# Patient Record
Sex: FEMALE | Race: WHITE | NOT HISPANIC OR LATINO | URBAN - METROPOLITAN AREA
[De-identification: names, ages, dates, MRNs, and addresses within clinical notes are randomized per-mention and may not be internally consistent; named-entity substitution may affect disease eponyms.]

---

## 2017-01-20 ENCOUNTER — EMERGENCY (EMERGENCY)
Facility: HOSPITAL | Age: 57
LOS: 0 days | Discharge: HOME | End: 2017-01-21
Admitting: EMERGENCY MEDICINE
Payer: COMMERCIAL

## 2017-01-20 DIAGNOSIS — R50.9 FEVER, UNSPECIFIED: ICD-10-CM

## 2017-01-20 DIAGNOSIS — E86.0 DEHYDRATION: ICD-10-CM

## 2017-01-20 DIAGNOSIS — N39.0 URINARY TRACT INFECTION, SITE NOT SPECIFIED: ICD-10-CM

## 2017-01-20 DIAGNOSIS — J02.9 ACUTE PHARYNGITIS, UNSPECIFIED: ICD-10-CM

## 2017-01-20 DIAGNOSIS — J02.8 ACUTE PHARYNGITIS DUE TO OTHER SPECIFIED ORGANISMS: ICD-10-CM

## 2017-01-20 PROCEDURE — 99284 EMERGENCY DEPT VISIT MOD MDM: CPT | Mod: 25

## 2017-01-20 PROCEDURE — 71020: CPT | Mod: 26

## 2017-04-06 ENCOUNTER — APPOINTMENT (OUTPATIENT)
Dept: GASTROENTEROLOGY | Facility: CLINIC | Age: 57
End: 2017-04-06

## 2017-04-06 VITALS
SYSTOLIC BLOOD PRESSURE: 135 MMHG | WEIGHT: 184 LBS | OXYGEN SATURATION: 97 % | BODY MASS INDEX: 30.66 KG/M2 | HEIGHT: 65 IN | DIASTOLIC BLOOD PRESSURE: 85 MMHG | HEART RATE: 81 BPM

## 2017-04-06 DIAGNOSIS — R93.2 ABNORMAL FINDINGS ON DIAGNOSTIC IMAGING OF LIVER AND BILIARY TRACT: ICD-10-CM

## 2017-04-06 DIAGNOSIS — R94.8 ABNORMAL RESULTS OF FUNCTION STUDIES OF OTHER ORGANS AND SYSTEMS: ICD-10-CM

## 2017-04-12 ENCOUNTER — APPOINTMENT (OUTPATIENT)
Dept: UROLOGY | Facility: CLINIC | Age: 57
End: 2017-04-12

## 2017-05-05 ENCOUNTER — INPATIENT (INPATIENT)
Facility: HOSPITAL | Age: 57
LOS: 24 days | Discharge: HOME | End: 2017-05-30
Attending: PSYCHIATRY & NEUROLOGY | Admitting: PSYCHIATRY & NEUROLOGY
Payer: COMMERCIAL

## 2017-05-05 VITALS
TEMPERATURE: 98 F | RESPIRATION RATE: 15 BRPM | HEIGHT: 64 IN | SYSTOLIC BLOOD PRESSURE: 157 MMHG | OXYGEN SATURATION: 100 % | WEIGHT: 145.06 LBS | DIASTOLIC BLOOD PRESSURE: 86 MMHG | HEART RATE: 78 BPM

## 2017-05-05 DIAGNOSIS — F30.2 MANIC EPISODE, SEVERE WITH PSYCHOTIC SYMPTOMS: ICD-10-CM

## 2017-05-05 DIAGNOSIS — R69 ILLNESS, UNSPECIFIED: ICD-10-CM

## 2017-05-05 LAB
ALBUMIN SERPL ELPH-MCNC: 3.5 G/DL — SIGNIFICANT CHANGE UP (ref 3.3–5)
ALP SERPL-CCNC: 166 U/L — HIGH (ref 40–120)
ALT FLD-CCNC: 86 U/L — HIGH (ref 12–78)
AMPHET UR-MCNC: NEGATIVE — SIGNIFICANT CHANGE UP
ANION GAP SERPL CALC-SCNC: 13 MMOL/L — SIGNIFICANT CHANGE UP (ref 5–17)
APAP SERPL-MCNC: <2 UG/ML — LOW (ref 10–30)
APPEARANCE UR: (no result)
AST SERPL-CCNC: 49 U/L — HIGH (ref 15–37)
BACTERIA # UR AUTO: (no result)
BARBITURATES UR SCN-MCNC: NEGATIVE — SIGNIFICANT CHANGE UP
BASOPHILS # BLD AUTO: 0.1 K/UL — SIGNIFICANT CHANGE UP (ref 0–0.2)
BASOPHILS NFR BLD AUTO: 1.4 % — SIGNIFICANT CHANGE UP (ref 0–2)
BENZODIAZ UR-MCNC: NEGATIVE — SIGNIFICANT CHANGE UP
BILIRUB SERPL-MCNC: 0.4 MG/DL — SIGNIFICANT CHANGE UP (ref 0.2–1.2)
BILIRUB UR-MCNC: NEGATIVE — SIGNIFICANT CHANGE UP
BUN SERPL-MCNC: 32 MG/DL — HIGH (ref 7–23)
CALCIUM SERPL-MCNC: 9.4 MG/DL — SIGNIFICANT CHANGE UP (ref 8.5–10.1)
CHLORIDE SERPL-SCNC: 109 MMOL/L — HIGH (ref 96–108)
CO2 SERPL-SCNC: 20 MMOL/L — LOW (ref 22–31)
COCAINE METAB.OTHER UR-MCNC: NEGATIVE — SIGNIFICANT CHANGE UP
COLOR SPEC: YELLOW — SIGNIFICANT CHANGE UP
CREAT SERPL-MCNC: 1.7 MG/DL — HIGH (ref 0.5–1.3)
DIFF PNL FLD: (no result)
EOSINOPHIL # BLD AUTO: 0.1 K/UL — SIGNIFICANT CHANGE UP (ref 0–0.5)
EOSINOPHIL NFR BLD AUTO: 1.7 % — SIGNIFICANT CHANGE UP (ref 0–6)
EPI CELLS # UR: (no result)
ETHANOL SERPL-MCNC: <10 MG/DL — SIGNIFICANT CHANGE UP (ref 0–10)
GLUCOSE SERPL-MCNC: 103 MG/DL — HIGH (ref 70–99)
GLUCOSE UR QL: NEGATIVE MG/DL — SIGNIFICANT CHANGE UP
HCG SERPL-ACNC: 4 MIU/ML — SIGNIFICANT CHANGE UP
HCT VFR BLD CALC: 36.4 % — SIGNIFICANT CHANGE UP (ref 34.5–45)
HGB BLD-MCNC: 11.9 G/DL — SIGNIFICANT CHANGE UP (ref 11.5–15.5)
KETONES UR-MCNC: NEGATIVE — SIGNIFICANT CHANGE UP
LEUKOCYTE ESTERASE UR-ACNC: (no result)
LITHIUM SERPL-MCNC: <0.2 MMOL/L — LOW (ref 0.6–1.2)
LYMPHOCYTES # BLD AUTO: 1.1 K/UL — SIGNIFICANT CHANGE UP (ref 1–3.3)
LYMPHOCYTES # BLD AUTO: 12.9 % — LOW (ref 13–44)
MCHC RBC-ENTMCNC: 28.3 PG — SIGNIFICANT CHANGE UP (ref 27–34)
MCHC RBC-ENTMCNC: 32.6 GM/DL — SIGNIFICANT CHANGE UP (ref 32–36)
MCV RBC AUTO: 86.8 FL — SIGNIFICANT CHANGE UP (ref 80–100)
METHADONE UR-MCNC: NEGATIVE — SIGNIFICANT CHANGE UP
MONOCYTES # BLD AUTO: 0.5 K/UL — SIGNIFICANT CHANGE UP (ref 0–0.9)
MONOCYTES NFR BLD AUTO: 5.8 % — SIGNIFICANT CHANGE UP (ref 2–14)
NEUTROPHILS # BLD AUTO: 6.4 K/UL — SIGNIFICANT CHANGE UP (ref 1.8–7.4)
NEUTROPHILS NFR BLD AUTO: 78.1 % — HIGH (ref 43–77)
NITRITE UR-MCNC: NEGATIVE — SIGNIFICANT CHANGE UP
OPIATES UR-MCNC: NEGATIVE — SIGNIFICANT CHANGE UP
PCP SPEC-MCNC: SIGNIFICANT CHANGE UP
PCP UR-MCNC: NEGATIVE — SIGNIFICANT CHANGE UP
PH UR: 6 — SIGNIFICANT CHANGE UP (ref 5–8)
PLATELET # BLD AUTO: 212 K/UL — SIGNIFICANT CHANGE UP (ref 150–400)
POTASSIUM SERPL-MCNC: 3.9 MMOL/L — SIGNIFICANT CHANGE UP (ref 3.5–5.3)
POTASSIUM SERPL-SCNC: 3.9 MMOL/L — SIGNIFICANT CHANGE UP (ref 3.5–5.3)
PROT SERPL-MCNC: 7.5 GM/DL — SIGNIFICANT CHANGE UP (ref 6–8.3)
PROT UR-MCNC: 100 MG/DL
RBC # BLD: 4.19 M/UL — SIGNIFICANT CHANGE UP (ref 3.8–5.2)
RBC # FLD: 12.2 % — SIGNIFICANT CHANGE UP (ref 10.3–14.5)
RBC CASTS # UR COMP ASSIST: SIGNIFICANT CHANGE UP /HPF (ref 0–4)
SALICYLATES SERPL-MCNC: <1.7 MG/DL — LOW (ref 2.8–20)
SODIUM SERPL-SCNC: 142 MMOL/L — SIGNIFICANT CHANGE UP (ref 135–145)
SP GR SPEC: 1.01 — SIGNIFICANT CHANGE UP (ref 1.01–1.02)
THC UR QL: NEGATIVE — SIGNIFICANT CHANGE UP
UROBILINOGEN FLD QL: NEGATIVE MG/DL — SIGNIFICANT CHANGE UP
WBC # BLD: 8.2 K/UL — SIGNIFICANT CHANGE UP (ref 3.8–10.5)
WBC # FLD AUTO: 8.2 K/UL — SIGNIFICANT CHANGE UP (ref 3.8–10.5)
WBC UR QL: SIGNIFICANT CHANGE UP

## 2017-05-05 PROCEDURE — 99285 EMERGENCY DEPT VISIT HI MDM: CPT

## 2017-05-05 PROCEDURE — 93010 ELECTROCARDIOGRAM REPORT: CPT

## 2017-05-05 RX ORDER — HALOPERIDOL DECANOATE 100 MG/ML
5 INJECTION INTRAMUSCULAR ONCE
Qty: 0 | Refills: 0 | Status: COMPLETED | OUTPATIENT
Start: 2017-05-05 | End: 2017-05-05

## 2017-05-05 RX ORDER — DIPHENHYDRAMINE HCL 50 MG
50 CAPSULE ORAL ONCE
Qty: 0 | Refills: 0 | Status: DISCONTINUED | OUTPATIENT
Start: 2017-05-05 | End: 2017-05-30

## 2017-05-05 RX ORDER — DOCUSATE SODIUM 100 MG
100 CAPSULE ORAL DAILY
Qty: 0 | Refills: 0 | Status: DISCONTINUED | OUTPATIENT
Start: 2017-05-05 | End: 2017-05-19

## 2017-05-05 RX ORDER — MAGNESIUM HYDROXIDE 400 MG/1
30 TABLET, CHEWABLE ORAL DAILY
Qty: 0 | Refills: 0 | Status: DISCONTINUED | OUTPATIENT
Start: 2017-05-05 | End: 2017-05-30

## 2017-05-05 RX ORDER — HALOPERIDOL DECANOATE 100 MG/ML
5 INJECTION INTRAMUSCULAR ONCE
Qty: 0 | Refills: 0 | Status: COMPLETED | OUTPATIENT
Start: 2017-05-05 | End: 2017-05-06

## 2017-05-05 RX ORDER — ACETAMINOPHEN 500 MG
650 TABLET ORAL EVERY 6 HOURS
Qty: 0 | Refills: 0 | Status: DISCONTINUED | OUTPATIENT
Start: 2017-05-05 | End: 2017-05-18

## 2017-05-05 RX ORDER — QUETIAPINE FUMARATE 200 MG/1
50 TABLET, FILM COATED ORAL AT BEDTIME
Qty: 0 | Refills: 0 | Status: DISCONTINUED | OUTPATIENT
Start: 2017-05-05 | End: 2017-05-06

## 2017-05-05 RX ADMIN — Medication 2 MILLIGRAM(S): at 12:00

## 2017-05-05 RX ADMIN — HALOPERIDOL DECANOATE 5 MILLIGRAM(S): 100 INJECTION INTRAMUSCULAR at 12:00

## 2017-05-05 RX ADMIN — QUETIAPINE FUMARATE 50 MILLIGRAM(S): 200 TABLET, FILM COATED ORAL at 20:20

## 2017-05-05 NOTE — ED BEHAVIORAL HEALTH ASSESSMENT NOTE - DESCRIPTION
Agitated,  acutely manic, uncooperative with interview due to paranoia, rambling pressures speech, illogical, delusional, thoughts feels  is stealing from her and neighbors are involved,  appears internally preoccupied, high energy.  Per police report Pt made suicidal statement about  ending it all". "stage 3 renal disease, elevated liver enzymes, MGUS,, breast Ca DSIS left breast, Cardiac Murmur, DJD back, GERD, Lymes Disoease, Uterine Leiomyoma, tonsilectomy lives with , no children, graduated [employer] and has some college.  Pt has own insurance co but can not function since stopping meds

## 2017-05-05 NOTE — ED PROVIDER NOTE - PMH
Bipolar disorder    Breast CA  DCIS, left breast, s/p RT  Cardiac murmur    Degenerative disc disease, lumbar    Depression    GERD (gastroesophageal reflux disease)    Kidney disease  "stage 3" as per patient secondary to lithium treatment many years ago  Lyme disease    Uterine leiomyoma

## 2017-05-05 NOTE — ED ADULT NURSE REASSESSMENT NOTE - NS ED NURSE REASSESS COMMENT FT1
Pt had to be chemically restrained for agitation and aggression towards staff. Pt spoken to by several staff members prior to incident without change. Pt in room at this time, blood work drawn and sent, pending results. Will continue to monitor for safety and comfort.

## 2017-05-05 NOTE — ED BEHAVIORAL HEALTH ASSESSMENT NOTE - OTHER PAST PSYCHIATRIC HISTORY (INCLUDE DETAILS REGARDING ONSET, COURSE OF ILLNESS, INPATIENT/OUTPATIENT TREATMENT)
multiple psych admission last at  March of 2016 and in 2013 in [facility].  Last psych provider is Dr Darryl Gant in [place] multiple psych admission last at  March of 2016 and in 2015 in [facility] to [place].  Last psych provider is Dr Darryl Gant in [place]

## 2017-05-05 NOTE — ED PROVIDER NOTE - PSH
S/P coronary angiogram  10 yrs ago, no intervention  S/P dilation and curettage  uterine polyps  S/P lumpectomy of breast  2011, left breast, no lymph nodes removed  S/P tonsillectomy

## 2017-05-05 NOTE — ED PROVIDER NOTE - PROGRESS NOTE DETAILS
Pt appears acutely psychotic. Unable to redirect verbally. Will use haldol and ativan to treat for psychosis and aggression. Pt evaluated by psych NPTommy. Pt will likely need involuntary admission. Awaiting labs for medical clearance. Pt is now sleeping. Haldol and ativan has worked for pt's sharlene. Physical exam is nonfocal.

## 2017-05-05 NOTE — ED PROVIDER NOTE - MEDICAL DECISION MAKING DETAILS
57 yo pt with hx bipolar.  pt presents with possible manic episode.  pt will in psych admission for evaluation and work up.

## 2017-05-05 NOTE — ED PROVIDER NOTE - NS ED MD SCRIBE ATTENDING SCRIBE SECTIONS
PHYSICAL EXAM/RESULTS/REVIEW OF SYSTEMS/DISPOSITION/PROGRESS NOTE/PAST MEDICAL/SURGICAL/SOCIAL HISTORY/HISTORY OF PRESENT ILLNESS

## 2017-05-05 NOTE — ED BEHAVIORAL HEALTH ASSESSMENT NOTE - SUMMARY
Pt is a 89 yowmf with PPH Bipolar and is noncompliant with meds due to medical comorbidities.  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  Pt refuses psych treatment and her meds and has tried alternativem but unsuccessful results.  Pt is agitated and psychotic and will require inpt psych admission for mood instability and psychosis and is a danger to self and others.  Admission will be  a [phone] to Dr. Pappas.  Case reviewed with Dr White.
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
## 2017-05-05 NOTE — PATIENT PROFILE BEHAVIORAL HEALTH - NS TRANSFER PATIENT BELONGINGS
pt has yellow rings on her person/silver bracl. in [employer] [address] [employer]/Clothing/Jewelry/Money (specify) pt has 2 yellow rings and 2 grey rings on her person/silver bracl. in [address] [employer]/Jewelry/Money (specify)/Clothing

## 2017-05-05 NOTE — ED PROVIDER NOTE - PSYCHIATRIC, MLM
Agitated. Unable to get close to pt. Pressured speech. Paranoid. Flight of ideas. Tangential when speaking.

## 2017-05-05 NOTE — ED PROVIDER NOTE - OBJECTIVE STATEMENT
55 y/o female with a h/o bipolar disorder, presenting to ED for agitation, paranoia and delusions. Pt denies being depressed. Pt says her  told her to "check into the hospital". She is stating that her psych medications will not help her and she will not take them. Pt states she has been "trying to get out of the bad environment" she was in due to her  and parents. She reports "stress at work", and states that she has no friends. Pt claims she has been staying in hotels.

## 2017-05-06 DIAGNOSIS — F31.2 BIPOLAR DISORDER, CURRENT EPISODE MANIC SEVERE WITH PSYCHOTIC FEATURES: ICD-10-CM

## 2017-05-06 DIAGNOSIS — Z63.9 PROBLEM RELATED TO PRIMARY SUPPORT GROUP, UNSPECIFIED: ICD-10-CM

## 2017-05-06 DIAGNOSIS — F31.5 BIPOLAR DISORDER, CURRENT EPISODE DEPRESSED, SEVERE, WITH PSYCHOTIC FEATURES: ICD-10-CM

## 2017-05-06 DIAGNOSIS — N28.9 DISORDER OF KIDNEY AND URETER, UNSPECIFIED: ICD-10-CM

## 2017-05-06 DIAGNOSIS — Z91.19 PATIENT'S NONCOMPLIANCE WITH OTHER MEDICAL TREATMENT AND REGIMEN: ICD-10-CM

## 2017-05-06 LAB
ALBUMIN SERPL ELPH-MCNC: 2.7 G/DL — LOW (ref 3.3–5)
ALP SERPL-CCNC: 130 U/L — HIGH (ref 40–120)
ALT FLD-CCNC: 64 U/L — SIGNIFICANT CHANGE UP (ref 12–78)
AST SERPL-CCNC: 34 U/L — SIGNIFICANT CHANGE UP (ref 15–37)
BILIRUB DIRECT SERPL-MCNC: <0.1 MG/DL — SIGNIFICANT CHANGE UP (ref 0–0.2)
BILIRUB INDIRECT FLD-MCNC: >0.2 MG/DL — SIGNIFICANT CHANGE UP (ref 0.2–1)
BILIRUB SERPL-MCNC: 0.3 MG/DL — SIGNIFICANT CHANGE UP (ref 0.2–1.2)
CHOLEST SERPL-MCNC: 197 MG/DL — SIGNIFICANT CHANGE UP (ref 10–199)
HCG SERPL-ACNC: 4 MIU/ML — SIGNIFICANT CHANGE UP
HDLC SERPL-MCNC: 67 MG/DL — SIGNIFICANT CHANGE UP (ref 40–125)
LIPID PNL WITH DIRECT LDL SERPL: 113 MG/DL — SIGNIFICANT CHANGE UP
PROT SERPL-MCNC: 6.3 GM/DL — SIGNIFICANT CHANGE UP (ref 6–8.3)
TOTAL CHOLESTEROL/HDL RATIO MEASUREMENT: 2.9 RATIO — LOW (ref 3.3–7.1)
TRIGL SERPL-MCNC: 87 MG/DL — SIGNIFICANT CHANGE UP (ref 10–149)
TSH SERPL-MCNC: 1.86 UU/ML — SIGNIFICANT CHANGE UP (ref 0.36–3.74)

## 2017-05-06 RX ORDER — HALOPERIDOL DECANOATE 100 MG/ML
5 INJECTION INTRAMUSCULAR EVERY 6 HOURS
Qty: 0 | Refills: 0 | Status: DISCONTINUED | OUTPATIENT
Start: 2017-05-06 | End: 2017-05-30

## 2017-05-06 RX ORDER — QUETIAPINE FUMARATE 200 MG/1
100 TABLET, FILM COATED ORAL AT BEDTIME
Qty: 0 | Refills: 0 | Status: DISCONTINUED | OUTPATIENT
Start: 2017-05-06 | End: 2017-05-07

## 2017-05-06 RX ADMIN — HALOPERIDOL DECANOATE 5 MILLIGRAM(S): 100 INJECTION INTRAMUSCULAR at 10:24

## 2017-05-06 RX ADMIN — QUETIAPINE FUMARATE 100 MILLIGRAM(S): 200 TABLET, FILM COATED ORAL at 21:54

## 2017-05-06 RX ADMIN — Medication 2 MILLIGRAM(S): at 10:25

## 2017-05-06 SDOH — SOCIAL STABILITY - SOCIAL INSECURITY: PROBLEM RELATED TO PRIMARY SUPPORT GROUP, UNSPECIFIED: Z63.9

## 2017-05-06 NOTE — BEHAVIORAL HEALTH ASSESSMENT NOTE - NSBHMEDSOTHERFT_PSY_A_CORE
as above  Lithium and Depakote both DC'd 2-3 months ago due to pt adverse effects  Seroquel 600 mg po qhs ( also effective but also apparently DC'd as above)

## 2017-05-06 NOTE — BEHAVIORAL HEALTH ASSESSMENT NOTE - NSBHCHARTREVIEWVS_PSY_A_CORE FT
Vital Signs Last 24 Hrs  T(C): 36.6, Max: 36.6 (05-06 @ 08:21)  T(F): 97.9, Max: 97.9 (05-06 @ 08:21)  HR: 82 (82 - 82)  BP: 121/82 (121/82 - 121/82)  BP(mean): --  RR: 16 (16 - 16)  SpO2: 100% (100% - 100%)

## 2017-05-06 NOTE — BEHAVIORAL HEALTH ASSESSMENT NOTE - NSBHADMBHPROVCNTCTNOFT_PSY_A_CORE
pt has not seen any outpatient provider in months. Plan to contact last provider when pt is well enough to give verbal consent
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
## 2017-05-06 NOTE — BEHAVIORAL HEALTH ASSESSMENT NOTE - NSBHCHARTREVIEWLAB_PSY_A_CORE FT
CBC Full  -  ( 05 May 2017 12:26 )  WBC Count : 8.2 K/uL  Hemoglobin : 11.9 g/dL  Hematocrit : 36.4 %  Platelet Count - Automated : 212 K/uL  Mean Cell Volume : 86.8 fl  Mean Cell Hemoglobin : 28.3 pg  Mean Cell Hemoglobin Concentration : 32.6 gm/dL  Auto Neutrophil # : 6.4 K/uL  Auto Lymphocyte # : 1.1 K/uL  Auto Monocyte # : 0.5 K/uL  Auto Eosinophil # : 0.1 K/uL  Auto Basophil # : 0.1 K/uL  Auto Neutrophil % : 78.1 %  Auto Lymphocyte % : 12.9 %  Auto Monocyte % : 5.8 %  Auto Eosinophil % : 1.7 %  Auto Basophil % : 1.4 %        142  |  109<H>  |  32<H>  ----------------------------<  103<H>  3.9   |  20<L>  |  1.70<H>    Ca    9.4      05 May 2017 12:26    TPro  6.3  /  Alb  2.7<L>  /  TBili  0.3  /  DBili  <0.1  /  AST  34  /  ALT  64  /  AlkPhos  130<H>        Urinalysis Basic - ( 05 May 2017 12:22 )    Color: Yellow / Appearance: Slightly Turbid / S.010 / pH: x  Gluc: x / Ketone: Negative  / Bili: Negative / Urobili: Negative mg/dL   Blood: x / Protein: 100 mg/dL / Nitrite: Negative   Leuk Esterase: Moderate / RBC: 0-2 /HPF / WBC 3-5   Sq Epi: x / Non Sq Epi: Moderate / Bacteria: Few

## 2017-05-06 NOTE — BEHAVIORAL HEALTH ASSESSMENT NOTE - OTHER PAST PSYCHIATRIC HISTORY (INCLUDE DETAILS REGARDING ONSET, COURSE OF ILLNESS, INPATIENT/OUTPATIENT TREATMENT)
see ED HPI  Pt with a h/o prior inpatient admissions due to manic psychosis in the context of clinical relapse  Pt admitted to [facility] in 3/2016   Admission to [facility] in 2015  Pt h/o outpatient treatment with Dr Darryl Gant in [place]

## 2017-05-06 NOTE — BEHAVIORAL HEALTH ASSESSMENT NOTE - NSBHADMITIPDSM4_PSY_A_CORE FT
ongoing psychosocial stressors largely in the context of pt's h/o treatment noncompliance with subsequent clinical relapse

## 2017-05-06 NOTE — BEHAVIORAL HEALTH ASSESSMENT NOTE - PROBLEM SELECTOR PLAN 2
1. Ongoing pt staff support and pt psychoeducation related to treatment of bipolar d/o including various treatment options.

## 2017-05-06 NOTE — BEHAVIORAL HEALTH ASSESSMENT NOTE - PROBLEM SELECTOR PLAN 1
1. Restart Seroquel 50 mg po qhs with slow titration and monitoring of CMP and LFTs   2. Unable to resume Lithium due to renal disease  3.Depakote also stopped due to prior elevated LFTs  4.To gather more clinical information from collaterals to aid in treatment and DC planning  5.Pt to attend therapy groups when clinicall more stable and able to meaningfully participate

## 2017-05-06 NOTE — BEHAVIORAL HEALTH ASSESSMENT NOTE - PROBLEM SELECTOR PLAN 3
1. Hospitalist H and P pending  2.To gather further pt PMH to aid in diagnosis and treatment of the pt's severe bipolar d/o

## 2017-05-06 NOTE — BEHAVIORAL HEALTH ASSESSMENT NOTE - NSBHSUICRISKFACTOR_PSY_A_CORE
Unable to engage in safety planning/Impulsivity/Agitation/severe anxiety/Mood episode/Chronic pain or acute medical issue

## 2017-05-06 NOTE — BEHAVIORAL HEALTH ASSESSMENT NOTE - OTHER
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

## 2017-05-06 NOTE — BEHAVIORAL HEALTH ASSESSMENT NOTE - NSBHSUICPROTECTFACT_PSY_A_CORE
Future oriented/Other/Responsibility to family and others/Identifies reasons for living/Supportive social network or family

## 2017-05-06 NOTE — BEHAVIORAL HEALTH ASSESSMENT NOTE - NSBHADMITIPSTRENGTH_PSY_A_CORE
Able to set and pursue goals/Has access to housing/residential stability/Able to exercise self-direction/other/Creative/Intelligent/Has supportive interpersonal relationships with family, friends or peers/Attempting to realize his/her potential/Has vocational interests or hobbies

## 2017-05-06 NOTE — BEHAVIORAL HEALTH ASSESSMENT NOTE - PRIMARY DX
Manic episode, severe with psychotic symptoms Bipolar affective disorder, manic, severe, with psychotic behavior

## 2017-05-06 NOTE — BEHAVIORAL HEALTH ASSESSMENT NOTE - AXIS III
Stage 3 renal disease, elevated liver enzymes, GERD, MGUS Stage 3 renal disease, elevated liver enzymes, GERD, MGUS  Left breast cancer s/p DSIS  DJD  back, h/o uterine leiomyoma, h/o Lyme disease

## 2017-05-06 NOTE — BEHAVIORAL HEALTH ASSESSMENT NOTE - DESCRIPTION
"stage 3 renal disease, elevated liver enzymes, MGUS,, breast Ca DSIS left breast, Cardiac Murmur, DJD back, GERD, Lymes Disoease, Uterine Leiomyoma, tonsilectomy

## 2017-05-06 NOTE — BEHAVIORAL HEALTH ASSESSMENT NOTE - PROBLEM SELECTOR PLAN 4
1.When pt is again more clinically stable, to address with pt and her  the tension surrounding the pt's h/o treatment noncompliance and the resultant strain on both their lives  2.JEF discussion and group resources etc for both pt and her

## 2017-05-06 NOTE — BEHAVIORAL HEALTH ASSESSMENT NOTE - NSBHVIOLRISKFACTORFT_PSY_A_CORE
when bipolar manic and psychotic in the context of recent need to DC previously effective medications due to adverse effects ( Stage 3 renal disease and recent h/o elevated LFTs .) LFTs now largely normalized

## 2017-05-06 NOTE — BEHAVIORAL HEALTH ASSESSMENT NOTE - NSBHCHARTREVIEWINVESTIGATE_PSY_A_CORE FT
MEDICATIONS  (STANDING):  docusate sodium 100milliGRAM(s) Oral daily  QUEtiapine 50milliGRAM(s) Oral at bedtime    MEDICATIONS  (PRN):  diphenhydrAMINE   Injectable 50milliGRAM(s) IntraMuscular once PRN Agitation  LORazepam     Tablet 2milliGRAM(s) Oral every 6 hours PRN Agitation  acetaminophen   Tablet 650milliGRAM(s) Oral every 6 hours PRN For Temp greater than 38 C (100.4 F)  aluminum hydroxide/magnesium hydroxide/simethicone Suspension 30milliLiter(s) Oral every 6 hours PRN Dyspepsia  magnesium hydroxide Suspension 30milliLiter(s) Oral daily PRN Constipation  haloperidol    Injectable 5milliGRAM(s) IntraMuscular every 6 hours PRN Agitation due to bipolar manic psychosis  LORazepam   Injectable 2milliGRAM(s) IntraMuscular every 6 hours PRN Agitation

## 2017-05-06 NOTE — BEHAVIORAL HEALTH ASSESSMENT NOTE - NSBHADMITTHERAPIESTARGET_PSY_A_CORE FT
to stabilze mood and to decrease thought disorganiztion to stabilze mood and to decrease thought disorganization

## 2017-05-07 RX ORDER — QUETIAPINE FUMARATE 200 MG/1
150 TABLET, FILM COATED ORAL AT BEDTIME
Qty: 0 | Refills: 0 | Status: DISCONTINUED | OUTPATIENT
Start: 2017-05-07 | End: 2017-05-09

## 2017-05-07 RX ADMIN — Medication 2 MILLIGRAM(S): at 20:15

## 2017-05-07 RX ADMIN — Medication 2 MILLIGRAM(S): at 10:14

## 2017-05-07 NOTE — PROGRESS NOTE BEHAVIORAL HEALTH - AXIS III
Stage 3 renal disease, elevated liver enzymes, GERD, MGUS  Left breast cancer s/p DSIS  DJD  back, h/o uterine leiomyoma, h/o Lyme disease
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
## 2017-05-07 NOTE — PROGRESS NOTE BEHAVIORAL HEALTH - OTHER
unable to assess fully, appears internally preoccupied verbally abusive, threatening nearly impossible to interrupt frightened despite pt insistence that " I'm fine!"

## 2017-05-07 NOTE — PROGRESS NOTE BEHAVIORAL HEALTH - NSBHFUPINTERVALHXFT_PSY_A_CORE
Pt tolerating newly slowly restarted Seroquel as above. Pt remains severely ill with manic psychotic dangerously impulsive behaviors.

## 2017-05-07 NOTE — PROGRESS NOTE BEHAVIORAL HEALTH - PROBLEM SELECTOR PLAN 1
1. Continue slow titration of Seroquel  now to 150 mg po qhs   2. Unable to resume Lithium due to renal disease  3.Depakote also stopped due to prior elevated LFTs  4.To gather more clinical information from collaterals to aid in treatment and DC planning  5.Pt to attend therapy groups when clinically more stable and able to meaningfully participate  7. Close ongoing lab monitoring

## 2017-05-08 LAB
ALBUMIN SERPL ELPH-MCNC: 3.6 G/DL — SIGNIFICANT CHANGE UP (ref 3.3–5)
ALP SERPL-CCNC: 160 U/L — HIGH (ref 40–120)
ALT FLD-CCNC: 72 U/L — SIGNIFICANT CHANGE UP (ref 12–78)
ANION GAP SERPL CALC-SCNC: 10 MMOL/L — SIGNIFICANT CHANGE UP (ref 5–17)
AST SERPL-CCNC: 45 U/L — HIGH (ref 15–37)
BILIRUB SERPL-MCNC: 0.3 MG/DL — SIGNIFICANT CHANGE UP (ref 0.2–1.2)
BUN SERPL-MCNC: 34 MG/DL — HIGH (ref 7–23)
CALCIUM SERPL-MCNC: 9.4 MG/DL — SIGNIFICANT CHANGE UP (ref 8.5–10.1)
CHLORIDE SERPL-SCNC: 108 MMOL/L — SIGNIFICANT CHANGE UP (ref 96–108)
CO2 SERPL-SCNC: 25 MMOL/L — SIGNIFICANT CHANGE UP (ref 22–31)
CREAT SERPL-MCNC: 1.78 MG/DL — HIGH (ref 0.5–1.3)
GLUCOSE SERPL-MCNC: 92 MG/DL — SIGNIFICANT CHANGE UP (ref 70–99)
POTASSIUM SERPL-MCNC: 4.3 MMOL/L — SIGNIFICANT CHANGE UP (ref 3.5–5.3)
POTASSIUM SERPL-SCNC: 4.3 MMOL/L — SIGNIFICANT CHANGE UP (ref 3.5–5.3)
PROT SERPL-MCNC: 7.9 GM/DL — SIGNIFICANT CHANGE UP (ref 6–8.3)
SODIUM SERPL-SCNC: 143 MMOL/L — SIGNIFICANT CHANGE UP (ref 135–145)

## 2017-05-08 PROCEDURE — 76770 US EXAM ABDO BACK WALL COMP: CPT | Mod: 26

## 2017-05-08 RX ADMIN — Medication 2 MILLIGRAM(S): at 18:04

## 2017-05-08 NOTE — PROGRESS NOTE BEHAVIORAL HEALTH - NSBHCHARTREVIEWLAB_PSY_A_CORE FT
05-08    143  |  108  |  34<H>  ----------------------------<  92  4.3   |  25  |  1.78<H>    Ca    9.4      08 May 2017 07:07    TPro  7.9  /  Alb  3.6  /  TBili  0.3  /  DBili  x   /  AST  45<H>  /  ALT  72  /  AlkPhos  160<H>  05-08

## 2017-05-08 NOTE — PROGRESS NOTE BEHAVIORAL HEALTH - SUMMARY
Pt is a 89 yo wmf with PPH Bipolar and is noncompliant with meds due to medical comorbidities.  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  Pt refuses psych treatment and her meds and has tried alternate treatment options  but with unsuccessful results.  Pt admitted to N and remains on a CO 1:1 for her safety and that of others in light of pt's severe manic psychotic clinical presentation.

## 2017-05-08 NOTE — CONSULT NOTE ADULT - SUBJECTIVE AND OBJECTIVE BOX
Chief complaints.: Brought to ER by Police due to [place] and suicidal ideation.    HPI:  Hx per pt and obtained per Chart.  55 yo woman with Bipolar disorder since age 16.  Had been on Lithium therapy for twenty yrs--Pt believes due abnormal renal fx.   Apparently has had renal evaluation with several different nephrologists --Nilesh Gross, Dr Grigsby and Dr Rosado.  States she did not want to go back for follow up since nothing was done to improve her kidney fx.  Pt is aware of CKD due to Lithium therapy.  Believes if she is just treated with holistic medicine, her condition should improve.  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  Continued to make her bed despite several requests to stop and talk to me.  No significant change in urinary habits.  However does complain of chronic urinary incontinence since Hysterectomy due to large fibroid.   Reports "hydronephrosis" post -op but pt is unclear on intervention for this.      PMHX and PSHX.  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  4.Hx of Hysterectomy with Fibroid removal  5.Hx of hydronephrosis.      FAMILY HISTORY: Non-contributory      SOCIAL HISTORY :  No smoking or ETOH hx  Allergies    No Known Allergies    Intolerances    Review of systems.  Denies HA  Denies SOB  Denies Chest discomfort  Complains of urinary incontinence.  Denies dysuria  Denies abdominal pain.    MEDICATIONS  (STANDING):  docusate sodium 100milliGRAM(s) Oral daily  QUEtiapine 150milliGRAM(s) Oral at bedtime    MEDICATIONS  (PRN):  diphenhydrAMINE   Injectable 50milliGRAM(s) IntraMuscular once PRN Agitation  LORazepam     Tablet 2milliGRAM(s) Oral every 6 hours PRN Agitation  acetaminophen   Tablet 650milliGRAM(s) Oral every 6 hours PRN For Temp greater than 38 C (100.4 F)  aluminum hydroxide/magnesium hydroxide/simethicone Suspension 30milliLiter(s) Oral every 6 hours PRN Dyspepsia  magnesium hydroxide Suspension 30milliLiter(s) Oral daily PRN Constipation  haloperidol    Injectable 5milliGRAM(s) IntraMuscular every 6 hours PRN Agitation due to bipolar manic psychosis  LORazepam   Injectable 2milliGRAM(s) IntraMuscular every 6 hours PRN Agitation         Vital Signs Last 24 Hrs  T(C): 36.8, Max: 36.8 (05-08 @ 08:16)  T(F): 98.3, Max: 98.3 (05-08 @ 08:16)  HR: 71 (71 - 71)  BP: 108/90 (108/90 - 108/90)  BP(mean): --  RR: 16 (16 - 16)  SpO2: 99% (99% - 99%)  Daily     Daily   I&O's Summary      PHYSICAL EXAM: Awake, Hyperactive with flight of ideas  GEN: No resp distress  HEENT: WNL  NECK : supple  CV: S1S2 RRR  LUNGS: clear to aus  ABD: soft  EXT: no edema    LABS:    05-08    143  |  108  |  34<H>  ----------------------------<  92  4.3   |  25  |  1.78<H>    Ca    9.4      08 May 2017 07:07    TPro  7.9  /  Alb  3.6  /  TBili  0.3  /  DBili  x   /  AST  45<H>  /  ALT  72  /  AlkPhos  160<H>  05-08

## 2017-05-08 NOTE — PROGRESS NOTE BEHAVIORAL HEALTH - OTHER
verbally abusive, threatening nearly impossible to interrupt frightened despite pt insistence that " I'm fine!" unable to assess fully, appears internally preoccupied though pt denies AH /VH

## 2017-05-08 NOTE — PROGRESS NOTE BEHAVIORAL HEALTH - PROBLEM SELECTOR PLAN 3
1. Hospitalist H and P appreciated  2.To gather further pt PMH to aid in diagnosis and treatment of the pt's severe bipolar d/o  3.Nephrology consult requested 5/8/17.

## 2017-05-08 NOTE — PROGRESS NOTE BEHAVIORAL HEALTH - NSBHFUPINTERVALCCFT_PSY_A_CORE
" There is nothing wrong with me! . My  and my parents are stealing from me . I don't need to be here! It was the stress . They put me here!  I want to try herbal remedies. 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 Pt remains  with loud pressured speech, illogical thinking with easy volatility and angry outbursts. 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         Pt with poor sleep and remains paranoid, guarded and impaired judgment and limited insight into the nature and severity of her illness and the need for consistent, likely long term medication treatment.  Pt h/o renal diease and recent elevated LFTs are limiting pt treatment options. This was discussed at length with the pt who continued to insist she " needs to go home and get back to work".   Nephrology consult requested 5/8/17 for further assessment of pt reported Stage 3 renal disease. Labs rechecked 5/8/17 . LFTs now trending slightly upward.   Pt tolerating low dose Seroquel with efficacy too early to assess as above.  Pt remains manic, psychotic,  with marked paranoia and unpredictable bursts of angry paranoid fueled tirades .'Pt was awake most of the  night  despite restart of low dose slowly titrated Seroquel due to pt h/o renal and hepatic disorders which had most recently curtailed pt's ability to continue on previously effective Lithium and Depakote .              Pt remains on CONSTANT OBSERVATION 1;1 STAFF DUE TO PT MANIC PSYCHOTIC UNPREDICTABLE AGGRESSION  Plan   1.   Rechecked  CMP 5/8/17, and to monitor renal and hepatic indices  2.To continue to titrate Seroquel possibly to prior effective dose of 600 mg po qhs ( bipolar d/o0  3.Nephrology consult requested 5/8/17 as above  4.CO 1:1 as above for pt and others safety

## 2017-05-08 NOTE — PROVIDER CONTACT NOTE (OTHER) - REASON
h/o stage 3 renal disease, h/o bipolar disorder and lithium, Requesting assessment for worsening renal function.

## 2017-05-09 RX ORDER — QUETIAPINE FUMARATE 200 MG/1
200 TABLET, FILM COATED ORAL AT BEDTIME
Qty: 0 | Refills: 0 | Status: DISCONTINUED | OUTPATIENT
Start: 2017-05-09 | End: 2017-05-12

## 2017-05-09 RX ADMIN — Medication 650 MILLIGRAM(S): at 17:55

## 2017-05-09 RX ADMIN — QUETIAPINE FUMARATE 200 MILLIGRAM(S): 200 TABLET, FILM COATED ORAL at 21:00

## 2017-05-09 RX ADMIN — Medication 30 MILLILITER(S): at 17:55

## 2017-05-09 NOTE — PROGRESS NOTE BEHAVIORAL HEALTH - NSBHFUPINTERVALCCFT_PSY_A_CORE
" So what is wrong with me? I need to see a urologist. How are my kidneys? But what about my urine? Seroquel? What's that for? 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 I don't know why my  and my parents wanted to put me here. I don't know why they are all against me? "    Pt seen in the day room and later in her room  with pt's CO 1;1 staff nearby due to ongoing pt safety concerns related to pt's ongoing bipolar manic psychotic volatility.  Pt remains  with loud pressured speech, illogical thinking with easy volatility and angry outbursts. 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        The pt remains with frequent bursts of frenetic manic psychotic agitation.  Pt remains restless and easily agitated and frantic with little to no provocation.  Pt seen by Nephrologist on 5/8/17 for evaluation of pt's chronic kidney disease . Renal ultrasound revealed mild right hydronephrosis and bilateral small cysts.   Impression that pt's renal disease is currently largely stable  but of long duration and of slow progression.  Pt with poor sleep and remains paranoid, guarded and impaired judgment and limited insight into the nature and severity of her illness and the need for consistent, likely long term medication treatment.  Pt h/o renal diease and recent elevated LFTs are limiting pt treatment options. This was discussed at length with the pt who continued to insist she " needs to go home and get back to work".   Nephrology consult requested 5/8/17 for further assessment of pt reported Stage 3 renal disease. Labs rechecked 5/8/17 . LFTs now trending slightly upward.   Pt tolerating low dose Seroquel with efficacy too early to assess as above. Pt continues with markedly impaired judgment and with very limited  insight into the nature and severity of her bipolar d/o and the need for consistent treatment in an effort to decrease  the risk of clinical relapse.  Pt remains manic, psychotic,  with marked paranoia and unpredictable bursts of angry paranoid fueled tirades .'Pt  reportedly did not take her Seroquel last evening though she had been med compliant the night before. was awake most of the  night  despite restart of low dose slowly titrated Seroquel due to pt h/o renal and hepatic disorders which had most recently curtailed pt's ability to continue on previously effective Lithium and Depakote .              Pt remains on CONSTANT OBSERVATION 1;1 STAFF DUE TO PT manic psychotic impulsive  aggression / agitation  Plan   1.   Rechecked  CMP 5/8/17, and to monitor renal and hepatic indices  2.To continue to titrate Seroquel possibly to prior effective dose of 600 mg po qhs ( bipolar d/o0  3.Nephrology consult of 5/8/17  appreciated as above  4.CO 1:1 as above for pt and others safety

## 2017-05-09 NOTE — PROGRESS NOTE BEHAVIORAL HEALTH - PROBLEM SELECTOR PLAN 3
1. Hospitalist H and P appreciated  2.To gather further pt PMH to aid in diagnosis and treatment of the pt's severe bipolar d/o  3.Nephrology consult  5/8/17. appreciated  4.Renal Ultrasound , other testing ongoing.

## 2017-05-09 NOTE — PROGRESS NOTE BEHAVIORAL HEALTH - OTHER
frightened, argumentative at times but less verbally abusive, threatening in nature difficult  to interrupt frightened with pt somatic concerns and worries " What is wrong with me? Will I be able to live my life like before?" unable to assess fully, appears internally preoccupied though pt denies AH /VH

## 2017-05-09 NOTE — PROGRESS NOTE BEHAVIORAL HEALTH - PROBLEM SELECTOR PLAN 1
1. Continue slow titration of Seroquel  now to 200  mg po qhs   2. Unable to resume Lithium due to renal disease  3.Depakote also stopped due to prior elevated LFTs  4.To gather more clinical information from collaterals to aid in treatment and DC planning  5.Pt to attend therapy groups when clinically more stable and able to meaningfully participate  7. Close ongoing lab monitoring  8.Continue Constant Observation 1:1 staff for pt/others safety

## 2017-05-10 LAB
ALBUMIN SERPL ELPH-MCNC: 3.3 G/DL — SIGNIFICANT CHANGE UP (ref 3.3–5)
ANION GAP SERPL CALC-SCNC: 6 MMOL/L — SIGNIFICANT CHANGE UP (ref 5–17)
BUN SERPL-MCNC: 38 MG/DL — HIGH (ref 7–23)
CALCIUM SERPL-MCNC: 9.1 MG/DL — SIGNIFICANT CHANGE UP (ref 8.5–10.1)
CALCIUM SERPL-MCNC: 9.6 MG/DL — SIGNIFICANT CHANGE UP (ref 8.4–10.5)
CHLORIDE SERPL-SCNC: 104 MMOL/L — SIGNIFICANT CHANGE UP (ref 96–108)
CO2 SERPL-SCNC: 32 MMOL/L — HIGH (ref 22–31)
CREAT SERPL-MCNC: 1.76 MG/DL — HIGH (ref 0.5–1.3)
GLUCOSE SERPL-MCNC: 133 MG/DL — HIGH (ref 70–99)
PHOSPHATE SERPL-MCNC: 3.4 MG/DL — SIGNIFICANT CHANGE UP (ref 2.5–4.5)
POTASSIUM SERPL-MCNC: 3.9 MMOL/L — SIGNIFICANT CHANGE UP (ref 3.5–5.3)
POTASSIUM SERPL-SCNC: 3.9 MMOL/L — SIGNIFICANT CHANGE UP (ref 3.5–5.3)
PTH-INTACT FLD-MCNC: 77 PG/ML — HIGH (ref 15–65)
SODIUM SERPL-SCNC: 142 MMOL/L — SIGNIFICANT CHANGE UP (ref 135–145)

## 2017-05-10 RX ADMIN — Medication 2 MILLIGRAM(S): at 08:43

## 2017-05-10 RX ADMIN — QUETIAPINE FUMARATE 200 MILLIGRAM(S): 200 TABLET, FILM COATED ORAL at 21:48

## 2017-05-10 NOTE — PROGRESS NOTE BEHAVIORAL HEALTH - NSBHFUPINTERVALHXFT_PSY_A_CORE
Pt tolerating newly restarted Seroquel with plan for ongoing slow titration to alleviate pt symptoms of bipolar d/o with manic psychosis.

## 2017-05-10 NOTE — PROGRESS NOTE BEHAVIORAL HEALTH - NSBHFUPINTERVALCCFT_PSY_A_CORE
" How will I know when I'm feeling better?"  Pt seen  with her CO 1: staff in her room at various times throughout the day. Pt remains manic and thought disordered but with slightly less overall intensity. Pt able to remain in one location now for awhile and is beginning to be able to have a conversation with others in a more usual back and forth manner. Pt appears more aware of her surroundings   and is better able to be redirected when her behavior begins to escalte in a potentially unsafe manner.         Pt seen by Nephrologist on 5/8/17 for evaluation of pt's chronic kidney disease . Renal ultrasound revealed mild right hydronephrosis and bilateral small cysts.   Impression that pt's renal disease is currently largely stable  but of long duration and of slow progression.  Pt with poor sleep and remains paranoid, guarded and impaired judgment and limited insight into the nature and severity of her illness and the need for consistent, likely long term medication treatment.  Pt h/o renal diease and recent elevated LFTs are limiting pt treatment options. This was discussed at length with the pt who continued to insis   Nephrology consult requested 5/8/17 for further assessment of pt reported Stage 3 renal disease. Labs recheck judgment and with very limited  insight into the nature and severity of her bipolar d/o and the need for consistent treatment in an effort to decrease  the risk of clinical relapse.  Pt remains manic, psychotic,  with marked paranoia and unpredictable bursts of angry paranoid fueled tirades .'Pt  reportedly did not take her Seroquel last evening though she had been med compliant the night before. was awake most of the  night  despite restart of low dose slowly titrated Seroquel due to pt h/o renal and hepatic disorders which had most recently curtailed pt's ability to continue on previously effective Lithium and Depakote .            Pt had initially refused lab work per Nephrology consult but RN staff were able to persuade pt to have renal profile and PTH  5/10/17.  Discussed pt clinical progress with staff. Plan to DC CO 1:1  and to begin Management alert observation status as immediate pt safety concerns have significantly decreased.

## 2017-05-10 NOTE — PROGRESS NOTE BEHAVIORAL HEALTH - NSBHCHARTREVIEWLAB_PSY_A_CORE FT
05-10    142  |  104  |  38<H>  ----------------------------<  133<H>  3.9   |  32<H>  |  1.76<H>    Ca    9.1      10 May 2017 13:30  Phos  3.4     05-10    TPro  x   /  Alb  3.3  /  TBili  x   /  DBili  x   /  AST  x   /  ALT  x   /  AlkPhos  x   05-10

## 2017-05-11 RX ADMIN — Medication 2 MILLIGRAM(S): at 21:20

## 2017-05-11 RX ADMIN — Medication 2 MILLIGRAM(S): at 01:26

## 2017-05-11 NOTE — PROGRESS NOTE BEHAVIORAL HEALTH - NSBHFUPINTERVALCCFT_PSY_A_CORE
" I don't want to take Seroquel anymore because of the way it makes me feel. I feel fuzzy in the head. I want to try holistic treatments. "   Pt seen with RN as pt spoke with rapid somewhat digressive, overinclusive and non goal directed statements which were difficult to follow and more difficult to interrupt.   Pt continues with manic psychosis and reportedly slept a little better with Seroquel but not through t the night. Pt noted by staff to be somewhat more clinically stable with newly begun still quite low dose of Seroquel which the pt now states she does not wish to continue.         Pt seen by Nephrologist for follow up on 5/11/17 . Consult notes appreciated.  Initial consult  on 5/8/17 for evaluation of pt's chronic kidney disease . Renal ultrasound revealed mild right hydronephrosis and bilateral small cysts.   Impression that pt's renal disease is currently largely stable  but of long duration and of slow progression.  Pt with poor sleep and remains paranoid, guarded and impaired judgment and limited insight into the nature and severity of her illness and the need for consistent, likely long term medication treatment.  Pt h/o renal diease and recent elevated LFTs are limiting pt treatment options. This was discussed at length with the pt who continued to insis   Nephrology consult requested 5/8/17 for further assessment of pt reported Stage 3 renal disease. Labs recheck judgment and with very limited  insight into the nature and severity of her bipolar d/o and the need for consistent treatment in an effort to decrease  the risk of clinical relapse.  Pt remains manic, psychotic,  with marked paranoia and unpredictable bursts of angry paranoid fueled tirades .'Pt  reportedly did not take her l and hepatic disorders which had most recently curtailed pt's ability to continue on previously effective Lithium and Depakote .            PTH  elevated = 77 .  Ca +2 = 9.6 ( WNL)

## 2017-05-11 NOTE — PROGRESS NOTE BEHAVIORAL HEALTH - PROBLEM SELECTOR PLAN 3
1. Hospitalist H and P appreciated  2.To gather further pt PMH to aid in diagnosis and treatment of the pt's severe bipolar d/o  3.Nephrology consult  5/8/17. appreciated  4.Renal Ultrasound , other testing complteted.

## 2017-05-11 NOTE — PROGRESS NOTE BEHAVIORAL HEALTH - SUMMARY
Pt is a 54 yo wmf with PPH Bipolar with a h/o treatment noncompliance  with meds due to medical comorbidities and due to pt's false belief that she has no illness and thus needs no treatment..  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  Pt had  refused further  psychotropic medication  treatment and her meds and had tried alternate treatment options  but without success.  Pt was admitted to   on  5/5/17 on a 9.39  emergency legal status now converted to a 2PC in the context of the pt's severe and ongoing bipolar manic psychosis . Nephrology consultation on 5/8/17 and 5/11/17 appreciated. Pt's CKD remains largely stable.    Pt continuing with low dose newly restarted Seroquel which the pt is now thinking about stopping once again in favor of prior and unsuccessful trials of 'holistic' treatments.

## 2017-05-11 NOTE — PROGRESS NOTE BEHAVIORAL HEALTH - NSBHFUPINTERVALHXFT_PSY_A_CORE
Pt had been tolerating newly and slowly restarted Seroquel now increased to 200 mg po qhs ( pt h/o Seroquel 600 mg po qhs with good clinical effect)   Pt remains with manic psychotic clinical presentation with ongoing impaired judgment and  virtually no insight into the nature and severity of her  bipolar d/o and the likely need for long term treatment.  Pt now on management alert observation status due to some mild clinical improvement since restarting Seroquel.  Pt still not able to meaningfully attend or to participate in therapy groups though hospital staff continue to encourage the pt to work a bit individually as tolerated.

## 2017-05-11 NOTE — PROGRESS NOTE BEHAVIORAL HEALTH - OTHER
frightened, argumentative at times but less verbally abusive, threatening in nature difficult  to interrupt frightened with pt somatic concerns and worries " What is wrong with me? Will I be able to live my life like before?" unable to assess fully, appears internally preoccupied though pt denies AH /VH frenetic, apprehensive and panicky at times near pressured and circumstantial , repetitive and non goal-directed

## 2017-05-12 RX ORDER — HALOPERIDOL DECANOATE 100 MG/ML
1 INJECTION INTRAMUSCULAR EVERY 12 HOURS
Qty: 0 | Refills: 0 | Status: DISCONTINUED | OUTPATIENT
Start: 2017-05-12 | End: 2017-05-15

## 2017-05-12 RX ORDER — BENZTROPINE MESYLATE 1 MG
0.5 TABLET ORAL
Qty: 0 | Refills: 0 | Status: DISCONTINUED | OUTPATIENT
Start: 2017-05-12 | End: 2017-05-17

## 2017-05-12 RX ADMIN — Medication 2 MILLIGRAM(S): at 16:11

## 2017-05-12 NOTE — PROGRESS NOTE BEHAVIORAL HEALTH - SUMMARY
Pt is a 56 yo wmf with PPH Bipolar with a h/o treatment noncompliance  with meds due to medical comorbidities and due to pt's false belief that she has no illness and thus needs no treatment..  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  Pt had  refused further  psychotropic medication  treatment and her meds and had tried alternate treatment options  but without success.  Pt was admitted to   on  5/5/17 on a 9.39  emergency legal status now converted to a 2PC in the context of the pt's severe and ongoing bipolar manic psychosis . Nephrology consultation on 5/8/17 and 5/11/17 appreciated. Pt's CKD remains largely stable.    Pt continuing with low dose newly restarted Seroquel which the pt is now thinking about stopping once again in favor of prior and unsuccessful trials of 'holistic' treatments.

## 2017-05-12 NOTE — PROGRESS NOTE BEHAVIORAL HEALTH - NSBHLOCFT_PSY_A_CORE
pt quickly manic and agitated and requiring sedation for her safety due to dangerous behaviors

## 2017-05-12 NOTE — PROGRESS NOTE BEHAVIORAL HEALTH - NSBHCHARTREVIEWINVESTIGATE_PSY_A_CORE FT
MEDICATIONS  (STANDING):  docusate sodium 100milliGRAM(s) Oral daily  QUEtiapine 150milliGRAM(s) Oral at bedtime    MEDICATIONS  (PRN):  diphenhydrAMINE   Injectable 50milliGRAM(s) IntraMuscular once PRN Agitation  LORazepam     Tablet 2milliGRAM(s) Oral every 6 hours PRN Agitation  acetaminophen   Tablet 650milliGRAM(s) Oral every 6 hours PRN For Temp greater than 38 C (100.4 F)  aluminum hydroxide/magnesium hydroxide/simethicone Suspension 30milliLiter(s) Oral every 6 hours PRN Dyspepsia  magnesium hydroxide Suspension 30milliLiter(s) Oral daily PRN Constipation  haloperidol    Injectable 5milliGRAM(s) IntraMuscular every 6 hours PRN Agitation due to bipolar manic psychosis  LORazepam   Injectable 2milliGRAM(s) IntraMuscular every 6 hours PRN Agitation
MEDICATIONS  (STANDING):  docusate sodium 100milliGRAM(s) Oral daily  QUEtiapine 200milliGRAM(s) Oral at bedtime    MEDICATIONS  (PRN):  diphenhydrAMINE   Injectable 50milliGRAM(s) IntraMuscular once PRN Agitation  LORazepam     Tablet 2milliGRAM(s) Oral every 6 hours PRN Agitation  acetaminophen   Tablet 650milliGRAM(s) Oral every 6 hours PRN For Temp greater than 38 C (100.4 F)  aluminum hydroxide/magnesium hydroxide/simethicone Suspension 30milliLiter(s) Oral every 6 hours PRN Dyspepsia  magnesium hydroxide Suspension 30milliLiter(s) Oral daily PRN Constipation  haloperidol    Injectable 5milliGRAM(s) IntraMuscular every 6 hours PRN Agitation due to bipolar manic psychosis  LORazepam   Injectable 2milliGRAM(s) IntraMuscular every 6 hours PRN Agitation

## 2017-05-12 NOTE — PROGRESS NOTE BEHAVIORAL HEALTH - OTHER
frenetic, apprehensive and panicky at times frightened, argumentative at times but less verbally abusive, threatening in nature near pressured and circumstantial , repetitive and non goal-directed difficult  to interrupt frightened with pt somatic concerns and worries " What is wrong with me? Will I be able to live my life like before?" unable to assess fully, appears internally preoccupied though pt denies AH /VH

## 2017-05-12 NOTE — PROGRESS NOTE BEHAVIORAL HEALTH - NSBHFUPINTERVALCCFT_PSY_A_CORE
" So how am I doing? I need to see a urologist. About my urine. How is my liver? Everything used to be perfect."   Pt seen in the hallway. Pt with a barrage of  questions repeated in a near pressured fashion before responses could be made.  Pt has now refused Seroquel x the past 3 nights despite ongoing staff psychoeducation and staff support. Writer reviewed with the pt the fact that pt had been seen twice by Nephrology related to pt's h/o chronic kidney disease which appears currently stable but likely slowly progressive. Pt was told by Nephology that pt could follow up with Urology as an outpatient for pt c/o urinary incontinence.   Pt noted by writer and other hospital staff to be resuming her  early admission more manic psychotic clinical presentation in light of the pt's now refusal of Seroquel  to treat her severe bipolar d/o. Writer discussed with the pt the recommendation for pt trial of well studied evidence based Haldol PO , then Haldol decanoate to treat the pt's severe bipolar d/o given pt's inability to take Lithium due to renal disease and pt h/o more elevated LFTs believed to be in the context of Depakote treatment.    Pt continues to insist she would like to 'try a holistic approach" and to work with a  holistic NP pt had heard about  at  [facility]..  Pt trying to attend therapy groups but unable to meaningfully participate in any discussion group. Pt able to remain for a time in art therapy group which pt appeared to enjoy.        Pt seen by Nephrologist for follow up on 5/11/17 . Consult notes appreciated.  Initial consult  on 5/8/17 for evaluation of pt's chronic kidney disease . Renal ultrasound revealed mild right hydronephrosis and bilateral small cysts.   Impression that pt's renal disease is currently largely stable  but of long duration and of slow progression.  Pt with poor sleep and remains paranoid, guarded and impaired judgment and limited insight into the nature and severity of her illness and the need for consistent, likely long term medication treatment.  Pt h/o renal diease and recent elevated LFTs are limiting pt treatment options. This was discussed at length with the pt who continued to insis   Nephrology consult requested 5/8/17 for further assessment of pt reported Stage 3 renal disease. Labs recheck judgment and with very limited  insight into the nature and severity of her bipolar d/o and the need for consistent treatment in an effort to decrease  the risk of clinical relapse.            PTH  elevated = 77 .  Ca +2 = 9.6 ( WNL)

## 2017-05-12 NOTE — PROGRESS NOTE BEHAVIORAL HEALTH - NSBHFUPINTERVALHXFT_PSY_A_CORE
Pt had been tolerating newly and slowly titrated Seroquel  but pt has now declined this medication x the past 3 nights due to pt's ongoing paranoia and manic agitation .  Pt remains with manic psychotic clinical presentation with ongoing impaired judgment and  virtually no insight into the nature and severity of her  bipolar d/o and the likely need for long term treatment.  Pt now on management alert observation status due to some mild clinical improvement since restarting Seroquel.  Pt still not able to meaningfully attend or to participate in therapy groups though hospital staff continue to encourage the pt to work a bit individually as tolerated.

## 2017-05-12 NOTE — PROGRESS NOTE BEHAVIORAL HEALTH - PROBLEM SELECTOR PLAN 1
1. Pt now declining Seroquel  due to manic psychotic paranoia .   2.Plan to continue to encourage the pt to resume previously effective and well tolerated Seroquel or to consider a trial of Haldol PO with recommendation for Haldol decanoate if pt tolerates PO Haldol and this medication is seen to be clinically effective for this pt.    3. Unable to resume Lithium due to renal disease  4.Depakote also stopped due to prior elevated LFTs  5.To gather more clinical information from collaterals to aid in treatment and DC planning  6.Pt to attend therapy groups when clinically more stable and able to meaningfully participate  7. Continue close ongoing lab monitoring  8.Management alert observation status  9. CO 1:1 observation may once again become necessary if pt continues to decline medication and her manic psychotic aggression and agitation return.

## 2017-05-12 NOTE — PROGRESS NOTE BEHAVIORAL HEALTH - PRN MEDS
prn Ativan with good effect for bipolar manic anxiety/agitation
prn IM and PO Haldol  and Ativan  due to pt's dangerous, aggressive and assaultive behaviors as above
prn Ativan with good effect for bipolar manic anxiety/agitation
prn IM and PO Haldol  and Ativan  due to pt's dangerous, aggressive and assaultive behaviors as above

## 2017-05-13 NOTE — PROGRESS NOTE BEHAVIORAL HEALTH - NSBHFUPINTERVALCCFT_PSY_A_CORE
Follow up for sharlene    Patient with multiple complaints- reluctant to take medication recommended  Also complaining about mistreatment by

## 2017-05-13 NOTE — PROGRESS NOTE BEHAVIORAL HEALTH - OTHER
frenetic, apprehensive and panicky at times frightened, argumentative at times but less verbally abusive, threatening in nature near pressured and circumstantial , repetitive and non goal-directed difficult  to interrupt unable to assess fully, appears internally preoccupied though pt denies AH /VH

## 2017-05-13 NOTE — PROGRESS NOTE BEHAVIORAL HEALTH - NSBHCHARTREVIEWVS_PSY_A_CORE FT
Vital Signs Last 24 Hrs  T(C): 36.6, Max: 36.6 (05-13 @ 08:41)  T(F): 97.9, Max: 97.9 (05-13 @ 08:41)  HR: 79 (79 - 79)  BP: 142/87 (142/87 - 142/87)  BP(mean): --  RR: 14 (14 - 14)  SpO2: 100% (100% - 100%)

## 2017-05-14 RX ORDER — LANOLIN ALCOHOL/MO/W.PET/CERES
3 CREAM (GRAM) TOPICAL AT BEDTIME
Qty: 0 | Refills: 0 | Status: DISCONTINUED | OUTPATIENT
Start: 2017-05-14 | End: 2017-05-30

## 2017-05-14 RX ADMIN — HALOPERIDOL DECANOATE 1 MILLIGRAM(S): 100 INJECTION INTRAMUSCULAR at 21:45

## 2017-05-14 RX ADMIN — Medication 3 MILLIGRAM(S): at 21:50

## 2017-05-14 NOTE — PROGRESS NOTE BEHAVIORAL HEALTH - NSBHFUPINTERVALHXFT_PSY_A_CORE
patient remains manic, pressured with poor insight into illness and need for medication    MEDICATIONS  (STANDING):  docusate sodium 100milliGRAM(s) Oral daily  haloperidol     Tablet 1milliGRAM(s) Oral every 12 hours    MEDICATIONS  (PRN):  diphenhydrAMINE   Injectable 50milliGRAM(s) IntraMuscular once PRN Agitation  acetaminophen   Tablet 650milliGRAM(s) Oral every 6 hours PRN For Temp greater than 38 C (100.4 F)  aluminum hydroxide/magnesium hydroxide/simethicone Suspension 30milliLiter(s) Oral every 6 hours PRN Dyspepsia  magnesium hydroxide Suspension 30milliLiter(s) Oral daily PRN Constipation  haloperidol    Injectable 5milliGRAM(s) IntraMuscular every 6 hours PRN Agitation due to bipolar manic psychosis  benztropine 0.5milliGRAM(s) Oral two times a day PRN Extrapyramidal symptoms

## 2017-05-14 NOTE — PROGRESS NOTE BEHAVIORAL HEALTH - NSBHFUPINTERVALCCFT_PSY_A_CORE
Follow up for sharlene.      Patient continues to be focused on harmful effects medication ahs had on her body  Asking  for melatonin

## 2017-05-14 NOTE — PROGRESS NOTE BEHAVIORAL HEALTH - NSBHCHARTREVIEWVS_PSY_A_CORE FT
Vital Signs Last 24 Hrs  T(C): 36.8, Max: 36.8 (05-14 @ 08:59)  T(F): 98.3, Max: 98.3 (05-14 @ 08:59)  HR: 80 (80 - 80)  BP: 123/81 (123/81 - 123/81)  BP(mean): --  RR: 14 (14 - 14)  SpO2: 99% (99% - 99%)

## 2017-05-14 NOTE — PROGRESS NOTE BEHAVIORAL HEALTH - OTHER
frenetic, apprehensive and panicky at times frightened, argumentative at times but less verbally abusive, threatening in nature near pressured and circumstantial , repetitive and non goal-directed difficult  to interrupt

## 2017-05-15 RX ORDER — HALOPERIDOL DECANOATE 100 MG/ML
1 INJECTION INTRAMUSCULAR ONCE
Qty: 0 | Refills: 0 | Status: COMPLETED | OUTPATIENT
Start: 2017-05-15 | End: 2017-05-15

## 2017-05-15 RX ORDER — HALOPERIDOL DECANOATE 100 MG/ML
2 INJECTION INTRAMUSCULAR AT BEDTIME
Qty: 0 | Refills: 0 | Status: DISCONTINUED | OUTPATIENT
Start: 2017-05-15 | End: 2017-05-17

## 2017-05-15 RX ADMIN — Medication 1 MILLIGRAM(S): at 16:30

## 2017-05-15 RX ADMIN — HALOPERIDOL DECANOATE 2 MILLIGRAM(S): 100 INJECTION INTRAMUSCULAR at 21:27

## 2017-05-15 RX ADMIN — HALOPERIDOL DECANOATE 1 MILLIGRAM(S): 100 INJECTION INTRAMUSCULAR at 16:30

## 2017-05-15 NOTE — PROGRESS NOTE BEHAVIORAL HEALTH - NSBHCHARTREVIEWVS_PSY_A_CORE FT
Vital Signs Last 24 Hrs  T(C): 36.8, Max: 36.8 (05-15 @ 07:43)  T(F): 98.3, Max: 98.3 (05-15 @ 07:43)  HR: 74 (74 - 74)  BP: 133/90 (133/90 - 133/90)  BP(mean): --  RR: 16 (16 - 16)  SpO2: 99% (99% - 99%)

## 2017-05-15 NOTE — PROGRESS NOTE BEHAVIORAL HEALTH - NSBHADMITIPOBSFT_PSY_A_CORE
management alert observation status management alert observation status due to severity of now untreated bipolar manic psychosis

## 2017-05-15 NOTE — PROGRESS NOTE BEHAVIORAL HEALTH - OTHER
frenetic, apprehensive and panicky at times at times glaring  with angry though frightened pleading eye contact at times frightened, argumentative at times but still  verbally abusive, threatening in nature pressured and circumstantial , repetitive and non goal-directed difficult  to interrupt though pt denies AH /VH pt at times appears distracted and possibly internally preoccupied

## 2017-05-15 NOTE — PROGRESS NOTE BEHAVIORAL HEALTH - PROBLEM SELECTOR PLAN 1
1. Pt now declining Seroquel  due to manic psychotic paranoia .   2.Plan to continue to encourage the pt to resume previously effective and well tolerated Seroquel or to consider a trial of Haldol PO with recommendation for Haldol decanoate if pt tolerates PO Haldol and this medication is seen to be clinically effective for this pt.    3. Unable to resume Lithium due to renal disease  4.Depakote also stopped due to prior elevated LFTs  5.To gather more clinical information from collaterals to aid in treatment and DC planning  6.Pt to attend therapy groups when clinically more stable and able to meaningfully participate  7. Continue close ongoing lab monitoring  8.Management alert observation status  9. CO 1:1 observation may once again become necessary if pt continues to decline   medication and her manic psychotic aggression and agitation return.  10. Plan to pursue treatment over objection in light of the severity of pt's illness and the unlikely pt symptom resolution without psychiatric medication  , specifically antipsychotic medication.

## 2017-05-15 NOTE — PROGRESS NOTE BEHAVIORAL HEALTH - NSBHFUPINTERVALCCFT_PSY_A_CORE
covering note.   Pt getting more irritable, was taking  books off the shelf  and impulsively throwing them on the floor. Pt was asked to refrain from doing this but pt was preoccupied and verbally aggressive . Pt needing prn of medication and was willing to take on oral basis so haldol 1mg and ativan 1mg both PO  given. Pt seen earlier in the afternoon . Pt with pressured speech and illogical thinking as pt tried to negotiate her treatment in order to expedite her DC from hospital " because you have my life in your hands ." Later this afternoon, as observed by writer and reported by Dr Zaragoza and other hospital staff:   Pt getting more irritable, was taking  books off the shelf  and impulsively throwing them on the floor. Pt was asked to refrain from doing this but pt was preoccupied and verbally aggressive . Pt needing prn of medication and was willing to take on oral basis so haldol 1mg and ativan 1mg both PO  given.

## 2017-05-15 NOTE — PROGRESS NOTE BEHAVIORAL HEALTH - NSBHCHARTREVIEWVS_PSY_A_CORE FT
Vital Signs Last 24 Hrs  T(C): 36.8, Max: 36.8 (05-14 @ 08:59)  T(F): 98.3, Max: 98.3 (05-14 @ 08:59)  HR: 80 (80 - 80)  BP: 123/81 (123/81 - 123/81)  BP(mean): --  RR: 14 (14 - 14)  SpO2: 99% (99% - 99%) Vital Signs Last 24 Hrs  T(C): 36.8, Max: 36.8 (05-15 @ 07:43)  T(F): 98.3, Max: 98.3 (05-15 @ 07:43)  HR: 74 (74 - 74)  BP: 133/90 (133/90 - 133/90)  BP(mean): --  RR: 16 (16 - 16)  SpO2: 99% (99% - 99%)

## 2017-05-15 NOTE — PROGRESS NOTE BEHAVIORAL HEALTH - SUMMARY
Pt is a 56 yo wmf with PPH Bipolar with a h/o treatment noncompliance  with meds due to medical comorbidities and due to pt's false belief that she has no illness and thus needs no treatment..  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  Pt had  refused further  psychotropic medication  treatment and her meds and had tried alternate treatment options  but without success.  Pt was admitted to   on  5/5/17 on a 9.39  emergency legal status now converted to a 2PC in the context of the pt's severe and ongoing bipolar manic psychosis . Nephrology consultation on 5/8/17 and 5/11/17 appreciated. Pt's CKD remains largely stable.    Pt continuing with low dose newly restarted Seroquel which the pt is now thinking about stopping once again in favor of prior and unsuccessful trials of 'holistic' treatments. Pt is a 54 yo wmf with PPH Bipolar with a h/o treatment noncompliance  with meds due to medical comorbidities and due to pt's false belief that she has no illness and thus needs no treatment..  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  Pt had  refused further  psychotropic medication  treatment and her meds and had tried alternate treatment options  but without success.  Pt was admitted to   on  5/5/17 on a 9.39  emergency legal status now converted to a 2P in the context of the pt's severe and ongoing bipolar manic psychosis . Nephrology consultation on 5/8/17 and 5/11/17 appreciated. Pt's CKD remains largely stable.    Pt now refusing any and all antipsychotic treatment for her severe bipolar d/o .with low dose newly restarted as the pt speaks vaguely about her wish to  resume  prior , unsuccessful trials of 'holistic' treatments.        In light of the severity of the pt's current functional impairment directly related to pt renewed DC of  FDA approved and evidence based treatment with first or second generation antipsychotic medications,  the plan is now to move forward with a request for treatment over objection so that the pt can actually clinically improve , stabilize and return to her functional life in the community.

## 2017-05-15 NOTE — PROGRESS NOTE BEHAVIORAL HEALTH - SUMMARY
Pt is a 56 yo wmf with PPH Bipolar with a h/o treatment noncompliance  with meds due to medical comorbidities and due to pt's false belief that she has no illness and thus needs no treatment..  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  Pt had  refused further  psychotropic medication  treatment and her meds and had tried alternate treatment options  but without success.  Pt was admitted to   on  5/5/17 on a 9.39  emergency legal status now converted to a 2P in the context of the pt's severe and ongoing bipolar manic psychosis . Nephrology consultation on 5/8/17 and 5/11/17 appreciated. Pt's CKD remains largely stable.    Pt now refusing any and all antipsychotic treatment for her severe bipolar d/o .with low dose newly restarted as the pt speaks vaguely about her wish to  resume  prior , unsuccessful trials of 'holistic' treatments.        In light of the severity of the pt's current functional impairment directly related to pt renewed DC of  FDA approved and evidence based treatment with first or second generation antipsychotic medications,  the plan is now to move forward with a request for treatment over objection so that the pt can actually clinically improve , stabilize and return to her functional life in the community.

## 2017-05-15 NOTE — PROGRESS NOTE BEHAVIORAL HEALTH - RISK ASSESSMENT
Pt is a danger to self and others due to pt's severe, worsening bipolar manic psychosis with marked pt paranoia .

## 2017-05-15 NOTE — PROGRESS NOTE BEHAVIORAL HEALTH - PROBLEM SELECTOR PLAN 3
1. Hospitalist H and P appreciated  2.To gather further pt PMH to aid in diagnosis and treatment of the pt's severe bipolar d/o  3.Nephrology consult  5/8/17. appreciated  4.Renal Ultrasound , other testing complteted. 1. Hospitalist H and P appreciated  2.To gather further pt PMH to aid in diagnosis and treatment of the pt's severe bipolar d/o  3.Nephrology consult  5/8/17. appreciated  4.Renal Ultrasound , other testing  completed.

## 2017-05-15 NOTE — PROGRESS NOTE BEHAVIORAL HEALTH - PROBLEM SELECTOR PLAN 1
1. Pt now declining Seroquel  due to manic psychotic paranoia .   2.Plan to continue to encourage the pt to resume previously effective and well tolerated Seroquel or to consider a trial of Haldol PO with recommendation for Haldol decanoate if pt tolerates PO Haldol and this medication is seen to be clinically effective for this pt.    3. Unable to resume Lithium due to renal disease  4.Depakote also stopped due to prior elevated LFTs  5.To gather more clinical information from collaterals to aid in treatment and DC planning  6.Pt to attend therapy groups when clinically more stable and able to meaningfully participate  7. Continue close ongoing lab monitoring  8.Management alert observation status  9. CO 1:1 observation may once again become necessary if pt continues to decline medication and her manic psychotic aggression and agitation return. 1. Pt now declining Seroquel  due to manic psychotic paranoia .   2.Plan to continue to encourage the pt to resume previously effective and well tolerated Seroquel or to consider a trial of Haldol PO with recommendation for Haldol decanoate if pt tolerates PO Haldol and this medication is seen to be clinically effective for this pt.    3. Unable to resume Lithium due to renal disease  4.Depakote also stopped due to prior elevated LFTs  5.To gather more clinical information from collaterals to aid in treatment and DC planning  6.Pt to attend therapy groups when clinically more stable and able to meaningfully participate  7. Continue close ongoing lab monitoring  8.Management alert observation status  9. CO 1:1 observation may once again become necessary if pt continues to decline   medication and her manic psychotic aggression and agitation return.  10. Plan to pursue treatment over objection in light of the severity of pt's illness and the unlikely pt symptom resolution without psychiatric medication  , specifically antipsychotic medication.

## 2017-05-15 NOTE — PROGRESS NOTE BEHAVIORAL HEALTH - NSBHFUPINTERVALCCFT_PSY_A_CORE
" Don't you talk to her ! She is here to see me. I'll tell you who you can talk to!. You all just want to force medicine on me when there is nothing wrong with me!"  Pt remarks seemingly in the context of this writer's merely having tried to make introductions to pt's visitor. Pt then unpredictably and without provocation became  briefly enraged with pt's  angrily pointing her finger in writer's face ( as well as in the faces of other hospital staff) . Pt stated to this writer who has been caring for the pt since pt's recent admission to hospital on 5/5/17 due to the  pt's severe bipolar manic psychosis in the context of pt treatment noncompliance., " Who are you?! You want me to take medication? There is nothing wrong with me! I don't have bipolar ! . I want to try a holistic approach to treatment."

## 2017-05-15 NOTE — PROGRESS NOTE BEHAVIORAL HEALTH - NSBHFUPINTERVALHXFT_PSY_A_CORE
· Pt seen during several brief pt argumentative and confrontational  pt angry outburst with paranoid  fueled and underlying frightened pt illogical statements to writer and to other hospital staff. Pt has now declined all antipsychotic medications ( including Seroquel after brief retrial with slow but evident clinical efficacy  and Haldol ) recommended to treat the pt's severe and ongoing, functionally worsening bipolar manic psychosis with ongoing paranoid and persecutory delusions incorporating the pt's  and her parents.   Pt remains with functionally impairing  manic psychotic clinical presentation with ongoing impaired judgment and  virtually no insight into the nature and severity of her  bipolar d/o and the likely need for long term treatment.  Pt now on management alert observation status due to some earlier brief mild clinical improvement when the pt had initially agreed to  restarting lower dose Seroquel.  Pt still not able to meaningfully participate in therapy groups aside from directed brief individual art projects.               The pt remains with severely functionally impairing bipolar manic psychosis with ever worsening pt clinical status in the context of pt's now self discontinued Seroquel .In light of the  limited available pt  treatment options due to the  pt inability to take Lithium or Depakote due to ongoing slowly progressive  renal and milder reported hepatic concerns such that the pt remains unable to maintain herself even within the confines of a locked inpatient psychiatric  the next medically necessary step  more clearly speaks to the now pressing need to submit an application to the MH court seeking pt treatment over objection in an effort to alleviate the pt's severely functionally impairing symptoms so that the pt can be safely discharged from hospital to resume her previously healthy functioning in all spheres of her life.

## 2017-05-15 NOTE — PROGRESS NOTE BEHAVIORAL HEALTH - RISK ASSESSMENT
Pt is a danger to self due to suicidal statement today to police.  Past SA is denied by . Pt is a danger to self and others due to pt's severe, worsening bipolar manic psychosis with marked pt paranoia .

## 2017-05-15 NOTE — PROGRESS NOTE BEHAVIORAL HEALTH - NSBHCHARTREVIEWIMAGING_PSY_A_CORE FT
MEDICATIONS  (STANDING):  docusate sodium 100milliGRAM(s) Oral daily  haloperidol     Tablet 1milliGRAM(s) Oral every 12 hours  melatonin. 3milliGRAM(s) Oral at bedtime    MEDICATIONS  (PRN):  diphenhydrAMINE   Injectable 50milliGRAM(s) IntraMuscular once PRN Agitation  acetaminophen   Tablet 650milliGRAM(s) Oral every 6 hours PRN For Temp greater than 38 C (100.4 F)  aluminum hydroxide/magnesium hydroxide/simethicone Suspension 30milliLiter(s) Oral every 6 hours PRN Dyspepsia  magnesium hydroxide Suspension 30milliLiter(s) Oral daily PRN Constipation  haloperidol    Injectable 5milliGRAM(s) IntraMuscular every 6 hours PRN Agitation due to bipolar manic psychosis  benztropine 0.5milliGRAM(s) Oral two times a day PRN Extrapyramidal symptoms  LORazepam   Injectable 2milliGRAM(s) IntraMuscular every 6 hours PRN Agitation due to bipolar sharlene

## 2017-05-15 NOTE — PROGRESS NOTE BEHAVIORAL HEALTH - PROBLEM SELECTOR PLAN 3
1. Hospitalist H and P appreciated  2.To gather further pt PMH to aid in diagnosis and treatment of the pt's severe bipolar d/o  3.Nephrology consult  5/8/17. appreciated  4.Renal Ultrasound , other testing  completed.

## 2017-05-15 NOTE — PROGRESS NOTE BEHAVIORAL HEALTH - OTHER
frenetic, apprehensive and panicky at times frightened, argumentative at times but less verbally abusive, threatening in nature near pressured and circumstantial , repetitive and non goal-directed difficult  to interrupt at times glaring  with angry though frightened pleading eye contact at times though pt denies AH /VH pt at times appears distracted and possibly internally preoccupied frightened, argumentative at times but still  verbally abusive, threatening in nature pressured and circumstantial , repetitive and non goal-directed

## 2017-05-16 LAB
ALBUMIN SERPL ELPH-MCNC: 3.3 G/DL — SIGNIFICANT CHANGE UP (ref 3.3–5)
ALP SERPL-CCNC: 230 U/L — HIGH (ref 40–120)
ALT FLD-CCNC: 246 U/L — HIGH (ref 12–78)
ANION GAP SERPL CALC-SCNC: 6 MMOL/L — SIGNIFICANT CHANGE UP (ref 5–17)
AST SERPL-CCNC: 136 U/L — HIGH (ref 15–37)
BILIRUB SERPL-MCNC: 0.5 MG/DL — SIGNIFICANT CHANGE UP (ref 0.2–1.2)
BUN SERPL-MCNC: 37 MG/DL — HIGH (ref 7–23)
CALCIUM SERPL-MCNC: 9.3 MG/DL — SIGNIFICANT CHANGE UP (ref 8.5–10.1)
CHLORIDE SERPL-SCNC: 109 MMOL/L — HIGH (ref 96–108)
CO2 SERPL-SCNC: 29 MMOL/L — SIGNIFICANT CHANGE UP (ref 22–31)
CREAT SERPL-MCNC: 1.78 MG/DL — HIGH (ref 0.5–1.3)
GLUCOSE SERPL-MCNC: 108 MG/DL — HIGH (ref 70–99)
POTASSIUM SERPL-MCNC: 4.3 MMOL/L — SIGNIFICANT CHANGE UP (ref 3.5–5.3)
POTASSIUM SERPL-SCNC: 4.3 MMOL/L — SIGNIFICANT CHANGE UP (ref 3.5–5.3)
PROT SERPL-MCNC: 7.6 GM/DL — SIGNIFICANT CHANGE UP (ref 6–8.3)
SODIUM SERPL-SCNC: 144 MMOL/L — SIGNIFICANT CHANGE UP (ref 135–145)
URATE SERPL-MCNC: 9 MG/DL — HIGH (ref 2.5–7)

## 2017-05-16 RX ADMIN — HALOPERIDOL DECANOATE 2 MILLIGRAM(S): 100 INJECTION INTRAMUSCULAR at 20:44

## 2017-05-16 RX ADMIN — Medication 3 MILLIGRAM(S): at 20:43

## 2017-05-16 NOTE — PROGRESS NOTE BEHAVIORAL HEALTH - NSBHFUPINTERVALCCFT_PSY_A_CORE
" I  want to see my GI doctor!"  Writer requested a GI consult for the pt in house but pt preferred to see 'her GI doctor". Writer then spoke with Dr Wylie ( tel [phone]) who noted having seen the pt 5 yrs ago. He will consult with the pt on 5/17/1  Pt seen at various times throughout the day. Pt remains with rapid speech and illogical thinking but clearly trying hard to focus and to converse more logically. Pt received prn Haldol and Ativan PO on 5/15/17 due to frenetic disorganized behavior and speech   Pt has only within the past 24 hours agreed to begin a trial of standing Haldol , a medication which had been well tolerated and clinically effective for this pt despite its hepatic  metabolism.  Since no treatment of pt's severe bipolar d/o is not a viable option at this time, writer discussed plan with the pt to consult GI and to begin a slow titration of Haldol, an evidence based " I  want to see my GI doctor!"  Writer requested a GI consult for the pt in house but pt preferred to see 'her GI doctor". Writer then spoke with Dr Wylie ( tel [phone]) who noted having seen the pt 5 yrs ago. He will consult with the pt on 5/17/1  Pt seen at various times throughout the day. Pt remains with rapid speech and illogical thinking but clearly trying hard to focus and to converse more logically. Pt received prn Haldol and Ativan PO on 5/15/17 due to frenetic disorganized behavior and speech   Pt has only within the past 24 hours agreed to begin a trial of standing Haldol , a medication which had been well tolerated and clinically effective for this pt despite its hepatic  metabolism. Holistic treatments were a focus of the pt, though reported h/o holistic treatments by this pt had proven ineffective and unsuccessful.   Since no treatment of pt's severe bipolar d/o is not a viable option at this time, writer discussed plan with the pt to consult GI and to begin a slow titration of Haldol, an evidence based

## 2017-05-16 NOTE — PROGRESS NOTE BEHAVIORAL HEALTH - NSBHFUPINTERVALCCFT_PSY_A_CORE
Administrative Hearing   Reviewed the notes , and read the list of medications proposed for the pt as the pt is inconsistent with taking her medications even while in the hospital. . Explained to the pt that aside from her medical disorders she also has a psychological disorder that requires treatment. Reviewed with her the recommended medications of haldol and ativan but pt repeatedly stated that "but I always take my medications, always have after all these years."   She then went off on a tangent " why would you   even talk about injecting medication into my blood ? I have a blood disorder that nobody can figure out . Nobody can figure out what is wrong with my liver, my kidney. My health is at stake I could lose everything.  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  I thought we worked things out , I spoke with Dr Pappas that I was taking the medications. What does this have to do with anything . So I didn't take medication for one day and Iost my mind . Why are we going to court? I don't want to go to court. Do we have to go to court? Why are we talking about this. What are you saying?"  Both the  and I needed to repeat information several times as the pt was having difficulty understanding the purpose of the meeting and was getting herself more upset. .   Pt still has paranoid, persecutory, bizarre delusions and somatic preoccupation. Her thought disorder, poor attention span and impulsivity continue to interfere with her ability to process  information  which leads  to further distortion of information and more anxiety and confusion.  Pt later approached  me  in the hallway  appearing  anxious and claimed to have a variety of "pains in my lower back, headache . I've never had this before.  I need the charts to be looked at again to make sure everything has been medically covered.  What is over here ?" as she pointed to her throat. " I don't have typical problems so I need to be examined now  this can be serious."

## 2017-05-16 NOTE — PROGRESS NOTE BEHAVIORAL HEALTH - NSBHCHARTREVIEWVS_PSY_A_CORE FT
ICU Vital Signs Last 24 Hrs  T(C): 37.1, Max: 37.1 (05-16 @ 08:51)  T(F): 98.7, Max: 98.7 (05-16 @ 08:51)  HR: 85 (85 - 85)  BP: 117/75 (117/75 - 117/75)  BP(mean): --  ABP: --  ABP(mean): --  RR: 16 (16 - 16)  SpO2: 99% (99% - 99%)

## 2017-05-16 NOTE — PROGRESS NOTE BEHAVIORAL HEALTH - NSBHADMITDANGEROTHERS_PSY_A_CORE
assualtive threats with plan and means

## 2017-05-16 NOTE — PROGRESS NOTE BEHAVIORAL HEALTH - SUMMARY
Pt is a 54 yo wmf with PPH Bipolar with a h/o treatment noncompliance  with meds due to medical comorbidities and due to pt's false belief that she has no illness and thus needs no treatment..  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  Pt had  refused further  psychotropic medication  treatment and her meds and had tried alternate treatment options  but without success.  Pt was admitted to   on  5/5/17 on a 9.39  emergency legal status now converted to a 2P in the context of the pt's severe and ongoing bipolar manic psychosis . Nephrology consultation on 5/8/17 and 5/11/17 appreciated. Pt's CKD remains largely stable.    Pt now refusing any and all antipsychotic treatment for her severe bipolar d/o .with low dose newly restarted as the pt speaks vaguely about her wish to  resume  prior , unsuccessful trials of 'holistic' treatments.        In light of the severity of the pt's current functional impairment directly related to pt renewed DC of  FDA approved and evidence based treatment with first or second generation antipsychotic medications,  the plan is now to move forward with a request for treatment over objection so that the pt can actually clinically improve , stabilize and return to her functional life in the community.

## 2017-05-16 NOTE — PROGRESS NOTE BEHAVIORAL HEALTH - NSBHFUPINTERVALHXFT_PSY_A_CORE
· Pt seen during several brief pt argumentative and confrontational  pt angry outburst with paranoid  fueled and underlying frightened pt illogical statements to writer and to other hospital staff. Pt has now declined all antipsychotic medications ( including Seroquel after brief retrial with slow but evident clinical efficacy  and Haldol ) recommended to treat the pt's severe and ongoing, functionally worsening bipolar manic psychosis with ongoing paranoid and persecutory delusions incorporating the pt's  and her parents.   Pt remains with functionally impairing  manic psychotic clinical presentation with ongoing impaired judgment and  virtually no insight into the nature and severity of her  bipolar d/o and the likely need for long term treatment.  Pt now on management alert observation status due to some earlier brief mild clinical improvement when the pt had initially agreed to  restarting lower dose Seroquel.  Pt still not able to meaningfully participate in therapy groups aside from directed brief individual art projects.               The pt remains with severely functionally impairing bipolar manic psychosis with ever worsening pt clinical status in the context of pt's now self discontinued Seroquel .In light of the  limited available pt  treatment options due to the  pt inability to take Lithium or Depakote due to ongoing slowly progressive  renal and milder reported hepatic concerns such that the pt remains unable to maintain herself even within the confines of a locked inpatient psychiatric  the next medically necessary step  more clearly speaks to the now pressing need to submit an application to the MH court seeking pt treatment over objection in an effort to alleviate the pt's severely functionally impairing symptoms so that the pt can be safely discharged from hospital to resume her previously healthy functioning in all spheres of her life. · Pt seen during several brief pt argumentative and confrontational  pt angry outburst with paranoid  fueled and underlying frightened pt illogical statements to writer and to other hospital staff. Pt has now declined all antipsychotic medications ( including Seroquel after brief retrial with slow but evident clinical efficacy  and Haldol ) recommended to treat the pt's severe and ongoing, functionally worsening bipolar manic psychosis with ongoing paranoid and persecutory delusions incorporating the pt's  and her parents.   Pt remains with functionally impairing  manic psychotic clinical presentation with ongoing impaired judgment and  virtually no insight into the nature and severity of her  bipolar d/o and the likely need for long term treatment.  Pt now on management alert observation status due to some earlier brief mild clinical improvement when the pt had initially agreed to  restarting lower dose Seroquel.  Pt still not able to meaningfully participate in therapy groups aside from directed brief individual art projects.               The pt remains with severely functionally impairing bipolar manic psychosis with ever worsening pt clinical status in the context of pt's now self discontinued Seroquel .In light of the  limited available pt  treatment options due to the  pt inability to take Lithium or Depakote due to ongoing slowly progressive  renal and milder reported hepatic concerns such that the pt remains unable to maintain herself even within the confines of a locked inpatient psychiatric  the next medically necessary step  more clearly speaks to the now pressing need to submit an application to the MH court seeking pt treatment over objection in an effort to alleviate the pt's severely functionally impairing symptoms so that the pt can be safely discharged from hospital to resume her previously healthy functioning in all spheres of her life  MH atty to hold administrative hearing for pt to discuss plan for treatment over objection due to severity of pt's illness and safety concerns if pt remains untreated in the community.

## 2017-05-17 LAB
AMMONIA BLD-MCNC: 27 UMOL/L — SIGNIFICANT CHANGE UP (ref 11–32)
BASOPHILS # BLD AUTO: 0.1 K/UL — SIGNIFICANT CHANGE UP (ref 0–0.2)
BASOPHILS NFR BLD AUTO: 1.5 % — SIGNIFICANT CHANGE UP (ref 0–2)
EOSINOPHIL # BLD AUTO: 0.3 K/UL — SIGNIFICANT CHANGE UP (ref 0–0.5)
EOSINOPHIL NFR BLD AUTO: 3.9 % — SIGNIFICANT CHANGE UP (ref 0–6)
FOLATE SERPL-MCNC: >20 NG/ML — SIGNIFICANT CHANGE UP (ref 4.8–24.2)
HCT VFR BLD CALC: 37.4 % — SIGNIFICANT CHANGE UP (ref 34.5–45)
HGB BLD-MCNC: 12.1 G/DL — SIGNIFICANT CHANGE UP (ref 11.5–15.5)
LYMPHOCYTES # BLD AUTO: 1.3 K/UL — SIGNIFICANT CHANGE UP (ref 1–3.3)
LYMPHOCYTES # BLD AUTO: 19.4 % — SIGNIFICANT CHANGE UP (ref 13–44)
MAGNESIUM SERPL-MCNC: 2 MG/DL — SIGNIFICANT CHANGE UP (ref 1.6–2.6)
MCHC RBC-ENTMCNC: 29.3 PG — SIGNIFICANT CHANGE UP (ref 27–34)
MCHC RBC-ENTMCNC: 32.4 GM/DL — SIGNIFICANT CHANGE UP (ref 32–36)
MCV RBC AUTO: 90.5 FL — SIGNIFICANT CHANGE UP (ref 80–100)
MONOCYTES # BLD AUTO: 0.4 K/UL — SIGNIFICANT CHANGE UP (ref 0–0.9)
MONOCYTES NFR BLD AUTO: 6 % — SIGNIFICANT CHANGE UP (ref 2–14)
NEUTROPHILS # BLD AUTO: 4.6 K/UL — SIGNIFICANT CHANGE UP (ref 1.8–7.4)
NEUTROPHILS NFR BLD AUTO: 69.2 % — SIGNIFICANT CHANGE UP (ref 43–77)
PHOSPHATE SERPL-MCNC: 4.3 MG/DL — SIGNIFICANT CHANGE UP (ref 2.5–4.5)
PLATELET # BLD AUTO: 198 K/UL — SIGNIFICANT CHANGE UP (ref 150–400)
RBC # BLD: 4.13 M/UL — SIGNIFICANT CHANGE UP (ref 3.8–5.2)
RBC # FLD: 11.8 % — SIGNIFICANT CHANGE UP (ref 10.3–14.5)
VIT B12 SERPL-MCNC: 1457 PG/ML — HIGH (ref 243–894)
WBC # BLD: 6.6 K/UL — SIGNIFICANT CHANGE UP (ref 3.8–10.5)
WBC # FLD AUTO: 6.6 K/UL — SIGNIFICANT CHANGE UP (ref 3.8–10.5)

## 2017-05-17 RX ORDER — BENZTROPINE MESYLATE 1 MG
0.5 TABLET ORAL
Qty: 0 | Refills: 0 | Status: DISCONTINUED | OUTPATIENT
Start: 2017-05-17 | End: 2017-05-18

## 2017-05-17 RX ORDER — HALOPERIDOL DECANOATE 100 MG/ML
5 INJECTION INTRAMUSCULAR AT BEDTIME
Qty: 0 | Refills: 0 | Status: DISCONTINUED | OUTPATIENT
Start: 2017-05-17 | End: 2017-05-18

## 2017-05-17 RX ORDER — BENZTROPINE MESYLATE 1 MG
1 TABLET ORAL AT BEDTIME
Qty: 0 | Refills: 0 | Status: DISCONTINUED | OUTPATIENT
Start: 2017-05-17 | End: 2017-05-17

## 2017-05-17 RX ADMIN — Medication 3 MILLIGRAM(S): at 20:12

## 2017-05-17 RX ADMIN — Medication 1 MILLIGRAM(S): at 18:30

## 2017-05-17 RX ADMIN — HALOPERIDOL DECANOATE 5 MILLIGRAM(S): 100 INJECTION INTRAMUSCULAR at 20:12

## 2017-05-17 RX ADMIN — Medication 650 MILLIGRAM(S): at 00:38

## 2017-05-17 RX ADMIN — Medication 1 MILLIGRAM(S): at 20:12

## 2017-05-17 RX ADMIN — Medication 1 MILLIGRAM(S): at 13:43

## 2017-05-17 RX ADMIN — Medication 100 MILLIGRAM(S): at 09:47

## 2017-05-17 RX ADMIN — Medication 0.5 MILLIGRAM(S): at 01:00

## 2017-05-17 NOTE — PROGRESS NOTE BEHAVIORAL HEALTH - NSBHFUPINTERVALCCFT_PSY_A_CORE
" I'll take the Haldol but the lowest dose I can I can take. I don't need the Cogentin but I felt muscle aches  and I took the Cogentin.The Ativan they didn't give me. I would take that."    Pt  with better attention to her ADLs but she remains with rapid speech and near frenetic demeanor. Pt less overtly paranoid but still quite guarded and suspicious overall.   Administrative Hearing  ( held 5/16/17)  Reviewed the notes , and read the list of medications proposed for the pt as the pt is inconsistent with taking her medications even while in the hospital. . Explained to the pt that aside from her medical disorders she also has a psychological disorder that requires treatment. Reviewed with her the recommended medications of haldol and ativan but pt repeatedly stated that "but I always take my medications, always have after all these years."   She then went off on a tangent " why would you   even talk about injecting medication into my blood ? I have a blood disorder that nobody can figure out . Nobody can figure out what is wrong with my liver, my kidney. My health is at stake I could lose everything.  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  I thought we worked things out , I spoke with Dr Pappas that I was taking the medications. What does this have to do with anything . So I didn't take medication for one day and Iost my mind . Why are we going to court? I don't want to go to court. Do we have to go to court? Why are we talking about this. What are you saying?"  Both the  and I needed to repeat information several times as the pt was having difficulty understanding the purpose of the meeting and was getting herself more upset. .   Pt still has paranoid, persecutory, bizarre delusions and somatic preoccupation. Her thought disorder, poor attention span and impulsivity continue to interfere with her ability to process  information  which leads  to further distortion of information and more anxiety and confusion.  Pt later approached  me  in the hallway  appearing  anxious and claimed to have a variety of "pains in my lower back, headache . I've never had this before.  I need the charts to be looked at again to make sure everything has been medically covered.  What is over here ?" as she pointed to her throat. " I don't have typical problems so I need to be examined now  this can be serious."

## 2017-05-17 NOTE — PROGRESS NOTE BEHAVIORAL HEALTH - PROBLEM SELECTOR PLAN 1
1. Resume Ativan 1 mg po tid  2.To increase Haldol to 5 mg po qhs   3.Cogentin 0.5 mg po q 12 prn ( EPS)  4. Unable to resume Lithium due to renal disease  5.Depakote also stopped due to prior elevated LFTs  6.To gather more clinical information from collaterals to aid in treatment and DC planning  7.Pt to attend therapy groups when clinically more stable and able to meaningfully participate  8. Continue close ongoing lab monitoring  9.Management alert observation status  10. Administrative hearing held on 5/16/17  as part of plan to pursue treatment over objection in light of the severity of pt's illness and the unlikely pt symptom resolution without psychiatric medication  , specifically antipsychotic medication.  11. GI consult requested ( scheduled for 5/17/17 with Dr [facility])

## 2017-05-17 NOTE — PROGRESS NOTE BEHAVIORAL HEALTH - NSBHCHARTREVIEWLAB_PSY_A_CORE FT
CBC Full  -  ( 17 May 2017 08:14 )  WBC Count : 6.6 K/uL  Hemoglobin : 12.1 g/dL  Hematocrit : 37.4 %  Platelet Count - Automated : 198 K/uL  Mean Cell Volume : 90.5 fl  Mean Cell Hemoglobin : 29.3 pg  Mean Cell Hemoglobin Concentration : 32.4 gm/dL  Auto Neutrophil # : 4.6 K/uL  Auto Lymphocyte # : 1.3 K/uL  Auto Monocyte # : 0.4 K/uL  Auto Eosinophil # : 0.3 K/uL  Auto Basophil # : 0.1 K/uL  Auto Neutrophil % : 69.2 %  Auto Lymphocyte % : 19.4 %  Auto Monocyte % : 6.0 %  Auto Eosinophil % : 3.9 %  Auto Basophil % : 1.5 %    05-16    144  |  109<H>  |  37<H>  ----------------------------<  108<H>  4.3   |  29  |  1.78<H>    Ca    9.3      16 May 2017 08:04  Phos  4.3     05-17  Mg     2.0     05-17    TPro  7.6  /  Alb  3.3  /  TBili  0.5  /  DBili  x   /  AST  136<H>  /  ALT  246<H>  /  AlkPhos  230<H>  05-16

## 2017-05-17 NOTE — PROGRESS NOTE BEHAVIORAL HEALTH - OTHER
frenetic, apprehensive and panicky at times anxious and apprehensive eye contact but with less overt hostility frightened, argumentative at times but still  verbally abusive, threatening in nature pressured and circumstantial , repetitive and non goal-directed difficult  to interrupt though pt denies AH /VH pt at times appears distracted and possibly internally preoccupied

## 2017-05-17 NOTE — PROGRESS NOTE BEHAVIORAL HEALTH - NSBHFUPINTERVALHXFT_PSY_A_CORE
Pt seen. Pt and writer again reviewed the  court paper writer had given to pt related to the possibility of Treatment over objection request in light of the pt's severe bipolar d/ with ongoing manic psychosis which is not amenable to usual treatments due to medical co morbid disorders but which must be treated with some form of medication due to safety concerns for the pt and others were pt discharged without medication. Pt amenable to this plan.  Pt trying to attend therapy groups as tolerated but pt struggling with attention in light of  ongoing thought disorganization.  Writer spoke with GI Dr Wylie  on 5/16/17 as pt had requested"  her  GI doctor". Writer informed that pt had not been seen by him x the past  5 years  . Dr WESLEY agreed to consult with the pt on the unit with plan for 5/17/17 consult.  Pt also with a h/o inner thigh cysts  reported again on 5/16/17 ( seen by hospitalist)     Discussed plan to increase Haldol to 5 mg po qhs, to make Cogentin prn and to restart standing Ativan 1 mg oo tid. Pt amenable to plan.

## 2017-05-18 LAB
ANA TITR SER: NEGATIVE — SIGNIFICANT CHANGE UP
CERULOPLASMIN SERPL-MCNC: 35 MG/DL — SIGNIFICANT CHANGE UP (ref 20–60)
FERRITIN SERPL-MCNC: 226 NG/ML — HIGH (ref 15–150)
HAV IGM SER-ACNC: SIGNIFICANT CHANGE UP
HBV CORE AB SER-ACNC: SIGNIFICANT CHANGE UP
HBV CORE IGM SER-ACNC: SIGNIFICANT CHANGE UP
HBV SURFACE AB SER-ACNC: <3 MIU/ML — LOW
HBV SURFACE AG SER-ACNC: SIGNIFICANT CHANGE UP
HCV AB S/CO SERPL IA: 0.11 S/CO — SIGNIFICANT CHANGE UP
HCV AB SERPL-IMP: SIGNIFICANT CHANGE UP
IRON SATN MFR SERPL: 25 % — SIGNIFICANT CHANGE UP (ref 14–50)
IRON SATN MFR SERPL: 66 UG/DL — SIGNIFICANT CHANGE UP (ref 30–160)
SMOOTH MUSCLE AB SER-ACNC: SIGNIFICANT CHANGE UP
TIBC SERPL-MCNC: 260 UG/DL — SIGNIFICANT CHANGE UP (ref 220–430)
UIBC SERPL-MCNC: 194 UG/DL — SIGNIFICANT CHANGE UP (ref 110–370)

## 2017-05-18 PROCEDURE — 76700 US EXAM ABDOM COMPLETE: CPT | Mod: 26

## 2017-05-18 RX ORDER — AMANTADINE HCL 100 MG
100 CAPSULE ORAL EVERY 12 HOURS
Qty: 0 | Refills: 0 | Status: DISCONTINUED | OUTPATIENT
Start: 2017-05-18 | End: 2017-05-22

## 2017-05-18 RX ORDER — HALOPERIDOL DECANOATE 100 MG/ML
5 INJECTION INTRAMUSCULAR EVERY 12 HOURS
Qty: 0 | Refills: 0 | Status: DISCONTINUED | OUTPATIENT
Start: 2017-05-18 | End: 2017-05-30

## 2017-05-18 RX ADMIN — Medication 1 MILLIGRAM(S): at 09:42

## 2017-05-18 RX ADMIN — HALOPERIDOL DECANOATE 5 MILLIGRAM(S): 100 INJECTION INTRAMUSCULAR at 20:40

## 2017-05-18 RX ADMIN — Medication 1 MILLIGRAM(S): at 20:40

## 2017-05-18 RX ADMIN — Medication 100 MILLIGRAM(S): at 20:40

## 2017-05-18 NOTE — PROGRESS NOTE BEHAVIORAL HEALTH - OTHER
frenetic, apprehensive and panicky at times anxious and apprehensive eye contact but with less overt hostility better modulated in tone overall. Pt more aware of her episodic outbursts and making more concerted effort to maintain her calm rapid but not clearly pressured as before less pressured and pt better able to focus though briefly on other's comments though pt denies AH /VH pt at times appears distracted and possibly internally preoccupied affect  slowly becoming more mood congruent less manifestly labile

## 2017-05-18 NOTE — PROGRESS NOTE BEHAVIORAL HEALTH - NSBHFUPINTERVALCCFT_PSY_A_CORE
" I'll take the Haldol but the Cogentin isn't working for the restlessness and cramping in my legs that seems to go to my arms with twisting muscles."  Pt seen by GI and consult appreciated. GI workup to date largely negative and unremarkable.   Renal status stable and unchanged with slowly progressive renal disease. .

## 2017-05-18 NOTE — PROGRESS NOTE BEHAVIORAL HEALTH - PROBLEM SELECTOR PLAN 1
1. Resume Ativan 1 mg po tid  2.To increase Haldol to 5 mg po q 12 hrs  3.DC Cogentin 0.5 mg po q 12 prn ( ineffective per pt report EPS)  4.Begin Amantadine 100 mg po q 12 hrs ( EPS , r/o RLS)  5.Depakote also stopped due to prior elevated LFTs  6.To gather more clinical information from collaterals to aid in treatment and DC planning  7.Pt to attend therapy groups when clinically more stable and able to meaningfully participate  8. Continue close ongoing lab monitoring  9.Management alert observation status  10. Administrative hearing held on 5/16/17  as part of plan to pursue treatment over objection in light of the severity of pt's illness and the unlikely pt symptom resolution without psychiatric medication  , specifically antipsychotic medication.  11. GI consult requested ( scheduled for 5/17/17 with Dr [facility])

## 2017-05-18 NOTE — PROGRESS NOTE BEHAVIORAL HEALTH - NSBHFUPINTERVALHXFT_PSY_A_CORE
Pt seen. Though having had a difficult evening due to  court papers being served etc, pt now remains amenable to ongoing treatment with safest treatment option of Haldol titration with lowest safest and most effective treatment for her bipolar d/o with manic psychosis. Pt also amenable to TID standing Ativan  for manic psychotic anxiety.  Informed consent obtained from pt for a trial of Amantadine for prophylactic treatment of EPS and possible restless leg syndrome via it's Dopamine  agonist property.   Pt also with a h/o inner thigh cysts  reported again on 5/16/17 (to be  seen by hospitalist)     Discussed plan to increase Haldol to 5 mg po q 12 hrs , and to continue  standing Ativan 1 mg oo tid. as above.  Pt amenable to plan.

## 2017-05-18 NOTE — CONSULT NOTE ADULT - SUBJECTIVE AND OBJECTIVE BOX
57 yo F admitted F with multiple comorbidities admitted under psychiatry now complaining of chronic groin cysts. Per patient groin cysts appeared approx one year ago. Have mild pain. she has tried numerous remedies to ahsan the reoccurrence but they seem to come back. She also picks at them and they leak clear fluid. She denies any other cysts in vaginal or anal region. Denies fevers or chills. No pelvic pain. No vaginal discharge. She does admit to shaving the area.    Monogamous with one partner.    GI workup also in progress for transaminitis. Denies abd pain presently.      PAST MEDICAL & SURGICAL HISTORY:  Degenerative disc disease, lumbar  Bipolar disorder  Depression  Lyme disease  GERD (gastroesophageal reflux disease)  Uterine leiomyoma  Breast CA: DCIS, left breast, s/p RT  Kidney disease: &quot;stage 3&quot; as per patient secondary to lithium treatment many years ago  MVP (mitral valve prolapse)  Cardiac murmur  S/P coronary angiogram: 10 yrs ago, no intervention  S/P dilation and curettage: uterine polyps  S/P tonsillectomy  S/P lumpectomy of breast: 2011, left breast, no lymph nodes removed    ICU Vital Signs Last 24 Hrs  T(C): 36.8, Max: 36.8 (05-18 @ 07:40)  T(F): 98.2, Max: 98.2 (05-18 @ 07:40)  HR: 77 (77 - 77)  BP: 110/86 (110/86 - 110/86)  BP(mean): --  ABP: --  ABP(mean): --  RR: 16 (16 - 16)  SpO2: 100% (100% - 100%)          PHYSICAL EXAM:    Constitutional: NAD, awake and alert, well-developed  HEENT: PERR, EOMI, Normal Hearing, MMM  Neck: Soft and supple, No LAD, No JVD  Respiratory: Breath sounds are clear bilaterally, No wheezing, rales or rhonchi  Cardiovascular: S1 and S2, regular rate and rhythm, no Murmurs, gallops or rubs  Gastrointestinal: Bowel Sounds present, soft, nontender, nondistended, no guarding, no rebound  Extremities: No peripheral edema. groin: several small cystic lesions to B/L medial thigh regions with evident scarring. Largest being 1cm, fluctuant, no erythema or warmth/tenderness  Vascular: 2+ peripheral pulses  Neurological: A/O x 3, no focal deficits  Musculoskeletal: 5/5 strength b/l upper and lower extremities  Skin: No rashes 55 yo F admitted F with multiple comorbidities admitted under psychiatry now complaining of chronic groin cysts. Per patient groin cysts appeared approx one year ago. Have mild pain. she has tried numerous remedies to ahsan the reoccurrence but they seem to come back. She also picks at them and they leak clear fluid. She denies any other cysts in vaginal or anal region. Denies fevers or chills. No pelvic pain. No vaginal discharge. She does admit to shaving the area.    Monogamous with one partner.    GI workup also in progress for transaminitis. Denies abd pain presently.      PAST MEDICAL & SURGICAL HISTORY:  Degenerative disc disease, lumbar  Bipolar disorder  Depression  Lyme disease  GERD (gastroesophageal reflux disease)  Uterine leiomyoma  Breast CA: DCIS, left breast, s/p RT  Kidney disease: &quot;stage 3&quot; as per patient secondary to lithium treatment many years ago  MVP (mitral valve prolapse)  Cardiac murmur  S/P coronary angiogram: 10 yrs ago, no intervention  S/P dilation and curettage: uterine polyps  S/P tonsillectomy  S/P lumpectomy of breast: 2011, left breast, no lymph nodes removed    ICU Vital Signs Last 24 Hrs  T(C): 36.8, Max: 36.8 (05-18 @ 07:40)  T(F): 98.2, Max: 98.2 (05-18 @ 07:40)  HR: 77 (77 - 77)  BP: 110/86 (110/86 - 110/86)  BP(mean): --  ABP: --  ABP(mean): --  RR: 16 (16 - 16)  SpO2: 100% (100% - 100%)          PHYSICAL EXAM:    Constitutional: NAD, awake and alert, well-developed  HEENT: PERR, EOMI, Normal Hearing, MMM  Neck: Soft and supple, No LAD, No JVD  Respiratory: Breath sounds are clear bilaterally, No wheezing, rales or rhonchi  Cardiovascular: S1 and S2, regular rate and rhythm, no Murmurs, gallops or rubs  Gastrointestinal: Bowel Sounds present, soft, nontender, nondistended, no guarding, no rebound  Extremities: No peripheral edema. groin: several small cystic lesions to B/L medial thigh regions with evident scarring. Largest being 1cm, fluctuant, no erythema or warmth/tenderness  Vascular: 2+ peripheral pulses  Neurological: A/O x 3, no focal deficits  Musculoskeletal: 5/5 strength b/l upper and lower extremities  Skin: No rashes              LABS: All Labs Reviewed:                        12.1   6.6   )-----------( 198      ( 17 May 2017 08:14 )             37.4       Phos  4.3     05-17  Mg     2.0     05-17                Blood Culture:         U/S abd:    IMPRESSION:     No hepatobiliary abnormality to explain the patient's elevated LFTs.  Echogenic kidneys compatible with medical renal disease.

## 2017-05-19 RX ADMIN — Medication 100 MILLIGRAM(S): at 09:37

## 2017-05-19 RX ADMIN — HALOPERIDOL DECANOATE 5 MILLIGRAM(S): 100 INJECTION INTRAMUSCULAR at 09:38

## 2017-05-19 RX ADMIN — Medication 1 MILLIGRAM(S): at 20:55

## 2017-05-19 RX ADMIN — Medication 100 MILLIGRAM(S): at 20:55

## 2017-05-19 RX ADMIN — HALOPERIDOL DECANOATE 5 MILLIGRAM(S): 100 INJECTION INTRAMUSCULAR at 20:55

## 2017-05-19 RX ADMIN — MAGNESIUM HYDROXIDE 30 MILLILITER(S): 400 TABLET, CHEWABLE ORAL at 09:37

## 2017-05-19 RX ADMIN — Medication 100 MILLIGRAM(S): at 09:36

## 2017-05-19 RX ADMIN — Medication 1 MILLIGRAM(S): at 09:38

## 2017-05-19 NOTE — PROGRESS NOTE BEHAVIORAL HEALTH - NSBHFUPINTERVALCCFT_PSY_A_CORE
" I feel a little like my mind is being too controlled by the medicine but I'm willing to take it."    Renal status stable and unchanged with slowly progressive renal disease.   GI workup in progress with initial reports largely WNL..  Pt tolerating newly increased Haldol 5 mg po q12 and Amantadine 100 mg po q 12 hrs ( EPS ? ?restless leg syndrome)  Discussed plan with the pt to continue standing Ativan 1 mg po tid for bipolar manic psychotic agitation.   Pt with slow but more apparent clinical efficacy.   court hearing for treatment over objection adjourned x 1 week. as pt now med compliant and beginning to clinically improve.  Pt continues with very little insight into her illness and the need for ongoing treatment.

## 2017-05-19 NOTE — PROGRESS NOTE BEHAVIORAL HEALTH - PROBLEM SELECTOR PLAN 1
1. Continue Ativan 1 mg po tid  2. Continue Haldol to 5 mg po q 12 hrs  3.DC Cogentin 0.5 mg po q 12 prn ( ineffective per pt report EPS)  4.Begin Amantadine 100 mg po q 12 hrs ( EPS , r/o RLS)  5.Depakote also stopped due to prior elevated LFTs  6.Pt unable to take Lithium due to CHRONIC KIDNEY DISEASE  6.To gather more clinical information from collaterals to aid in treatment and DC planning  7.Pt to attend therapy groups when clinically more stable and able to meaningfully participate  8. Continue close ongoing lab monitoring  9.Management alert observation status  10. Administrative hearing held on 5/16/17  as part of plan to pursue treatment over objection in light of the severity of pt's illness and the unlikely pt symptom resolution without psychiatric medication  , specifically antipsychotic medication.  11. GI consult requested ( scheduled for 5/17/17 with Dr [facility])

## 2017-05-19 NOTE — PROGRESS NOTE BEHAVIORAL HEALTH - OTHER
improving, steady more aware eye contact better modulated in tone overall. Pt more aware of her episodic outbursts and making more concerted effort to maintain her calm rapid but not clearly pressured as before less pressured and pt better able to focus though briefly on other's comments less manifestly labile affect  slowly becoming more mood congruent though pt denies AH /VH pt at times appears distracted and possibly internally preoccupied

## 2017-05-19 NOTE — PROGRESS NOTE BEHAVIORAL HEALTH - NSBHCHARTREVIEWIMAGING_PSY_A_CORE FT
MEDICATIONS  (STANDING):    melatonin. 3milliGRAM(s) Oral at bedtime  LORazepam     Tablet 1milliGRAM(s) Oral three times a day  amantadine 100milliGRAM(s) Oral every 12 hours  haloperidol     Tablet 5milliGRAM(s) Oral every 12 hours    MEDICATIONS  (PRN):  diphenhydrAMINE   Injectable 50milliGRAM(s) IntraMuscular once PRN Agitation  aluminum hydroxide/magnesium hydroxide/simethicone Suspension 30milliLiter(s) Oral every 6 hours PRN Dyspepsia  magnesium hydroxide Suspension 30milliLiter(s) Oral daily PRN Constipation  haloperidol    Injectable 5milliGRAM(s) IntraMuscular every 6 hours PRN Agitation due to bipolar manic psychosis  LORazepam   Injectable 2milliGRAM(s) IntraMuscular every 6 hours PRN Agitation due to bipolar sharlene  LORazepam     Tablet 1milliGRAM(s) Oral two times a day PRN Anxiety/ agitation

## 2017-05-19 NOTE — PROGRESS NOTE BEHAVIORAL HEALTH - NSBHFUPINTERVALHXFT_PSY_A_CORE
Pt seen. Though having had a difficult evening due to  court papers being served etc, pt now remains amenable to ongoing treatment with safest treatment option of Haldol titration with lowest safest and most effective treatment for her bipolar d/o with manic psychosis.     Due to medical co morbid illnesses, pt remains UNABLE TO TAKE LITHIUM OR DEPAKOTE

## 2017-05-20 LAB — COPPER SERPL-MCNC: 139 UG/DL — SIGNIFICANT CHANGE UP (ref 72–166)

## 2017-05-20 RX ADMIN — Medication 1 MILLIGRAM(S): at 09:01

## 2017-05-20 RX ADMIN — HALOPERIDOL DECANOATE 5 MILLIGRAM(S): 100 INJECTION INTRAMUSCULAR at 20:59

## 2017-05-20 RX ADMIN — Medication 100 MILLIGRAM(S): at 21:00

## 2017-05-20 RX ADMIN — HALOPERIDOL DECANOATE 5 MILLIGRAM(S): 100 INJECTION INTRAMUSCULAR at 09:01

## 2017-05-20 RX ADMIN — Medication 1 MILLIGRAM(S): at 20:59

## 2017-05-20 NOTE — PROGRESS NOTE BEHAVIORAL HEALTH - NSBHFUPINTERVALCCFT_PSY_A_CORE
"I am taking the medicine but I am not sure about it."  Pt had many somatic complaints, no acute sx present.     Renal status stable and unchanged with slowly progressive renal disease.   GI workup in progress with initial reports largely WNL..  Pt tolerating newly increased Haldol 5 mg po q12 and Amantadine 100 mg po q 12 hrs ( EPS ? ?restless leg syndrome)  Discussed plan with the pt to continue standing Ativan 1 mg po tid for bipolar manic psychotic agitation.   Pt with slow but more apparent clinical efficacy.   court hearing for treatment over objection adjourned x 1 week. as pt now med compliant and beginning to clinically improve.  Pt continues with very little insight into her illness and the need for ongoing treatment.

## 2017-05-21 RX ADMIN — Medication 100 MILLIGRAM(S): at 22:10

## 2017-05-21 RX ADMIN — HALOPERIDOL DECANOATE 5 MILLIGRAM(S): 100 INJECTION INTRAMUSCULAR at 22:11

## 2017-05-21 RX ADMIN — HALOPERIDOL DECANOATE 5 MILLIGRAM(S): 100 INJECTION INTRAMUSCULAR at 09:46

## 2017-05-21 RX ADMIN — Medication 1 MILLIGRAM(S): at 09:45

## 2017-05-21 RX ADMIN — Medication 100 MILLIGRAM(S): at 09:45

## 2017-05-21 RX ADMIN — Medication 1 MILLIGRAM(S): at 22:10

## 2017-05-21 NOTE — PROGRESS NOTE BEHAVIORAL HEALTH - NSBHCHARTREVIEWVS_PSY_A_CORE FT
Vital Signs Last 24 Hrs  T(C): 36.7, Max: 36.7 (05-21 @ 08:25)  T(F): 98, Max: 98 (05-21 @ 08:25)  HR: 77 (77 - 77)  BP: 104/85 (104/85 - 104/85)  BP(mean): --  RR: 15 (15 - 15)  SpO2: 98% (98% - 98%)

## 2017-05-21 NOTE — PROGRESS NOTE BEHAVIORAL HEALTH - NSBHFUPINTERVALCCFT_PSY_A_CORE
"I am taking the medicine but I am not sure about it."  Pt preoccupied with her medical conditions, some were legitimate concerns. Discussed efficacy of Seroquel and it's appropriateness in context of liver and kidney disease, and pt is amenable to discussing and possibly starting on Monday with primary provider on unit.     Renal status stable and unchanged with slowly progressive renal disease.   GI workup in progress with initial reports largely WNL..  Pt tolerating newly increased Haldol 5 mg po q12 and Amantadine 100 mg po q 12 hrs ( EPS ? ?restless leg syndrome)  Discussed plan with the pt to continue standing Ativan 1 mg po tid for bipolar manic psychotic agitation.   Pt with slow but more apparent clinical efficacy.  [facility] court hearing for treatment over objection adjourned x 1 week. as pt now med compliant and beginning to clinically improve.  Pt continues with very little insight into her illness and the need for ongoing treatment.

## 2017-05-22 RX ADMIN — HALOPERIDOL DECANOATE 5 MILLIGRAM(S): 100 INJECTION INTRAMUSCULAR at 20:44

## 2017-05-22 RX ADMIN — Medication 1 MILLIGRAM(S): at 20:44

## 2017-05-22 RX ADMIN — Medication 100 MILLIGRAM(S): at 09:38

## 2017-05-22 RX ADMIN — HALOPERIDOL DECANOATE 5 MILLIGRAM(S): 100 INJECTION INTRAMUSCULAR at 09:38

## 2017-05-22 RX ADMIN — Medication 1 MILLIGRAM(S): at 09:38

## 2017-05-22 NOTE — PROGRESS NOTE BEHAVIORAL HEALTH - OTHER
improving, steady more aware eye contact better modulated in tone overall. Pt more aware of her episodic outbursts and making more concerted effort to maintain her calm rapid but not clearly pressured as before less pressured and pt better able to focus though briefly on other's comments less manifestly labile affect  slowly becoming more mood congruent more normal overall thought associations

## 2017-05-22 NOTE — PROGRESS NOTE BEHAVIORAL HEALTH - NSBHFUPINTERVALHXFT_PSY_A_CORE
Pt seen in the hallway . Pt more composed in behavior and demeanor with more attention to grooming and more rested appearing despite the pt's increasing concern regarding potential 'effects' of pr's current still low dose med regimen of Haldol 5 mg q 12 , Amantadine 100 mg q 12 and Ativan now lowered to BID from tid due to pt c/o daytime sedation.  Pt still with rapid speech but with improving thought disorder and overall decrease in affective volatility. with seemingly well tolerated med regimen.    Due to medical co morbid illnesses, pt remains UNABLE TO TAKE LITHIUM OR DEPAKOTE

## 2017-05-22 NOTE — PROGRESS NOTE BEHAVIORAL HEALTH - NSBHCHARTREVIEWVS_PSY_A_CORE FT
Vital Signs Last 24 Hrs  T(C): 37.6, Max: 37.6 (05-22 @ 09:04)  T(F): 99.6, Max: 99.6 (05-22 @ 09:04)  HR: 79 (79 - 79)  BP: 115/82 (115/82 - 115/82)  BP(mean): --  RR: 14 (14 - 14)  SpO2: 99% (99% - 99%)

## 2017-05-22 NOTE — PROGRESS NOTE BEHAVIORAL HEALTH - NSBHFUPINTERVALCCFT_PSY_A_CORE
" I'm feeling tired and I couldn't sit through the meeting this morning. Do you think the meds are doing this to me?"    Renal status stable and unchanged with slowly progressive renal disease.   GI workup in progress with initial reports largely WNL..  GI follow up appreciated. Serum copper WNL      Pt tolerating newly increased Haldol 5 mg po q12 and Amantadine 100 mg po q 12 hrs ( EPS ? ?restless leg syndrome)  Discussed plan with the pt to continue standing Ativan 1 mg po tid for bipolar manic psychotic agitation.   Pt with slow but more apparent clinical efficacy.   court hearing for treatment over objection adjourned x 1 week. as pt now med compliant and beginning to clinically improve.  Pt continues with very little insight into her illness and the need for ongoing treatment.

## 2017-05-22 NOTE — PROGRESS NOTE BEHAVIORAL HEALTH - NSBHCHARTREVIEWIMAGING_PSY_A_CORE FT
MEDICATIONS  (STANDING):  melatonin. 3milliGRAM(s) Oral at bedtime  amantadine 100milliGRAM(s) Oral every 12 hours  haloperidol     Tablet 5milliGRAM(s) Oral every 12 hours  LORazepam     Tablet 1milliGRAM(s) Oral every 12 hours    MEDICATIONS  (PRN):  diphenhydrAMINE   Injectable 50milliGRAM(s) IntraMuscular once PRN Agitation  aluminum hydroxide/magnesium hydroxide/simethicone Suspension 30milliLiter(s) Oral every 6 hours PRN Dyspepsia  magnesium hydroxide Suspension 30milliLiter(s) Oral daily PRN Constipation  haloperidol    Injectable 5milliGRAM(s) IntraMuscular every 6 hours PRN Agitation due to bipolar manic psychosis  LORazepam   Injectable 2milliGRAM(s) IntraMuscular every 6 hours PRN Agitation due to bipolar sharlene  LORazepam     Tablet 1milliGRAM(s) Oral two times a day PRN Anxiety/ agitation

## 2017-05-22 NOTE — PROGRESS NOTE BEHAVIORAL HEALTH - PROBLEM SELECTOR PLAN 1
1.Lower Ativan 1 mg po bid ( pt c/o daytime sedation)  2. Continue Haldol to 5 mg po q 12 hrs  3.DC Cogentin 0.5 mg po q 12 prn ( ineffective per pt report EPS)  4.Begin Amantadine 100 mg po q 12 hrs ( EPS , r/o RLS)  5.Depakote also stopped due to prior elevated LFTs  6.Pt unable to take Lithium due to CHRONIC KIDNEY DISEASE  6.To gather more clinical information from collaterals to aid in treatment and DC planning  7.Pt to attend therapy groups when clinically more stable and able to meaningfully participate  8. Continue close ongoing lab monitoring  9.Management alert observation status  10. Administrative hearing held on 5/16/17  as part of plan to pursue treatment over objection in light of the severity of pt's illness and the unlikely pt symptom resolution without psychiatric medication  , specifically antipsychotic medication.  11. GI consult requested ( scheduled for 5/17/17 with Dr [facility])

## 2017-05-23 RX ADMIN — HALOPERIDOL DECANOATE 5 MILLIGRAM(S): 100 INJECTION INTRAMUSCULAR at 20:59

## 2017-05-23 RX ADMIN — Medication 3 MILLIGRAM(S): at 20:59

## 2017-05-23 RX ADMIN — HALOPERIDOL DECANOATE 5 MILLIGRAM(S): 100 INJECTION INTRAMUSCULAR at 08:11

## 2017-05-23 RX ADMIN — Medication 1 MILLIGRAM(S): at 20:59

## 2017-05-23 RX ADMIN — Medication 1 MILLIGRAM(S): at 08:11

## 2017-05-23 NOTE — PROGRESS NOTE BEHAVIORAL HEALTH - NSBHFUPINTERVALCCFT_PSY_A_CORE
" I need to get the 24 hour urine now."   Pt with increasing focus on somatic concerns related to current ongoing GI work up  with consultation much appreciated.   Lab work to date has been largely WNL   Serum copper  WNL  24 hr urine collection for copper now ordered  as above.        Renal status stable and unchanged with slowly progressive renal disease.   GI workup in progress with initial reports largely WNL..  GI follow up appreciated. Serum copper WNL      Pt tolerating newly increased Haldol 5 mg po q12 and Amantadine 100 mg po q 12 hrs ( EPS ? ?restless leg syndrome)  Discussed plan with the pt to continue standing Ativan 1 mg po tid for bipolar manic psychotic agitation.   Pt with slow but more apparent clinical efficacy.  [facility] hearing for treatment over objection adjourned x 1 week. as pt now med compliant and beginning to clinically improve.  Pt continues with very little insight into her illness and the need for ongoing treatment.

## 2017-05-23 NOTE — PROGRESS NOTE BEHAVIORAL HEALTH - OTHER
improving, steady more aware eye contact better modulated in tone overall. Pt more aware of her episodic outbursts and making more concerted effort to maintain her calm rapid but not clearly pressured as before less pressured and pt better able to focus though briefly on other's comments less manifestly labile affect  slowly becoming more mood congruent more normal overall thought associations pt with brief outcries  related to her fatigue and her inattention which she continues to blame exclusively on medications which she has largely discontinued less pressured but once again  with poorer  focus  on other's comments

## 2017-05-23 NOTE — PROGRESS NOTE BEHAVIORAL HEALTH - PROBLEM SELECTOR PLAN 1
1.Lower Ativan 1 mg po bid ( pt c/o daytime sedation)  2. Continue Haldol to 5 mg po q 12 hrs  3.DC Cogentin 0.5 mg po q 12 prn ( ineffective per pt report EPS)  4.Begin Amantadine 100 mg po q 12 hrs ( EPS , r/o RLS)  5.Depakote also stopped due to prior elevated LFTs  6.Pt unable to take Lithium due to CHRONIC KIDNEY DISEASE  6.To gather more clinical information from collaterals to aid in treatment and DC planning  7.Pt to attend therapy groups when clinically more stable and able to meaningfully participate  8. Continue close ongoing lab monitoring  9.Management alert observation status  10. Administrative hearing held on 5/16/17  as part of plan to pursue treatment over objection in light of the severity of pt's illness and the unlikely pt symptom resolution without psychiatric medication  , specifically antipsychotic medication.  11. GI consult requested ( scheduled for 5/17/17 with Dr [facility]) 1.Lower Ativan 1 mg po bid ( pt c/o daytime sedation)  2. Continue Haldol to 5 mg po q 12 hrs  3.DC Cogentin 0.5 mg po q 12 prn ( ineffective per pt report EPS)  4.DC  Amantadine 100 mg po q 12 hrs ( EPS ) at pt's request  5.Depakote also stopped due to prior elevated LFTs  6.Pt unable to take Lithium due to CHRONIC KIDNEY DISEASE  7.To gather more clinical information from collaterals to aid in treatment and DC planning  8.Pt to attend therapy groups when clinically more stable and able to meaningfully participate  9. Continue close ongoing lab monitoring  9.Management alert observation status  10. Administrative hearing held on 5/16/17  as part of plan to pursue treatment over objection in light of the severity of pt's illness and the unlikely pt symptom resolution without psychiatric medication  , specifically antipsychotic medication.  11. GI consult requested ( scheduled for 5/17/17 with Dr [facility])

## 2017-05-23 NOTE — PROGRESS NOTE BEHAVIORAL HEALTH - NSBHFUPINTERVALHXFT_PSY_A_CORE
Pt seen in the hallway and in and out of therapy groups between pt phone calls.   Pt still with rapid speech but with improving thought disorder and overall decrease in affective volatility. with seemingly well tolerated med regimen.  Pt appears a bit more anxious and easily overwhelmed  but  maintaining tenuous emotional control.  Pt continues with no insight into her illness and her judg  Due to medical co morbid illnesses, pt remains UNABLE TO TAKE LITHIUM OR DEPAKOTE Pt seen in the hallway and in and out of therapy groups between pt phone calls.   Pt still with rapid speech but with improving thought disorder and overall decrease in affective volatility. with seemingly well tolerated med regimen.  Pt appears a bit more anxious and easily overwhelmed  but  maintaining tenuous emotional control.  Pt continues with no insight into her illness and her judgment remains impaired .  Due to medical co morbid illnesses, pt remains UNABLE TO TAKE LITHIUM OR DEPAKOTE

## 2017-05-24 RX ADMIN — Medication 1 MILLIGRAM(S): at 20:51

## 2017-05-24 RX ADMIN — Medication 3 MILLIGRAM(S): at 20:51

## 2017-05-24 RX ADMIN — HALOPERIDOL DECANOATE 5 MILLIGRAM(S): 100 INJECTION INTRAMUSCULAR at 09:01

## 2017-05-24 RX ADMIN — HALOPERIDOL DECANOATE 5 MILLIGRAM(S): 100 INJECTION INTRAMUSCULAR at 20:51

## 2017-05-24 RX ADMIN — Medication 1 MILLIGRAM(S): at 09:01

## 2017-05-24 NOTE — PROGRESS NOTE BEHAVIORAL HEALTH - NSBHFUPINTERVALCCFT_PSY_A_CORE
" I need some fresh air. I need to get back to work. I'm having my work up done on the unit. "      Serum copper  WNL  24 hr urine collection for copper now underway       Renal status stable and unchanged with slowly progressive renal disease.   GI workup in progress with initial reports largely WNL..  GI follow up appreciated. Serum copper WNL      Pt tolerating newly increased Haldol 5 mg po q12 and Amantadine 100 mg po q 12 hrs ( EPS ? ?restless leg syndrome)  Discussed plan with the pt to continue standing Ativan 1 mg po tid for bipolar manic psychotic agitation.   Pt with slow but more apparent clinical efficacy.   court hearing for treatment over objection adjourned x 1 week. as pt now med compliant and beginning to clinically improve.  Pt continues with very little insight into her illness and the need for ongoing treatment.

## 2017-05-24 NOTE — PROGRESS NOTE BEHAVIORAL HEALTH - PROBLEM SELECTOR PLAN 1
1.Lower Ativan 1 mg po bid ( pt c/o daytime sedation)  2. Continue Haldol to 5 mg po q 12 hrs  3.DC Cogentin 0.5 mg po q 12 prn ( ineffective per pt report EPS)  4.DC  Amantadine 100 mg po q 12 hrs ( EPS ) at pt's request  5.Depakote also stopped due to prior elevated LFTs  6.Pt unable to take Lithium due to CHRONIC KIDNEY DISEASE  7.To gather more clinical information from collaterals to aid in treatment and DC planning  8.Pt to attend therapy groups when clinically more stable and able to meaningfully participate  9. Continue close ongoing lab monitoring  9.Management alert observation status  10. Administrative hearing held on 5/16/17  as part of plan to pursue treatment over objection in light of the severity of pt's illness and the unlikely pt symptom resolution without psychiatric medication  , specifically antipsychotic medication.  11. GI consult requested ( scheduled for 5/17/17 with Dr [facility])

## 2017-05-24 NOTE — PROGRESS NOTE BEHAVIORAL HEALTH - NSBHFUPINTERVALHXFT_PSY_A_CORE
Pt seen in the day room . Pt appearing calmer overall with good eye contact and less circumstantial thinking with less frenetic behavior overall.    Pt still rapid speech but no longer pressured . Pt with  less overt evidence of thought disorder and with an overall decrease in affective volatility. with seemingly well tolerated med regimen.  Pt appears somewhat anxious but  less easily overwhelmed  . Pt is continuing to  maintain  emotional control.  Pt continues with no insight into her illness and her judgment remains impaired .  Due to medical co morbid illnesses, pt remains UNABLE TO TAKE LITHIUM OR DEPAKOTE  Pt requesting STEP 2 privilege . This writer spoke with nursing staff who are in agreement with step change for pt at this time.

## 2017-05-24 NOTE — PROGRESS NOTE BEHAVIORAL HEALTH - OTHER
improving, steady more aware eye contact rapid but not  pressured as before less pressured but with slow pt improved   focus  on other's comments during conversations less manifestly labile affect  slowly becoming more mood congruent more normal overall thought associations improving

## 2017-05-25 DIAGNOSIS — F25.0 SCHIZOAFFECTIVE DISORDER, BIPOLAR TYPE: ICD-10-CM

## 2017-05-25 RX ADMIN — Medication 3 MILLIGRAM(S): at 21:03

## 2017-05-25 RX ADMIN — HALOPERIDOL DECANOATE 5 MILLIGRAM(S): 100 INJECTION INTRAMUSCULAR at 21:03

## 2017-05-25 RX ADMIN — HALOPERIDOL DECANOATE 5 MILLIGRAM(S): 100 INJECTION INTRAMUSCULAR at 08:52

## 2017-05-25 RX ADMIN — Medication 1 MILLIGRAM(S): at 21:03

## 2017-05-25 RX ADMIN — Medication 1 MILLIGRAM(S): at 08:53

## 2017-05-25 NOTE — PROGRESS NOTE BEHAVIORAL HEALTH - NSBHFUPINTERVALCCFT_PSY_A_CORE
" I feel despondent. What's wrong with me? There must be something wrong with me. My throat . No one is checking my thyroid. They said I have a fatty liver. "    Pt seen in the day room. Pt remains massively anxious with ongoing somatic preoccupations bordering on the delusional despite numerous medical specialty evaluations and ongoing pt reassurances related to pt's overall stable medical health.  Pt continues somewhat psychomotor agitated but less frenetic than before. Pt with virtually no insight into the nature and severity of her illness and the likely need for long term medication treatment in an effort to decrease risk of clinical relapse and need for readmission to hospital.  Pt tolerating low dose Haldol with modest efficacy. Pt declining to allow further increase in her antipsychotic medication despite ongoing though less intense paranoia and referential thinking..  Writer reminded the pt and her parents that the pt remains in tenuous emotional control  and that pt will require slow cautious resumption of her previously very hectic lifestyle.  Though pt has stated she will comply with medications , the pt has also continued to believe that holistic  treatment  of her severe and  functionally impairing  presumptive schizoaffective d/o- bipolar type will be her plan for treatment upon DC home from hospital.

## 2017-05-25 NOTE — PROGRESS NOTE BEHAVIORAL HEALTH - OTHER
improving, steady more aware eye contact improving rapid but not  pressured as before less pressured but still rapid . with ongoing pt difficulty with ability to   focus  on conversations and therapy groups less manifestly labile affect  slowly becoming more mood congruent more normal overall thought associations though still with predilection to become loose and tangential at times ongoing somatic and referential delusional thinking.

## 2017-05-25 NOTE — PROGRESS NOTE BEHAVIORAL HEALTH - NSBHFUPINTERVALHXFT_PSY_A_CORE
Due to medical co morbid illnesses, pt remains UNABLE TO TAKE LITHIUM OR DEPAKOTE    Though   Court hearing now adjourned as pt is taking medication to treat her illness, the pt continues to decline any recommended increase in pt's Haldol and Ativan doses in an effort to further alleviate persistent symptoms of her schizoaffective d/o.

## 2017-05-25 NOTE — PROGRESS NOTE BEHAVIORAL HEALTH - NSBHFUPDXUPDATEFT_PSY_A_CORE
with further collateral history, pt appears to continue with baseline paranoia and milder form of thought disorganization even when mood episode itsel ( ie sharlene or depression) is largely resolved.

## 2017-05-25 NOTE — PROGRESS NOTE BEHAVIORAL HEALTH - PROBLEM SELECTOR PLAN 1
1.Lower Ativan 1 mg po bid ( pt c/o daytime sedation)  2. Continue still low dose Haldol  5 mg po q 12 hrs  3.DC Cogentin 0.5 mg po q 12 prn ( ineffective per pt report EPS)  4.DC  Amantadine 100 mg po q 12 hrs ( EPS ) at pt's request  5.Depakote also stopped due to prior elevated LFTs  6.Pt unable to take Lithium due to CHRONIC KIDNEY DISEASE  7.To gather more clinical information from collaterals to aid in treatment and DC planning  8.Pt to attend therapy groups when clinically more stable and able to meaningfully participate  9. Continue close ongoing lab monitoring  9.Management alert observation status  10. Administrative hearing held on 5/16/17  as part of plan to pursue treatment over objection in light of the severity of pt's illness and the unlikely pt symptom resolution without psychiatric medication  , specifically antipsychotic medication.  11. GI consult requested ( scheduled for 5/17/17 with Dr [facility])

## 2017-05-26 RX ORDER — COLCHICINE 0.6 MG
1.2 TABLET ORAL ONCE
Qty: 0 | Refills: 0 | Status: COMPLETED | OUTPATIENT
Start: 2017-05-26 | End: 2017-05-26

## 2017-05-26 RX ORDER — COLCHICINE 0.6 MG
0.6 TABLET ORAL ONCE
Qty: 0 | Refills: 0 | Status: COMPLETED | OUTPATIENT
Start: 2017-05-26 | End: 2017-05-26

## 2017-05-26 RX ORDER — COLCHICINE 0.6 MG
0.6 TABLET ORAL
Qty: 0 | Refills: 0 | Status: DISCONTINUED | OUTPATIENT
Start: 2017-05-27 | End: 2017-05-30

## 2017-05-26 RX ADMIN — HALOPERIDOL DECANOATE 5 MILLIGRAM(S): 100 INJECTION INTRAMUSCULAR at 08:17

## 2017-05-26 RX ADMIN — Medication 1 MILLIGRAM(S): at 08:17

## 2017-05-26 RX ADMIN — HALOPERIDOL DECANOATE 5 MILLIGRAM(S): 100 INJECTION INTRAMUSCULAR at 21:11

## 2017-05-26 RX ADMIN — Medication 1.2 MILLIGRAM(S): at 21:36

## 2017-05-26 RX ADMIN — Medication 3 MILLIGRAM(S): at 21:11

## 2017-05-26 RX ADMIN — Medication 1 MILLIGRAM(S): at 21:11

## 2017-05-26 RX ADMIN — Medication 0.6 MILLIGRAM(S): at 23:17
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
## 2017-05-26 NOTE — CONSULT NOTE ADULT - SUBJECTIVE AND OBJECTIVE BOX
PMD: Dr. Theodore    HPI: 55 y/o female initially admitted to inpatient psychiatry on 5/5/17 for sharlene episode. Pt with PMHx significant for GERD, Depression, bipolar breast cancer, CKD stage 3, MVP and Gout. Consult called because pt is now complaining of right great toe pain, redness and swelling. Had acute gout attacked in the past that improved with colchicine. Denies fever, chills, recent trauma, CP, SOB, abdominal pain. Uric acid level done on 5/16 ws 9.     PAST MEDICAL HISTORY:  Degenerative disc disease, lumbar  Bipolar disorder  Depression  Lyme disease  GERD (gastroesophageal reflux disease)  Uterine leiomyoma  Breast CA: DCIS, left breast, s/p RT  CKD stage 3; as per patient secondary to lithium treatment many years ago  MVP (mitral valve prolapse)  Cardiac murmur    PAST SURGICAL HISTORY  S/P dilation and curettage: uterine polyps  S/P tonsillectomy  S/P lumpectomy of breast: 2011, left breast, no lymph nodes removed    Allergies: NKA    ROS: All other review of systems is negative unless indicated above in HPI    Vital Signs Last 24 Hrs  T(C): 37.1, Max: 37.1 (05-26 @ 08:15)  T(F): 98.7, Max: 98.7 (05-26 @ 08:15)  HR: 83 (83 - 83)  BP: 129/92 (129/92 - 129/92)  BP(mean): --  RR: 16 (16 - 16)  SpO2: 98% (98% - 98%)    PHYSICAL EXAM:  Constitutional: NAD, well-developed  HEENT: EOMI, PERRLA  Neck: supple, nontender  Respiratory: CTA b/l, no wheezing, rhonchi or rales  Cardiovascular: S1 and S2, RRR  Gastrointestinal: BS+, soft, nontender, nondistended  Extremities: No peripheral edema, tenderness, swelling and erythema at base of right great toe  Vascular: 2+ peripheral pulses  Neurological: A/O x 3, no focal deficits

## 2017-05-26 NOTE — PROGRESS NOTE BEHAVIORAL HEALTH - PROBLEM SELECTOR PLAN 1
1.Lower Ativan 1 mg po bid ( pt c/o daytime sedation)  2. Continue still low dose Haldol  5 mg po q 12 hrs  3.DC Cogentin 0.5 mg po q 12 prn ( ineffective per pt report EPS)  4.DC  Amantadine 100 mg po q 12 hrs ( EPS ) at pt's request  5.Depakote also stopped due to prior elevated LFTs  6.Pt unable to take Lithium due to CHRONIC KIDNEY DISEASE  7.To gather more clinical information from collaterals to aid in treatment and DC planning  8.Pt to attend therapy groups when clinically more stable and able to meaningfully participate  9. Continue to encourage the pt to reconsider allowing recommended increase in pt's Haldol dose to further alleviate ongoing impairing schizoaffective bipolar d/o symptoms

## 2017-05-26 NOTE — PROGRESS NOTE BEHAVIORAL HEALTH - AXIS III
Stage 3 renal disease, elevated liver enzymes, GERD, MGUS  Left breast cancer s/p DSIS  DJD  back, h/o uterine leiomyoma, h/o Lyme disease  fatty liver ( r/o)

## 2017-05-26 NOTE — PROGRESS NOTE BEHAVIORAL HEALTH - NSBHFUPINTERVALHXFT_PSY_A_CORE
Due to medical co morbid illnesses, pt remains UNABLE TO TAKE LITHIUM OR DEPAKOTE    Though   Court hearing  regarding treatment over objection now adjourned indefinitely as pt is taking medication though pt continues to take much less than is therapeutically necessary to treat the pt's longstanding, significant presumptive schizoaffective disorder- bipolar type.   Writer has continued to encourage the pt to allow an increase in pt's currently low dose Haldol in an effort to further alleviate the pt's ongoing significant affective dysregulation and comorbid thought disorganization.   Pt now with worsening somatic delusions , currently regarding 'gout'.  Pt may have had a gouty episode in the past. Urate  slightly elevated = 9. Pt with somewhat reddened, inflamed and reportedly exquisitely tender right great toe.   Writer requested hospitalist consult 5/26/17. to further evaluate.  Pt more easily agitated, anxious and near frantic at times with talk of her health , with pt concerns NOT always consistent with the medical facts despite the pt's well defined h/o renal and GI disorders among other medical issues.  Pt declines any further increase in Haldol or any other psychotropic medication which could potentially alleviate her seemingly increasing manic psychotic symptoms.  Pt appears to be becoming more agitated and disorganized in her thinking as the stress of her requested/demanded discharge from hospital approaches.

## 2017-05-26 NOTE — PROGRESS NOTE BEHAVIORAL HEALTH - OTHER
near frantic, searching eye contact, pt near panic stricken suddenly for unclear reasons rapid and becoming near  pressured as before less pressured but still rapid . with ongoing pt difficulty with ability to   focus  on conversations and therapy groups somatic and referential delusional thinking of increasing frequency and severity frenetic, frantic , erratic behavior " I have gout! I ate too much salmon!."

## 2017-05-26 NOTE — CONSULT NOTE ADULT - ASSESSMENT
55 yo F with pmh above p/w c/o groin lesions      #Chronic groin lesions   Most likely compatible with sebaceous cysts  No active infection at present  No abx indicated at this time  May place warm compress locally, avoid shaving and/or picking  Recommend local intradermal steroid injection outpt with dermatology, if becomes larger may need drainage of cyst but not indicated now    #transaminitis  work up in progress/per GI  follow up lab work    #CKD stage 3  Stable    DVT px
55 y/o female with PMHx significant for GERD, Depression, Gout, bipolar breast cancer, CKD stage 3, MVP with acute gout.     #Acute Gout  -Colchicine 1.2 mg PO once then 0.6 mg 1 hour late  -Start on Colchicine 0.6 mg bid tomorrow  -Uric acid level, BMP, CBC  -Pain control    # Transaminitis  -W/U in progress as per GI    #CKD stage 3  Stable
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   CKD has been of several yrs duration.    --CKD  though pt can not recall baseline creat, likely very slowly progressive.  Therefore, no specific intervention indicated at this point.  --Electrolytes stable.  Fluid status stable.  Again no specific intervention indicated.  --Pr advised to follow up as outpt.  --Will require follow up with  and heme as outpt.  --Check renal sonogram to establish chronicity of renal dysfx.

## 2017-05-26 NOTE — PROGRESS NOTE BEHAVIORAL HEALTH - NSBHFUPINTERVALCCFT_PSY_A_CORE
" I have gout. I have been eating too much salmon and the purines are too much. I need steroids. "    Pt seen by GI and 24 hr urine creatinine pending. Possibility of fatty liver and need for liver biopsy in future after DC home was also explored with the pt who remains increasingly anxious and somatically preoccupied.   Pt remains massively anxious with ongoing somatic preoccupations bordering on the delusional despite numerous medical specialty evaluations and ongoing pt reassurances related to pt's overall stable medical health.  Pt continues somewhat psychomotor agitated but less frenetic than before. Pt with virtually no insight into the nature and severity of her illness and the likely need for long term medication treatment in an effort to decrease risk of clinical relapse and need for readmission to hospital.  Pt tolerating low dose Haldol with modest efficacy. Pt declining to allow further increase in her antipsychotic medication despite ongoing though less intense paranoia and referential thinking..  Writer reminded the pt and her parents that the pt remains in tenuous emotional control  and that pt will require slow cautious resumption of her previously very hectic lifestyle.  Though pt has stated she will comply with medications , the pt has also continued to believe that holistic  treatment  of her severe and  functionally impairing  presumptive schizoaffective d/o- bipolar type will be her plan for treatment upon DC home from hospital.

## 2017-05-26 NOTE — PROGRESS NOTE ADULT - ASSESSMENT
elev LFT that is cuasing anxiety for pt  RO AIH etc  pt braught in old records with elevated LFTs for at least a few years  celiac, ASMAb, AMA, abd US neg  had MRCP end of last year, only one page of report available and neg,   old records ordered, and DW RN and MRCP report requested  pt states taht she has records at home and H will bring them in   will also try to get records from out pt doctors  I also left message for Dr Beckman, prior GI      collect 24 hour urine for copper,      Groin cystic lesions         also records requested again
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   CKD has been of several yrs duration.    --CKD  though pt can not recall baseline creat, likely very slowly progressive.  Therefore, no specific intervention indicated at this point.  --Electrolytes stable.  Fluid status stable.  Again no specific intervention indicated.  --Pr advised to follow up as outpt.  --Will require follow up with  and heme as outpt.  --Check renal sonogram to establish chronicity of renal dysfx.    5/11 SY  --CKD stable  Previous Creat a few months ago around 1.7.  No specific intervention indicated.  --Sono result noted.  Right Hydronephrosis of chronic nature.  No intervention indicated.  --Pt is advised to follow up with  as outpt for stress incontinence.  --No active renal issues.  Will sign off now.  Please call back if acute renal issues need further follow up.
MICHAEL ordered and in progress  RO AIH etc  old records ordered  check US      Groin cystic lesions   please have hospitalist assess adn RO infection, DW Rn      also records requested again
elev LFT that is cuasing anxiety for pt  RO AIH etc  old records ordered, and FLORI RN  pt states taht she has records at home and H will bring them in  He will also try to get records from out pt doctors  check US      Groin cystic lesions         also records requested again
elev LFT that is cuasing anxiety for pt  RO AIH etc  pt braught in old records with elevated LFTs for at least a few years  celiac, ASMAb, AMA, abd US neg  had MRCP end of last year, neg    received labs from her primary care physician, which are essentially same as above.  [facility]    old records ordered, and DW RN      will also try to get records from out pt doctors   left message for her gastroenterologist again  I also left message for Dr Beckman, prior GI      collect 24 hour urine for copper,  Additionally, possibility for liver biopsy discussed with patient. She is considering this.I also discussed with her that she may very well have Teresa. She has had increased weight gain.  However, she is uncomfortable with this.  I offered the option of trying weight loss to see for liver function tests improved. She still considering this but states that she would prefer to have a liver biopsy.
elev LFT that is cuasing anxiety for pt  RO AIH etc  pt braught in old records with elevated LFTs for at least a few years  celiac, ASMAb, AMA, abd US neg  had MRCP end of last year, neg    received labs from her primary care physician, which are essentially same as above.  [facility].    old records ordered, and FLORI RN   I FLORI Beckman, prior GI      collect 24 hour urine for copper, awit results  Additionally, possibility for liver biopsy discussed with patient. She is considering this.I also discussed with her that she may very well have Teresa. She has had increased weight gain.  However, she is uncomfortable with this.  I offered the option of trying weight loss to see for liver function tests improved. She still considering this but states that she would prefer to have a liver biopsy.  She wants to think this over a little more.  Liver biopsy can be arranged as an out pt  MOM left for   FU as out pt DW pt   she is thinking about going back to her original GI due to insurance issues or seeeing DR Alissa jack    case FLORI Beckman
elev LFT that is cuasing anxiety for pt  RO AIH etc  pt braught in old records with elevated LFTs for at least a few years  celiac, ASMAb, AMA, abd US neg  had MRCP end of last year, only one page of report available and neg,     received labs from her primary care physician, which are essentially same as above.  [facility].    old records ordered, and FLORI RN      will also try to get records from out pt doctors   left message for her gastroenterologist again  I also left message for Dr Beckman, prior GI      collect 24 hour urine for copper,  discussed with nursing regarding initiating this.  Additionally, possibility for liver biopsy discussed with patient. She is considering this
elev LFT that is cuasing anxiety for pt  RO AIH etc  pt braught in old records with elevated LFTs for at least a few years  celiac, ASMAb, AMA, abd US neg  had MRCP end of last year, only one page of report available and neg,     received labs from her primary care physician, which are essentially same as above.  [facility].    old records ordered, and FLORI RN      will also try to get records from out pt doctors  I also left message for Dr Beckman, prior GI      collect 24 hour urine for copper,      Groin cystic lesions         also records requested again
elevated LFT  RODRIGUEZ ordered and in progress  RO AIH etc  old records ordered  check US      Groin cystic lesions   please have hospitalist assess adn RO infection

## 2017-05-27 LAB
ALBUMIN SERPL ELPH-MCNC: 3 G/DL — LOW (ref 3.3–5)
ALP SERPL-CCNC: 377 U/L — HIGH (ref 40–120)
ALT FLD-CCNC: 207 U/L — HIGH (ref 12–78)
ANION GAP SERPL CALC-SCNC: 6 MMOL/L — SIGNIFICANT CHANGE UP (ref 5–17)
ANION GAP SERPL CALC-SCNC: 9 MMOL/L — SIGNIFICANT CHANGE UP (ref 5–17)
AST SERPL-CCNC: 130 U/L — HIGH (ref 15–37)
BILIRUB SERPL-MCNC: 0.3 MG/DL — SIGNIFICANT CHANGE UP (ref 0.2–1.2)
BUN SERPL-MCNC: 44 MG/DL — HIGH (ref 7–23)
BUN SERPL-MCNC: 47 MG/DL — HIGH (ref 7–23)
CALCIUM SERPL-MCNC: 9.1 MG/DL — SIGNIFICANT CHANGE UP (ref 8.5–10.1)
CALCIUM SERPL-MCNC: 9.1 MG/DL — SIGNIFICANT CHANGE UP (ref 8.5–10.1)
CHLORIDE SERPL-SCNC: 104 MMOL/L — SIGNIFICANT CHANGE UP (ref 96–108)
CHLORIDE SERPL-SCNC: 104 MMOL/L — SIGNIFICANT CHANGE UP (ref 96–108)
CO2 SERPL-SCNC: 27 MMOL/L — SIGNIFICANT CHANGE UP (ref 22–31)
CO2 SERPL-SCNC: 28 MMOL/L — SIGNIFICANT CHANGE UP (ref 22–31)
COPPER ?TM UR-MCNC: 67 — HIGH (ref 15–60)
COPPER UR-MCNC: 4475 ML — SIGNIFICANT CHANGE UP
CREAT SERPL-MCNC: 1.7 MG/DL — HIGH (ref 0.5–1.3)
CREAT SERPL-MCNC: 1.85 MG/DL — HIGH (ref 0.5–1.3)
GLUCOSE SERPL-MCNC: 179 MG/DL — HIGH (ref 70–99)
GLUCOSE SERPL-MCNC: 99 MG/DL — SIGNIFICANT CHANGE UP (ref 70–99)
HCT VFR BLD CALC: 34.6 % — SIGNIFICANT CHANGE UP (ref 34.5–45)
HGB BLD-MCNC: 11.5 G/DL — SIGNIFICANT CHANGE UP (ref 11.5–15.5)
MCHC RBC-ENTMCNC: 29.6 PG — SIGNIFICANT CHANGE UP (ref 27–34)
MCHC RBC-ENTMCNC: 33.3 GM/DL — SIGNIFICANT CHANGE UP (ref 32–36)
MCV RBC AUTO: 88.9 FL — SIGNIFICANT CHANGE UP (ref 80–100)
PLATELET # BLD AUTO: 162 K/UL — SIGNIFICANT CHANGE UP (ref 150–400)
POTASSIUM SERPL-MCNC: 4.3 MMOL/L — SIGNIFICANT CHANGE UP (ref 3.5–5.3)
POTASSIUM SERPL-MCNC: 4.3 MMOL/L — SIGNIFICANT CHANGE UP (ref 3.5–5.3)
POTASSIUM SERPL-SCNC: 4.3 MMOL/L — SIGNIFICANT CHANGE UP (ref 3.5–5.3)
POTASSIUM SERPL-SCNC: 4.3 MMOL/L — SIGNIFICANT CHANGE UP (ref 3.5–5.3)
PROT SERPL-MCNC: 7.1 GM/DL — SIGNIFICANT CHANGE UP (ref 6–8.3)
RBC # BLD: 3.9 M/UL — SIGNIFICANT CHANGE UP (ref 3.8–5.2)
RBC # FLD: 12 % — SIGNIFICANT CHANGE UP (ref 10.3–14.5)
SODIUM SERPL-SCNC: 137 MMOL/L — SIGNIFICANT CHANGE UP (ref 135–145)
SODIUM SERPL-SCNC: 141 MMOL/L — SIGNIFICANT CHANGE UP (ref 135–145)
URATE SERPL-MCNC: 9.9 MG/DL — HIGH (ref 2.5–7)
WBC # BLD: 7.7 K/UL — SIGNIFICANT CHANGE UP (ref 3.8–10.5)
WBC # FLD AUTO: 7.7 K/UL — SIGNIFICANT CHANGE UP (ref 3.8–10.5)

## 2017-05-27 RX ADMIN — HALOPERIDOL DECANOATE 5 MILLIGRAM(S): 100 INJECTION INTRAMUSCULAR at 20:34

## 2017-05-27 RX ADMIN — Medication 0.6 MILLIGRAM(S): at 20:34

## 2017-05-27 RX ADMIN — Medication 3 MILLIGRAM(S): at 20:34

## 2017-05-27 RX ADMIN — Medication 0.6 MILLIGRAM(S): at 09:03

## 2017-05-27 RX ADMIN — HALOPERIDOL DECANOATE 5 MILLIGRAM(S): 100 INJECTION INTRAMUSCULAR at 09:03

## 2017-05-27 NOTE — PROGRESS NOTE BEHAVIORAL HEALTH - NSBHFUPINTERVALCCFT_PSY_A_CORE
Covering note.  Pt seen today , she has a cc of her toe still being swollen.  Pt was seen for an acute episode of gout  and pt received a loading dose of the medication and now on the maintenance dose.  Pt with hx of liver and renal issues.     " I have gout. I have been eating too much salmon and the purines are too much. I need steroids. "    Pt seen by GI and 24 hr urine creatinine pending. Possibility of fatty liver and need for liver biopsy in future after DC home was also explored with the pt who remains increasingly anxious and somatically preoccupied.   Pt remains massively anxious with ongoing somatic preoccupations bordering on the delusional despite numerous medical specialty evaluations and ongoing pt reassurances related to pt's overall stable medical health.  Pt continues somewhat psychomotor agitated but less frenetic than before. Pt with virtually no insight into the nature and severity of her illness and the likely need for long term medication treatment in an effort to decrease risk of clinical relapse and need for readmission to hospital.  Pt tolerating low dose Haldol with modest efficacy. Pt declining to allow further increase in her antipsychotic medication despite ongoing though less intense paranoia and referential thinking..  Writer reminded the pt and her parents that the pt remains in tenuous emotional control  and that pt will require slow cautious resumption of her previously very hectic lifestyle.  Though pt has stated she will comply with medications , the pt has also continued to believe that holistic  treatment  of her severe and  functionally impairing  presumptive schizoaffective d/o- bipolar type will be her plan for treatment upon DC home from hospital.

## 2017-05-27 NOTE — PROGRESS NOTE BEHAVIORAL HEALTH - OTHER
frenetic, frantic , erratic behavior near frantic, searching eye contact, pt near panic stricken suddenly for unclear reasons rapid and becoming near  pressured as before less pressured but still rapid . with ongoing pt difficulty with ability to   focus  on conversations and therapy groups " I have gout! I ate too much salmon!." somatic and referential delusional thinking of increasing frequency and severity

## 2017-05-28 RX ADMIN — HALOPERIDOL DECANOATE 5 MILLIGRAM(S): 100 INJECTION INTRAMUSCULAR at 20:53

## 2017-05-28 RX ADMIN — HALOPERIDOL DECANOATE 5 MILLIGRAM(S): 100 INJECTION INTRAMUSCULAR at 09:06

## 2017-05-28 RX ADMIN — Medication 0.6 MILLIGRAM(S): at 09:06

## 2017-05-28 RX ADMIN — Medication 3 MILLIGRAM(S): at 20:53

## 2017-05-28 RX ADMIN — Medication 0.6 MILLIGRAM(S): at 20:53

## 2017-05-28 RX ADMIN — Medication 30 MILLILITER(S): at 09:06

## 2017-05-28 NOTE — PROGRESS NOTE BEHAVIORAL HEALTH - NSBHCHARTREVIEWVS_PSY_A_CORE FT
Vital Signs Last 24 Hrs  T(C): 36.6, Max: 36.6 (05-28 @ 08:45)  T(F): 97.9, Max: 97.9 (05-28 @ 08:45)  HR: 69 (69 - 69)  BP: 116/89 (116/89 - 116/89)  BP(mean): --  RR: 14 (14 - 14)  SpO2: 99% (99% - 99%)

## 2017-05-29 RX ORDER — COLCHICINE 0.6 MG
1 TABLET ORAL
Qty: 28 | Refills: 0
Start: 2017-05-29 | End: 2017-06-12

## 2017-05-29 RX ORDER — HALOPERIDOL DECANOATE 100 MG/ML
1 INJECTION INTRAMUSCULAR
Qty: 0 | Refills: 0 | DISCHARGE
Start: 2017-05-29 | End: 2017-06-30

## 2017-05-29 RX ORDER — HALOPERIDOL DECANOATE 100 MG/ML
5 INJECTION INTRAMUSCULAR
Qty: 0 | Refills: 0 | DISCHARGE
Start: 2017-05-29

## 2017-05-29 RX ORDER — LANOLIN ALCOHOL/MO/W.PET/CERES
1 CREAM (GRAM) TOPICAL
Qty: 14 | Refills: 1
Start: 2017-05-29 | End: 2017-06-25

## 2017-05-29 RX ADMIN — Medication 0.6 MILLIGRAM(S): at 21:09

## 2017-05-29 RX ADMIN — Medication 1 MILLIGRAM(S): at 09:59

## 2017-05-29 RX ADMIN — Medication 3 MILLIGRAM(S): at 21:11

## 2017-05-29 RX ADMIN — HALOPERIDOL DECANOATE 5 MILLIGRAM(S): 100 INJECTION INTRAMUSCULAR at 09:02

## 2017-05-29 RX ADMIN — Medication 1 MILLIGRAM(S): at 21:09

## 2017-05-29 RX ADMIN — Medication 0.6 MILLIGRAM(S): at 09:02

## 2017-05-29 RX ADMIN — HALOPERIDOL DECANOATE 5 MILLIGRAM(S): 100 INJECTION INTRAMUSCULAR at 21:09

## 2017-05-29 NOTE — PROGRESS NOTE BEHAVIORAL HEALTH - NSBHFUPINTERVALHXFT_PSY_A_CORE
Due to medical co morbid illnesses, pt remains UNABLE TO TAKE LITHIUM OR DEPAKOTE Due to medical co morbid illnesses, pt remains UNABLE TO TAKE LITHIUM OR DEPAKOTE    Pt seen in the day room. Pt appeared somewhat clarke and fatigued but a bit calmer than the past few days. Pt continues with rapid speech and illogical thinking with tangential and ruminative thought process . Pt remains on still low dose Haldol 5 mg po q12 and pt continues to decline any further increase in her antipsychotic . In fact pt suggested, " I think I need less Haldol and more Ativan" Pt remains ambivalent as to whether she should/ will remain on her current low dose med regimen . Pt continues to doubt she has a bipolar d/o or any diathesis at all . Pt also undecided as to whether or not she will remain on medication medically necessary to treat the pt's severe bipolar mood d/o. Sleep and appetite are slowly improving. Pt attending groups but tpt tends to monopolize the conversation with her ruminations as to whether or not to continue her medication after DC from hospital.  When writer reminded pt that the pt herself had requested that the Cogentin and later Amantadine be discontinued because they ' made me worse' , the pt appeared puzzled and then changed course.

## 2017-05-29 NOTE — PROGRESS NOTE BEHAVIORAL HEALTH - NSBHCHARTREVIEWLAB_PSY_A_CORE FT
CBC Full  -  ( 17 May 2017 08:14 )  WBC Count : 6.6 K/uL  Hemoglobin : 12.1 g/dL  Hematocrit : 37.4 %  Platelet Count - Automated : 198 K/uL  Mean Cell Volume : 90.5 fl  Mean Cell Hemoglobin : 29.3 pg  Mean Cell Hemoglobin Concentration : 32.4 gm/dL  Auto Neutrophil # : 4.6 K/uL  Auto Lymphocyte # : 1.3 K/uL  Auto Monocyte # : 0.4 K/uL  Auto Eosinophil # : 0.3 K/uL  Auto Basophil # : 0.1 K/uL  Auto Neutrophil % : 69.2 %  Auto Lymphocyte % : 19.4 %  Auto Monocyte % : 6.0 %  Auto Eosinophil % : 3.9 %  Auto Basophil % : 1.5 %    05-16    144  |  109<H>  |  37<H>  ----------------------------<  108<H>  4.3   |  29  |  1.78<H>    Ca    9.3      16 May 2017 08:04  Phos  4.3     05-17  Mg     2.0     05-17    TPro  7.6  /  Alb  3.3  /  TBili  0.5  /  DBili  x   /  AST  136<H>  /  ALT  246<H>  /  AlkPhos  230<H>  05-16 05-27    141  |  104  |  47<H>  ----------------------------<  99  4.3   |  28  |  1.70<H>    Ca    9.1      27 May 2017 17:48    TPro  7.1  /  Alb  3.0<L>  /  TBili  0.3  /  DBili  x   /  AST  130<H>  /  ALT  207<H>  /  AlkPhos  377<H>  05-27

## 2017-05-29 NOTE — PROGRESS NOTE BEHAVIORAL HEALTH - OTHER
frenetic, frantic , erratic behavior near frantic, searching eye contact, pt near panic stricken suddenly for unclear reasons rapid and becoming near  pressured as before less pressured but still rapid . with ongoing pt difficulty with ability to   focus  on conversations and therapy groups " I have gout! I ate too much salmon!." somatic and referential delusional thinking of increasing frequency and severity superficially cooperative but continues riddled with doubts about her illness and her need for medication rapid but not  pressured as before still rapid . with ongoing pt difficulty with ability to   focus  on conversations and therapy groups " I just don't feel right. There is something wrong. I just don't know what." somatic and referential thinking persist though to a slightly lesser degree

## 2017-05-29 NOTE — PROGRESS NOTE BEHAVIORAL HEALTH - NSBHCHARTREVIEWIMAGING_PSY_A_CORE FT
MEDICATIONS  (STANDING):  melatonin. 3milliGRAM(s) Oral at bedtime  amantadine 100milliGRAM(s) Oral every 12 hours  haloperidol     Tablet 5milliGRAM(s) Oral every 12 hours  LORazepam     Tablet 1milliGRAM(s) Oral every 12 hours    MEDICATIONS  (PRN):  diphenhydrAMINE   Injectable 50milliGRAM(s) IntraMuscular once PRN Agitation  aluminum hydroxide/magnesium hydroxide/simethicone Suspension 30milliLiter(s) Oral every 6 hours PRN Dyspepsia  magnesium hydroxide Suspension 30milliLiter(s) Oral daily PRN Constipation  haloperidol    Injectable 5milliGRAM(s) IntraMuscular every 6 hours PRN Agitation due to bipolar manic psychosis  LORazepam   Injectable 2milliGRAM(s) IntraMuscular every 6 hours PRN Agitation due to bipolar sharlene  LORazepam     Tablet 1milliGRAM(s) Oral two times a day PRN Anxiety/ agitation MEDICATIONS  (STANDING):  melatonin. 3milliGRAM(s) Oral at bedtime  haloperidol     Tablet 5milliGRAM(s) Oral every 12 hours  colchicine 0.6milliGRAM(s) Oral two times a day  LORazepam     Tablet 1milliGRAM(s) Oral two times a day    MEDICATIONS  (PRN):  diphenhydrAMINE   Injectable 50milliGRAM(s) IntraMuscular once PRN Agitation  aluminum hydroxide/magnesium hydroxide/simethicone Suspension 30milliLiter(s) Oral every 6 hours PRN Dyspepsia  magnesium hydroxide Suspension 30milliLiter(s) Oral daily PRN Constipation  haloperidol    Injectable 5milliGRAM(s) IntraMuscular every 6 hours PRN Agitation due to bipolar manic psychosis

## 2017-05-29 NOTE — PROGRESS NOTE BEHAVIORAL HEALTH - NSBHFUPINTERVALCCFT_PSY_A_CORE
" I don't feel right. I just don't. Taking only the Haldol and no Cogentin or Ativan. I don't know."    Pt seen by hospitalist on 5/27/17 and rechecked urate= 9.9.  Pt begun on low dose Colchicine 0.6 mg po q 12 hrs for acute right great toe gouty arthritic flare up. Pt with no further c/o toe pain.   24 HR URINE COPPER = 67 ( SLIGHT ELEVATION ( RANGE 25-60)  LFT S remain somewhat elevated. Renal function impaired but largely stable at this time.

## 2017-05-30 VITALS
OXYGEN SATURATION: 100 % | DIASTOLIC BLOOD PRESSURE: 87 MMHG | RESPIRATION RATE: 14 BRPM | SYSTOLIC BLOOD PRESSURE: 123 MMHG | TEMPERATURE: 98 F | HEART RATE: 69 BPM

## 2017-05-30 RX ADMIN — HALOPERIDOL DECANOATE 5 MILLIGRAM(S): 100 INJECTION INTRAMUSCULAR at 09:43

## 2017-05-30 RX ADMIN — Medication 0.6 MILLIGRAM(S): at 09:43

## 2017-05-30 RX ADMIN — Medication 1 MILLIGRAM(S): at 09:43

## 2017-05-30 NOTE — DISCHARGE NOTE BEHAVIORAL HEALTH - NSBHDCVIOLFCTROTHERFT_PSY_A_CORE
treatment noncompliance is bundy. Pt remains openly ambivalent as to her decision to continue with current low dose medication treatment

## 2017-05-30 NOTE — DISCHARGE NOTE BEHAVIORAL HEALTH - NSBHDCSIGEVENTSFT_PSY_A_CORE
Pt required Constant Observation with 1:1 staff during the first 1/2 of her admission to hospital in the context of pt's severe manic psychotic clinical presentation with pt 's tendency to wander  and to struggle with boundary violations. Pt also had been declining any medication to treat her presumptive schizoaffective d/o and thus pt remained acutely ill during that time period.  Pt also had been verbally and physically threatening towards staff  during that time.

## 2017-05-30 NOTE — PROGRESS NOTE BEHAVIORAL HEALTH - PROBLEM SELECTOR PROBLEM 1
Bipolar affective disorder, depressed, severe, with psychotic behavior

## 2017-05-30 NOTE — DISCHARGE NOTE BEHAVIORAL HEALTH - NSBHDCHANDOFFFT_PSY_A_CORE
Appointment made with previous provider, Dr. Gant.  Clinical was faxed including discharge summary and care plan for his review.  Pt will be seen 5/31 at 3:30pm.

## 2017-05-30 NOTE — DISCHARGE NOTE BEHAVIORAL HEALTH - HPI (INCLUDE ILLNESS QUALITY, SEVERITY, DURATION, TIMING, CONTEXT, MODIFYING FACTORS, ASSOCIATED SIGNS AND SYMPTOMS)
The pt is a  56 year-old    WF with a long h/o severe bipolar d/o and a h/o entrenched pt nonadherence with treatment . 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  Pt agitated in ED, starting screaming when sitting with 1 to 1, refused to sit with her calling her names, security called, Pt trying to leave ED.  Pt interviewed and was initially cooperative reporting  is stealing money from her,  then became agitated with writer accused me of being like her neighbor, refused to speak to me, presents acutely manic pressured rambling speech, paranoid, agitated, pacing irritable, tangential, with flight of ideas and poor impulse control, threw a urine container at nurse.  Spoke  with  for collaterals and history, reports Pt stopped her meds due to medical comorbidities attributed to Lithium and Depakote.  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  Her last psych MD was Dr Darryl Gant and has not seen him for many months.    Last night Pt was up all night destroying things in home, ripping up sheets etc and  was unable to sleep, but was Pt who called 911 and not .  Pt has been deteriorating since stopping her meds months ago and has poor sleep.   denies h/o SA, which cannot be assessed at this time due to agitation and refusal to cooperate with interview.

## 2017-05-30 NOTE — DISCHARGE NOTE BEHAVIORAL HEALTH - NSBHDCMEDSFT_PSY_A_CORE
MEDICATIONS  (STANDING) Discharge )  melatonin. 3milliGRAM(s) Oral at bedtime  haloperidol     Tablet 5milliGRAM(s) Oral every 12 hours  colchicine 0.6milliGRAM(s) Oral two times a day  LORazepam     Tablet 1milliGRAM(s) Oral two times a day

## 2017-05-30 NOTE — PROGRESS NOTE BEHAVIORAL HEALTH - NSBHATTESTSEENBY_PSY_A_CORE
attending Psychiatrist without NP/Trainee

## 2017-05-30 NOTE — PROGRESS NOTE BEHAVIORAL HEALTH - OTHER
superficially cooperative but continues riddled with doubts about her illness and her need for medication rapid but not  pressured as before still rapid . with ongoing pt difficulty with ability to   focus  on conversations and therapy groups " I just don't feel right. There is something wrong. I just don't know what." somatic and referential thinking persist though to a slightly lesser degree

## 2017-05-30 NOTE — PROGRESS NOTE BEHAVIORAL HEALTH - ABNORMAL MOVEMENTS
No abnormal movements

## 2017-05-30 NOTE — PROGRESS NOTE BEHAVIORAL HEALTH - NSBHFUPINTERVALCCFT_PSY_A_CORE
"  Can I leave soon? My  is coming over for me."    Pt seen by hospitalist on 5/27/17 and rechecked urate= 9.9.  Pt begun on low dose Colchicine 0.6 mg po q 12 hrs for acute right great toe gouty arthritic flare up. Pt with no further c/o toe pain.   24 HR URINE COPPER = 67 ( SLIGHT ELEVATION ( RANGE 25-60)  LFT S remain somewhat elevated. Renal function impaired but largely stable at this time.

## 2017-05-30 NOTE — PROGRESS NOTE BEHAVIORAL HEALTH - INSIGHT (REGARDING PSYCHIATRIC ILLNESS)
Poor

## 2017-05-30 NOTE — PROGRESS NOTE BEHAVIORAL HEALTH - ATTENTION / CONCENTRATION
Impaired

## 2017-05-30 NOTE — DISCHARGE NOTE BEHAVIORAL HEALTH - NSBHDCADDFT_PSY_A_CORE
·   PLEASE NOTE  I5/15/17 :   Pt seen during several brief pt argumentative and confrontational  pt angry outburst with paranoid  fueled and underlying frightened pt illogical statements to writer and to other hospital staff. Pt has now declined all antipsychotic medications ( including Seroquel after brief retrial with slow but evident clinical efficacy  and Haldol ) recommended to treat the pt's severe and ongoing, functionally worsening bipolar manic psychosis with ongoing paranoid and persecutory delusions incorporating the pt's  and her parents.   Pt remains with functionally impairing  manic psychotic clinical presentation with ongoing impaired judgment and  virtually no insight into the nature and severity of her  bipolar d/o and the likely need for long term treatment.  Pt now on management alert observation status due to some earlier brief mild clinical improvement when the pt had initially agreed to  restarting lower dose Seroquel.  Pt still not able to meaningfully participate in therapy groups aside from directed brief individual art projects.               The pt remains with severely functionally impairing bipolar manic psychosis with ever worsening pt clinical status in the context of pt's now self discontinued Seroquel .In light of the  limited available pt  treatment options due to the  pt inability to take Lithium or Depakote due to ongoing slowly progressive  renal and milder reported hepatic concerns such that the pt remains unable to maintain herself even within the confines of a locked inpatient psychiatric  the next medically necessary step  more clearly speaks to the now pressing need to submit an application to the  court seeking pt treatment over objection in an effort to alleviate the pt's severely functionally impairing symptoms so that the pt can be safely discharged from hospital to resume her previously healthy functioning in all spheres of her life.           Treatment over objection papers were submitted by the hospital at this time and an administrative hearing was held. As the pt had just begun to accept low dose Haldol to treat her severe illness, the court hearing was postponed  x 1 week, then adjourned completely as pt allowed the Haldol dose to be further but not sufficiently  titrated.

## 2017-05-30 NOTE — PROGRESS NOTE BEHAVIORAL HEALTH - THOUGHT ASSOCIATIONS
Loose
Other
Loose
Other

## 2017-05-30 NOTE — DISCHARGE NOTE BEHAVIORAL HEALTH - NSBHDCTHERAPYFT_PSY_A_CORE
brief individual therapy + group therapies including coping skills, safety planning, discussion with substance abuse focus, DBT/ CBT, medication and diagnosis psychoeducation, art, pet therapies .Fresh air walks with staff off unit

## 2017-05-30 NOTE — DISCHARGE NOTE BEHAVIORAL HEALTH - CARE PROVIDER_API CALL
astrid martinez  Pt has follow up med management appointment on 5/31/17 at 3:30 pm in [place]  Phone: (   )    -  Fax: (   )    -

## 2017-05-30 NOTE — PROGRESS NOTE BEHAVIORAL HEALTH - RELATEDNESS
Poor
Fair
Poor
Fair
Poor

## 2017-05-30 NOTE — DISCHARGE NOTE BEHAVIORAL HEALTH - NSBHDCADMRISKREASONS_PSY_A_CORE
Poor engagement in outpt treatment/Non-adherence with medications/Co-occur mental health and medical

## 2017-05-30 NOTE — PROGRESS NOTE BEHAVIORAL HEALTH - NS ED BHA MSE SPEECH ARTICULATION
Normal

## 2017-05-30 NOTE — DISCHARGE NOTE BEHAVIORAL HEALTH - NSBHDCMEDICALFT_PSY_A_CORE
Pt with longstanding chronic renal disease and elevated LFTs.  Pt seen and evaluated thoroughly by Nephrology and then by GI . ( reports included)  GI and renal follow up outpatient has already been discussed with the pt

## 2017-05-30 NOTE — PROGRESS NOTE BEHAVIORAL HEALTH - MUSCLE TONE / STRENGTH
Normal muscle tone/strength

## 2017-05-30 NOTE — PROGRESS NOTE BEHAVIORAL HEALTH - NS ED BHA REVIEW OF ED CHART AVAILABLE INVESTIGATIONS REVIEWED
Yes
None available
Yes
None available
Yes
None available

## 2017-05-30 NOTE — PROGRESS NOTE BEHAVIORAL HEALTH - NSBHCHARTREVIEWIMAGING_PSY_A_CORE FT
MEDICATIONS  (STANDING):  melatonin. 3milliGRAM(s) Oral at bedtime  haloperidol     Tablet 5milliGRAM(s) Oral every 12 hours  colchicine 0.6milliGRAM(s) Oral two times a day  LORazepam     Tablet 1milliGRAM(s) Oral two times a day    MEDICATIONS  (PRN):  diphenhydrAMINE   Injectable 50milliGRAM(s) IntraMuscular once PRN Agitation  aluminum hydroxide/magnesium hydroxide/simethicone Suspension 30milliLiter(s) Oral every 6 hours PRN Dyspepsia  magnesium hydroxide Suspension 30milliLiter(s) Oral daily PRN Constipation  haloperidol    Injectable 5milliGRAM(s) IntraMuscular every 6 hours PRN Agitation due to bipolar manic psychosis

## 2017-05-30 NOTE — PROGRESS NOTE BEHAVIORAL HEALTH - NSBHFUPINTERVALHXFT_PSY_A_CORE
Due to medical co morbid illnesses, pt remains UNABLE TO TAKE LITHIUM OR DEPAKOTE    Pt seen in the day room. Pt looking forward to DC home 5/30/17. Pt appeared somewhat clarke and fatigued but a bit calmer than the past few days. Pt continues with rapid speech and illogical thinking with tangential and ruminative thought process . Pt remains on still low dose Haldol 5 mg po q12 and pt continues to decline any further increase in her antipsychotic . In fact pt suggested, " I think I need less Haldol and more Ativan" Pt remains ambivalent as to whether she should/ will remain on her current low dose med regimen . Pt continues to doubt she has a bipolar d/o or any diathesis at all . Pt also undecided as to whether or not she will remain on medication medically necessary to treat the pt's severe bipolar mood d/o. Sleep and appetite are slowly improving. Pt attending groups but tpt tends to monopolize the conversation with her ruminations as to whether or not to continue her medication after DC from hospital.  When writer reminded pt that the pt herself had requested that the Cogentin and later Amantadine be discontinued because they ' made me worse' , the pt appeared puzzled and then changed course.

## 2017-05-30 NOTE — PROGRESS NOTE BEHAVIORAL HEALTH - NSBHADMITIPREASON_PSY_A_CORE
Danger to self; mental illness expected to respond to inpatient care
Danger to self; mental illness expected to respond to inpatient care
Danger to others; mental illness expected to respond to inpatient care
Danger to self; mental illness expected to respond to inpatient care
Danger to others; mental illness expected to respond to inpatient care
Danger to others; mental illness expected to respond to inpatient care
Danger to self; mental illness expected to respond to inpatient care
Danger to self; mental illness expected to respond to inpatient care
Danger to others; mental illness expected to respond to inpatient care
Danger to self; mental illness expected to respond to inpatient care
Danger to others; mental illness expected to respond to inpatient care
Danger to self; mental illness expected to respond to inpatient care
Danger to others; mental illness expected to respond to inpatient care
Danger to self; mental illness expected to respond to inpatient care

## 2017-05-30 NOTE — PROGRESS NOTE BEHAVIORAL HEALTH - AFFECT RANGE
Labile
Labile/Other
Labile
Labile/Other
Other/Labile
Labile
Labile/Other

## 2017-05-30 NOTE — PROGRESS NOTE BEHAVIORAL HEALTH - ORIENTED TO PLACE
Yes

## 2017-05-30 NOTE — DISCHARGE NOTE BEHAVIORAL HEALTH - NSBHDCRESPONSEFT_PSY_A_CORE
very modest due to pt's ongoing preference to not take any medication and ultimately pt's agreeing to take only a very low dose of Haldol 5 mg po q 12 and Ativan 1 mg po q 12 for treatment of her severe persistent presumptive schizoaffective disorder.

## 2017-05-30 NOTE — DISCHARGE NOTE BEHAVIORAL HEALTH - NSBHDCLABSFT_PSY_A_CORE
as per outpatient Nephrology and GI   CBC with diff, CMP, Fasting lipids, HgA1C, with antipsychotic medication  Monitor vital signs, waist circumference with FGA Haldol  Monitor AIMS

## 2017-05-30 NOTE — PROGRESS NOTE BEHAVIORAL HEALTH - NS ED BHA AXIS I PRIMARY CODE FT
F31.2
F25.0
F25.0
F31.2
F25.0
F31.2
F25.0
F31.2

## 2017-05-30 NOTE — PROGRESS NOTE BEHAVIORAL HEALTH - NSBHLOC_PSY_A_CORE
Other
Alert
Other
Alert
Other

## 2017-05-30 NOTE — PROGRESS NOTE BEHAVIORAL HEALTH - PRIMARY DX
Bipolar affective disorder, manic, severe, with psychotic behavior
Schizoaffective disorder, bipolar type
Bipolar affective disorder, manic, severe, with psychotic behavior
Schizoaffective disorder, bipolar type
Bipolar affective disorder, manic, severe, with psychotic behavior
Schizoaffective disorder, bipolar type
Bipolar affective disorder, manic, severe, with psychotic behavior
Schizoaffective disorder, bipolar type
Bipolar affective disorder, manic, severe, with psychotic behavior
Bipolar affective disorder, manic, severe, with psychotic behavior

## 2017-05-30 NOTE — PROGRESS NOTE BEHAVIORAL HEALTH - AFFECT CONGRUENCE
Not congruent
Other
Not congruent
Other

## 2017-05-30 NOTE — PROGRESS NOTE BEHAVIORAL HEALTH - ESTIMATED INTELLIGENCE
Average

## 2017-05-30 NOTE — DISCHARGE NOTE BEHAVIORAL HEALTH - NSBHDCVIOLFCTRMIT_PSY_A_CORE
family support ongoing but pt is ultimate decider as to her treatment unless new safety concerns present themselves after pt DC home in the context of the pt's decision to DC her medication

## 2017-05-30 NOTE — PROGRESS NOTE BEHAVIORAL HEALTH - IMPULSE CONTROL
Impaired
Impaired/Other
Impaired/Other
Impaired

## 2017-05-30 NOTE — PROGRESS NOTE BEHAVIORAL HEALTH - NSBHADMITMEDEDU_PSY_A_CORE
yes...
yes...
no
yes...
no
no
yes...
yes...
no
yes...
no

## 2017-05-30 NOTE — PROGRESS NOTE BEHAVIORAL HEALTH - FUND OF KNOWLEDGE
Impaired
Normal
Impaired
Normal
Normal
Impaired
Normal
Impaired
Normal
Normal

## 2017-05-30 NOTE — PROGRESS NOTE BEHAVIORAL HEALTH - PROBLEM SELECTOR PROBLEM 3
Kidney disease

## 2017-05-30 NOTE — PROGRESS NOTE BEHAVIORAL HEALTH - NSBHFUPTYPE_PSY_A_CORE
Inpatient

## 2017-05-30 NOTE — PROGRESS NOTE BEHAVIORAL HEALTH - NSBHADMITIPDSM_PSY_A_CORE
see above for Axis I, II, III

## 2017-05-30 NOTE — DISCHARGE NOTE BEHAVIORAL HEALTH - NSBHDCCONDITIONFT_PSY_A_CORE
only fair with pt continuing with hypomania in the context of pt's reluctance to take medication of any kind other than 'holistic'. Pt amenable only to current too low dosing of Haldol 5 mg po q12 and Ativan 1 mg po q 12.

## 2017-05-30 NOTE — PROGRESS NOTE BEHAVIORAL HEALTH - REMOTE MEMORY
Impaired
Normal
Impaired
Normal
Impaired
Normal
Impaired
Normal

## 2017-05-30 NOTE — PROGRESS NOTE BEHAVIORAL HEALTH - AFFECT QUALITY
Anxious/Irritable
Anxious/Irritable
Depressed/Irritable/Anxious
Depressed/Irritable/Anxious
Irritable/Anxious
Irritable/Anxious/Depressed
Irritable/Depressed/Anxious
Anxious/Depressed/Irritable
Anxious/Depressed/Irritable
Anxious/Irritable
Depressed/Irritable/Anxious
Irritable/Anxious/Depressed
Irritable/Depressed/Anxious
Anxious/Irritable
Depressed/Anxious/Irritable
Depressed/Anxious/Irritable
Irritable/Anxious/Depressed
Irritable/Depressed/Anxious

## 2017-05-30 NOTE — DISCHARGE NOTE BEHAVIORAL HEALTH - NSBHDCVIOLSAFETYFT_PSY_A_CORE
safety planning reviewed daily and more than daily at times. Key factor is pt lack of insight into the nature and severity of her illness and the need for ongoing treatment in order to maintain herself in a safe manner in the community  Pt to be given Tel # of Crisis Hotline services  Pt aware that she can return to the nearest ER 24/7 should new or worsening safety concerns arise.

## 2017-05-30 NOTE — DISCHARGE NOTE BEHAVIORAL HEALTH - SECONDARY DIAGNOSIS.
Nonadherence to medical treatment Relationship problems Kidney disease, chronic, stage III (GFR 30-59 ml/min)

## 2017-05-30 NOTE — DISCHARGE NOTE BEHAVIORAL HEALTH - NSBHDCHOUSING_PSY_A_CORE
home/Patient will return to her home address with her  [address] [place] [address] [phone]

## 2017-05-30 NOTE — PROGRESS NOTE BEHAVIORAL HEALTH - NSBHADMITDANGERSELF_PSY_A_CORE
unable to care for self

## 2017-05-30 NOTE — PROGRESS NOTE BEHAVIORAL HEALTH - RECENT MEMORY
Impaired
Normal
Impaired
Normal
Impaired
Normal
Impaired
Normal
Normal

## 2017-05-30 NOTE — PROGRESS NOTE BEHAVIORAL HEALTH - NSBHADMITIPBHPROVIDER_PSY_A_CORE
N/A
N/A
no/Dr Darryl Gant in [place]
no/Dr Darryl Gant in [place]
no/Dr Darryl Gant in [place]
no/Dr Darryl Gant in [place]
N/A
Dr Darryl Gant in [place]/no
N/A
N/A
no/Dr Darryl Gant in [place]
Dr Darryl Gant in [place]/no
Dr Darryl Gant in [place]/no
Dr Darryl Gant in [place]/no
N/A
no/Dr Darryl Gant in [place]
N/A
no/Dr Darryl Gant in [place]

## 2017-05-30 NOTE — PROGRESS NOTE BEHAVIORAL HEALTH - NSBHADMITMEDEDUDETAILS_A_CORE FT
pt is aware of the use of medication and of the potential side effects

## 2017-05-30 NOTE — PROGRESS NOTE BEHAVIORAL HEALTH - NS ED BHA AXIS I SECONDARY1 CODE FT
Z91.19

## 2017-05-30 NOTE — PROGRESS NOTE BEHAVIORAL HEALTH - PROBLEM SELECTOR PLAN 1
1.Lower Ativan 1 mg po bid ( pt c/o daytime sedation)  2. Continue still low dose Haldol  5 mg po q 12 hrs  3.DC Cogentin 0.5 mg po q 12 prn ( ineffective per pt report EPS)  4.DC  Amantadine 100 mg po q 12 hrs ( EPS ) at pt's request  5.Depakote also stopped due to prior elevated LFTs  6.Pt unable to take Lithium due to CHRONIC KIDNEY DISEASE  7.To gather more clinical information from collaterals to aid in treatment and DC planning  8.Pt to attend therapy groups when clinically more stable and able to meaningfully participate  9. Continue to encourage the pt to reconsider allowing recommended increase in pt's Haldol dose to further alleviate ongoing impairing schizoaffective bipolar d/o symptoms  10. Pt scheduled for DC home 5/30/17

## 2017-05-30 NOTE — PROGRESS NOTE BEHAVIORAL HEALTH - NS ED BHA REVIEW OF ED CHART AVAILABLE LABS REVIEWED
Yes
None available
None available
Yes
None available
None available
Yes
None available
None available
Yes

## 2017-05-30 NOTE — PROGRESS NOTE BEHAVIORAL HEALTH - NSBHCHARTREVIEWLAB_PSY_A_CORE FT
05-27    141  |  104  |  47<H>  ----------------------------<  99  4.3   |  28  |  1.70<H>    Ca    9.1      27 May 2017 17:48    TPro  7.1  /  Alb  3.0<L>  /  TBili  0.3  /  DBili  x   /  AST  130<H>  /  ALT  207<H>  /  AlkPhos  377<H>  05-27

## 2017-05-30 NOTE — PROGRESS NOTE BEHAVIORAL HEALTH - NSBHFUPSUICINTERVAL_PSY_A_CORE
none known

## 2017-05-30 NOTE — PROGRESS NOTE BEHAVIORAL HEALTH - THOUGHT CONTENT
Ideas of reference/Preoccupations/Delusions/Ruminations
Ideas of reference/Ruminations/Preoccupations/Delusions
Preoccupations/Delusions/Ideas of reference/Other/Ruminations
Ruminations/Delusions/Ideas of reference/Preoccupations
Ideas of reference/Preoccupations/Delusions/Ruminations
Ideas of reference/Ruminations/Delusions/Preoccupations
Preoccupations/Delusions/Ruminations/Other/Ideas of reference
Ruminations/Delusions/Ideas of reference/Preoccupations
Ideas of reference/Delusions/Preoccupations/Ruminations
Ideas of reference/Preoccupations/Delusions/Ruminations
Ideas of reference/Preoccupations/Delusions/Ruminations
Other/Ruminations/Delusions/Ideas of reference/Preoccupations
Other/Ruminations/Ideas of reference/Preoccupations/Delusions
Ruminations/Delusions/Ideas of reference/Preoccupations
Ruminations/Delusions/Other/Ideas of reference/Preoccupations
Ideas of reference/Delusions/Ruminations/Preoccupations
Ideas of reference/Preoccupations/Ruminations/Delusions
Ideas of reference/Ruminations/Delusions/Preoccupations
Preoccupations/Ideas of reference/Ruminations/Delusions
Ruminations/Preoccupations/Delusions/Ideas of reference
Other/Ruminations/Ideas of reference/Delusions/Preoccupations
Ruminations/Delusions/Ideas of reference/Preoccupations

## 2017-05-30 NOTE — PROGRESS NOTE BEHAVIORAL HEALTH - PROBLEM SELECTOR PROBLEM 4
Relationship problems

## 2017-05-30 NOTE — DISCHARGE NOTE BEHAVIORAL HEALTH - NSBHDCVIOLPROTECTFT_PSY_A_CORE
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

## 2017-05-30 NOTE — PROGRESS NOTE BEHAVIORAL HEALTH - NSBHCONSORIP_PSY_A_CORE
Inpatient Admission...

## 2017-05-30 NOTE — PROGRESS NOTE BEHAVIORAL HEALTH - NSBHFUPMEDSE_PSY_A_CORE
None known

## 2017-05-30 NOTE — DISCHARGE NOTE BEHAVIORAL HEALTH - PROVIDER TOKENS
FREE:[LAST:[michelle],FIRST:[astrid],PHONE:[(   )    -],FAX:[(   )    -],ADDRESS:[Pt has follow up med management appointment on 5/31/17 at 3:30 pm in [place]]]

## 2017-05-30 NOTE — DISCHARGE NOTE BEHAVIORAL HEALTH - MEDICATION SUMMARY - MEDICATIONS TO TAKE
I will START or STAY ON the medications listed below when I get home from the hospital:    LORazepam 1 mg oral tablet  -- 1 tab(s) by mouth 2 times a day MDD:2 mg /day ( anxiety related to bipolar d/o)  -- Indication: For Bipolar d/o- anxiety    colchicine 0.6 mg oral tablet  -- 1 tab(s) by mouth 2 times a day MDD:1.2 mg /day ( acute gouty arthritis)  -- Indication: For gout     haloperidol 5 mg oral tablet  -- 1 tab(s) by mouth every 12 hours  -- Indication: For Schizoaffective disorder, bipolar type    haloperidol  -- 5 milligram(s) by mouth 2 times a day  -- Indication: For Duplicate    melatonin 3 mg oral tablet  -- 1 tab(s) by mouth once a day (at bedtime) MDD:3 mg /day ( insomnia)  -- Indication: For insomnia

## 2017-05-30 NOTE — PROGRESS NOTE BEHAVIORAL HEALTH - SECONDARY DX1
Nonadherence to medical treatment

## 2017-05-30 NOTE — PROGRESS NOTE BEHAVIORAL HEALTH - GAIT / STATION
Normal gait / station

## 2017-05-30 NOTE — DISCHARGE NOTE BEHAVIORAL HEALTH - NSBHDCADDR2FT_A_CORE
[address] [place] [id number]   [address] If patient would like to change psychiatrists...Dr. Desai was informed of d/c and can be reached at the number provided. Her office is [address] [place] [address] [address]  [place], [place], [address] If patient would like to change psychiatrist ..Dr. Desai was informed of d/c and can be reached at the number provided. Her office is [address]  [place], [place], [address]

## 2017-06-02 DIAGNOSIS — Z63.0 PROBLEMS IN RELATIONSHIP WITH SPOUSE OR PARTNER: ICD-10-CM

## 2017-06-02 DIAGNOSIS — Z85.3 PERSONAL HISTORY OF MALIGNANT NEOPLASM OF BREAST: ICD-10-CM

## 2017-06-02 DIAGNOSIS — M10.9 GOUT, UNSPECIFIED: ICD-10-CM

## 2017-06-02 DIAGNOSIS — F31.2 BIPOLAR DISORDER, CURRENT EPISODE MANIC SEVERE WITH PSYCHOTIC FEATURES: ICD-10-CM

## 2017-06-02 DIAGNOSIS — L72.3 SEBACEOUS CYST: ICD-10-CM

## 2017-06-02 DIAGNOSIS — N13.30 UNSPECIFIED HYDRONEPHROSIS: ICD-10-CM

## 2017-06-02 DIAGNOSIS — I34.1 NONRHEUMATIC MITRAL (VALVE) PROLAPSE: ICD-10-CM

## 2017-06-02 DIAGNOSIS — R94.5 ABNORMAL RESULTS OF LIVER FUNCTION STUDIES: ICD-10-CM

## 2017-06-02 DIAGNOSIS — M51.36 OTHER INTERVERTEBRAL DISC DEGENERATION, LUMBAR REGION: ICD-10-CM

## 2017-06-02 DIAGNOSIS — N18.3 CHRONIC KIDNEY DISEASE, STAGE 3 (MODERATE): ICD-10-CM

## 2017-06-02 DIAGNOSIS — Z91.14 PATIENT'S OTHER NONCOMPLIANCE WITH MEDICATION REGIMEN: ICD-10-CM

## 2017-06-02 SDOH — SOCIAL STABILITY - SOCIAL INSECURITY: PROBLEMS IN RELATIONSHIP WITH SPOUSE OR PARTNER: Z63.0

## 2017-06-28 ENCOUNTER — APPOINTMENT (OUTPATIENT)
Dept: UROLOGY | Facility: CLINIC | Age: 57
End: 2017-06-28

## 2017-06-28 VITALS
HEART RATE: 77 BPM | DIASTOLIC BLOOD PRESSURE: 78 MMHG | SYSTOLIC BLOOD PRESSURE: 133 MMHG | BODY MASS INDEX: 30.66 KG/M2 | OXYGEN SATURATION: 97 % | HEIGHT: 65 IN | WEIGHT: 184 LBS | TEMPERATURE: 98.2 F

## 2017-06-28 DIAGNOSIS — N39.3 STRESS INCONTINENCE (FEMALE) (MALE): ICD-10-CM

## 2017-06-28 DIAGNOSIS — N13.30 UNSPECIFIED HYDRONEPHROSIS: ICD-10-CM

## 2017-06-28 DIAGNOSIS — R39.12 POOR URINARY STREAM: ICD-10-CM

## 2017-06-28 DIAGNOSIS — R33.9 RETENTION OF URINE, UNSPECIFIED: ICD-10-CM
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
## 2017-07-21 ENCOUNTER — APPOINTMENT (OUTPATIENT)
Dept: UROLOGY | Facility: CLINIC | Age: 57
End: 2017-07-21

## 2017-10-17 NOTE — PROGRESS NOTE BEHAVIORAL HEALTH - NS ED BHA MED ROS CONSTITUTIONAL SYMPTOMS
Patient is scheduled-she would like a call from the doctor or nurse, she would like to know what she should do after she goes off of the medication XIFAXAN  Please call to discuss [phone]    Unable to assess
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
## 2019-06-04 NOTE — PROGRESS NOTE BEHAVIORAL HEALTH - BODY HABITUS
[place] EMERGENCY DEPARTMENT (Formerly [facility])  June 4, 2019    History     Chief Complaint   Patient presents with     Hand Pain     The history is provided by the patient and medical records.     Jayden Don is a 75 year old male without significant PMHx who presents to the Emergency Department today for evaluation of left hand swelling and pain. Patient reports ongoing hand pain since Saturday, 06/01. Patient denies hand trauma, taking blood thinners,a history of gout, or having a history of blood clots.     Per review of patient chart, Jayden underwent surgery for a splenic laceration on 05/24/2019. Patient has been on a 14-day course of amoxicillin beginning 05/28.       I have reviewed the Medications, Allergies, Past Medical and Surgical History, and Social History in the [facility] system.    Past Medical History:   Diagnosis Date     Allergic state      Benign prostatic hyperplasia      Diabetes mellitus type 2, controlled, without complications (H)     controlled with diet and exercise      HTN (hypertension)     controlled with medication     Macular degeneration of right eye     receives injections q8w     Obstructive chronic bronchitis with exacerbation (H)        Past Surgical History:   Procedure Laterality Date     ENT SURGERY  1989    Surgery on nose      GI SURGERY  05/06/2019    Ablation on spleen      IR VISCERAL ANGIOGRAM  5/10/2019     ORTHOPEDIC SURGERY       OTHER SURGICAL HISTORY      Sinus/nose surgery many years ago     OTHER SURGICAL HISTORY      Trigger thumb       Family History   Problem Relation Age of Onset     C.A.D. Mother      Breast Cancer Mother         uterine      Genetic Disorder Mother         machular degeneration     Neurologic Disorder Father         parkinson     Eye Disorder Brother         photophobia       Social History     Tobacco Use     Smoking status: Former Smoker     Smokeless tobacco: Never Used   Substance Use Topics     Alcohol use: Yes     Comment:  "moderate       No current facility-administered medications for this encounter.      Current Outpatient Medications   Medication     acetaminophen (TYLENOL) 325 MG tablet     amoxicillin (AMOXIL) 875 MG tablet     ANDROGEL 50 MG/5GM TD PACK     GLUCOSAMINE-CHONDROITIN 750-600 MG OR TABS     loratadine (CLARITIN) 10 MG tablet     LOTRISONE 1-0.05 % EX CREA     oxyCODONE (ROXICODONE) 5 MG tablet     polyethylene glycol (MIRALAX/GLYCOLAX) packet     sodium chloride 0.9 % SOLN     tamsulosin (FLOMAX) 0.4 MG capsule     TRIAMTERENE-HCTZ 37.5-25 MG OR CAPS     VIAGRA 100 MG OR TABS     VITAMIN E 800 IU OR CAPS        Allergies   Allergen Reactions     Crestor [Rosuvastatin] Other (See Comments)     Joint Pain     No Known Drug Allergies          Review of Systems   Constitutional: Negative for fever.   HENT: Negative for congestion.    Eyes: Negative for redness.   Respiratory: Negative for shortness of breath.    Cardiovascular: Negative for chest pain.   Gastrointestinal: Negative for abdominal pain.   Genitourinary: Negative for difficulty urinating.   Musculoskeletal: Negative for arthralgias and neck stiffness.   Skin: Negative for color change.   Neurological: Negative for headaches.   Psychiatric/Behavioral: Negative for confusion.   All other systems reviewed and are negative.      Physical Exam   BP: 115/72  Pulse: 62  Temp: 96  F (35.6  C)  Resp: 16  Height: 160 cm (5' 3\")  Weight: 69.4 kg (153 lb)  SpO2: 100 %      Physical Exam  Physical Exam   Constitutional: oriented to person, place, and time. appears well-developed and well-nourished.   HENT:   Head: Normocephalic and atraumatic.   Neck: Normal range of motion.   Pulmonary/Chest: Effort normal. No respiratory distress.   Cardiac: No murmurs, rubs, gallops. RRR.  Abdominal: Abdomen soft, nontender, nondistended. No rebound tenderness.  MSK: Left hand on the third metacarpal phalangeal joints there is swelling, erythema, warmth and tenderness.  There is also " swelling over the posterior aspect of the hand that does feel warm and tender to palpation.  Peripheral pulses in this extremity are intact.  Capillary refill intact.  Range of motion of fingers normal, some pain with extension of fingers.  No obvious fusiform swelling of the fingers.  Neurological: alert and oriented to person, place, and time.   Skin: Skin is warm and dry.   Psychiatric:  normal mood and affect.  behavior is normal. Thought content normal.     ED Course   2:30 PM  The patient was seen and examined by Tin Shelley MD, in Room [facility] ([facility]).        Procedures    Labs Ordered and Resulted from Time of ED Arrival Up to the Time of Departure from the ED - No data to display         Assessments & Plan (with Medical Decision Making)   MDM  Patient presenting with concern for infected joint and hands.  Discussed with orthopedics surgery who will do a bedside joint aspiration for evaluation of gout versus possible septic joint.  No obvious flexor tenosynovitis on examination.  Patient does have acutely elevated ESR, CRP, white blood count normal.  X-ray shows some superficial swelling with no fracture, there are some joint abnormalities more concerning for gout.  Patient be sent out to oncoming provider pending aspiration.    Addendum: Orthopedics is unable to get an aspiration.  They did recommend IV antibiotics, risks and benefits were discussed.  We will give antibiotics and NSAIDs to cover possible gout versus infection, orthopedic surgery recommended Augmentin.  The patient has follow-up tomorrow, he will follow-up with orthopedic surgery close to his house.  Patient will return if symptoms are worsening.    Jayden ROMERO Don has made the decision to leave the current treatment against medical advice.  He has been told that his symptoms may possibly be caused by bacterial infection. He has been informed and understands the inherent risks, including death, permanent disability or limb loss.  He has  decided to accept the responsibility for his decision.  He has the capacity to make this informed decision.  He is alert and oriented x 3, understands these instructions, and is able to ambulate.  Jayden Don and all necessary parties have been advised that they may return at any time for further evaluation or treatment.      I have reviewed the nursing notes.    I have reviewed the findings, diagnosis, plan and need for follow up with the patient.       Medication List      There are no discharge medications for this visit.         Final diagnoses:   Pain of left hand       I, Rinku Zamorano, am serving as a trained medical scribe to document services personally performed by Tin Shelley MD, based on the provider's statements to me.      I, Tin Shelley MD, was physically present and have reviewed and verified the accuracy of this note documented by Rinku Zamorano.       6/4/2019   [facility], [place], EMERGENCY DEPARTMENT     Tin Shelley MD  06/04/19 0109       Tin Shelley MD  06/04/19 8140     Overweight

## 2019-10-29 ENCOUNTER — APPOINTMENT (OUTPATIENT)
Dept: OTOLARYNGOLOGY | Facility: CLINIC | Age: 59
End: 2019-10-29
Payer: COMMERCIAL

## 2019-10-29 VITALS
BODY MASS INDEX: 27.49 KG/M2 | DIASTOLIC BLOOD PRESSURE: 92 MMHG | HEIGHT: 65 IN | HEART RATE: 86 BPM | WEIGHT: 165 LBS | SYSTOLIC BLOOD PRESSURE: 140 MMHG

## 2019-10-29 DIAGNOSIS — Z84.1 FAMILY HISTORY OF DISORDERS OF KIDNEY AND URETER: ICD-10-CM

## 2019-10-29 DIAGNOSIS — Z83.3 FAMILY HISTORY OF DIABETES MELLITUS: ICD-10-CM

## 2019-10-29 DIAGNOSIS — Z86.03 PERSONAL HISTORY OF NEOPLASM OF UNCERTAIN BEHAVIOR: ICD-10-CM

## 2019-10-29 DIAGNOSIS — R13.12 DYSPHAGIA, OROPHARYNGEAL PHASE: ICD-10-CM

## 2019-10-29 DIAGNOSIS — Z80.9 FAMILY HISTORY OF MALIGNANT NEOPLASM, UNSPECIFIED: ICD-10-CM

## 2019-10-29 DIAGNOSIS — Z87.448 PERSONAL HISTORY OF OTHER DISEASES OF URINARY SYSTEM: ICD-10-CM

## 2019-10-29 DIAGNOSIS — G52.1 DISORDERS OF GLOSSOPHARYNGEAL NERVE: ICD-10-CM

## 2019-10-29 DIAGNOSIS — M35.00 SICCA SYNDROME, UNSPECIFIED: ICD-10-CM

## 2019-10-29 PROCEDURE — 99214 OFFICE O/P EST MOD 30 MIN: CPT | Mod: 25

## 2019-10-29 PROCEDURE — 31575 DIAGNOSTIC LARYNGOSCOPY: CPT

## 2019-10-29 RX ORDER — HALOPERIDOL LACTATE 2 MG/ML
2 CONCENTRATE, ORAL ORAL
Refills: 0 | Status: ACTIVE | COMMUNITY

## 2019-10-29 RX ORDER — PILOCARPINE HYDROCHLORIDE 5 MG/1
5 TABLET, FILM COATED ORAL 4 TIMES DAILY
Qty: 90 | Refills: 3 | Status: ACTIVE | COMMUNITY
Start: 2019-10-29 | End: 1900-01-01

## 2019-10-29 RX ORDER — CLONAZEPAM 0.5 MG/1
0.5 TABLET ORAL
Refills: 0 | Status: ACTIVE | COMMUNITY

## 2019-10-29 NOTE — ASSESSMENT
[FreeTextEntry1] : chronic sicca\par moderate renal failure\par suspect autoimmune etiology\par trial pilocarpine 5mg

## 2019-10-29 NOTE — PROCEDURE
[de-identified] : Indication for procedure:Unable to examine laryngeal structures with mirror exam\par Scope # 13\par Topical anesthesia with viscous xylocaine 2% is applied to the anterior nares.\par A flexible fiberoptic laryngoscope is than introduced through the nares.\par The nasopharynx is clear without mass or inflammation.\par The posterior pharyngeal wall is unremarkable.\par The tongue base and vallecula are unremarkable.  The hypopharynx is unremarkable and unobstructed.\par The supraglottic larynx is within normal limits.\par Both vocal cords are fully mobile with no nodule, polyp or other lesion present.\par There is no edema or erythema overlying the arytenoid cartilages or the inter-arytenoid space.\par The subglottic space is clear.\par The voice has a normal quality.\par

## 2019-10-29 NOTE — REVIEW OF SYSTEMS
[Hoarseness] : hoarseness [Throat Pain] : throat pain [Throat Dryness] : throat dryness [Throat Itching] : throat itching [Cough] : cough [Negative] : Heme/Lymph [Patient Intake Form Reviewed] : Patient intake form was reviewed [FreeTextEntry1] : joint aches   sweating at night  muscle aches   rash  itching  difficulty swallowing

## 2019-10-29 NOTE — HISTORY OF PRESENT ILLNESS
[de-identified] : co excessive dry mouth\par had sjogrens test reported neg\par using biotin\par food allergy testing neg\par haldol now dc\par kidney failure creatinine 2.68\par mgus

## 2019-10-31 ENCOUNTER — APPOINTMENT (OUTPATIENT)
Dept: ORTHOPEDIC SURGERY | Facility: CLINIC | Age: 59
End: 2019-10-31
Payer: COMMERCIAL

## 2019-10-31 VITALS
HEIGHT: 65 IN | TEMPERATURE: 98.1 F | DIASTOLIC BLOOD PRESSURE: 84 MMHG | HEART RATE: 71 BPM | WEIGHT: 165 LBS | BODY MASS INDEX: 27.49 KG/M2 | SYSTOLIC BLOOD PRESSURE: 129 MMHG

## 2019-10-31 DIAGNOSIS — M75.51 BURSITIS OF RIGHT SHOULDER: ICD-10-CM

## 2019-10-31 DIAGNOSIS — M54.12 RADICULOPATHY, CERVICAL REGION: ICD-10-CM

## 2019-10-31 DIAGNOSIS — Z85.9 PERSONAL HISTORY OF MALIGNANT NEOPLASM, UNSPECIFIED: ICD-10-CM

## 2019-10-31 PROCEDURE — 99203 OFFICE O/P NEW LOW 30 MIN: CPT

## 2019-11-01 PROBLEM — Z85.9 HISTORY OF MALIGNANT NEOPLASM: Status: RESOLVED | Noted: 2019-10-31 | Resolved: 2019-11-01

## 2019-11-07 PROBLEM — M54.12 CERVICAL RADICULOPATHY, CHRONIC: Status: ACTIVE | Noted: 2019-11-07

## 2019-11-08 ENCOUNTER — INPATIENT (INPATIENT)
Facility: HOSPITAL | Age: 59
LOS: 12 days | Discharge: HOME | DRG: 885 | End: 2019-11-21
Attending: PSYCHIATRY & NEUROLOGY | Admitting: PSYCHIATRY & NEUROLOGY
Payer: COMMERCIAL

## 2019-11-08 VITALS
SYSTOLIC BLOOD PRESSURE: 144 MMHG | DIASTOLIC BLOOD PRESSURE: 86 MMHG | HEART RATE: 90 BPM | TEMPERATURE: 98 F | OXYGEN SATURATION: 98 % | RESPIRATION RATE: 19 BRPM

## 2019-11-08 DIAGNOSIS — F31.9 BIPOLAR DISORDER, UNSPECIFIED: ICD-10-CM

## 2019-11-08 LAB
ADD ON TEST-SPECIMEN IN LAB: SIGNIFICANT CHANGE UP
ALBUMIN SERPL ELPH-MCNC: 3.4 G/DL — SIGNIFICANT CHANGE UP (ref 3.3–5)
ALP SERPL-CCNC: 114 U/L — SIGNIFICANT CHANGE UP (ref 40–120)
ALT FLD-CCNC: 40 U/L — SIGNIFICANT CHANGE UP (ref 12–78)
ANION GAP SERPL CALC-SCNC: 8 MMOL/L — SIGNIFICANT CHANGE UP (ref 5–17)
APAP SERPL-MCNC: < 2 UG/ML (ref 10–30)
APPEARANCE UR: CLEAR — SIGNIFICANT CHANGE UP
AST SERPL-CCNC: 27 U/L — SIGNIFICANT CHANGE UP (ref 15–37)
BASOPHILS # BLD AUTO: 0.06 K/UL — SIGNIFICANT CHANGE UP (ref 0–0.2)
BASOPHILS NFR BLD AUTO: 0.7 % — SIGNIFICANT CHANGE UP (ref 0–2)
BILIRUB SERPL-MCNC: 0.4 MG/DL — SIGNIFICANT CHANGE UP (ref 0.2–1.2)
BILIRUB UR-MCNC: NEGATIVE — SIGNIFICANT CHANGE UP
BUN SERPL-MCNC: 52 MG/DL — HIGH (ref 7–23)
CALCIUM SERPL-MCNC: 9.1 MG/DL — SIGNIFICANT CHANGE UP (ref 8.5–10.1)
CHLORIDE SERPL-SCNC: 109 MMOL/L — HIGH (ref 96–108)
CO2 SERPL-SCNC: 24 MMOL/L — SIGNIFICANT CHANGE UP (ref 22–31)
COLOR SPEC: YELLOW — SIGNIFICANT CHANGE UP
CREAT SERPL-MCNC: 2.5 MG/DL — HIGH (ref 0.5–1.3)
DIFF PNL FLD: NEGATIVE — SIGNIFICANT CHANGE UP
EOSINOPHIL # BLD AUTO: 0.14 K/UL — SIGNIFICANT CHANGE UP (ref 0–0.5)
EOSINOPHIL NFR BLD AUTO: 1.6 % — SIGNIFICANT CHANGE UP (ref 0–6)
ETHANOL SERPL-MCNC: <10 MG/DL — SIGNIFICANT CHANGE UP (ref 0–10)
GLUCOSE SERPL-MCNC: 164 MG/DL — HIGH (ref 70–99)
GLUCOSE UR QL: NEGATIVE MG/DL — SIGNIFICANT CHANGE UP
HCT VFR BLD CALC: 33.8 % — LOW (ref 34.5–45)
HGB BLD-MCNC: 11.3 G/DL — LOW (ref 11.5–15.5)
IMM GRANULOCYTES NFR BLD AUTO: 0.2 % — SIGNIFICANT CHANGE UP (ref 0–1.5)
KETONES UR-MCNC: NEGATIVE — SIGNIFICANT CHANGE UP
LEUKOCYTE ESTERASE UR-ACNC: ABNORMAL
LYMPHOCYTES # BLD AUTO: 1.04 K/UL — SIGNIFICANT CHANGE UP (ref 1–3.3)
LYMPHOCYTES # BLD AUTO: 12.2 % — LOW (ref 13–44)
MCHC RBC-ENTMCNC: 29.7 PG — SIGNIFICANT CHANGE UP (ref 27–34)
MCHC RBC-ENTMCNC: 33.4 GM/DL — SIGNIFICANT CHANGE UP (ref 32–36)
MCV RBC AUTO: 88.7 FL — SIGNIFICANT CHANGE UP (ref 80–100)
MONOCYTES # BLD AUTO: 0.47 K/UL — SIGNIFICANT CHANGE UP (ref 0–0.9)
MONOCYTES NFR BLD AUTO: 5.5 % — SIGNIFICANT CHANGE UP (ref 2–14)
NEUTROPHILS # BLD AUTO: 6.78 K/UL — SIGNIFICANT CHANGE UP (ref 1.8–7.4)
NEUTROPHILS NFR BLD AUTO: 79.8 % — HIGH (ref 43–77)
NITRITE UR-MCNC: NEGATIVE — SIGNIFICANT CHANGE UP
PCP SPEC-MCNC: SIGNIFICANT CHANGE UP
PH UR: 5 — SIGNIFICANT CHANGE UP (ref 5–8)
PLATELET # BLD AUTO: 204 K/UL — SIGNIFICANT CHANGE UP (ref 150–400)
POTASSIUM SERPL-MCNC: 3.4 MMOL/L — LOW (ref 3.5–5.3)
POTASSIUM SERPL-SCNC: 3.4 MMOL/L — LOW (ref 3.5–5.3)
PROT SERPL-MCNC: 7.3 GM/DL — SIGNIFICANT CHANGE UP (ref 6–8.3)
PROT UR-MCNC: 100 MG/DL
RBC # BLD: 3.81 M/UL — SIGNIFICANT CHANGE UP (ref 3.8–5.2)
RBC # FLD: 12.4 % — SIGNIFICANT CHANGE UP (ref 10.3–14.5)
SALICYLATES SERPL-MCNC: <1.7 MG/DL — LOW (ref 2.8–20)
SODIUM SERPL-SCNC: 141 MMOL/L — SIGNIFICANT CHANGE UP (ref 135–145)
SP GR SPEC: 1.01 — SIGNIFICANT CHANGE UP (ref 1.01–1.02)
TSH SERPL-MCNC: 2.11 UU/ML — SIGNIFICANT CHANGE UP (ref 0.34–4.82)
UROBILINOGEN FLD QL: NEGATIVE MG/DL — SIGNIFICANT CHANGE UP
WBC # BLD: 8.51 K/UL — SIGNIFICANT CHANGE UP (ref 3.8–10.5)
WBC # FLD AUTO: 8.51 K/UL — SIGNIFICANT CHANGE UP (ref 3.8–10.5)

## 2019-11-08 PROCEDURE — 73080 X-RAY EXAM OF ELBOW: CPT | Mod: 26,RT

## 2019-11-08 PROCEDURE — 36415 COLL VENOUS BLD VENIPUNCTURE: CPT

## 2019-11-08 PROCEDURE — 97162 PT EVAL MOD COMPLEX 30 MIN: CPT | Mod: GP

## 2019-11-08 PROCEDURE — 73030 X-RAY EXAM OF SHOULDER: CPT | Mod: RT

## 2019-11-08 PROCEDURE — 97530 THERAPEUTIC ACTIVITIES: CPT | Mod: GP

## 2019-11-08 PROCEDURE — 73080 X-RAY EXAM OF ELBOW: CPT | Mod: RT

## 2019-11-08 PROCEDURE — 84702 CHORIONIC GONADOTROPIN TEST: CPT

## 2019-11-08 PROCEDURE — 73030 X-RAY EXAM OF SHOULDER: CPT | Mod: 26,RT

## 2019-11-08 PROCEDURE — 80048 BASIC METABOLIC PNL TOTAL CA: CPT

## 2019-11-08 PROCEDURE — 83036 HEMOGLOBIN GLYCOSYLATED A1C: CPT

## 2019-11-08 PROCEDURE — 80061 LIPID PANEL: CPT

## 2019-11-08 RX ORDER — SODIUM CHLORIDE 9 MG/ML
2000 INJECTION INTRAMUSCULAR; INTRAVENOUS; SUBCUTANEOUS ONCE
Refills: 0 | Status: COMPLETED | OUTPATIENT
Start: 2019-11-08 | End: 2019-11-08

## 2019-11-08 RX ORDER — HALOPERIDOL DECANOATE 100 MG/ML
5 INJECTION INTRAMUSCULAR ONCE
Refills: 0 | Status: COMPLETED | OUTPATIENT
Start: 2019-11-08 | End: 2019-11-08

## 2019-11-08 RX ORDER — POTASSIUM CHLORIDE 20 MEQ
40 PACKET (EA) ORAL ONCE
Refills: 0 | Status: COMPLETED | OUTPATIENT
Start: 2019-11-08 | End: 2019-11-08

## 2019-11-08 RX ORDER — DIPHENHYDRAMINE HCL 50 MG
50 CAPSULE ORAL ONCE
Refills: 0 | Status: COMPLETED | OUTPATIENT
Start: 2019-11-08 | End: 2019-11-08

## 2019-11-08 RX ORDER — HALOPERIDOL DECANOATE 100 MG/ML
5 INJECTION INTRAMUSCULAR ONCE
Refills: 0 | Status: DISCONTINUED | OUTPATIENT
Start: 2019-11-08 | End: 2019-11-20

## 2019-11-08 RX ORDER — DIPHENHYDRAMINE HCL 50 MG
50 CAPSULE ORAL ONCE
Refills: 0 | Status: DISCONTINUED | OUTPATIENT
Start: 2019-11-08 | End: 2019-11-20

## 2019-11-08 RX ORDER — HALOPERIDOL DECANOATE 100 MG/ML
5 INJECTION INTRAMUSCULAR DAILY
Refills: 0 | Status: DISCONTINUED | OUTPATIENT
Start: 2019-11-09 | End: 2019-11-11

## 2019-11-08 RX ADMIN — Medication 50 MILLIGRAM(S): at 02:17

## 2019-11-08 RX ADMIN — Medication 2 MILLIGRAM(S): at 02:18

## 2019-11-08 RX ADMIN — Medication 40 MILLIEQUIVALENT(S): at 23:53

## 2019-11-08 RX ADMIN — SODIUM CHLORIDE 2000 MILLILITER(S): 9 INJECTION INTRAMUSCULAR; INTRAVENOUS; SUBCUTANEOUS at 07:26

## 2019-11-08 RX ADMIN — HALOPERIDOL DECANOATE 5 MILLIGRAM(S): 100 INJECTION INTRAMUSCULAR at 02:17

## 2019-11-08 RX ADMIN — SODIUM CHLORIDE 4000 MILLILITER(S): 9 INJECTION INTRAMUSCULAR; INTRAVENOUS; SUBCUTANEOUS at 06:26

## 2019-11-08 NOTE — ED BEHAVIORAL HEALTH ASSESSMENT NOTE - DETAILS
did not participate in history kidney and liver abnormalities father  has Bipolar 2, no h/o SA in family LOW / MD MAHAMED Dr Sanchez

## 2019-11-08 NOTE — ED BEHAVIORAL HEALTH ASSESSMENT NOTE - CURRENT MEDICATION
Haldol 5 mg  Klonopin 0.5 mg (ISTOP [id number])    07/10/2019	07/13/2019	clonazepam 0.5 mg tablet	30	30	Darryl Gant
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
## 2019-11-08 NOTE — ED ADULT NURSE REASSESSMENT NOTE - NS ED NURSE REASSESS COMMENT FT1
Called [facility] for report, [facility] said [facility] does not have a bed available at this time due to discharges.  Will follow up with floor, charge made aware.

## 2019-11-08 NOTE — H&P ADULT - ATTENDING COMMENTS
57 y/o F PMHx as noted above admitted to inpatient Psychiatry for further evaluation and management of Bipolar I disorder.    #Bipolar I Disorder severe with Psychotic Symptoms  ~cont. management per Psychiatry    #Possible Elevated BUN/Cr 52/2.50  ~as noted above patient reports hx of CKD and upon chart review Cr has been elevated (1.7 to 2.06)  ~patient received IV fluids in the ED   ~cont. management as above     #Right arm pain  ~cont. management as outlined above    #Mild Hypokalemia  ~repleted  ~f/u am labs    #Vte ppx  ~IMPROVE Vte Risk Score is 0  ~encourage ambulation
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
## 2019-11-08 NOTE — H&P ADULT - ALLERGIC/IMMUNOLOGIC
Chief Complaint   Patient presents with   • Follow-up   • Hypertension   • Hyperlipidemia   • Elevated Blood Sugar With No Symptoms     HISTORY OF PRESENT ILLNESS:  Ezequiel Cleaning 39 year old male is here for following problems.    1.  Hypertension-initial blood pressure slightly elevated.  he just lost his dog and is very sad and tearful today ..  He says otherwise his blood pressure is fairly well controlled    2.  Hypercholesterolemia-total cholesterol is slightly elevated compared to his usual number last time.  But his triglycerides is extremely high at over 500    3.  Impaired fasting glucose-A1c and fasting sugar both have increased since his last visit in May.  He says he has not been watching his diet or exercising at all    4.  Depression-he has been on venlafaxine for many years but over the last 6 months he feels worse.  He has no motivation and has spent a lot of days just in bed during the summertime and he did not have to go to work.  He is a basketball court and a school but other than that he does not get any physical activity.  He does get tearful today because of his dog's death and also with his ongoing lack of motivation.  He has also gained significant weight in the past 6 months        Past Medical History:   Diagnosis Date   • Abnormal liver enzymes 5/10/2018   • Hypercholesterolemia 2/8/2018   • IFG (impaired fasting glucose) 5/10/2018   • Low serum testosterone level 2/8/2018   • Migraine without aura and without status migrainosus, not intractable 2/1/2018   • Vitamin D deficiency 2/8/2018   • Weight gain 2/1/2018       Current Outpatient Medications   Medication Sig Dispense Refill   • metoPROLOL succinate (TOPROL-XL) 25 MG 24 hr tablet Take 1 tablet by mouth daily. 90 tablet 1   • venlafaxine XR (EFFEXOR XR) 150 MG 24 hr capsule Take 1 capsule by mouth daily. 90 capsule 1   • atorvastatin (LIPITOR) 20 MG tablet TAKE 1 TABLET BY MOUTH DAILY 90 tablet 0   • Cholecalciferol (VITAMIN D3) 5000  units Tab Take 5,000 Units by mouth.       No current facility-administered medications for this visit.        ALLERGIES:   Allergen Reactions   • Cat Dander Other (See Comments)     Itchy eyes, sob       REVIEW OF SYSTEMS:  No fever, cough, chest pain  Rest ROS (review of systems) as per HPI (history of present illness)      PHYSICAL EXAMINATION:  VITAL SIGNS:    Visit Vitals  /84   Pulse 78   Ht 6' 2\" (1.88 m)   Wt (!) 150.1 kg   SpO2 98%   BMI 42.50 kg/m²     CONSTITUTIONAL: Patient appears healthy and appropriate for age, and is in no distress.  EYES:  Normal conjunctivae and lids.  PERRLA (pupils equal, round, reactive to light and accomodation.   RESPIRATORY:  No retraction or accessory muscle use.  Normal breath  sounds; no rales, rhonchi or wheezing.  CARDIOVASCULAR:  Normal S1, S2; no murmurs.  No peripheral edema.  MUSCULOSKELETAL: No cyanosis, no clubbing.  No  inflammation.  PSYCHIATRIC:  Normal judgment and insight.  Alert and oriented x3.    ASSESSMENT:  (Z23) Need for influenza vaccination  (primary encounter diagnosis)  Comment: Given today  Plan: INFLUENZA QUADRIVALENT SPLIT PRES FREE 0.5 ML         VACC, IM (FLULAVAL,FLUARIX,FLUZONE)            (I10) Essential hypertension  Comment: Borderline elevated but repeat better  Plan: CBC & AUTO DIFFERENTIAL, COMPREHENSIVE         METABOLIC PANEL        New prescription done for 25 mg of metoprolol, keep a log of blood pressure readings outside of the office    (R73.01) IFG (impaired fasting glucose)  Comment: Elevated A1c and fasting sugar  Plan: GLYCOHEMOGLOBIN, LIPID PANEL WITH REFLEX        Patient counseled on increased physical activity, diet modifications and weight loss in detail. Exercise at least 30-45 minutes a day and watch the diet per recommendations.      (E55.9) Vitamin D deficiency  Comment: Low  Plan: VITAMIN D -25 HYDROXY        Noncompliance with supplements, 5000 units a day and encouraged    (E78.00)  Hypercholesterolemia  Comment: Elevated triglycerides  Plan: LIPID PANEL WITH REFLEX        Again discussed importance of physical activity and weight loss and improvement in his sugar levels    Depression-I spent more than 50% of my time of the total 25 minute visit discussing overall his lack of motivation.  We discussed about possibly increasing venlafaxine or adding another medication but he wants to give it a shot with his own efforts including physical activity eating better and would like to follow-up in 3 months to see how he is doing        Return in about 3 months (around 3/17/2019).       negative

## 2019-11-08 NOTE — ED ADULT NURSE REASSESSMENT NOTE - NS ED NURSE REASSESS COMMENT FT1
Pt awake, a/ox3, ambulatory with steady gait to bathroom, on 1:1. pt found to have jewelry /bra/underwear on. in pt chart/belongings sheet MD Parker approved pt to remain in these items. AM shift removed garments and jewelry and sent to security. valuable sheet In chart. pt safety maintained. diet order placed for pt, will provide breakfast to pt.

## 2019-11-08 NOTE — ED ADULT NURSE NOTE - OBJECTIVE STATEMENT
pt brought in by PD accusing her  of trying to kill her. upon arrival, pd stated that patient was erratic and breaking things around house. as per PD,  stated that she hasn't been taking her klonipin medication. in ED, pt erratic, unable to formulate coherent thoughts, acutely psychotic, accusing staff of trying to harm her. Does not believe she is in [facility]. Pt conceded to removing clothes on her own but refused to remove underwear and sports bra. MD oh made aware and is agreeable.  Pt was not agreeable to plan of care, was pacing and acutely anxious, medicated with benadryl, ativan and haldol for agitation. After medication administration, pt is now calm, lethargic yet agreeable to blood draw and EKG performed. EKG performed by nursing assistant lilia with Katharina carlton supervision. Pt informed of plan of care, resting comfortably at this time. no physical hold or restraints used.

## 2019-11-08 NOTE — ED BEHAVIORAL HEALTH ASSESSMENT NOTE - DESCRIPTION
Agitated,  acutely manic, uncooperative with interview due to paranoia, rambling pressures speech, illogical, delusional as per ED providers    Vital Signs Last 24 Hrs  T(C): 36.6 (08 Nov 2019 11:09), Max: 36.6 (08 Nov 2019 02:04)  T(F): 97.8 (08 Nov 2019 11:09), Max: 97.8 (08 Nov 2019 02:04)  HR: 65 (08 Nov 2019 11:09) (65 - 90)  BP: 124/89 (08 Nov 2019 11:09) (124/89 - 144/86)  BP(mean): --  RR: 17 (08 Nov 2019 06:13) (17 - 19)  SpO2: 98% (08 Nov 2019 11:09) (97% - 98%) "stage 3 renal disease, elevated liver enzymes, MGUS,, breast Ca DSIS left breast, Cardiac Murmur, DJD back, GERD, Lymes Disoease, Uterine Leiomyoma, tonsilectomy lives with , no children, graduated [employer] and has some college.  Pt has own insurance co but can not function since stopping meds

## 2019-11-08 NOTE — ED ADULT NURSE REASSESSMENT NOTE - NS ED NURSE REASSESS COMMENT FT1
Received report from BRIGIDA Salas.  Pt asleep in stretcher, 1:1 maintained for safety. Received report from BRIGIDA Salas.  Pt asleep in stretcher, awakens to verbal stimuli, respirations equal and unlabored. 1:1 maintained for safety.

## 2019-11-08 NOTE — ED BEHAVIORAL HEALTH ASSESSMENT NOTE - HPI (INCLUDE ILLNESS QUALITY, SEVERITY, DURATION, TIMING, CONTEXT, MODIFYING FACTORS, ASSOCIATED SIGNS AND SYMPTOMS)
56 year-old   female. 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 Patient brought in by EMS for agitated, manic, paranoid behaviors in the setting of medication non-compliance.    As per ED provider, patient arrived in ED disorganized, hostile, agitated, loud, labile, bizarre, rambling, profane, impulsive, erratic, belligerent, paranoid, uncooperative, unresponsive to verbal deescalation and redirection. Patient given Haldol 5mg and Ativan 2 mg - effective. Patient is disorganized, limited and amotivated historian. Patient is poorly related.     As per , patient was complaining of dry mouth and PCP stated it was likely secondary to Haldol 5 mg of which she stopped taking for 1 month and thus has been decompensating. Patient has been irritable, labile, paranoid, agitated, stating being abused (making accusations); poor sleep; poor overall function. Reports patient has one episode per year which requires in-patient hospitalization. Reports patient was stable since last in-patient hospitalization: discharged with Haldol 5 mg twice daily. Denies other concerns, stating however patient requires in-patient hospitalization for safety and stabilization.

## 2019-11-08 NOTE — H&P ADULT - NSHPPHYSICALEXAM_GEN_ALL_CORE
Vital Signs Last 24 Hrs  T(C): 36.8 (08 Nov 2019 16:35), Max: 36.8 (08 Nov 2019 16:35)  T(F): 98.2 (08 Nov 2019 16:35), Max: 98.2 (08 Nov 2019 16:35)  HR: 78 (08 Nov 2019 16:09) (65 - 90)  BP: 113/70 (08 Nov 2019 16:09) (113/70 - 144/86)  RR: 18 (08 Nov 2019 16:35) (17 - 19)  SpO2: 100% (08 Nov 2019 16:09) (97% - 100%)

## 2019-11-08 NOTE — H&P ADULT - HISTORY OF PRESENT ILLNESS
57 yo F with PMH significant for CKD3, GERD, DJD, Depression, Bipolar I disorder, h/o Breast CA admitted to [facility] for eval. of agitation, sharlene, paranoid behavior in the setting of medication non compliance. Pt is resting in bed, calm and  comfortable. Pt is c/o left arm pain x 20 days after lifting heavy bags. Denies headache, dizziness, chest pain, palpitations ro SOB. Denies fever, chills, cough, dysuria or diarrhea. Pt is reluctant to provide much details, on asking about baseline creatinine, pt got agitated and asked to check old records. Pt reported that she takes only multivitamins at home. Pt reported that she has been diagnosed with MGUS for  many years and not on any med at home.     PMH: As above  PSH: Tonsillectomy  Social Hx: Pt refused to provide details about social Hx  Family Hx: pt refused to provide details 59 yo F with PMH significant for CKD3, GERD, DJD, Depression, Bipolar I disorder, h/o Breast CA admitted to [facility] for eval. of agitation, sharlene, paranoid behavior in the setting of medication non compliance. Pt is resting in bed, calm and  comfortable. Pt is c/o Rt arm pain x 20 days after lifting heavy bags. Denies headache, dizziness, chest pain, palpitations ro SOB. Denies fever, chills, cough, dysuria or diarrhea. Pt is reluctant to provide much details, on asking about baseline creatinine, pt got agitated and asked to check old records. Pt reported that she takes only multivitamins at home. Pt reported that she has been diagnosed with MGUS for  many years and not on any med at home.     PMH: As above  PSH: Tonsillectomy  Social Hx: Pt refused to provide details about social Hx  Family Hx: pt refused to provide details

## 2019-11-08 NOTE — ED ADULT NURSE REASSESSMENT NOTE - NS ED NURSE REASSESS COMMENT FT1
attempted to call [facility], Spoke to RN Thor LLOYD. informed RN [place] bed is being cleaned and [facility] will call ED asap.

## 2019-11-08 NOTE — ED PROVIDER NOTE - PROGRESS NOTE DETAILS
Attending Dr. leah Sanchez Attending zachary Sanchez/w Dr. lynn, it has been 2 years and elevation of creatine can be just from underlying CKD.  Pt does not need medical admission for elevation of creat. Lorraine LAUGHLIN for ED attending Dr. Sanchez: Pt ambulating in no acute distress. Psychiatry consulting. Attending Sanchez, psych eval pt and to be admitted involuntary

## 2019-11-08 NOTE — ED ADULT TRIAGE NOTE - CHIEF COMPLAINT QUOTE
patient brought in by [facility] badge # [id number] after she called 9-[employer] stating her  was trying to kill her.  as per [employer] patient was found breaking items around the house and acting erratic,  states patient has not been taking her medication. history of bipolar.  patient cooperative at triage and denies SI/HI patient has a flight of ideas.

## 2019-11-08 NOTE — ED BEHAVIORAL HEALTH ASSESSMENT NOTE - RISK ASSESSMENT
HIGH RISK     ACUTE RISK FACTORS: disorganized, hostile, agitated, labile, bizarre, rambling, impulsive, erratic, belligerent, paranoid    CHRONIC RISK FACTORS: history of medication non-compliance, history of in-patient hospitalization, history of Bipolar, unable to work    PROTECTIVE FACTORS: no suicidal ideation/intent/plan; supportive family Low Acute Suicide Risk

## 2019-11-08 NOTE — H&P ADULT - ASSESSMENT
59 yo F with PMH significant for CKD3, GERD, DJD, Depression, Bipolar I disorder, h/o Breast CA admitted to [facility] for eval. of agitation, sharlene, paranoid behavior in the setting of medication non compliance.     # Bipolar I disorder/Paranoia/Agitation/Psych problems  - Manage as per primary Psych team    # Elevated BUN/Cr 52/2.50  - Pt reported that she has Chronic kidney disease , MGUS  - In the past, as per EMR, in 2017 Cr was 1.7 to 2.06  - As per EMR,  pt received 2L IVF in the ED   - As per ED provider note, ED MD spoke to Dr. Nance about elevated Cr and as per note, Dr. Nance recommends to ED MD that Pt doesn't require inpt medicine admission.   - Renal consult   - Pt denies any urinary c/o or abdominal pain/flank pain     # Anemia  - H/H: 11.3/33.8,   - As per EMR< in the past baseline Hb is from 10-11  - Denies any active GI or  bleeding    # Hypokalemia  - K: 3.4  - replete and recheck    # DVT Ppx  - Encourage Ambulation  IMPROVE VTE Individual Risk Assessment    RISK                                                                Points    [  ] Previous VTE                                                  3    [  ] Thrombophilia                                               2    [  ] Lower limb paralysis                                      2        (unable to hold up >15 seconds)      [  ] Current Cancer                                              2         (within 6 months)    [  ] Immobilization > 24 hrs                                1    [  ] ICU/CCU stay > 24 hours                              1    [  ] Age > 60                                                      1    IMPROVE VTE Score ______0 ___    IMPROVE Score 0-1: Low Risk, No VTE prophylaxis required for most patients, encourage ambulation.   IMPROVE Score 2-3: At risk, pharmacologic VTE prophylaxis is indicated for most patients (in the absence of a contraindication)  IMPROVE Score > or = 4: High Risk, pharmacologic VTE prophylaxis is indicated for most patients (in the absence of a contraindication)    Case d/w            # 57 yo F with PMH significant for CKD3, GERD, DJD, Depression, Bipolar I disorder, h/o Breast CA admitted to [facility] for eval. of agitation, sharlene, paranoid behavior in the setting of medication non compliance.     # Bipolar I disorder/Paranoia/Agitation/Psych problems  - Manage as per primary Psych team    # Elevated BUN/Cr 52/2.50  - Pt reported that she has Chronic kidney disease , MGUS  - In the past, as per EMR, in 2017 Cr was 1.7 to 2.06  - As per EMR,  pt received 2L IVF in the ED   - As per ED provider note, ED MD spoke to Dr. Nance about elevated Cr and as per note, Dr. Nance recommends to ED MD that Pt doesn't require inpt medicine admission.   - Renal consult   - Pt denies any urinary c/o or abdominal pain/flank pain     # Left arm pain s/o lifting heavy bags about 3-4 weeks ago  - Likely muscular  - Pain meds prn  - Pt is repeatedly asking for Xray     # Anemia  - H/H: 11.3/33.8,   - As per EMR< in the past baseline Hb is from 10-11  - Denies any active GI or  bleeding    # Hypokalemia  - K: 3.4  - replete and recheck    # DVT Ppx  - Encourage Ambulation  IMPROVE VTE Individual Risk Assessment    RISK                                                                Points    [  ] Previous VTE                                                  3    [  ] Thrombophilia                                               2    [  ] Lower limb paralysis                                      2        (unable to hold up >15 seconds)      [  ] Current Cancer                                              2         (within 6 months)    [  ] Immobilization > 24 hrs                                1    [  ] ICU/CCU stay > 24 hours                              1    [  ] Age > 60                                                      1    IMPROVE VTE Score ______0 ___    IMPROVE Score 0-1: Low Risk, No VTE prophylaxis required for most patients, encourage ambulation.   IMPROVE Score 2-3: At risk, pharmacologic VTE prophylaxis is indicated for most patients (in the absence of a contraindication)  IMPROVE Score > or = 4: High Risk, pharmacologic VTE prophylaxis is indicated for most patients (in the absence of a contraindication)    Case d/w  57 yo F with PMH significant for CKD3, GERD, DJD, Depression, Bipolar I disorder, h/o Breast CA admitted to [facility] for eval. of agitation, sharlene, paranoid behavior in the setting of medication non compliance.     # Bipolar I disorder/Paranoia/Agitation/Psych problems  - Manage as per primary Psych team    # Elevated BUN/Cr 52/2.50  - Pt reported that she has Chronic kidney disease , MGUS  - In the past, as per EMR, in 2017 Cr was 1.7 to 2.06  - As per EMR,  pt received 2L IVF in the ED   - As per ED provider note, ED MD spoke to Dr. Nance about elevated Cr and as per note, Dr. Nance recommends to ED MD that Pt doesn't require inpt medicine admission.   - Renal consult   - Pt denies any urinary c/o or abdominal pain/flank pain     # Rt arm pain s/o lifting heavy bags about 3-4 weeks ago  - Likely muscular  - Pain meds prn  - Pt is repeatedly asking for Xray     # Anemia  - H/H: 11.3/33.8,   - As per EMR< in the past baseline Hb is from 10-11  - Denies any active GI or  bleeding    # Hypokalemia  - K: 3.4  - replete and recheck    # DVT Ppx  - Encourage Ambulation  IMPROVE VTE Individual Risk Assessment    RISK                                                                Points    [  ] Previous VTE                                                  3    [  ] Thrombophilia                                               2    [  ] Lower limb paralysis                                      2        (unable to hold up >15 seconds)      [  ] Current Cancer                                              2         (within 6 months)    [  ] Immobilization > 24 hrs                                1    [  ] ICU/CCU stay > 24 hours                              1    [  ] Age > 60                                                      1    IMPROVE VTE Score ______0 ___    IMPROVE Score 0-1: Low Risk, No VTE prophylaxis required for most patients, encourage ambulation.   IMPROVE Score 2-3: At risk, pharmacologic VTE prophylaxis is indicated for most patients (in the absence of a contraindication)  IMPROVE Score > or = 4: High Risk, pharmacologic VTE prophylaxis is indicated for most patients (in the absence of a contraindication)    Case d/w Dr. Rodriguez

## 2019-11-09 DIAGNOSIS — F31.9 BIPOLAR DISORDER, UNSPECIFIED: ICD-10-CM

## 2019-11-09 LAB
ANION GAP SERPL CALC-SCNC: 8 MMOL/L — SIGNIFICANT CHANGE UP (ref 5–17)
BUN SERPL-MCNC: 41 MG/DL — HIGH (ref 7–23)
CALCIUM SERPL-MCNC: 9 MG/DL — SIGNIFICANT CHANGE UP (ref 8.5–10.1)
CHLORIDE SERPL-SCNC: 112 MMOL/L — HIGH (ref 96–108)
CHOLEST SERPL-MCNC: 198 MG/DL — SIGNIFICANT CHANGE UP (ref 10–199)
CO2 SERPL-SCNC: 25 MMOL/L — SIGNIFICANT CHANGE UP (ref 22–31)
CREAT SERPL-MCNC: 2.07 MG/DL — HIGH (ref 0.5–1.3)
ESTIMATED AVERAGE GLUCOSE: 108 MG/DL — SIGNIFICANT CHANGE UP (ref 68–114)
GLUCOSE SERPL-MCNC: 159 MG/DL — HIGH (ref 70–99)
HBA1C BLD-MCNC: 5.4 % — SIGNIFICANT CHANGE UP (ref 4–5.6)
HDLC SERPL-MCNC: 87 MG/DL — SIGNIFICANT CHANGE UP
LIPID PNL WITH DIRECT LDL SERPL: 98 MG/DL — SIGNIFICANT CHANGE UP
POTASSIUM SERPL-MCNC: 4.5 MMOL/L — SIGNIFICANT CHANGE UP (ref 3.5–5.3)
POTASSIUM SERPL-SCNC: 4.5 MMOL/L — SIGNIFICANT CHANGE UP (ref 3.5–5.3)
SODIUM SERPL-SCNC: 145 MMOL/L — SIGNIFICANT CHANGE UP (ref 135–145)
TOTAL CHOLESTEROL/HDL RATIO MEASUREMENT: 2.3 RATIO — LOW (ref 3.3–7.1)
TRIGL SERPL-MCNC: 64 MG/DL — SIGNIFICANT CHANGE UP (ref 10–149)

## 2019-11-09 PROCEDURE — 99222 1ST HOSP IP/OBS MODERATE 55: CPT

## 2019-11-09 RX ADMIN — HALOPERIDOL DECANOATE 5 MILLIGRAM(S): 100 INJECTION INTRAMUSCULAR at 17:26

## 2019-11-09 NOTE — PROGRESS NOTE BEHAVIORAL HEALTH - NSBHFUPIPCHARTREVFT_PSY_A_CORE
· HPI: 56 year-old   female. 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 Patient brought in by EMS for agitated, manic, paranoid behaviors in the setting of medication non-compliance.    As per ED provider, patient arrived in ED disorganized, hostile, agitated, loud, labile, bizarre, rambling, profane, impulsive, erratic, belligerent, paranoid, uncooperative, unresponsive to verbal deescalation and redirection. Patient given Haldol 5mg and Ativan 2 mg - effective. Patient is disorganized, limited and amotivated historian. Patient is poorly related.     As per , patient was complaining of dry mouth and PCP stated it was likely secondary to Haldol 5 mg of which she stopped taking for 1 month and thus has been decompensating. Patient has been irritable, labile, paranoid, agitated, stating being abused (making accusations); poor sleep; poor overall function. Reports patient has one episode per year which requires in-patient hospitalization. Reports patient was stable since last in-patient hospitalization: discharged with Haldol 5 mg twice daily. Denies other concerns, stating however patient requires in-patient hospitalization for safety and stabilization.

## 2019-11-09 NOTE — PROGRESS NOTE BEHAVIORAL HEALTH - RISK ASSESSMENT
HIGH RISK     ACUTE RISK FACTORS: disorganized, hostile, agitated, labile, bizarre, rambling, impulsive, erratic, belligerent, paranoid    CHRONIC RISK FACTORS: history of medication non-compliance, history of in-patient hospitalization, history of Bipolar, unable to work    PROTECTIVE FACTORS: no suicidal ideation/intent/plan; supportive family

## 2019-11-09 NOTE — CONSULT NOTE ADULT - SUBJECTIVE AND OBJECTIVE BOX
Patient is a 58y Female whom presented to the hospital with CKD IV reported baseline crcl ~ 24 due lithium, GERD, DJD, Depression, Bipolar I disorder, h/o Breast CA admitted to [facility] for eval. of agitation, sharlene, paranoid behavior in the setting of medication non compliance. per patient and documents has seen numerous nephrologists, currently follows w/ Dr Desai. She reports no current complaints, very agitated. Discussed on 5N with family at side.     PAST MEDICAL & SURGICAL HISTORY:  Degenerative disc disease, lumbar  Bipolar disorder  Depression  Lyme disease  GERD (gastroesophageal reflux disease)  Uterine leiomyoma  Breast CA: DCIS, left breast, s/p RT  Kidney disease: &quot;stage 3&quot; as per patient secondary to lithium treatment many years ago  Cardiac murmur  S/P coronary angiogram: 10 yrs ago, no intervention  S/P dilation and curettage: uterine polyps  S/P tonsillectomy  S/P lumpectomy of breast: , left breast, no lymph nodes removed      MEDICATIONS  (STANDING):  haloperidol     Tablet 5 milliGRAM(s) Oral daily    MEDICATIONS  (PRN):  diphenhydrAMINE   Injectable 50 milliGRAM(s) IntraMuscular once PRN Extrapyramidal prophylaxis  haloperidol    Injectable 5 milliGRAM(s) IntraMuscular once PRN Psychotic agitation SEVERE  LORazepam     Tablet 2 milliGRAM(s) Oral every 6 hours PRN Anxiety / Agitation MOD  LORazepam   Injectable 2 milliGRAM(s) IntraMuscular once PRN Agitation, SEVERE      Allergies    No Known Allergies    Intolerances        SOCIAL HISTORY:  +psychiatric hx  FAMILY HISTORY:  No renal disease know    REVIEW OF SYSTEMS:    CONSTITUTIONAL: No weakness, fevers or chills  EYES/ENT: No visual changes;  No vertigo or throat pain   NECK: No pain or stiffness  RESPIRATORY: No cough, wheezing, hemoptysis; No shortness of breath  CARDIOVASCULAR: No chest pain or palpitations  GASTROINTESTINAL: No abdominal or epigastric pain. No nausea, vomiting, or hematemesis; No diarrhea or constipation. No melena or hematochezia.  GENITOURINARY: No dysuria, frequency or hematuria  NEUROLOGICAL: No numbness or weakness  SKIN: No itching, burning, rashes, or lesions   All other review of systems is negative unless indicated above.      T(C): , Max: 36.9 (19 @ 09:24)  T(F): , Max: 98.4 (19 @ 09:24)  HR: 78 (19 @ 16:09)  BP: 113/70 (19 @ 16:09)  BP(mean): --  RR: 14 (19 @ 09:24)  SpO2: 99% (19 @ 09:24)  Wt(kg): --    Height (cm): 165.1 ( 16:35)  Weight (kg): 63.503 (:35)  BMI (kg/m2): 23.3 (:35)  BSA (m2): 1.7 (:35)    PHYSICAL EXAM:    Constitutional: NAD, anxious.  HEENT: PERRLA, EOMI,  MMM  Neck: No LAD, No JVD  Respiratory: dist BS  Cardiovascular: S1 and S2, RRR  Gastrointestinal: BS+, soft, NT/ND  Extremities: No peripheral edema  Neurological: Alert, anxious  : No Ware  Skin: No rashes  Access: Not applicable        LABS:                        11.3   8.51  )-----------( 204      ( 2019 02:38 )             33.8     2019 09:09    145    |  112    |  41     ----------------------------<  159    4.5     |  25     |  2.07   2019 02:38    141    |  109    |  52     ----------------------------<  164    3.4     |  24     |  2.50     Ca    9.0        2019 09:09  Ca    9.1        2019 02:38    TPro  7.3    /  Alb  3.4    /  TBili  0.4    /  DBili  x      /  AST  27     /  ALT  40     /  AlkPhos  114    2019 02:38          Urine Studies:  Urinalysis Basic - ( 2019 09:05 )    Color: Yellow / Appearance: Clear / S.010 / pH: x  Gluc: x / Ketone: Negative  / Bili: Negative / Urobili: Negative mg/dL   Blood: x / Protein: 100 mg/dL / Nitrite: Negative   Leuk Esterase: Small / RBC: 0-2 /HPF / WBC 6-10   Sq Epi: x / Non Sq Epi: Few / Bacteria: Moderate            RADIOLOGY & ADDITIONAL STUDIES:

## 2019-11-09 NOTE — PROGRESS NOTE BEHAVIORAL HEALTH - NSBHFUPINTERVALHXFT_PSY_A_CORE
56 year-old   female. living with , non-caretaker, no children, owns insurance co, but needed to close due to mental illness, multiple psychiatric admissions (last  5/2017), history of Bipolar 1 Disorder, no h/o substance  or alcohol abuse/use, no violence to others but h/o destroying property in home, PMH "stage 3 " renal disease per  secondary to Lithium, MGUS (precursor to MML), and elevated liver enzymes due to Depakote per . Patient brought in by EMS for agitated, manic, paranoid behaviors in the setting of medication non-compliance.    Patient was seen today AM, mood is uplifted, endorsing that she needs only Haldol 0.5 mg to help gain control of her symptoms. She just mentions that she needs to get out of here and take only Haldol 0.5 mg daily. Writer also spoke with her  who was present next to her, as per her , she stopped taking her meds for an unknown period, and was smashing everything in the house, she was not sleeping, bizarre, manic, and her  called EMS to bring her here. 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 She also takes Klonopin at times. she was out of control and her  felt that it is coming a few days earlier to coming here at ED in .     She denied drug/legal issues, she also has some Tendinitis/Bursitis.    Plan: To start Haldol 5 mg daily           PRN Ativan/Benadryl

## 2019-11-09 NOTE — CONSULT NOTE ADULT - ASSESSMENT
58 CKD IV GERD, DJD, Depression, Bipolar I disorder, h/o Breast CA admitted to [facility] for eval. of agitation, sahrlene, paranoid behavior, renal evaluation of CKD.    CKD IV  -Renal function stable, near baseline  -Keep net even, no need for IVF  -Optimize oral intake  -Outpatient follow up with primary nephro on discharge, Dr. Desai  -Renal sonogram if renal function >2.5 mg/dl  -No NSAIDs, dose all meds for crcl ~ 25 ml/min    Will follow as needed, call with any acute/new renal issues    d/c with staff and family

## 2019-11-10 PROCEDURE — 99231 SBSQ HOSP IP/OBS SF/LOW 25: CPT

## 2019-11-10 RX ADMIN — HALOPERIDOL DECANOATE 5 MILLIGRAM(S): 100 INJECTION INTRAMUSCULAR at 18:29

## 2019-11-10 RX ADMIN — Medication 2 MILLIGRAM(S): at 22:34

## 2019-11-10 NOTE — PROGRESS NOTE BEHAVIORAL HEALTH - NSBHFUPINTERVALHXFT_PSY_A_CORE
56 year-old   female. 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 Patient brought in by EMS for agitated, manic, paranoid behaviors in the setting of medication non-compliance.    Patient was seen today in bed, mood seems Ok today, less hyperactive and less talkative, she was informed that she is on Haldol 5 mg daily and to continue as she needs it. She seems fixated on haldol 0.5 mg daily. To continue meds as ordered, no other complaints noted.    Plan: to continue Haldol 5 mg daily

## 2019-11-11 PROCEDURE — 99232 SBSQ HOSP IP/OBS MODERATE 35: CPT

## 2019-11-11 RX ORDER — HALOPERIDOL DECANOATE 100 MG/ML
5 INJECTION INTRAMUSCULAR AT BEDTIME
Refills: 0 | Status: DISCONTINUED | OUTPATIENT
Start: 2019-11-11 | End: 2019-11-18

## 2019-11-11 RX ORDER — LIDOCAINE 4 G/100G
2 CREAM TOPICAL DAILY
Refills: 0 | Status: DISCONTINUED | OUTPATIENT
Start: 2019-11-11 | End: 2019-11-21

## 2019-11-11 RX ADMIN — HALOPERIDOL DECANOATE 5 MILLIGRAM(S): 100 INJECTION INTRAMUSCULAR at 21:36

## 2019-11-11 RX ADMIN — LIDOCAINE 2 PATCH: 4 CREAM TOPICAL at 17:05

## 2019-11-11 NOTE — PROGRESS NOTE BEHAVIORAL HEALTH - NSBHFUPINTERVALHXFT_PSY_A_CORE
56 year-old   female. living with , non-caretaker, no children, owns insurance co, but needed to close due to mental         Pt with more focus on right shoulder pain .  The xray is with no evidence of any fracture and is thought to have bursitis or muscular strain.   Will have PT evaluate for exercise to retain range of motion.   Will offer lidocaine patch to decrease muscular pain.   Pt still with some guarded suspicious thoughts and is still wanting to decrease the amount of the haldol .   Pt will need discussion about haldol decanoate.     illness, multiple psychiatric admissions (last  5/2017), history of Bipolar 1 Disorder, no h/o substance  or alcohol abuse/use, no violence to others but h/o destroying property in home, PMH "stage 3 " renal disease per  secondary to Lithium, MGUS (precursor to MML), and elevated liver enzymes due to Depakote per . Patient brought in by EMS for agitated, manic, paranoid behaviors in the setting of medication non-compliance.    Patient was seen today in bed, mood seems Ok today, less hyperactive and less talkative, she was informed that she is on Haldol 5 mg daily and to continue as she needs it. She seems fixated on haldol 0.5 mg daily. To continue meds as ordered, no other complaints noted.    Plan: to continue Haldol 5 mg daily

## 2019-11-12 PROCEDURE — 99231 SBSQ HOSP IP/OBS SF/LOW 25: CPT

## 2019-11-12 RX ADMIN — HALOPERIDOL DECANOATE 5 MILLIGRAM(S): 100 INJECTION INTRAMUSCULAR at 20:48

## 2019-11-12 RX ADMIN — LIDOCAINE 2 PATCH: 4 CREAM TOPICAL at 10:02

## 2019-11-12 NOTE — PHYSICAL THERAPY INITIAL EVALUATION ADULT - MANUAL MUSCLE TESTING RESULTS, REHAB EVAL
Pt able to raise /hold R shoulder above horizontal ,NEGATIVE drop arm test; Strength Flexion/Abduction,ER=  3+- 4-/5 with pain limiting ,IR/ADD=4+/5 ,elbow/wrist/hand mm =>4+/5

## 2019-11-12 NOTE — PHYSICAL THERAPY INITIAL EVALUATION ADULT - RISK REDUCTION/PREVENTION, PT EVAL
pt at risk for developing adhesive capsulitis or "frozen shoulder " should she continue to favor R arm,would benefit from regular OP PT 2-3x/week ,?cortisone injection/secondary impairments

## 2019-11-12 NOTE — PHYSICAL THERAPY INITIAL EVALUATION ADULT - PERSONAL SAFETY AND JUDGMENT, REHAB EVAL
h/o recent sharlene,agitation and paranoia leading to hospitalization, h/o property destruction in home/at risk behaviors demonstrated

## 2019-11-12 NOTE — PHYSICAL THERAPY INITIAL EVALUATION ADULT - PERTINENT HX OF CURRENT PROBLEM, REHAB EVAL
h/o bipolar d/o ,non-compliant with meds per spouse ,became agitated ,manic, paranoid, admitted for stabilization h/o bipolar d/o ,non-compliant with meds per spouse ,became agitated ,manic, paranoid, admitted for stabilization; also c/o R shoulder pain/dysfunction since 10/17/19 dominant RUE with progressive pain/debility

## 2019-11-12 NOTE — PHYSICAL THERAPY INITIAL EVALUATION ADULT - SKIN COLOR/CHARACTERISTICS
?healed "scrape" R anterior shoulder ,pt reports remote history of scraping shoulder fell into a wall

## 2019-11-12 NOTE — PHYSICAL THERAPY INITIAL EVALUATION ADULT - MODALITIES TREATMENT COMMENTS
Instructed in low grade pendulum ex's RUE standing bent over at waist leaning on window sill with uninvolved LUE,ice packs applied draped to R shoulder x~30 mins ,instructed to rest arm in loose packed position resting on arm rest of chair or night stand at bedside when icing R shoulder ,may benefit from TENS

## 2019-11-12 NOTE — PHYSICAL THERAPY INITIAL EVALUATION ADULT - GENERAL OBSERVATIONS, REHAB EVAL
pt seated in art room on 5N,leaves session for PT evaluation/R ,ambulating I'ly with decreased arm swing RURICK

## 2019-11-12 NOTE — PHYSICAL THERAPY INITIAL EVALUATION ADULT - OCCUPATION
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

## 2019-11-12 NOTE — PROGRESS NOTE BEHAVIORAL HEALTH - NSBHFUPINTERVALHXFT_PSY_A_CORE
read the PT note/  Pt with decreased anxiety today and was more goal directed and interactive.  Will continue with the pt to alk about adjust of medication

## 2019-11-12 NOTE — PHYSICAL THERAPY INITIAL EVALUATION ADULT - ACTIVE RANGE OF MOTION EXAMINATION, REHAB EVAL
deficits as listed below/guarded motion R shoulder with pain reproduced on elevation in saggital plane at midrange,AROM min limited FE,ABD,ER/IR/bilateral  lower extremity Active ROM was WFL (within functional limits)/bilateral upper extremity Active ROM was WFL (within functional limits)

## 2019-11-12 NOTE — PHYSICAL THERAPY INITIAL EVALUATION ADULT - PATIENT/FAMILY/SIGNIFICANT OTHER GOALS STATEMENT, PT EVAL
pt reports onset R shoulder pain 10/17/19 after lifting luggage which has progressively worsened since

## 2019-11-12 NOTE — PHYSICAL THERAPY INITIAL EVALUATION ADULT - DIAGNOSIS, PT EVAL
bipolar disorder,R shoulder pain/possible bursitis vs muscular strain ,CKD3 bipolar disorder, R shoulder pain/possible bursitis vs muscular strain ,CKD3

## 2019-11-12 NOTE — PHYSICAL THERAPY INITIAL EVALUATION ADULT - CRITERIA FOR SKILLED THERAPEUTIC INTERVENTIONS
anticipated equipment needs at discharge/impairments found/predicted duration of therapy intervention/risk reduction/prevention/rehab potential/anticipated discharge recommendation/functional limitations in following categories/therapy frequency

## 2019-11-13 DIAGNOSIS — Z91.19 PATIENT'S NONCOMPLIANCE WITH OTHER MEDICAL TREATMENT AND REGIMEN: ICD-10-CM

## 2019-11-13 PROCEDURE — 99232 SBSQ HOSP IP/OBS MODERATE 35: CPT

## 2019-11-13 RX ADMIN — HALOPERIDOL DECANOATE 5 MILLIGRAM(S): 100 INJECTION INTRAMUSCULAR at 22:20

## 2019-11-13 RX ADMIN — LIDOCAINE 2 PATCH: 4 CREAM TOPICAL at 08:58

## 2019-11-13 NOTE — PROGRESS NOTE BEHAVIORAL HEALTH - NSBHFUPINTERVALHXFT_PSY_A_CORE
Spoke with the pt who finally did not obsess about the shoulder but still refusing to have the haldol be converted to haldol decanoate which would help with the noncompliance with medication .  Pt spoke about the "holistic healing." and her only needing "small dose or no medication."  Pt with no insight inspite of several hospitlaization

## 2019-11-13 NOTE — PROGRESS NOTE BEHAVIORAL HEALTH - NSBHADMITMEDEDUDETAILS_A_CORE FT
Discussed risks/benefits/s-e

## 2019-11-13 NOTE — PROGRESS NOTE BEHAVIORAL HEALTH - RISK ASSESSMENT
ACUTE RISKlow moderate  FACTORS:  pt not as irritable, still uninsightful, restricting dose  CHRONIC RISK FACTORS: history of medication non-compliance, history of in-patient hospitalization, history of Bipolar, unable to work    PROTECTIVE FACTORS: no suicidal ideation/intent/plan; supportive family

## 2019-11-14 PROCEDURE — 99232 SBSQ HOSP IP/OBS MODERATE 35: CPT

## 2019-11-14 RX ORDER — ACETAMINOPHEN 500 MG
650 TABLET ORAL EVERY 6 HOURS
Refills: 0 | Status: DISCONTINUED | OUTPATIENT
Start: 2019-11-14 | End: 2019-11-20

## 2019-11-14 RX ADMIN — HALOPERIDOL DECANOATE 5 MILLIGRAM(S): 100 INJECTION INTRAMUSCULAR at 20:42

## 2019-11-14 NOTE — PROGRESS NOTE BEHAVIORAL HEALTH - THOUGHT PROCESS
Flight of ideas/Impaired reasoning/Tangential/Overinclusive/Illogical Illogical/Linear/Impaired reasoning/Tangential

## 2019-11-14 NOTE — CHART NOTE - NSCHARTNOTEFT_GEN_A_CORE
Patient c/o left foot pain . Specifically at the 1st MCP joint of the great toe.  She has a history of Gout in the past and has a history of Renal Failure and elevated Uric Acid.  Has mild erythema of the  great toe at the 1 st MCP joint . There is mild to moderate tenderness.  No crepitation      R/O gout   . Will Rx with Acetaminophen due to poor renal function.                    Patient does not want narcotic medications at this time                     Will F/U/ with Rheumatology as outpatient

## 2019-11-14 NOTE — DISCUSSION/SUMMARY
[de-identified] : At this time, due to impingement syndrome and bursitis of the right shoulder and probable cervical radiculopathy, she will be started on therapy for her shoulder.  She will be referred for a spine evaluation for her neck.

## 2019-11-14 NOTE — PROGRESS NOTE BEHAVIORAL HEALTH - PROBLEM SELECTOR PLAN 1
MEDICATIONS  (STANDING):  haloperidol     Tablet 5 milliGRAM(s) Oral at bedtime  lidocaine   Patch 2 Patch Transdermal daily

## 2019-11-14 NOTE — PHYSICAL EXAM
[Normal] : Gait: normal [de-identified] : Left Shoulder: \par Range of Motion in Degrees:   	\par    	                        Claimant:  	Normal:  	\par Abduction (Active)  	180  	180 degrees  	\par Abduction (Passive)  	180  	180 degrees  	\par Forward elevation (Active):  	180  	180 degrees  	\par Forward elevation (Passive):  180  	180 degrees  	\par External rotation (Active):  	45  	45 degrees  	\par External rotation (Passive):  	45  	45 degrees  	\par Internal rotation (Active):  	L-1  	L-1  	\par Internal rotation (Passive):  	L-1  	L-1  \par 	\par No motor weakness to internal rotation, external rotation or abduction in the scapular plane.  Negative crank test.  Negative O’Marcelo’s test.  Negative Speed’s test. Negative Yergason’s test.  Negative cross arm test.  No tenderness to palpation at the AC joint. Negative Hawkin’s sign.  Negative Neer’s sign.  Negative apprehension. Negative sulcus sign.  No gross neurological or vascular deficits distally.  Skin is intact.  No rashes, scars or lesions. 2+ radial and ulnar pulses. No extra-articular swelling or tenderness.\par \par Right Shoulder:  \par Range of Motion in Degrees:	\par 	                                  Claimant:	Normal:	\par Abduction (Active)	                  180	               180 degrees	\par Abduction (Passive)	  180	               180 degrees	\par Forward elevation (Active):	  180	               180 degrees	\par Forward elevation (Passive):	  180	               180 degrees	\par External rotation (Active):	   45	               45 degrees	\par External rotation (Passive):	   45	               45 degrees	\par Internal rotation (Active):	   L-1	               L-1	\par Internal rotation (Passive):	   L-1	               L-1	\par  \par No motor weakness to internal rotation, external rotation or abduction in the scapular plane.  Negative crank test.  Negative O’Marcelo’s test.  Negative Speed’s test. Negative Yergason’s test.  Negative cross arm test.  No tenderness to palpation at the AC joint. Positive Hawkin’s sign.  Positive Neer’s sign. Negative apprehension. Negative sulcus sign.  No gross neurological or vascular deficits distally.  Skin is intact.  No rashes, scars or lesions. 2+ radial and ulnar pulses. No extra-articular swelling or tenderness.\par \par Cervical Spine:\par Diffuse cervical trapezial spasm with radiation down her arm to her hand. \par  [de-identified] : Appearance:  Well-developed, well-nourished female in no acute distress.\par \par

## 2019-11-14 NOTE — ADDENDUM
[FreeTextEntry1] : This note was written by Debra Boyd on 11/14/2019 acting as a scribe for SUSHMA BRUSH III, MD

## 2019-11-14 NOTE — HISTORY OF PRESENT ILLNESS
[(Does not interfere) 0] : the ailment interference is 0/10 (does not interfere) [7] : the ailment interference is 7/10 [de-identified] : The patient comes in today complaining of her right arm and the right side of her chest.  She said it's been going on for the last several months, getting worse recently.  The patient states the onset/injury occurred on 10/17/19.  This injury is not work related or due to an automobile accident.  The patient describes the pain as shooting and throbbing.  The patient notes not moving makes her symptoms better, while moving makes her symptoms worse.  The patient indicates pain is at a level of 8 on a pain scale of 0-10.  [] : No

## 2019-11-14 NOTE — PROGRESS NOTE BEHAVIORAL HEALTH - NSBHFUPINTERVALHXFT_PSY_A_CORE
Pt with want to be discharged . Pt claiming that she is not interested in getting the injectable haldol decanoate. Pt with want to be discharged . Pt claiming that she is not interested in getting the injectable haldol decanoate.  Pt with decreased anxiety and is improved with goal directed speech Pt with still limited insight

## 2019-11-15 PROCEDURE — 99232 SBSQ HOSP IP/OBS MODERATE 35: CPT

## 2019-11-15 RX ORDER — HALOPERIDOL DECANOATE 100 MG/ML
2 INJECTION INTRAMUSCULAR DAILY
Refills: 0 | Status: DISCONTINUED | OUTPATIENT
Start: 2019-11-15 | End: 2019-11-19

## 2019-11-15 RX ADMIN — LIDOCAINE 2 PATCH: 4 CREAM TOPICAL at 09:57

## 2019-11-15 RX ADMIN — HALOPERIDOL DECANOATE 5 MILLIGRAM(S): 100 INJECTION INTRAMUSCULAR at 20:37

## 2019-11-16 RX ORDER — PANTOPRAZOLE SODIUM 20 MG/1
40 TABLET, DELAYED RELEASE ORAL
Refills: 0 | Status: COMPLETED | OUTPATIENT
Start: 2019-11-16 | End: 2019-11-21

## 2019-11-16 RX ADMIN — HALOPERIDOL DECANOATE 2 MILLIGRAM(S): 100 INJECTION INTRAMUSCULAR at 09:20

## 2019-11-16 RX ADMIN — Medication 650 MILLIGRAM(S): at 20:49

## 2019-11-16 RX ADMIN — Medication 650 MILLIGRAM(S): at 19:48

## 2019-11-16 RX ADMIN — Medication 1 MILLIGRAM(S): at 19:49

## 2019-11-16 RX ADMIN — Medication 650 MILLIGRAM(S): at 09:48

## 2019-11-16 RX ADMIN — Medication 650 MILLIGRAM(S): at 05:10

## 2019-11-16 RX ADMIN — Medication 650 MILLIGRAM(S): at 04:10

## 2019-11-16 RX ADMIN — LIDOCAINE 2 PATCH: 4 CREAM TOPICAL at 09:20

## 2019-11-16 RX ADMIN — Medication 30 MILLIGRAM(S): at 19:48

## 2019-11-16 NOTE — CONSULT NOTE ADULT - ASSESSMENT
57 yo F with PMH significant for CKDstage 4, GERD, DJD, Depression, Bipolar I disorder, h/o Breast CA , goutadmitted to 5[facility] 11/8/19 for eval of agitation, sharlene, paranoid behavior in the setting of medication non compliance.     #Left foot 1st MTP joint pain and mild erythema suspect acute gouty flare/hx gout  in Lieu of CKD stage 4 will avoid cochicine or NSAIDS   start low dose prednisone trial for 3 days   doubt cellulitis , afebrile, defer abx for now  monitor for signs of fever   monitor over 24 hrs if pain does not improve would consider xray and further eval    # Bipolar I disorder/Paranoia/Agitation/Psych problems  - Manage as per primary Psych team      # CKD stage 4   needs follow up as outpatient with dr patrick as per dr myers' consult note   if serum Cr >2.5 , consider renal US     # hx MGUS/anemia  stable     # DVT Ppx  - Encourage Ambulation      thank you for the courtesy of this consult   would follow with you over the next 24 hrs 57 yo F with PMH significant for CKDstage 4, GERD, DJD, Depression, Bipolar I disorder, h/o Breast CA , goutadmitted to 5[facility] 11/8/19 for eval of agitation, sharlene, paranoid behavior in the setting of medication non compliance.     #Left foot 1st MTP joint pain suspect acute gouty flare/hx gout  in Lieu of CKD stage 4 will avoid cochicine or NSAIDS   start low dose prednisone 30MG DAILY for 3 days   doubt cellulitis , afebrile, defer abx for now  monitor for signs of fever   denies traumA, no indication for xray at this time  IF LEFT 1ST MTP PAIN DOES NOT IMPROVE OVER 48 HRS OR IF SHE DEVELOPS FEVER OR ERYTHEMA PLEASE RECONSULT  MONITOR BP DAILY WHILE ON PO STEROID  PPI WITH STEROID USE       # Bipolar I disorder/Paranoia/Agitation/Psych problems  - Manage as per primary Psych team      # CKD stage 4   needs follow up as outpatient with dr patrick as per dr myers' consult note   if serum Cr >2.5 , consider renal US     # hx MGUS/anemia  stable     # DVT Ppx  - Encourage Ambulation      THANK YOU FOR THE COURTESY OF THIS CONSULT 59 yo F with PMH significant for CKDstage 4, GERD, DJD, Depression, Bipolar I disorder, h/o Breast CA , goutadmitted to 5[facility] 11/8/19 for eval of agitation, sharlene, paranoid behavior in the setting of medication non compliance.     #Left foot 1st MTP joint pain suspect acute gouty flare/hx gout  in Lieu of CKD stage 4 will avoid cochicine or NSAIDS   start low dose prednisone 30MG DAILY for 3 days   doubt cellulitis , afebrile, defer abx for now  monitor for signs of fever   denies traumA, no indication for xray at this time  IF LEFT 1ST MTP PAIN DOES NOT IMPROVE OVER 48 HRS OR IF SHE DEVELOPS FEVER OR ERYTHEMA PLEASE RECONSULT  MONITOR BP DAILY WHILE ON PO STEROID  PPI WITH STEROID USE       # Bipolar I disorder/Paranoia/Agitation/Psych problems  - Manage as per primary Psych team      # CKD stage 4   needs follow up as outpatient with dr patrick as per dr myers' consult note   if serum Cr >2.5 , consider renal US     # hx MGUS/anemia  stable     # DVT Ppx  - Encourage Ambulation      THANK YOU FOR THE COURTESY OF THIS CONSULT   IF LEFT 1ST MTP PAIN DOES NOT IMPROVE OVER 48 HRS OR IF SHE DEVELOPS FEVER OR ERYTHEMA PLEASE RECONSULT  MONITOR BP DAILY WHILE ON PO STEROID  PPI WITH STEROID USE  will sign off now please reconsult as needed 57 yo F with PMH significant for CKDstage 4, GERD, DJD, Depression, Bipolar I disorder, h/o Breast CA , goutadmitted to 5[facility] 11/8/19 for eval of agitation, sharlene, paranoid behavior in the setting of medication non compliance.     #Left foot 1st MTP joint pain suspect acute gouty flare/hx gout  in Lieu of CKD stage 4 will avoid cochicine or NSAIDS   start low dose prednisone 30MG DAILY for 3  to 4 days   doubt cellulitis , afebrile, defer abx for now  monitor for signs of fever   denies traumA, no indication for xray at this time  IF LEFT 1ST MTP PAIN DOES NOT IMPROVE OVER 48 HRS OR IF SHE DEVELOPS FEVER OR ERYTHEMA PLEASE RECONSULT  MONITOR BP DAILY WHILE ON PO STEROID  PPI WITH STEROID USE       # Bipolar I disorder/Paranoia/Agitation/Psych problems  - Manage as per primary Psych team      # CKD stage 4   needs follow up as outpatient with dr patrick as per dr myers' consult note   if serum Cr >2.5 , consider renal US     # hx MGUS/anemia  stable     # DVT Ppx  - Encourage Ambulation      THANK YOU FOR THE COURTESY OF THIS CONSULT   IF LEFT 1ST MTP PAIN DOES NOT IMPROVE OVER 48 HRS OR IF SHE DEVELOPS FEVER OR ERYTHEMA PLEASE RECONSULT  MONITOR BP DAILY WHILE ON PO STEROID  PPI WITH STEROID USE  will sign off now please reconsult as needed
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
## 2019-11-16 NOTE — CONSULT NOTE ADULT - SUBJECTIVE AND OBJECTIVE BOX
59 yo F with PMH significant for CKD4,MGUS,  GERD, DJD, Depression, Bipolar I disorder, h/o Breast CA, hx gout  admitted 19 to [facility] for eval. of agitation, sharlene, paranoid behavior in the setting of medication non compliance.  Now hospitalist team consulted for acute left 1st MTP joint pain and erythema , no fever .Has hx of gout  denies fever, denies trauma     PMH: As above  PSH: Tonsillectomy  Social Hx:   Family Hx:      PHYSICAL EXAM:    Daily     Daily Weight in k.2 (2019 08:24)    ICU Vital Signs Last 24 Hrs  T(C): 37 (2019 08:24), Max: 37 (2019 08:24)  T(F): 98.6 (2019 08:24), Max: 98.6 (2019 08:24)  HR: --  BP: --  BP(mean): --  ABP: --  ABP(mean): --  RR: 16 (2019 08:24) (16 - 16)  SpO2: 99% (2019 08:24) (99% - 99%)      Constitutional:NAD  HEENT: Atraumatic, SAVANNAH, Normal, No congestion  Respiratory: Breath Sounds normal, no rhonchi/wheeze  Cardiovascular: N S1S2;   Gastrointestinal: Abdomen soft, non tender,  Extremities: No edema left 1st MTP joint tender mild swelling   Neurological: AAO x 3, no gross focal motor deficits    Breasts: Deferred  Genitourinary: deferred  Rectal: Deferred                                          MEDICATIONS  (STANDING):  haloperidol     Tablet 5 milliGRAM(s) Oral at bedtime  haloperidol     Tablet 2 milliGRAM(s) Oral daily  lidocaine   Patch 2 Patch Transdermal daily 59 yo F with PMH significant for CKD4,MGUS,  GERD, DJD, Depression, Bipolar I disorder, h/o Breast CA, hx gout  admitted 19 to [facility] for eval. of agitation, sharlene, paranoid behavior in the setting of medication non compliance.  Now hospitalist team consulted for acute left 1st MTP joint pain a , no fever .Has hx of gout  denies fever, denies trauma .has had similar symptom in past due to gout    PMH: As above  PSH: Tonsillectomy  Social Hx:   Family Hx:      PHYSICAL EXAM:    Daily     Daily Weight in k.2 (2019 08:24)    ICU Vital Signs Last 24 Hrs  T(C): 37 (2019 08:24), Max: 37 (2019 08:24)  T(F): 98.6 (2019 08:24), Max: 98.6 (2019 08:24)  HR: --  BP: --  BP(mean): --  ABP: --  ABP(mean): --  RR: 16 (2019 08:24) (16 - 16)  SpO2: 99% (2019 08:24) (99% - 99%)      Constitutional:NAD  HEENT: Atraumatic, SAVANNAH, Normal, No congestion  Respiratory: Breath Sounds normal, no rhonchi/wheeze  Cardiovascular: N S1S2;   Gastrointestinal: Abdomen soft, non tender,  Extremities: No edema left 1st MTP joint tender mild swelling   Neurological: AAO x 3, no gross focal motor deficits    Breasts: Deferred  Genitourinary: deferred  Rectal: Deferred                                          MEDICATIONS  (STANDING):  haloperidol     Tablet 5 milliGRAM(s) Oral at bedtime  haloperidol     Tablet 2 milliGRAM(s) Oral daily  lidocaine   Patch 2 Patch Transdermal daily 57 yo F with PMH significant for CKD4,MGUS,  GERD, DJD, Depression, Bipolar I disorder, h/o Breast CA, hx gout  admitted 19 to [facility] for eval. of agitation, sharlene, paranoid behavior in the setting of medication non compliance.  Now hospitalist team consulted for acute left 1st MTP joint pain a , no fever .Has hx of gout  denies fever, denies trauma .has had similar symptom in past due to gout    PMH: As above  PSH: Tonsillectomy  Social Hx:   Family Hx:      PHYSICAL EXAM:    Daily     Daily Weight in k.2 (2019 08:24)    ICU Vital Signs Last 24 Hrs  T(C): 37 (2019 08:24), Max: 37 (2019 08:24)  T(F): 98.6 (2019 08:24), Max: 98.6 (2019 08:24)  HR: --  BP: --  BP(mean): --  ABP: --  ABP(mean): --  RR: 16 (2019 08:24) (16 - 16)  SpO2: 99% (2019 08:24) (99% - 99%)      Constitutional:NAD  HEENT: Atraumatic, SAVANNAH, Normal, No congestion  Respiratory: Breath Sounds normal, no rhonchi/wheeze  Cardiovascular: N S1S2;   Gastrointestinal: Abdomen soft, non tender,  Extremities: No edema left 1st MTP joint tender mild swelling , normal tactile temperature, no erythema, has normal ROM, palpable dorsalis pedis pulse, capillary refuill<2secs  Neurological: AAO x 3, no gross focal motor deficits    Breasts: Deferred  Genitourinary: deferred  Rectal: Deferred                                          MEDICATIONS  (STANDING):  haloperidol     Tablet 5 milliGRAM(s) Oral at bedtime  haloperidol     Tablet 2 milliGRAM(s) Oral daily  lidocaine   Patch 2 Patch Transdermal daily 59 yo F with PMH significant for CKD4,MGUS,  GERD, DJD, Depression, Bipolar I disorder, h/o Breast CA, hx gout  admitted 19 to [facility] for eval. of agitation, sharlene, paranoid behavior in the setting of medication non compliance.  Now hospitalist team consulted for acute left 1st MTP joint pain a , no fever .Has hx of gout  denies fever, denies trauma .has had similar symptom in past due to gout    PMH: As above  PSH: Tonsillectomy  Social Hx: nonsmoker, denies etoh use, denies recreational drug use   Family Hx:Mother gout      PHYSICAL EXAM:    Daily     Daily Weight in k.2 (2019 08:24)    ICU Vital Signs Last 24 Hrs  T(C): 37 (2019 08:24), Max: 37 (2019 08:24)  T(F): 98.6 (2019 08:24), Max: 98.6 (2019 08:24)  HR: --  BP: --  BP(mean): --  ABP: --  ABP(mean): --  RR: 16 (2019 08:24) (16 - 16)  SpO2: 99% (2019 08:24) (99% - 99%)      Constitutional:NAD  HEENT: Atraumatic, SAVANNAH, Normal, No congestion  Respiratory: Breath Sounds normal, no rhonchi/wheeze  Cardiovascular: N S1S2;   Gastrointestinal: Abdomen soft, non tender,  Extremities: No edema left 1st MTP joint tender mild swelling , normal tactile temperature, no erythema, has normal ROM, palpable dorsalis pedis pulse, capillary refuill<2secs  Neurological: AAO x 3, no gross focal motor deficits    Breasts: Deferred  Genitourinary: deferred  Rectal: Deferred                                          MEDICATIONS  (STANDING):  haloperidol     Tablet 5 milliGRAM(s) Oral at bedtime  haloperidol     Tablet 2 milliGRAM(s) Oral daily  lidocaine   Patch 2 Patch Transdermal daily

## 2019-11-17 RX ADMIN — HALOPERIDOL DECANOATE 2 MILLIGRAM(S): 100 INJECTION INTRAMUSCULAR at 09:32

## 2019-11-17 RX ADMIN — Medication 30 MILLIGRAM(S): at 12:28

## 2019-11-17 RX ADMIN — HALOPERIDOL DECANOATE 5 MILLIGRAM(S): 100 INJECTION INTRAMUSCULAR at 20:36

## 2019-11-17 RX ADMIN — PANTOPRAZOLE SODIUM 40 MILLIGRAM(S): 20 TABLET, DELAYED RELEASE ORAL at 06:24

## 2019-11-17 RX ADMIN — LIDOCAINE 2 PATCH: 4 CREAM TOPICAL at 21:01

## 2019-11-17 RX ADMIN — HALOPERIDOL DECANOATE 5 MILLIGRAM(S): 100 INJECTION INTRAMUSCULAR at 01:47

## 2019-11-17 RX ADMIN — LIDOCAINE 2 PATCH: 4 CREAM TOPICAL at 09:32

## 2019-11-18 PROCEDURE — 99232 SBSQ HOSP IP/OBS MODERATE 35: CPT

## 2019-11-18 RX ORDER — HALOPERIDOL DECANOATE 100 MG/ML
7 INJECTION INTRAMUSCULAR AT BEDTIME
Refills: 0 | Status: DISCONTINUED | OUTPATIENT
Start: 2019-11-18 | End: 2019-11-19

## 2019-11-18 RX ADMIN — HALOPERIDOL DECANOATE 2 MILLIGRAM(S): 100 INJECTION INTRAMUSCULAR at 09:32

## 2019-11-18 RX ADMIN — LIDOCAINE 2 PATCH: 4 CREAM TOPICAL at 09:32

## 2019-11-18 RX ADMIN — HALOPERIDOL DECANOATE 7 MILLIGRAM(S): 100 INJECTION INTRAMUSCULAR at 21:22

## 2019-11-18 RX ADMIN — PANTOPRAZOLE SODIUM 40 MILLIGRAM(S): 20 TABLET, DELAYED RELEASE ORAL at 06:24

## 2019-11-18 RX ADMIN — Medication 30 MILLIGRAM(S): at 12:44

## 2019-11-18 NOTE — PROGRESS NOTE BEHAVIORAL HEALTH - NSBHFUPINTERVALHXFT_PSY_A_CORE
Spoke with the pt and her  and he noted that the pt seemed "improved".  Pt still with some grandiosity and guardedness.  Pt willing to allow the increase of the haldol by 4mg to 9mg .  Pt with denial of any side effect from medication  Pt denies any suicidal ideation

## 2019-11-19 DIAGNOSIS — F31.9 BIPOLAR DISORDER, UNSPECIFIED: ICD-10-CM

## 2019-11-19 PROCEDURE — 99232 SBSQ HOSP IP/OBS MODERATE 35: CPT

## 2019-11-19 RX ORDER — HALOPERIDOL DECANOATE 100 MG/ML
7 INJECTION INTRAMUSCULAR ONCE
Refills: 0 | Status: COMPLETED | OUTPATIENT
Start: 2019-11-19 | End: 2019-11-19

## 2019-11-19 RX ORDER — HALOPERIDOL DECANOATE 100 MG/ML
5 INJECTION INTRAMUSCULAR
Refills: 0 | Status: DISCONTINUED | OUTPATIENT
Start: 2019-11-20 | End: 2019-11-21

## 2019-11-19 RX ADMIN — LIDOCAINE 2 PATCH: 4 CREAM TOPICAL at 09:05

## 2019-11-19 RX ADMIN — PANTOPRAZOLE SODIUM 40 MILLIGRAM(S): 20 TABLET, DELAYED RELEASE ORAL at 06:24

## 2019-11-19 RX ADMIN — HALOPERIDOL DECANOATE 7 MILLIGRAM(S): 100 INJECTION INTRAMUSCULAR at 19:50

## 2019-11-19 RX ADMIN — Medication 30 MILLIGRAM(S): at 09:05

## 2019-11-19 RX ADMIN — HALOPERIDOL DECANOATE 2 MILLIGRAM(S): 100 INJECTION INTRAMUSCULAR at 09:05

## 2019-11-19 RX ADMIN — LIDOCAINE 2 PATCH: 4 CREAM TOPICAL at 16:13

## 2019-11-19 NOTE — PROGRESS NOTE BEHAVIORAL HEALTH - PROBLEM SELECTOR PLAN 1
MEDICATIONS  (STANDING):  haloperidol     Tablet 7 milliGRAM(s) Oral once  lidocaine   Patch 2 Patch Transdermal daily  pantoprazole    Tablet 40 milliGRAM(s) Oral before breakfast  predniSONE   Tablet 30 milliGRAM(s) Oral daily MEDICATIONS  (STANDING):  haloperidol     Tablet 7 milliGRAM(s) Oral once tonite then increase tommorrow with total of 5mg po bid  lidocaine   Patch 2 Patch Transdermal daily  pantoprazole    Tablet 40 milliGRAM(s) Oral before breakfast  predniSONE   Tablet 30 milliGRAM(s) Oral daily

## 2019-11-19 NOTE — PROGRESS NOTE BEHAVIORAL HEALTH - RISK ASSESSMENT
HIGH RISK     ACUTE RISK FACTORS: disorganized, hostile, agitated, labile, bizarre, rambling, impulsive, erratic, belligerent, paranoid    CHRONIC RISK FACTORS: history of medication non-compliance, history of in-patient hospitalization, history of Bipolar, unable to work    PROTECTIVE FACTORS: no suicidal ideation/intent/plan; supportive family Low risk as the pt allows medication      ACUTE RISK FACTORS: now decreasing irritable mood and pt verbalizing willing to attend aftercare treatment    CHRONIC RISK FACTORS: history of medication non-compliance, history of in-patient hospitalization, history of Bipolar, unable to work    PROTECTIVE FACTORS: no suicidal ideation/intent/plan; supportive family

## 2019-11-19 NOTE — DISCHARGE NOTE BEHAVIORAL HEALTH - NSBHDCSWCOMMENTSFT_PSY_A_CORE
Ms. Reyes and her  were educated about the importance of attending outpatient mental health treatment, taking her medication as prescribed and about her discharge plan.  She was advised not to stop taking any medication unless told to do so by a physician.

## 2019-11-19 NOTE — DISCHARGE NOTE BEHAVIORAL HEALTH - NSBHDCMEDSFT_PSY_A_CORE
MEDICATIONS  (STANDING):  haloperidol     Tablet 7 milliGRAM(s) Oral once  lidocaine   Patch 2 Patch Transdermal daily  pantoprazole    Tablet 40 milliGRAM(s) Oral before breakfast  predniSONE   Tablet 30 milliGRAM(s) Oral daily MEDICATIONS  (STANDING):  haloperidol     Tablet 5 milliGRAM(s) Oral two times a day  lidocaine   Patch 2 Patch Transdermal daily  pantoprazole    Tablet 40 milliGRAM(s) Oral before breakfast  Pt is directed to continue with all medications until directed by her MD to change or discontinued with medications.  Pt with improved concentration.  Pt denies any suicidal ideation intent or plan Pt with denial of any side effects from medication MEDICATIONS  (STANDING):  haloperidol     Tablet 5 milliGRAM(s) Oral two times a day  lidocaine   Patch 2 Patch Transdermal daily  pantoprazole    Tablet 40 milliGRAM(s) Oral before breakfast  Pt is directed to continue with all medications until directed by her MD to change or discontinued with medications.  Pt with improved concentration.  Pt denies any suicidal ideation intent or plan Pt with denial of any side effects from medication  Pt is calm and not I distress

## 2019-11-19 NOTE — DISCHARGE NOTE BEHAVIORAL HEALTH - NS TRANSFER PATIENT BELONGINGS
Clothing Clothing/Other belongings/two necklaces, 1 yellow ring, two platinum rings, 1 pair of yellow gold earrings,  license/Cell Phone/PDA (specify)

## 2019-11-19 NOTE — DISCHARGE NOTE BEHAVIORAL HEALTH - REASON FOR ADMISSION
agitated, manic, paranoid,  "I am a ." agitated, manic, paranoid,  "I am a ." Pt responding to grandiose delusions and unable to function on a daily basis.

## 2019-11-19 NOTE — DISCHARGE NOTE BEHAVIORAL HEALTH - PAST PSYCHIATRIC HISTORY
Hx of multiple psych. admissions to: [facility], [facility] and [facility].  Lastat HH 5/17.  Pt sees Dr. Gant for many years.

## 2019-11-19 NOTE — DISCHARGE NOTE BEHAVIORAL HEALTH - FAMILY HISTORY OF PSYCHIATRIC ILLNESS
Pt is , has no children.  Lives with her  in [place].  Pt reportedly owns an insurance agency since , agency now closed.  Father is .  Mother and brother are supportive.    Family hx of substance abuse/ mental illness is denied.

## 2019-11-19 NOTE — PROGRESS NOTE BEHAVIORAL HEALTH - NSBHFUPINTERVALHXFT_PSY_A_CORE
Pt still with some suspicious guardedness but continues with some improvement. Pt wanting to have discharge on thursday.    Pt denies any suicidal ideation or plan  Will increase the haldol to 5mg po bid

## 2019-11-19 NOTE — DISCHARGE NOTE BEHAVIORAL HEALTH - NSBHDCADDFT_PSY_A_CORE
11-09 [id number] 5.4  11-09 Chol 198 LDL 98 HDL 87 Trig 64  Ventricular Rate 72 BPM    Atrial Rate 72 BPM    P-R Interval 158 ms    QRS Duration 84 ms    Q-T Interval 404 ms    QTC Calculation(Bezet) 442 ms    P Axis 26 degrees    R Axis 8 degrees    T Axis 38 degrees    Diagnosis Line Normal sinus rhythm Slight rvcd  Normal ECG  When compared with ECG of 05-MAY-2017 12:40, Similar to prior ekg      Confirmed by PATRICIA VENTURA MD ([id number]) on 11/8/2019 10:53:37 AM

## 2019-11-19 NOTE — DISCHARGE NOTE BEHAVIORAL HEALTH - MEDICATION SUMMARY - MEDICATIONS TO TAKE
I will START or STAY ON the medications listed below when I get home from the hospital:    haloperidol 5 mg oral tablet  -- 1 tab(s) by mouth 2 times a day  -- Indication: For Bipolar 1 disorder    lidocaine 5% topical film  -- Apply on skin to affected area once a day   -- Indication: For shoulder pain    pantoprazole 40 mg oral delayed release tablet  -- 1 tab(s) by mouth once a day (before a meal)  -- Indication: For GERD

## 2019-11-19 NOTE — DISCHARGE NOTE BEHAVIORAL HEALTH - NSBHDCTHERAPYFT_PSY_A_CORE
Therapeutic milieu, individual Therapeutic milieu, individual and group therapy, medication management, psychoeducation, safety planning, discharge planning.

## 2019-11-19 NOTE — DISCHARGE NOTE BEHAVIORAL HEALTH - CONDITIONS AT DISCHARGE
Pt alert and oriented x4, with improvement in mood and sharlene since admission to [facility]. She currently denies SI/HI, AH/VH. She verbalized understanding of all medication and discharge instructions. She is aware of who to call, or to return to ED if symptoms worsen or she becomes suicidal. She verbalized understanding. She left with all belongings in appropriate attire and footwear.

## 2019-11-19 NOTE — DISCHARGE NOTE BEHAVIORAL HEALTH - MEDICATION SUMMARY - MEDICATIONS TO STOP TAKING
I will STOP taking the medications listed below when I get home from the hospital:    colchicine 0.6 mg oral tablet  -- 1 tab(s) by mouth 2 times a day MDD:1.2 mg /day ( acute gouty arthritis)    LORazepam 1 mg oral tablet  -- 1 tab(s) by mouth 2 times a day MDD:2 mg /day ( anxiety related to bipolar d/o)    melatonin 3 mg oral tablet  -- 1 tab(s) by mouth once a day (at bedtime) MDD:3 mg /day ( insomnia)

## 2019-11-19 NOTE — DISCHARGE NOTE BEHAVIORAL HEALTH - NSBHDCRESPONSEFT_PSY_A_CORE
MEDICATIONS  (STANDING):  haloperidol     Tablet 7 milliGRAM(s) Oral once  lidocaine   Patch 2 Patch Transdermal daily  pantoprazole    Tablet 40 milliGRAM(s) Oral before breakfast  predniSONE   Tablet 30 milliGRAM(s) Oral daily MEDICATIONS  (STANDING):  haloperidol     Tablet 5 milliGRAM(s) Oral BID  lidocaine   Patch 2 Patch Transdermal daily  pantoprazole    Tablet 40 milliGRAM(s) Oral before breakfast  Pt is directed to continue with all medications until directed by her MD to change or discontinued with medications  Pt with improved goal directed speech.  Pt with euthymic mood and affect is full and mood congruentdirected speech .  Pt denies any suicidal ideation intent or plan.

## 2019-11-20 PROCEDURE — 99232 SBSQ HOSP IP/OBS MODERATE 35: CPT

## 2019-11-20 RX ORDER — HALOPERIDOL DECANOATE 100 MG/ML
1 INJECTION INTRAMUSCULAR
Qty: 30 | Refills: 1
Start: 2019-11-20 | End: 2019-12-19

## 2019-11-20 RX ORDER — LIDOCAINE 4 G/100G
1 CREAM TOPICAL
Qty: 15 | Refills: 1
Start: 2019-11-20 | End: 2019-12-19

## 2019-11-20 RX ORDER — PANTOPRAZOLE SODIUM 20 MG/1
1 TABLET, DELAYED RELEASE ORAL
Qty: 15 | Refills: 1
Start: 2019-11-20 | End: 2019-12-19

## 2019-11-20 RX ORDER — DIPHENHYDRAMINE HCL 50 MG
50 CAPSULE ORAL ONCE
Refills: 0 | Status: DISCONTINUED | OUTPATIENT
Start: 2019-11-20 | End: 2019-11-21

## 2019-11-20 RX ADMIN — LIDOCAINE 2 PATCH: 4 CREAM TOPICAL at 18:54

## 2019-11-20 RX ADMIN — Medication 1 MILLIGRAM(S): at 02:56

## 2019-11-20 RX ADMIN — Medication 30 MILLIGRAM(S): at 09:12

## 2019-11-20 RX ADMIN — PANTOPRAZOLE SODIUM 40 MILLIGRAM(S): 20 TABLET, DELAYED RELEASE ORAL at 06:34

## 2019-11-20 RX ADMIN — HALOPERIDOL DECANOATE 5 MILLIGRAM(S): 100 INJECTION INTRAMUSCULAR at 09:12

## 2019-11-20 RX ADMIN — LIDOCAINE 2 PATCH: 4 CREAM TOPICAL at 09:16

## 2019-11-20 RX ADMIN — HALOPERIDOL DECANOATE 5 MILLIGRAM(S): 100 INJECTION INTRAMUSCULAR at 21:02

## 2019-11-20 NOTE — PROGRESS NOTE BEHAVIORAL HEALTH - RISK ASSESSMENT
Low risk as the pt allows medication      ACUTE RISK FACTORS: now decreasing irritable mood and pt verbalizing willing to attend aftercare treatment    CHRONIC RISK FACTORS: history of medication non-compliance, history of in-patient hospitalization, history of Bipolar, unable to work    PROTECTIVE FACTORS: no suicidal ideation/intent/plan; supportive family

## 2019-11-20 NOTE — PROGRESS NOTE BEHAVIORAL HEALTH - NSBHFUPINTERVALHXFT_PSY_A_CORE
Pt with decreased labile intense mood and affect .  Pt is currently on haldol total of 10mg   Pt with denial of any side effect from education.

## 2019-11-20 NOTE — PROGRESS NOTE BEHAVIORAL HEALTH - PROBLEM SELECTOR PLAN 1
MEDICATIONS  (STANDING):  haloperidol     5mg po bid  lidocaine   Patch 2 Patch Transdermal daily  pantoprazole    Tablet 40 milliGRAM(s) Oral before breakfast  predniSONE   Tablet 30 milliGRAM(s) Oral daily

## 2019-11-21 VITALS — RESPIRATION RATE: 16 BRPM | TEMPERATURE: 99 F | OXYGEN SATURATION: 99 %

## 2019-11-21 PROCEDURE — 99239 HOSP IP/OBS DSCHRG MGMT >30: CPT

## 2019-11-21 RX ADMIN — LIDOCAINE 2 PATCH: 4 CREAM TOPICAL at 08:56

## 2019-11-21 RX ADMIN — PANTOPRAZOLE SODIUM 40 MILLIGRAM(S): 20 TABLET, DELAYED RELEASE ORAL at 06:20

## 2019-11-21 RX ADMIN — HALOPERIDOL DECANOATE 5 MILLIGRAM(S): 100 INJECTION INTRAMUSCULAR at 08:55

## 2019-11-21 NOTE — PROGRESS NOTE BEHAVIORAL HEALTH - NSBHADMITDANGERSELF_PSY_A_CORE
suicidal ideation with plan and means/unable to care for self
unable to care for self/suicidal ideation with plan and means
unable to care for self
unable to care for self/suicidal ideation with plan and means
unable to care for self
unable to care for self/suicidal ideation with plan and means
suicidal ideation with plan and means/unable to care for self

## 2019-11-21 NOTE — PROGRESS NOTE BEHAVIORAL HEALTH - AFFECT QUALITY
Anxious/Irritable
Irritable/Anxious
Anxious
Anxious/Irritable
Irritable/Anxious
Anxious
Anxious
Anxious/Irritable

## 2019-11-21 NOTE — PROGRESS NOTE BEHAVIORAL HEALTH - NSBHCHARTREVIEWVS_PSY_A_CORE FT
Vital Signs Last 24 Hrs  T(C): 35.6 (14 Nov 2019 08:54), Max: 35.6 (14 Nov 2019 08:54)  T(F): 96.1 (14 Nov 2019 08:54), Max: 96.1 (14 Nov 2019 08:54)  HR: --  BP: --  BP(mean): --  RR: 12 (14 Nov 2019 08:54) (12 - 12)  SpO2: 99% (14 Nov 2019 08:54) (99% - 99%)
Vital Signs Last 24 Hrs  T(C): 36.8 (20 Nov 2019 07:45), Max: 36.8 (20 Nov 2019 07:45)  T(F): 98.3 (20 Nov 2019 07:45), Max: 98.3 (20 Nov 2019 07:45)  HR: --  BP: --  BP(mean): --  RR: 16 (20 Nov 2019 07:45) (16 - 16)  SpO2: 99% (20 Nov 2019 07:45) (99% - 99%)
Vital Signs Last 24 Hrs  T(C): 36.8 (20 Nov 2019 07:45), Max: 36.8 (20 Nov 2019 07:45)  T(F): 98.3 (20 Nov 2019 07:45), Max: 98.3 (20 Nov 2019 07:45)  HR: --  BP: --  BP(mean): --  RR: 16 (20 Nov 2019 07:45) (16 - 16)  SpO2: 99% (20 Nov 2019 07:45) (99% - 99%)
Vital Signs Last 24 Hrs  T(C): 36.9 (09 Nov 2019 09:24), Max: 36.9 (09 Nov 2019 09:24)  T(F): 98.4 (09 Nov 2019 09:24), Max: 98.4 (09 Nov 2019 09:24)  HR: 78 (08 Nov 2019 16:09) (78 - 78)  BP: 113/70 (08 Nov 2019 16:09) (113/70 - 113/70)  BP(mean): --  RR: 14 (09 Nov 2019 09:24) (14 - 18)  SpO2: 99% (09 Nov 2019 09:24) (99% - 100%)
Vital Signs Last 24 Hrs  T(C): 36.9 (09 Nov 2019 09:24), Max: 36.9 (09 Nov 2019 09:24)  T(F): 98.4 (09 Nov 2019 09:24), Max: 98.4 (09 Nov 2019 09:24)  HR: 78 (08 Nov 2019 16:09) (78 - 78)  BP: 113/70 (08 Nov 2019 16:09) (113/70 - 113/70)  BP(mean): --  RR: 14 (09 Nov 2019 09:24) (14 - 18)  SpO2: 99% (09 Nov 2019 09:24) (99% - 100%)
Vital Signs Last 24 Hrs  T(C): 37.1 (13 Nov 2019 08:45), Max: 37.1 (13 Nov 2019 08:45)  T(F): 98.7 (13 Nov 2019 08:45), Max: 98.7 (13 Nov 2019 08:45)  HR: --  BP: --  BP(mean): --  RR: 14 (13 Nov 2019 08:45) (14 - 14)  SpO2: 99% (13 Nov 2019 08:45) (99% - 99%)
Vital Signs Last 24 Hrs  T(C): 37.2 (18 Nov 2019 10:10), Max: 37.2 (18 Nov 2019 10:10)  T(F): 99 (18 Nov 2019 10:10), Max: 99 (18 Nov 2019 10:10)  HR: --  BP: --  BP(mean): --  RR: 12 (18 Nov 2019 10:10) (12 - 12)  SpO2: 99% (18 Nov 2019 10:10) (99% - 99%)
Vital Signs Last 24 Hrs  T(C): 36.9 (09 Nov 2019 09:24), Max: 36.9 (09 Nov 2019 09:24)  T(F): 98.4 (09 Nov 2019 09:24), Max: 98.4 (09 Nov 2019 09:24)  HR: 78 (08 Nov 2019 16:09) (78 - 78)  BP: 113/70 (08 Nov 2019 16:09) (113/70 - 113/70)  BP(mean): --  RR: 14 (09 Nov 2019 09:24) (14 - 18)  SpO2: 99% (09 Nov 2019 09:24) (99% - 100%)
Vital Signs Last 24 Hrs  T(C): 35.6 (14 Nov 2019 08:54), Max: 35.6 (14 Nov 2019 08:54)  T(F): 96.1 (14 Nov 2019 08:54), Max: 96.1 (14 Nov 2019 08:54)  HR: --  BP: --  BP(mean): --  RR: 12 (14 Nov 2019 08:54) (12 - 12)  SpO2: 99% (14 Nov 2019 08:54) (99% - 99%)
Vital Signs Last 24 Hrs  T(C): 37 (21 Nov 2019 07:33), Max: 37 (21 Nov 2019 07:33)  T(F): 98.6 (21 Nov 2019 07:33), Max: 98.6 (21 Nov 2019 07:33)  HR: --  BP: --  BP(mean): --  RR: 16 (21 Nov 2019 07:33) (16 - 16)  SpO2: 99% (21 Nov 2019 07:33) (99% - 99%)
Vital Signs Last 24 Hrs  T(C): 36.8 (19 Nov 2019 08:28), Max: 36.8 (19 Nov 2019 08:28)  T(F): 98.3 (19 Nov 2019 08:28), Max: 98.3 (19 Nov 2019 08:28)  HR: --  BP: --  BP(mean): --  RR: 14 (19 Nov 2019 08:28) (14 - 14)  SpO2: 98% (19 Nov 2019 08:28) (98% - 98%)

## 2019-11-21 NOTE — PROGRESS NOTE BEHAVIORAL HEALTH - NSBHCHARTREVIEWLAB_PSY_A_CORE FT
11.3   8.51  )-----------( 204      ( 08 Nov 2019 02:38 )             33.8   11-09    145  |  112<H>  |  41<H>  ----------------------------<  159<H>  4.5   |  25  |  2.07<H>    Ca    9.0      09 Nov 2019 09:09    TPro  7.3  /  Alb  3.4  /  TBili  0.4  /  DBili  x   /  AST  27  /  ALT  40  /  AlkPhos  114  11-08
Sodium, Serum: 145 mmol/L (11.09.19 @ 09:09)
Sodium, Serum: 145 mmol/L (11.09.19 @ 09:09)
11.3   8.51  )-----------( 204      ( 08 Nov 2019 02:38 )             33.8   11-09    145  |  112<H>  |  41<H>  ----------------------------<  159<H>  4.5   |  25  |  2.07<H>    Ca    9.0      09 Nov 2019 09:09    TPro  7.3  /  Alb  3.4  /  TBili  0.4  /  DBili  x   /  AST  27  /  ALT  40  /  AlkPhos  114  11-08
Basic Metabolic Panel in AM (11.09.19 @ 09:09)    Sodium, Serum: 145 mmol/L    Potassium, Serum: 4.5 mmol/L    Chloride, Serum: 112 mmol/L    Carbon Dioxide, Serum: 25 mmol/L    Anion Gap, Serum: 8 mmol/L    Blood Urea Nitrogen, Serum: 41 mg/dL    Creatinine, Serum: 2.07 mg/dL    Glucose, Serum: 159 mg/dL    Calcium, Total Serum: 9.0 mg/dL    eGFR if Non : 26: Interpretative comment  The units for eGFR are mL/min/1.73M2 (normalized body surface area). The  eGFR is calculated from a serum creatinine using the CKD-EPI equation.  Other variables required for calculation are race, age and sex. Among  patients with chronic kidney disease (CKD), the eGFR is useful in  determining the stage of disease according to KDOQI CKD classification.  All eGFR results are reported numerically with the following  interpretation.          GFR                    With                 Without     (ml/min/1.73 m2)    Kidney Damage       Kidney Damage        >= 90                    Stage 1                     Normal        60-89                    Stage 2                     Decreased GFR        30-59     Stage 3                     Stage 3        15-29                    Stage 4                     Stage 4        < 15                      Stage 5                     Stage 5  Each stage of CKD assumes that the associated GFR level has been in  effect for at least 3 months. Determination of stages one and two (with  eGFR > 59 ml/min/m2) requires estimation of kidney damage for at least 3  months as defined by structural or functional abnormalities.  Limitations: All estimates of GFR will be less accurate for patients at  extremes of muscle mass (including but not limited to frail elderly,  critically ill, or cancer patients), those with unusual diets, and those  with conditions associated with reduced secretion or extrarenal  elimination of creatinine. The eGFR equation is not recommended for use  in patients with unstable creatinine levels. mL/min/1.73M2    eGFR if African American: 30 mL/min/1.73M2
Sodium, Serum: 145 mmol/L (11.09.19 @ 09:09)

## 2019-11-21 NOTE — PROGRESS NOTE BEHAVIORAL HEALTH - NSBHFUPTYPE_PSY_A_CORE
Inpatient
Inpatient-On Service Note
Inpatient
Inpatient

## 2019-11-21 NOTE — PROGRESS NOTE BEHAVIORAL HEALTH - NSBHADMITIPOBSFT_PSY_A_CORE
Routine Admission
Routine Admission
pt denies  any suicidal ideation or plan
pt denies any suicidal ideation or plan
Routine Admission
pt denies any suicidal ideation or plan
Routine Admission
Routine Admission

## 2019-11-21 NOTE — PROGRESS NOTE BEHAVIORAL HEALTH - ESTIMATED DISCHARGE DATE
11-Nov-2019
19-Nov-2019
14-Nov-2019
14-Nov-2019
11-Nov-2019
19-Nov-2019
19-Nov-2019
14-Nov-2019
11-Nov-2019

## 2019-11-21 NOTE — PROGRESS NOTE BEHAVIORAL HEALTH - MOOD
Normal
Angry/Irritable
Normal
Normal
Angry/Irritable
Angry/Irritable
Normal
Irritable/Angry

## 2019-11-21 NOTE — PROGRESS NOTE BEHAVIORAL HEALTH - PRIMARY DX
Bipolar 1 disorder
Manic episode, severe with psychotic symptoms
Manic episode, severe with psychotic symptoms
Bipolar 1 disorder
Manic episode, severe with psychotic symptoms
Bipolar disorder

## 2019-11-21 NOTE — PROGRESS NOTE BEHAVIORAL HEALTH - NS ED BHA MSE SPEECH RATE
Normal
Fast, difficult to interrupt
Normal
Normal
Fast, difficult to interrupt
Fast, difficult to interrupt
Normal
Fast, difficult to interrupt

## 2019-11-21 NOTE — PROGRESS NOTE BEHAVIORAL HEALTH - NSBHFUPINTERVALCCFT_PSY_A_CORE
"I think I feel better with the amount of medication I take."
" I feel better, when can I leave "
"I am already taking too much haldol I only need 1-2 mg that's all"
"I really feel ready to go home
"I really would like to go home soon"
"My shoulder hurts"
"When do I get to go home"
"When do I get to go home"
"when do I go home"
" I take Haldol 0.5 mg daily "
"I really feel ready to go home

## 2019-11-21 NOTE — PROGRESS NOTE BEHAVIORAL HEALTH - RISK ASSESSMENT
Low risk as the pt allows medication  and is verbalizing willing to continue wth medication    ACUTE RISK FACTORS: now decreasing irritable mood and pt verbalizing willing to attend aftercare treatment    CHRONIC RISK FACTORS: history of medication non-compliance, history of in-patient hospitalization, history of Bipolar, unable to work    PROTECTIVE FACTORS: no suicidal ideation/intent/plan; supportive family

## 2019-11-21 NOTE — PROGRESS NOTE BEHAVIORAL HEALTH - NS ED BHA MSE GENERAL APPEARANCE
No deformities present/Well developed
Well developed/No deformities present
No deformities present/Well developed
Well developed/No deformities present
No deformities present/Well developed

## 2019-11-21 NOTE — PROGRESS NOTE BEHAVIORAL HEALTH - PROBLEM SELECTOR PLAN 2
pt refused the haldol decanoate
pt refused haldol decanoate
encourage pt to get insight
pt refused haldol decanoate
pt refused the haldol decanoate
pt refused the haldol decanoate  pt verbalized willing to attend aftercare treatment
pt refused haldol decanoate
pt educated about decanoate and the pt refused

## 2019-11-21 NOTE — PROGRESS NOTE BEHAVIORAL HEALTH - NSBHPTASSESSDT_PSY_A_CORE
09-Nov-2019 11:26
10-Nov-2019 10:31
11-Nov-2019 15:46
13-Nov-2019 19:26
15-Nov-2019 17:36
18-Nov-2019 19:42
19-Nov-2019 18:06
20-Nov-2019 15:42
21-Nov-2019 18:13
14-Nov-2019 19:19
12-Nov-2019 12:15

## 2019-11-21 NOTE — PROGRESS NOTE BEHAVIORAL HEALTH - NSBHCHARTREVIEWINVESTIGATE_PSY_A_CORE FT
Ventricular Rate 72 BPM    Atrial Rate 72 BPM    P-R Interval 158 ms    QRS Duration 84 ms    Q-T Interval 404 ms    QTC Calculation(Bezet) 442 ms    P Axis 26 degrees    R Axis 8 degrees    T Axis 38 degrees    Diagnosis Line Normal sinus rhythm Slight rvcd  Normal ECG  When compared with ECG of 05-MAY-2017 12:40, Similar to prior ekg
Ventricular Rate 72 BPM    Atrial Rate 72 BPM    P-R Interval 158 ms    QRS Duration 84 ms    Q-T Interval 404 ms    QTC Calculation(Bezet) 442 ms    P Axis 26 degrees    R Axis 8 degrees    T Axis 38 degrees    Diagnosis Line Normal sinus rhythm Slight rvcd  Normal ECG  When compared with ECG of 05-MAY-2017 12:40, Similar to prior ekg      Confirmed by PATRICIA VENTURA MD [id number]) on 11/8/2019 10:53:37 AM
< from: 12 Lead ECG (11.08.19 @ 02:58) >    Ventricular Rate 72 BPM    Atrial Rate 72 BPM    P-R Interval 158 ms    QRS Duration 84 ms    Q-T Interval 404 ms    QTC Calculation(Bezet) 442 ms    P Axis 26 degrees    R Axis 8 degrees    T Axis 38 degrees    Diagnosis Line Normal sinus rhythm Slight rvcd  Normal ECG  When compared with ECG of 05-MAY-2017 12:40, Similar to prior ekg    < from: 12 Lead ECG (11.08.19 @ 02:58) >      Confirmed by PATRICIA VENTURA MD [id number]) on 11/8/2019 10:53:37 AM
< from: 12 Lead ECG (11.08.19 @ 02:58) >    Ventricular Rate 72 BPM    Atrial Rate 72 BPM    P-R Interval 158 ms    QRS Duration 84 ms    Q-T Interval 404 ms    QTC Calculation(Bezet) 442 ms    P Axis 26 degrees    R Axis 8 degrees    T Axis 38 degrees    Diagnosis Line Normal sinus rhythm Slight rvcd  Normal ECG  When compared with ECG of 05-MAY-2017 12:40, Similar to prior ekg    < from: 12 Lead ECG (11.08.19 @ 02:58) >      Confirmed by PATRICIA VENTURA MD [id number]) on 11/8/2019 10:53:37 AM
Ventricular Rate 72 BPM    Atrial Rate 72 BPM    P-R Interval 158 ms    QRS Duration 84 ms    Q-T Interval 404 ms    QTC Calculation(Bezet) 442 ms    P Axis 26 degrees    R Axis 8 degrees    T Axis 38 degrees    Diagnosis Line Normal sinus rhythm Slight rvcd  Normal ECG  When compared with ECG of 05-MAY-2017 12:40, Similar to prior ekg      Confirmed by PATRICIA VENTURA MD [id number]) on 11/8/2019 10:53:37 AM
< from: 12 Lead ECG (11.08.19 @ 02:58) >    Ventricular Rate 72 BPM    Atrial Rate 72 BPM    P-R Interval 158 ms    QRS Duration 84 ms    Q-T Interval 404 ms    QTC Calculation(Bezet) 442 ms    P Axis 26 degrees    R Axis 8 degrees    T Axis 38 degrees    Diagnosis Line Normal sinus rhythm Slight rvcd  Normal ECG  When compared with ECG of 05-MAY-2017 12:40, Similar to prior ekg    < from: 12 Lead ECG (11.08.19 @ 02:58) >      Confirmed by PATRICIA VENTURA MD [id number]) on 11/8/2019 10:53:37 AM
Ventricular Rate 72 BPM    Atrial Rate 72 BPM    P-R Interval 158 ms    QRS Duration 84 ms    Q-T Interval 404 ms    QTC Calculation(Bezet) 442 ms    P Axis 26 degrees    R Axis 8 degrees    T Axis 38 degrees    Diagnosis Line Normal sinus rhythm Slight rvcd  Normal ECG  When compared with ECG of 05-MAY-2017 12:40, Similar to prior ekg
Ventricular Rate 72 BPM    Atrial Rate 72 BPM    P-R Interval 158 ms    QRS Duration 84 ms    Q-T Interval 404 ms    QTC Calculation(Bezet) 442 ms    P Axis 26 degrees    R Axis 8 degrees    T Axis 38 degrees    Diagnosis Line Normal sinus rhythm Slight rvcd  Normal ECG  When compared with ECG of 05-MAY-2017 12:40, Similar to prior ekg      Confirmed by PATRICIA VENTURA MD ([id number]) on 11/8/2019 10:53:37 A

## 2019-11-21 NOTE — PROGRESS NOTE BEHAVIORAL HEALTH - BEHAVIOR
Uncooperative
Uncooperative
Cooperative
Uncooperative
Cooperative
Uncooperative
Cooperative
Cooperative
Uncooperative

## 2019-11-21 NOTE — PROGRESS NOTE BEHAVIORAL HEALTH - MUSCLE TONE / STRENGTH
Denies dysuria, hematuria, nocturia or frequency, urinary incontinence. NEUROLOGIC: Denies headaches, dizziness, syncope, weakness  MUSCULOSKELETAL: denies changes in range of motion, joint pain, stiffness. ENDOCRINOLOGY: Denies heat or cold intolerance. HEMATOLOGY: Denies easy bleeding or blood transfusion,anemia  DERMATOLOGY: Denies changes in moles or pigmentation changes. PSYCHIATRY: Denies depression, agitation or anxiety.     Past Medical History:   Diagnosis Date    Arthritis     CAD (coronary artery disease)     Cardi[facility]     COPD [facility]chronic obstructive pulmonary disease) ([facility]     Defibrillator activation     Diabetes mellitus with insulin therapy ([facility] [facility]     Generalized and unspecified atherosclerosis     Gout     Hyperlipidemia     Hypertension     Lung disease     Pain in joint, multiple sites     Prolonged emergence from general anesthesia     Status post angioplasty     TIA (transient ischemic attack)     Tobacco abuse     Type II or unspecified type diabetes mellitus without mention of complication, not stated as uncontrolled         Past Surgical History:   Procedure Laterality Date    CARDIAC CATHETERIZATION      COLONOSCOPY      CORONARY ANGIOPLASTY  4/29/11    Dr Maura Fregoso CORONARY ARTERY BYPASS GRAFT      ENDOSCOPY, COLON, DIAGNOSTIC      EYE SURGERY Bilateral     Cataracts     OTHER SURGICAL HISTORY      difibrillator     [place] CIRCUMCISION,OTHER,28+ D/O N/A 8/29/2018    CIRCUMCISION performed by Alison Glaser MD at [address] [place] [address] EGD TRANSORAL BIOPSY SINGLE/MULTIPLE N/A 11/5/2017    EGD BIOPSY performed by Heather De La Vega MD at [facility]        Family History   Problem Relation Age of Onset    Cancer Brother     Diabetes Mother     Heart Disease Father     Emphysema Father         Social History     Social History    Marital status:      Spouse name: N/A
Normal muscle tone/strength

## 2019-11-21 NOTE — PROGRESS NOTE BEHAVIORAL HEALTH - NSBHLEGALSTATUS_PSY_A_CORE
9.39 (Emergency)
9.13 (Voluntary)
9.13 (Voluntary)
9.39 (Emergency)
9.13 (Voluntary)
9.39 (Emergency)
9.39 (Emergency)

## 2019-11-21 NOTE — PROGRESS NOTE BEHAVIORAL HEALTH - PROBLEM SELECTOR PROBLEM 2
Noncompliance

## 2019-11-22 NOTE — CHART NOTE - NSCHARTNOTEFT_GEN_A_CORE
Patient arrived home safely.  Needs lidocaine patches that were started here.  Gave her the unit number to speak to her psychiatrist.  Said her gout is bothering her also. Encouraged patient to see her primary physician or walk in clinic.  Denies suicidal ideation and picked up the rest of her medication yesterday.

## 2019-11-26 DIAGNOSIS — R01.1 CARDIAC MURMUR, UNSPECIFIED: ICD-10-CM

## 2019-11-26 DIAGNOSIS — Z85.3 PERSONAL HISTORY OF MALIGNANT NEOPLASM OF BREAST: ICD-10-CM

## 2019-11-26 DIAGNOSIS — Z92.3 PERSONAL HISTORY OF IRRADIATION: ICD-10-CM

## 2019-11-26 DIAGNOSIS — M75.51 BURSITIS OF RIGHT SHOULDER: ICD-10-CM

## 2019-11-26 DIAGNOSIS — K21.9 GASTRO-ESOPHAGEAL REFLUX DISEASE WITHOUT ESOPHAGITIS: ICD-10-CM

## 2019-11-26 DIAGNOSIS — Z91.14 PATIENT'S OTHER NONCOMPLIANCE WITH MEDICATION REGIMEN: ICD-10-CM

## 2019-11-26 DIAGNOSIS — R74.8 ABNORMAL LEVELS OF OTHER SERUM ENZYMES: ICD-10-CM

## 2019-11-26 DIAGNOSIS — Z53.29 PROCEDURE AND TREATMENT NOT CARRIED OUT BECAUSE OF PATIENT'S DECISION FOR OTHER REASONS: ICD-10-CM

## 2019-11-26 DIAGNOSIS — D47.2 MONOCLONAL GAMMOPATHY: ICD-10-CM

## 2019-11-26 DIAGNOSIS — M51.36 OTHER INTERVERTEBRAL DISC DEGENERATION, LUMBAR REGION: ICD-10-CM

## 2019-11-26 DIAGNOSIS — N18.4 CHRONIC KIDNEY DISEASE, STAGE 4 (SEVERE): ICD-10-CM

## 2019-11-26 DIAGNOSIS — M10.9 GOUT, UNSPECIFIED: ICD-10-CM

## 2019-11-26 DIAGNOSIS — D64.9 ANEMIA, UNSPECIFIED: ICD-10-CM

## 2019-11-26 DIAGNOSIS — F31.2 BIPOLAR DISORDER, CURRENT EPISODE MANIC SEVERE WITH PSYCHOTIC FEATURES: ICD-10-CM

## 2019-12-02 NOTE — CHART NOTE - NSCHARTNOTEFT_GEN_A_CORE
Spoke with patient to follow up on linkage. Patient doing well, sounds good, thankful for the call and has been seeing Dr. Gant.  She states he lowered her meds. Patient hopes to stay healthy and not need the hospital again.

## 2020-06-20 NOTE — PROGRESS NOTE BEHAVIORAL HEALTH - NSBHCHARTREVIEWVS_PSY_A_CORE FT
Vital Signs Last 24 Hrs  T(C): 37.6, Max: 37.6 (05-22 @ 09:04)  T(F): 99.6, Max: 99.6 (05-22 @ 09:04)  HR: 79 (79 - 79)  BP: 115/82 (115/82 - 115/82)  BP(mean): --  RR: 14 (14 - 14)  SpO2: 99% (99% - 99%) Medications

## 2021-01-01 NOTE — PROGRESS NOTE BEHAVIORAL HEALTH - THOUGHT PROCESS
Baby Boy (Gen Chu was seen at bedside this morning  Mom without concerns at this time  Baby has passed hearing and CCHD screening tests  Most recent bilirubin of 7 29 at 52 hr of life at low risk zone  CYS involved, will need clearance prior to discharge  Otherwise, breast and bottle feeding, voiding and stooling well  Physical Exam:   General Appearance:  Alert, active, no distress  Head:  Normocephalic, AFOF                             Eyes:  Conjunctiva clear, +RR  Ears:  Normally placed, no anomalies  Nose: nares patent                           Mouth:  Palate intact  Respiratory:  No grunting, flaring, retractions, breath sounds clear and equal    Cardiovascular:  Regular rate and rhythm  No murmur  Adequate perfusion/capillary refill   Femoral pulse present  Abdomen:   Soft, non-distended, no masses, bowel sounds present, no HSM  Genitourinary:  Normal male, testes descended, anus patent  Spine:  No hair aileen, dimples  Musculoskeletal:  Normal hips  Skin/Hair/Nails:   Skin warm, dry, and intact, no rashes               Neurologic:   Normal tone and reflexes      Thomas Pool MD  Family Medicine, PGY-1  11:05 AM 2021
Overinclusive/Circumstantial/Disorganized/Tangential/Flight of ideas/Illogical/Impaired reasoning
Disorganized/Flight of ideas/Illogical/Impaired reasoning/Tangential/Overinclusive/Circumstantial
Overinclusive/Tangential/Linear/Circumstantial/Flight of ideas/Impaired reasoning
Tangential/Flight of ideas/Illogical/Impaired reasoning/Disorganized/Circumstantial/Overinclusive
Tangential/Illogical/Overinclusive/Circumstantial/Flight of ideas/Impaired reasoning/Disorganized
Tangential/Illogical/Overinclusive/Circumstantial/Flight of ideas/Impaired reasoning/Disorganized
Disorganized/Illogical/Tangential/Overinclusive/Circumstantial/Impaired reasoning/Flight of ideas
Disorganized/Impaired reasoning/Tangential/Circumstantial/Overinclusive/Illogical/Flight of ideas
Illogical/Impaired reasoning/Overinclusive/Circumstantial/Tangential/Flight of ideas/Disorganized
Impaired reasoning/Linear/Circumstantial/Tangential/Overinclusive/Flight of ideas
Disorganized/Circumstantial/Overinclusive/Illogical/Tangential/Flight of ideas/Impaired reasoning
Disorganized/Overinclusive/Circumstantial/Flight of ideas/Illogical/Tangential/Impaired reasoning
Disorganized/Overinclusive/Illogical/Circumstantial/Flight of ideas/Tangential/Impaired reasoning
Flight of ideas/Disorganized/Tangential/Overinclusive/Circumstantial/Illogical/Impaired reasoning
Flight of ideas/Impaired reasoning/Tangential/Linear/Overinclusive/Circumstantial
Overinclusive/Linear/Flight of ideas/Circumstantial/Tangential/Impaired reasoning
Tangential/Flight of ideas/Disorganized/Illogical/Overinclusive/Circumstantial/Impaired reasoning
Tangential/Flight of ideas/Linear/Overinclusive/Circumstantial/Impaired reasoning
Tangential/Impaired reasoning/Disorganized/Overinclusive/Circumstantial/Illogical/Flight of ideas
Tangential/Linear/Overinclusive/Flight of ideas/Impaired reasoning/Circumstantial
Circumstantial/Flight of ideas/Linear/Overinclusive/Impaired reasoning/Tangential
Illogical/Flight of ideas/Disorganized/Circumstantial/Tangential/Impaired reasoning/Overinclusive
Overinclusive/Circumstantial/Tangential/Disorganized/Illogical/Impaired reasoning/Flight of ideas
Overinclusive/Disorganized/Tangential/Flight of ideas/Circumstantial/Impaired reasoning/Illogical
Tangential/Overinclusive/Circumstantial/Linear/Impaired reasoning/Flight of ideas
Tangential/Overinclusive/Flight of ideas/Linear/Circumstantial/Impaired reasoning

## 2021-01-22 NOTE — PROGRESS NOTE BEHAVIORAL HEALTH - PROBLEM/PLAN-2
Patient was not seen/assessed by provider in the flu clinic today. COVID swab was order in compliance with requirement for testing prior to surgery or invasive procedure. Nasopharyngeal swab was collected and ordered through gravity vendor.
DISPLAY PLAN FREE TEXT

## 2021-06-15 ENCOUNTER — TRANSCRIPTION ENCOUNTER (OUTPATIENT)
Age: 61
End: 2021-06-15

## 2021-11-17 NOTE — PROGRESS NOTE BEHAVIORAL HEALTH - NS ED BHA MED ROS ENDOCRINE
Problem: Falls - Risk of:  Goal: Absence of physical injury  Description: Absence of physical injury  Outcome: Ongoing     Problem: Skin Integrity:  Goal: Will show no infection signs and symptoms  Description: Will show no infection signs and symptoms  Outcome: Ongoing No complaints

## 2022-03-07 NOTE — ASU PATIENT PROFILE, ADULT - NSICDXPASTSURGICALHX_GEN_ALL_CORE_FT
PAST SURGICAL HISTORY:  H/O: hysterectomy     S/P coronary angiogram 10 yrs ago, no intervention    S/P dilation and curettage uterine polyps    S/P lumpectomy of breast 2011, left breast, no lymph nodes removed    S/P tonsillectomy      PAST SURGICAL HISTORY:  Abnormal biopsy of kidney     H/O: hysterectomy     S/P coronary angiogram 10 yrs ago, no intervention    S/P dilation and curettage uterine polyps    S/P lumpectomy of breast 2011, left breast, no lymph nodes removed    S/P tonsillectomy

## 2022-03-07 NOTE — ASU PATIENT PROFILE, ADULT - FALL HARM RISK - UNIVERSAL INTERVENTIONS
Bed in lowest position, wheels locked, appropriate side rails in place/Call bell, personal items and telephone in reach/Instruct patient to call for assistance before getting out of bed or chair/Non-slip footwear when patient is out of bed/[place] to call system/Physically safe environment - no spills, clutter or unnecessary equipment/Purposeful Proactive Rounding/Room/bathroom lighting operational, light cord in reach

## 2022-03-07 NOTE — ASU PATIENT PROFILE, ADULT - NSICDXPASTMEDICALHX_GEN_ALL_CORE_FT
PAST MEDICAL HISTORY:  Bipolar disorder     Breast CA DCIS, left breast, s/p RT    Cardiac murmur     Degenerative disc disease, lumbar     Depression     GERD (gastroesophageal reflux disease)     Gout     H/O neuropathy     History of secondary hyperparathyroidism     HLD (hyperlipidemia)     Kidney disease "stage 3" as per patient secondary to lithium treatment many years ago    Lyme disease     MGUS (monoclonal gammopathy of unknown significance)     Uterine leiomyoma      PAST MEDICAL HISTORY:  Bipolar disorder     Breast CA DCIS, left breast, s/p RT    Cardiac murmur     Degenerative disc disease, lumbar     Depression     GERD (gastroesophageal reflux disease)     Gout     History of secondary hyperparathyroidism     HLD (hyperlipidemia)     Kidney disease "stage 4" as per patient secondary to lithium treatment many years ago    Lyme disease     MGUS (monoclonal gammopathy of unknown significance)     Uterine leiomyoma

## 2022-03-08 ENCOUNTER — RESULT REVIEW (OUTPATIENT)
Age: 62
End: 2022-03-08

## 2022-03-08 ENCOUNTER — OUTPATIENT (OUTPATIENT)
Dept: INPATIENT UNIT | Facility: HOSPITAL | Age: 62
LOS: 1 days | Discharge: HOME | End: 2022-03-08
Payer: COMMERCIAL

## 2022-03-08 VITALS
OXYGEN SATURATION: 98 % | DIASTOLIC BLOOD PRESSURE: 86 MMHG | TEMPERATURE: 98 F | HEART RATE: 73 BPM | WEIGHT: 169.98 LBS | HEIGHT: 65 IN | RESPIRATION RATE: 16 BRPM | SYSTOLIC BLOOD PRESSURE: 148 MMHG

## 2022-03-08 VITALS
HEART RATE: 61 BPM | SYSTOLIC BLOOD PRESSURE: 133 MMHG | TEMPERATURE: 98 F | RESPIRATION RATE: 18 BRPM | DIASTOLIC BLOOD PRESSURE: 71 MMHG | OXYGEN SATURATION: 100 %

## 2022-03-08 DIAGNOSIS — Z90.710 ACQUIRED ABSENCE OF BOTH CERVIX AND UTERUS: Chronic | ICD-10-CM

## 2022-03-08 DIAGNOSIS — D47.2 MONOCLONAL GAMMOPATHY: ICD-10-CM

## 2022-03-08 DIAGNOSIS — R89.7 ABNORMAL HISTOLOGICAL FINDINGS IN SPECIMENS FROM OTHER ORGANS, SYSTEMS AND TISSUES: Chronic | ICD-10-CM

## 2022-03-08 LAB
ANION GAP SERPL CALC-SCNC: 7 MMOL/L — SIGNIFICANT CHANGE UP (ref 5–17)
BUN SERPL-MCNC: 83 MG/DL — HIGH (ref 7–23)
CALCIUM SERPL-MCNC: 9.4 MG/DL — SIGNIFICANT CHANGE UP (ref 8.5–10.1)
CHLORIDE SERPL-SCNC: 112 MMOL/L — HIGH (ref 96–108)
CO2 SERPL-SCNC: 21 MMOL/L — LOW (ref 22–31)
CREAT SERPL-MCNC: 3.72 MG/DL — HIGH (ref 0.5–1.3)
EGFR: 13 ML/MIN/1.73M2 — LOW
GLUCOSE SERPL-MCNC: 103 MG/DL — HIGH (ref 70–99)
HCT VFR BLD CALC: 33.1 % — LOW (ref 34.5–45)
HGB BLD-MCNC: 10.7 G/DL — LOW (ref 11.5–15.5)
INR BLD: 0.97 RATIO — SIGNIFICANT CHANGE UP (ref 0.88–1.16)
MCHC RBC-ENTMCNC: 29.6 PG — SIGNIFICANT CHANGE UP (ref 27–34)
MCHC RBC-ENTMCNC: 32.3 GM/DL — SIGNIFICANT CHANGE UP (ref 32–36)
MCV RBC AUTO: 91.4 FL — SIGNIFICANT CHANGE UP (ref 80–100)
PLATELET # BLD AUTO: 215 K/UL — SIGNIFICANT CHANGE UP (ref 150–400)
POTASSIUM SERPL-MCNC: 4.5 MMOL/L — SIGNIFICANT CHANGE UP (ref 3.5–5.3)
POTASSIUM SERPL-SCNC: 4.5 MMOL/L — SIGNIFICANT CHANGE UP (ref 3.5–5.3)
PROTHROM AB SERPL-ACNC: 11.3 SEC — SIGNIFICANT CHANGE UP (ref 10.5–13.4)
RBC # BLD: 3.62 M/UL — LOW (ref 3.8–5.2)
RBC # FLD: 12.7 % — SIGNIFICANT CHANGE UP (ref 10.3–14.5)
SODIUM SERPL-SCNC: 140 MMOL/L — SIGNIFICANT CHANGE UP (ref 135–145)
WBC # BLD: 7.39 K/UL — SIGNIFICANT CHANGE UP (ref 3.8–10.5)
WBC # FLD AUTO: 7.39 K/UL — SIGNIFICANT CHANGE UP (ref 3.8–10.5)

## 2022-03-08 PROCEDURE — 88305 TISSUE EXAM BY PATHOLOGIST: CPT

## 2022-03-08 PROCEDURE — 80048 BASIC METABOLIC PNL TOTAL CA: CPT

## 2022-03-08 PROCEDURE — 93010 ELECTROCARDIOGRAM REPORT: CPT

## 2022-03-08 PROCEDURE — 93005 ELECTROCARDIOGRAM TRACING: CPT

## 2022-03-08 PROCEDURE — 88275 CYTOGENETICS 100-300: CPT

## 2022-03-08 PROCEDURE — 88313 SPECIAL STAINS GROUP 2: CPT | Mod: 26

## 2022-03-08 PROCEDURE — 38222 DX BONE MARROW BX & ASPIR: CPT | Mod: LT

## 2022-03-08 PROCEDURE — 88271 CYTOGENETICS DNA PROBE: CPT

## 2022-03-08 PROCEDURE — 85610 PROTHROMBIN TIME: CPT

## 2022-03-08 PROCEDURE — 88184 FLOWCYTOMETRY/ TC 1 MARKER: CPT

## 2022-03-08 PROCEDURE — 88313 SPECIAL STAINS GROUP 2: CPT

## 2022-03-08 PROCEDURE — 87205 SMEAR GRAM STAIN: CPT

## 2022-03-08 PROCEDURE — 36415 COLL VENOUS BLD VENIPUNCTURE: CPT

## 2022-03-08 PROCEDURE — 88280 CHROMOSOME KARYOTYPE STUDY: CPT

## 2022-03-08 PROCEDURE — 88264 CHROMOSOME ANALYSIS 20-25: CPT

## 2022-03-08 PROCEDURE — 88285 CHROMOSOME COUNT ADDITIONAL: CPT

## 2022-03-08 PROCEDURE — 88185 FLOWCYTOMETRY/TC ADD-ON: CPT

## 2022-03-08 PROCEDURE — 85027 COMPLETE CBC AUTOMATED: CPT

## 2022-03-08 PROCEDURE — 88237 TISSUE CULTURE BONE MARROW: CPT

## 2022-03-08 PROCEDURE — 77012 CT SCAN FOR NEEDLE BIOPSY: CPT

## 2022-03-08 PROCEDURE — 77012 CT SCAN FOR NEEDLE BIOPSY: CPT | Mod: 26

## 2022-03-08 PROCEDURE — 88305 TISSUE EXAM BY PATHOLOGIST: CPT | Mod: 26

## 2022-03-08 RX ORDER — SODIUM CHLORIDE 9 MG/ML
1000 INJECTION, SOLUTION INTRAVENOUS
Refills: 0 | Status: DISCONTINUED | OUTPATIENT
Start: 2022-03-08 | End: 2022-03-08

## 2022-03-08 RX ORDER — FENTANYL CITRATE 50 UG/ML
50 INJECTION INTRAVENOUS
Refills: 0 | Status: DISCONTINUED | OUTPATIENT
Start: 2022-03-08 | End: 2022-03-08

## 2022-03-08 RX ORDER — ONDANSETRON 8 MG/1
4 TABLET, FILM COATED ORAL ONCE
Refills: 0 | Status: DISCONTINUED | OUTPATIENT
Start: 2022-03-08 | End: 2022-03-08

## 2022-03-08 RX ORDER — OXYCODONE HYDROCHLORIDE 5 MG/1
5 TABLET ORAL ONCE
Refills: 0 | Status: DISCONTINUED | OUTPATIENT
Start: 2022-03-08 | End: 2022-03-08

## 2022-03-08 NOTE — ASU DISCHARGE PLAN (ADULT/PEDIATRIC) - NS MD DC FALL RISK RISK
For information on Fall & Injury Prevention, visit: [email or web address] OR  [email or web address] OR  https://www.cdc.gov/steadi/patient.html

## 2022-03-10 DIAGNOSIS — D47.2 MONOCLONAL GAMMOPATHY: ICD-10-CM

## 2022-03-21 LAB — CHROM ANALY OVERALL INTERP SPEC-IMP: SIGNIFICANT CHANGE UP

## 2022-06-14 NOTE — BEHAVIORAL HEALTH ASSESSMENT NOTE - NSBHADMITIPSTRENGTHDETAILS_PSY_A_CORE FT
Has daily headaches  Instructed to stop nsaids  Has aura with olfactory and visual  Refer to neurology   able to accomplish much when stable on medications for her bipolar d/o

## 2022-09-26 NOTE — PROGRESS NOTE BEHAVIORAL HEALTH - AXIS III
Goal Outcome Evaluation:      Summary: L hand pain, decreased motion, and increased generalized weakness; Suspected Lewy Body Dementia  DATE & TIME: 9/25/22-9/26/22 [id number]  Cognitive Concerns/ Orientation : A&Ox4 although appears confused/forgetful at times. Answers questions appropriately.  BEHAVIOR & AGGRESSION TOOL COLOR: Green   ABNL VS/O2: VSS on RA  MOBILITY: Total, T/R q2h, moderately impaired movement, contracted hands.  PAIN MANAGMENT: Denies  DIET: Reg, needs full assist with eating.  BOWEL/BLADDER: Incontinent of bowel. Chronic keating with chronic hematuria  ABNL LAB/BG: Hgb 12.1  DRAIN/DEVICES: RPIV SL. Keating in place. Irrigated x1.  SKIN: Wounds to L great toe, bandage CDI. Mepilex in place on excoriation on coccyx, CDI. Mepilex in place on R abdominal fold open site, CDI.  D/C DATE: awaiting safe discharge plan, possibly to rehab  OTHER IMPORTANT INFO: Keating with dark red hematuria  CONSULTS: Respiratory, Urology, and PT following                 Stage 3 renal disease, elevated liver enzymes, GERD, MGUS  Left breast cancer s/p DSIS  DJD  back, h/o uterine leiomyoma, h/o Lyme disease

## 2022-10-18 NOTE — ASU PATIENT PROFILE, ADULT - CAREGIVER ADDRESS
Patient called and wants to know if you will call in something for diarrhea? He has had it for several days. He has taken Immodium and it has not helped. Please call into [facility] in [place]. He is not able to come in. same as patient

## 2023-01-23 ENCOUNTER — EMERGENCY (EMERGENCY)
Facility: HOSPITAL | Age: 63
LOS: 0 days | Discharge: HOME | End: 2023-01-23
Attending: EMERGENCY MEDICINE
Payer: COMMERCIAL

## 2023-01-23 VITALS — OXYGEN SATURATION: 97 % | HEIGHT: 65 IN | HEART RATE: 81 BPM | RESPIRATION RATE: 18 BRPM | WEIGHT: 179.02 LBS

## 2023-01-23 VITALS
TEMPERATURE: 98 F | HEART RATE: 90 BPM | RESPIRATION RATE: 20 BRPM | SYSTOLIC BLOOD PRESSURE: 138 MMHG | OXYGEN SATURATION: 97 % | DIASTOLIC BLOOD PRESSURE: 85 MMHG

## 2023-01-23 DIAGNOSIS — Z85.3 PERSONAL HISTORY OF MALIGNANT NEOPLASM OF BREAST: ICD-10-CM

## 2023-01-23 DIAGNOSIS — F32.A DEPRESSION, UNSPECIFIED: ICD-10-CM

## 2023-01-23 DIAGNOSIS — Z94.0 KIDNEY TRANSPLANT STATUS: ICD-10-CM

## 2023-01-23 DIAGNOSIS — M10.9 GOUT, UNSPECIFIED: ICD-10-CM

## 2023-01-23 DIAGNOSIS — R20.0 ANESTHESIA OF SKIN: ICD-10-CM

## 2023-01-23 DIAGNOSIS — Z87.19 PERSONAL HISTORY OF OTHER DISEASES OF THE DIGESTIVE SYSTEM: ICD-10-CM

## 2023-01-23 DIAGNOSIS — Z90.710 ACQUIRED ABSENCE OF BOTH CERVIX AND UTERUS: Chronic | ICD-10-CM

## 2023-01-23 DIAGNOSIS — R89.7 ABNORMAL HISTOLOGICAL FINDINGS IN SPECIMENS FROM OTHER ORGANS, SYSTEMS AND TISSUES: Chronic | ICD-10-CM

## 2023-01-23 DIAGNOSIS — Z90.710 ACQUIRED ABSENCE OF BOTH CERVIX AND UTERUS: ICD-10-CM

## 2023-01-23 DIAGNOSIS — D25.9 LEIOMYOMA OF UTERUS, UNSPECIFIED: ICD-10-CM

## 2023-01-23 DIAGNOSIS — Z87.448 PERSONAL HISTORY OF OTHER DISEASES OF URINARY SYSTEM: ICD-10-CM

## 2023-01-23 DIAGNOSIS — K21.9 GASTRO-ESOPHAGEAL REFLUX DISEASE WITHOUT ESOPHAGITIS: ICD-10-CM

## 2023-01-23 DIAGNOSIS — Z90.09 ACQUIRED ABSENCE OF OTHER PART OF HEAD AND NECK: ICD-10-CM

## 2023-01-23 DIAGNOSIS — F31.9 BIPOLAR DISORDER, UNSPECIFIED: ICD-10-CM

## 2023-01-23 PROBLEM — E78.5 HYPERLIPIDEMIA, UNSPECIFIED: Chronic | Status: ACTIVE | Noted: 2022-03-07

## 2023-01-23 PROBLEM — D47.2 MONOCLONAL GAMMOPATHY: Chronic | Status: ACTIVE | Noted: 2022-03-07

## 2023-01-23 PROBLEM — Z86.39 PERSONAL HISTORY OF OTHER ENDOCRINE, NUTRITIONAL AND METABOLIC DISEASE: Chronic | Status: ACTIVE | Noted: 2022-03-07

## 2023-01-23 PROCEDURE — 99282 EMERGENCY DEPT VISIT SF MDM: CPT

## 2023-01-23 PROCEDURE — 99284 EMERGENCY DEPT VISIT MOD MDM: CPT

## 2023-01-23 NOTE — ED STATDOCS - ATTENDING APP SHARED VISIT CONTRIBUTION OF CARE
I personally saw the patient with the QUYEN, and completed the key components of the history and physical exam. I then discussed the management plan with the QUYEN.

## 2023-01-23 NOTE — ED STATDOCS - OBJECTIVE STATEMENT
63 y/o female PMHx of kidney transplant 4/22, kidney disease, LHD, breast ca, lyme disease, GERD, MGUS, uterine leiomyoma, bipolar disorder, depression presents to the ED c/o "I had novocaine injection for dental procedure at 10:30am today and my left sided of my mouth still feels numb." Pt reports she called the kidney transplant team who told her to follow up at the emergency room for an allergic reaction. Pt reports she feels anxious. Pt denies rash and denies difficulty breathing. Pt reports she is talking and swallowing normally.

## 2023-01-23 NOTE — ED ADULT TRIAGE NOTE - CHIEF COMPLAINT QUOTE
"I had novocaine injection for dental procedure at 10:30am and my left sided of my mouth still feels numb." Speaking in full sentences. No swelling noted in Triage. pt appears anxious. hx kidney transplant 4/22

## 2023-01-23 NOTE — ED STATDOCS - PROGRESS NOTE DETAILS
Lorraine Collier:  Spoke with [facility] transplant list. pt seen with ER attending for continued numbness to the left side of her face after dental procedure, concerned that she is having an allergic reaction, no SOB, no rash denies chest pain or tightness    [facility] transplant aware and agree to discharge patient    discussed the plan with the patient who is comfortable with this

## 2023-01-23 NOTE — ED ADULT NURSE NOTE - OBJECTIVE STATEMENT
"I had novocaine injection for dental procedure at 10:30am and my left sided of my mouth still feels numb." Speaking in full sentences. No swelling noted in Triage. pt appears anxious.

## 2023-01-23 NOTE — ED STATDOCS - NSICDXPASTSURGICALHX_GEN_ALL_CORE_FT
PAST SURGICAL HISTORY:  Abnormal biopsy of kidney     H/O: hysterectomy     S/P coronary angiogram 10 yrs ago, no intervention    S/P dilation and curettage uterine polyps    S/P lumpectomy of breast 2011, left breast, no lymph nodes removed    S/P tonsillectomy       Kidney transplanted

## 2023-01-23 NOTE — ED STATDOCS - NSICDXPASTMEDICALHX_GEN_ALL_CORE_FT
PAST MEDICAL HISTORY:  Bipolar disorder     Breast CA DCIS, left breast, s/p RT    Cardiac murmur     Degenerative disc disease, lumbar     Depression     GERD (gastroesophageal reflux disease)     Gout     History of secondary hyperparathyroidism     HLD (hyperlipidemia)     Kidney disease "stage 4" as per patient secondary to lithium treatment many years ago    Lyme disease     MGUS (monoclonal gammopathy of unknown significance)     Uterine leiomyoma

## 2023-01-23 NOTE — ED STATDOCS - PATIENT PORTAL LINK FT
You can access the FollowMyHealth Patient Portal offered by [facility] by registering at the following website: [email or web address]. By joining [employer]’s FollowMyHealth portal, you will also be able to view your health information using other applications (apps) compatible with our system.

## 2023-01-23 NOTE — ED STATDOCS - CLINICAL SUMMARY MEDICAL DECISION MAKING FREE TEXT BOX
Pt with a hx of renal transplant presents with complaint of facial numbness s/p dental visit today. No signs of allergic reaction. No rashes, no itching. Will discuss with kidney transplant at [facility] further steps.

## 2023-01-24 DIAGNOSIS — Z94.0 KIDNEY TRANSPLANT STATUS: Chronic | ICD-10-CM

## 2023-03-27 NOTE — DISCHARGE NOTE BEHAVIORAL HEALTH - MEDICATION SUMMARY - MEDICATIONS TO STOP TAKING
I will STOP taking the medications listed below when I get home from the hospital:    Depakote  --  by mouth    KlonoPIN  --  by mouth    SEROquel XR  --  by mouth    enalapril 5 mg oral tablet  -- 1 tab(s) by mouth once a day    Depakote  mg oral tablet, extended release  -- 1 tab(s) by mouth once a day    Depakote  mg oral tablet, extended release  -- 2 tab(s) by mouth once a day (at bedtime)    SEROquel 300 mg oral tablet  -- 2 tab(s) by mouth once a day (at bedtime)
Calm

## 2023-08-23 ENCOUNTER — EMERGENCY (EMERGENCY)
Facility: HOSPITAL | Age: 63
LOS: 0 days | Discharge: HOME | End: 2023-08-23
Attending: EMERGENCY MEDICINE
Payer: COMMERCIAL

## 2023-08-23 VITALS — HEIGHT: 65 IN | WEIGHT: 184.09 LBS

## 2023-08-23 VITALS
HEART RATE: 72 BPM | RESPIRATION RATE: 18 BRPM | DIASTOLIC BLOOD PRESSURE: 73 MMHG | SYSTOLIC BLOOD PRESSURE: 110 MMHG | OXYGEN SATURATION: 98 % | TEMPERATURE: 99 F

## 2023-08-23 DIAGNOSIS — E78.5 HYPERLIPIDEMIA, UNSPECIFIED: ICD-10-CM

## 2023-08-23 DIAGNOSIS — Z86.718 PERSONAL HISTORY OF OTHER VENOUS THROMBOSIS AND EMBOLISM: ICD-10-CM

## 2023-08-23 DIAGNOSIS — R05.9 COUGH, UNSPECIFIED: ICD-10-CM

## 2023-08-23 DIAGNOSIS — Z94.0 KIDNEY TRANSPLANT STATUS: Chronic | ICD-10-CM

## 2023-08-23 DIAGNOSIS — F31.9 BIPOLAR DISORDER, UNSPECIFIED: ICD-10-CM

## 2023-08-23 DIAGNOSIS — K21.9 GASTRO-ESOPHAGEAL REFLUX DISEASE WITHOUT ESOPHAGITIS: ICD-10-CM

## 2023-08-23 DIAGNOSIS — R89.7 ABNORMAL HISTOLOGICAL FINDINGS IN SPECIMENS FROM OTHER ORGANS, SYSTEMS AND TISSUES: Chronic | ICD-10-CM

## 2023-08-23 DIAGNOSIS — Z90.710 ACQUIRED ABSENCE OF BOTH CERVIX AND UTERUS: Chronic | ICD-10-CM

## 2023-08-23 DIAGNOSIS — Z90.710 ACQUIRED ABSENCE OF BOTH CERVIX AND UTERUS: ICD-10-CM

## 2023-08-23 DIAGNOSIS — M51.36 OTHER INTERVERTEBRAL DISC DEGENERATION, LUMBAR REGION: ICD-10-CM

## 2023-08-23 DIAGNOSIS — Z79.01 LONG TERM (CURRENT) USE OF ANTICOAGULANTS: ICD-10-CM

## 2023-08-23 DIAGNOSIS — N39.0 URINARY TRACT INFECTION, SITE NOT SPECIFIED: ICD-10-CM

## 2023-08-23 DIAGNOSIS — Z20.822 CONTACT WITH AND (SUSPECTED) EXPOSURE TO COVID-19: ICD-10-CM

## 2023-08-23 DIAGNOSIS — Z87.42 PERSONAL HISTORY OF OTHER DISEASES OF THE FEMALE GENITAL TRACT: ICD-10-CM

## 2023-08-23 DIAGNOSIS — Z94.0 KIDNEY TRANSPLANT STATUS: ICD-10-CM

## 2023-08-23 DIAGNOSIS — M10.9 GOUT, UNSPECIFIED: ICD-10-CM

## 2023-08-23 DIAGNOSIS — Z85.3 PERSONAL HISTORY OF MALIGNANT NEOPLASM OF BREAST: ICD-10-CM

## 2023-08-23 DIAGNOSIS — R50.9 FEVER, UNSPECIFIED: ICD-10-CM

## 2023-08-23 LAB
ALBUMIN SERPL ELPH-MCNC: 3.2 G/DL — LOW (ref 3.3–5)
ALP SERPL-CCNC: 148 U/L — HIGH (ref 40–120)
ALT FLD-CCNC: 109 U/L — HIGH (ref 12–78)
ANION GAP SERPL CALC-SCNC: 6 MMOL/L — SIGNIFICANT CHANGE UP (ref 5–17)
APPEARANCE UR: CLEAR — SIGNIFICANT CHANGE UP
APTT BLD: 29 SEC — SIGNIFICANT CHANGE UP (ref 24.5–35.6)
AST SERPL-CCNC: 90 U/L — HIGH (ref 15–37)
BACTERIA # UR AUTO: ABNORMAL
BASOPHILS # BLD AUTO: 0 K/UL — SIGNIFICANT CHANGE UP (ref 0–0.2)
BASOPHILS NFR BLD AUTO: 0 % — SIGNIFICANT CHANGE UP (ref 0–2)
BILIRUB SERPL-MCNC: 0.6 MG/DL — SIGNIFICANT CHANGE UP (ref 0.2–1.2)
BILIRUB UR-MCNC: NEGATIVE — SIGNIFICANT CHANGE UP
BUN SERPL-MCNC: 17 MG/DL — SIGNIFICANT CHANGE UP (ref 7–23)
CALCIUM SERPL-MCNC: 9.2 MG/DL — SIGNIFICANT CHANGE UP (ref 8.5–10.1)
CHLORIDE SERPL-SCNC: 108 MMOL/L — SIGNIFICANT CHANGE UP (ref 96–108)
CO2 SERPL-SCNC: 25 MMOL/L — SIGNIFICANT CHANGE UP (ref 22–31)
COLOR SPEC: YELLOW — SIGNIFICANT CHANGE UP
CREAT SERPL-MCNC: 1.15 MG/DL — SIGNIFICANT CHANGE UP (ref 0.5–1.3)
DIFF PNL FLD: ABNORMAL
EGFR: 54 ML/MIN/1.73M2 — LOW
EOSINOPHIL # BLD AUTO: 0.04 K/UL — SIGNIFICANT CHANGE UP (ref 0–0.5)
EOSINOPHIL NFR BLD AUTO: 1 % — SIGNIFICANT CHANGE UP (ref 0–6)
EPI CELLS # UR: SIGNIFICANT CHANGE UP
GLUCOSE SERPL-MCNC: 141 MG/DL — HIGH (ref 70–99)
GLUCOSE UR QL: NEGATIVE — SIGNIFICANT CHANGE UP
HCT VFR BLD CALC: 36.3 % — SIGNIFICANT CHANGE UP (ref 34.5–45)
HGB BLD-MCNC: 12.2 G/DL — SIGNIFICANT CHANGE UP (ref 11.5–15.5)
INR BLD: 1.27 RATIO — HIGH (ref 0.85–1.18)
KETONES UR-MCNC: NEGATIVE — SIGNIFICANT CHANGE UP
LACTATE SERPL-SCNC: 0.8 MMOL/L — SIGNIFICANT CHANGE UP (ref 0.7–2)
LEUKOCYTE ESTERASE UR-ACNC: ABNORMAL
LYMPHOCYTES # BLD AUTO: 0.52 K/UL — LOW (ref 1–3.3)
LYMPHOCYTES # BLD AUTO: 14 % — SIGNIFICANT CHANGE UP (ref 13–44)
MACROCYTES BLD QL: SLIGHT — SIGNIFICANT CHANGE UP
MANUAL SMEAR VERIFICATION: SIGNIFICANT CHANGE UP
MCHC RBC-ENTMCNC: 32.4 PG — SIGNIFICANT CHANGE UP (ref 27–34)
MCHC RBC-ENTMCNC: 33.6 GM/DL — SIGNIFICANT CHANGE UP (ref 32–36)
MCV RBC AUTO: 96.5 FL — SIGNIFICANT CHANGE UP (ref 80–100)
MONOCYTES # BLD AUTO: 0.18 K/UL — SIGNIFICANT CHANGE UP (ref 0–0.9)
MONOCYTES NFR BLD AUTO: 5 % — SIGNIFICANT CHANGE UP (ref 2–14)
NEUTROPHILS # BLD AUTO: 2.84 K/UL — SIGNIFICANT CHANGE UP (ref 1.8–7.4)
NEUTROPHILS NFR BLD AUTO: 72 % — SIGNIFICANT CHANGE UP (ref 43–77)
NEUTS BAND # BLD: 5 % — SIGNIFICANT CHANGE UP (ref 0–8)
NITRITE UR-MCNC: POSITIVE
NRBC # BLD: 0 /100 — SIGNIFICANT CHANGE UP (ref 0–0)
NRBC # BLD: SIGNIFICANT CHANGE UP /100 WBCS (ref 0–0)
OVALOCYTES BLD QL SMEAR: SLIGHT — SIGNIFICANT CHANGE UP
PH UR: 6 — SIGNIFICANT CHANGE UP (ref 5–8)
PLAT MORPH BLD: NORMAL — SIGNIFICANT CHANGE UP
PLATELET # BLD AUTO: 138 K/UL — LOW (ref 150–400)
POIKILOCYTOSIS BLD QL AUTO: SLIGHT — SIGNIFICANT CHANGE UP
POTASSIUM SERPL-MCNC: 3.6 MMOL/L — SIGNIFICANT CHANGE UP (ref 3.5–5.3)
POTASSIUM SERPL-SCNC: 3.6 MMOL/L — SIGNIFICANT CHANGE UP (ref 3.5–5.3)
PROT SERPL-MCNC: 6.9 GM/DL — SIGNIFICANT CHANGE UP (ref 6–8.3)
PROT UR-MCNC: NEGATIVE — SIGNIFICANT CHANGE UP
PROTHROM AB SERPL-ACNC: 14.3 SEC — HIGH (ref 9.5–13)
RAPID RVP RESULT: SIGNIFICANT CHANGE UP
RBC # BLD: 3.76 M/UL — LOW (ref 3.8–5.2)
RBC # FLD: 12.7 % — SIGNIFICANT CHANGE UP (ref 10.3–14.5)
RBC BLD AUTO: ABNORMAL
RBC CASTS # UR COMP ASSIST: ABNORMAL /HPF (ref 0–4)
SARS-COV-2 RNA SPEC QL NAA+PROBE: SIGNIFICANT CHANGE UP
SODIUM SERPL-SCNC: 139 MMOL/L — SIGNIFICANT CHANGE UP (ref 135–145)
SP GR SPEC: 1.01 — SIGNIFICANT CHANGE UP (ref 1.01–1.02)
TROPONIN I, HIGH SENSITIVITY RESULT: 7.81 NG/L — SIGNIFICANT CHANGE UP
UROBILINOGEN FLD QL: NEGATIVE — SIGNIFICANT CHANGE UP
VARIANT LYMPHS # BLD: 3 % — SIGNIFICANT CHANGE UP (ref 0–6)
WBC # BLD: 3.69 K/UL — LOW (ref 3.8–10.5)
WBC # FLD AUTO: 3.69 K/UL — LOW (ref 3.8–10.5)
WBC UR QL: >50 /HPF (ref 0–5)

## 2023-08-23 PROCEDURE — 84484 ASSAY OF TROPONIN QUANT: CPT

## 2023-08-23 PROCEDURE — 85025 COMPLETE CBC W/AUTO DIFF WBC: CPT

## 2023-08-23 PROCEDURE — 71045 X-RAY EXAM CHEST 1 VIEW: CPT

## 2023-08-23 PROCEDURE — 0225U NFCT DS DNA&RNA 21 SARSCOV2: CPT

## 2023-08-23 PROCEDURE — 99285 EMERGENCY DEPT VISIT HI MDM: CPT | Mod: 25

## 2023-08-23 PROCEDURE — 87086 URINE CULTURE/COLONY COUNT: CPT

## 2023-08-23 PROCEDURE — 87077 CULTURE AEROBIC IDENTIFY: CPT

## 2023-08-23 PROCEDURE — 96374 THER/PROPH/DIAG INJ IV PUSH: CPT

## 2023-08-23 PROCEDURE — 83605 ASSAY OF LACTIC ACID: CPT

## 2023-08-23 PROCEDURE — 87186 SC STD MICRODIL/AGAR DIL: CPT

## 2023-08-23 PROCEDURE — 71045 X-RAY EXAM CHEST 1 VIEW: CPT | Mod: 26

## 2023-08-23 PROCEDURE — 36415 COLL VENOUS BLD VENIPUNCTURE: CPT

## 2023-08-23 PROCEDURE — 85610 PROTHROMBIN TIME: CPT

## 2023-08-23 PROCEDURE — 99285 EMERGENCY DEPT VISIT HI MDM: CPT

## 2023-08-23 PROCEDURE — 80053 COMPREHEN METABOLIC PANEL: CPT

## 2023-08-23 PROCEDURE — 85730 THROMBOPLASTIN TIME PARTIAL: CPT

## 2023-08-23 PROCEDURE — 96375 TX/PRO/DX INJ NEW DRUG ADDON: CPT

## 2023-08-23 PROCEDURE — 87040 BLOOD CULTURE FOR BACTERIA: CPT

## 2023-08-23 PROCEDURE — 81001 URINALYSIS AUTO W/SCOPE: CPT

## 2023-08-23 RX ORDER — ACETAMINOPHEN 500 MG
1000 TABLET ORAL ONCE
Refills: 0 | Status: COMPLETED | OUTPATIENT
Start: 2023-08-23 | End: 2023-08-23

## 2023-08-23 RX ORDER — CEFEPIME 1 G/1
2000 INJECTION, POWDER, FOR SOLUTION INTRAMUSCULAR; INTRAVENOUS ONCE
Refills: 0 | Status: COMPLETED | OUTPATIENT
Start: 2023-08-23 | End: 2023-08-23

## 2023-08-23 RX ORDER — CEFPODOXIME PROXETIL 100 MG
1 TABLET ORAL
Qty: 20 | Refills: 0
Start: 2023-08-23 | End: 2023-09-01

## 2023-08-23 RX ORDER — SODIUM CHLORIDE 9 MG/ML
1000 INJECTION INTRAMUSCULAR; INTRAVENOUS; SUBCUTANEOUS ONCE
Refills: 0 | Status: COMPLETED | OUTPATIENT
Start: 2023-08-23 | End: 2023-08-23

## 2023-08-23 RX ADMIN — CEFEPIME 100 MILLIGRAM(S): 1 INJECTION, POWDER, FOR SOLUTION INTRAMUSCULAR; INTRAVENOUS at 06:24

## 2023-08-23 RX ADMIN — Medication 400 MILLIGRAM(S): at 05:35

## 2023-08-23 RX ADMIN — SODIUM CHLORIDE 1000 MILLILITER(S): 9 INJECTION INTRAMUSCULAR; INTRAVENOUS; SUBCUTANEOUS at 05:35

## 2023-08-23 NOTE — ED PROVIDER NOTE - PROGRESS NOTE DETAILS
Patient was signed out to me pending blood work.  Patient with UTI spoke with patient's transplant team from [facility] Dr. Jackson.  reviewed patient's presentation blood work vital signs.  They state patient is okay to be discharged on oral antibiotics no need for admission at this time can follow-up.  Patient's creatinine is at baseline her symptomatology more closely represents upper respiratory symptoms with cough and congestion she has no dysuria abdominal pain back pain or vomiting.  Patient is feeling much better and wants to go home transplant team initially recommended Levaquin however patient is allergic will treat with cefpodoxime discharged with follow-up strict return precautions.

## 2023-08-23 NOTE — ED PROVIDER NOTE - PATIENT PORTAL LINK FT
You can access the FollowMyHealth Patient Portal offered by [facility] by registering at the following website: [email or web address]. By joining [employer]’s FollowMyHealth portal, you will also be able to view your health information using other applications (apps) compatible with our system.

## 2023-08-23 NOTE — ED PROVIDER NOTE - CLINICAL SUMMARY MEDICAL DECISION MAKING FREE TEXT BOX
Pt with hx of renal transplant/immunosuppression, hx DVT on eliquis p/w fever x2d, cough. VSS. Plan for full infectious workup with labs, UA, cultures, RVP, CXR, IVF and empiric abx. Will reassess

## 2023-08-23 NOTE — ED ADULT NURSE NOTE - OBJECTIVE STATEMENT
pt presents to ED c/o cough and fever x2 days PTA. pt s/p kidney transplant 1.5 years ago, on chronic immunosuppression. pt reports fever at home, but afebrile on assessment. pt denies cp, sob, dizziness. pt endorsing mild HA. pt family at bedside. no other complaints at this time.

## 2023-08-23 NOTE — ED PROVIDER NOTE - PHYSICAL EXAMINATION
General: AAOx3, NAD  HEENT: NCAT  Cardiac: Normal rate and rhythym, no murmurs, normal peripheral perfusion  Respiratory: Normal rate and effort. CTAB  GI: Soft, nondistended, nontender  Neuro: No focal deficits. GERARD equally x4, sensation to light touch intact throughout  MSK: FROMx4, no focal bony tenderness, no peripheral edema  Skin: No rash

## 2023-08-23 NOTE — ED PROVIDER NOTE - OBJECTIVE STATEMENT
62-year-old female history of renal transplant at [facility] 1-1/2 years ago, on chronic immunosuppression presents with fever, cough.  Patient reports his symptoms started about 2 days ago, yesterday was feeling fevers and chills with cough, spoke to her transplant team who recommended evaluation at nearest ED.  Cough is nonproductive.  Tmax today was 102.  Last Tylenol was last night.  Denies sick contacts. UOP has been normal, denies dysuria/frequency

## 2023-08-23 NOTE — ED PROVIDER NOTE - NSICDXPASTSURGICALHX_GEN_ALL_CORE_FT
PAST SURGICAL HISTORY:  Abnormal biopsy of kidney     H/O: hysterectomy     Kidney transplanted     S/P coronary angiogram 10 yrs ago, no intervention    S/P dilation and curettage uterine polyps    S/P lumpectomy of breast 2011, left breast, no lymph nodes removed    S/P tonsillectomy

## 2023-08-23 NOTE — ED PROVIDER NOTE - NSFOLLOWUPINSTRUCTIONS_ED_ALL_ED_FT
1. return for worsening symptoms or anything concerning to you  2. take all home meds as prescribed  3. follow up with your pmd call to make an appointment  4. follow up with your transplant team  5. take cefpodoxime as directed    Urinary Tract Infection, Adult  ImageA urinary tract infection (UTI) is an infection of any part of the urinary tract, which includes the kidneys, ureters, bladder, and urethra. These organs make, store, and get rid of urine in the body. UTI can be a bladder infection (cystitis) or kidney infection (pyelonephritis).    What are the causes?  This infection may be caused by fungi, viruses, or bacteria. Bacteria are the most common cause of UTIs. This condition can also be caused by repeated incomplete emptying of the bladder during urination.    What increases the risk?  This condition is more likely to develop if:    You ignore your need to urinate or hold urine for long periods of time.  You do not empty your bladder completely during urination.  You wipe back to front after urinating or having a bowel movement, if you are female.  You are uncircumcised, if you are male.  You are constipated.  You have a urinary catheter that stays in place (indwelling).  You have a weak defense (immune) system.  You have a medical condition that affects your bowels, kidneys, or bladder.  You have diabetes.  You take antibiotic medicines frequently or for long periods of time, and the antibiotics no longer work well against certain types of infections (antibiotic resistance).  You take medicines that irritate your urinary tract.  You are exposed to chemicals that irritate your urinary tract.  You are female.    What are the signs or symptoms?  Symptoms of this condition include:    Fever.  Frequent urination or passing small amounts of urine frequently.  Needing to urinate urgently.  Pain or burning with urination.  Urine that smells bad or unusual.  Cloudy urine.  Pain in the lower abdomen or back.  Trouble urinating.  Blood in the urine.  Vomiting or being less hungry than normal.  Diarrhea or abdominal pain.  Vaginal discharge, if you are female.    How is this diagnosed?  This condition is diagnosed with a medical history and physical exam. You will also need to provide a urine sample to test your urine. Other tests may be done, including:    Blood tests.  Sexually transmitted disease (STD) testing.    If you have had more than one UTI, a cystoscopy or imaging studies may be done to determine the cause of the infections.    How is this treated?  Treatment for this condition often includes a combination of two or more of the following:    Antibiotic medicine.  Other medicines to treat less common causes of UTI.  Over-the-counter medicines to treat pain.  Drinking enough water to stay hydrated.    Follow these instructions at home:  Take over-the-counter and prescription medicines only as told by your health care provider.  If you were prescribed an antibiotic, take it as told by your health care provider. Do not stop taking the antibiotic even if you start to feel better.  Avoid alcohol, caffeine, tea, and carbonated beverages. They can irritate your bladder.  Drink enough fluid to keep your urine clear or pale yellow.  Keep all follow-up visits as told by your health care provider. This is important.  ImageMake sure to:    Empty your bladder often and completely. Do not hold urine for long periods of time.  Empty your bladder before and after sex.  Wipe from front to back after a bowel movement if you are female. Use each tissue one time when you wipe.    Contact a health care provider if:  You have back pain.  You have a fever.  You feel nauseous or vomit.  Your symptoms do not get better after 3 days.  Your symptoms go away and then return.  Get help right away if:  You have severe back pain or lower abdominal pain.  You are vomiting and cannot keep down any medicines or water.  This information is not intended to replace advice given to you by your health care provider. Make sure you discuss any questions you have with your health care provider.

## 2023-08-23 NOTE — ED ADULT NURSE REASSESSMENT NOTE - NS ED NURSE REASSESS COMMENT FT1
pt seen assessed and cleared to leave by MD took report from RN at bedside@ 7:30 no distress noted pt seen assessed and cleared to leave by MD

## 2023-08-23 NOTE — ED ADULT TRIAGE NOTE - CHIEF COMPLAINT QUOTE
Pt presents to ED w/ c/o fevers, cough, SOB. Pt reports that she took Tylenol at 7 pm yesterday evening. Pt w/ temp of 99.4 in triage.  Pt has pmh of kidney transplant 1.5 years ago.

## 2023-08-26 NOTE — ED POST DISCHARGE NOTE - RESULT SUMMARY
positive urine culture, patient was treated empirically in ED and discharged on PO antibiotics, sensitivities are pending.

## 2023-08-28 LAB
CULTURE RESULTS: SIGNIFICANT CHANGE UP
CULTURE RESULTS: SIGNIFICANT CHANGE UP
SPECIMEN SOURCE: SIGNIFICANT CHANGE UP
SPECIMEN SOURCE: SIGNIFICANT CHANGE UP

## 2023-09-14 NOTE — PROGRESS NOTE BEHAVIORAL HEALTH - RISK ASSESSMENT
see previous note Minoxidil Pregnancy And Lactation Text: This medication has not been assigned a Pregnancy Risk Category but animal studies failed to show danger with the topical medication. It is unknown if the medication is excreted in breast milk.

## 2023-09-25 ENCOUNTER — INPATIENT (INPATIENT)
Facility: HOSPITAL | Age: 63
LOS: 1 days | Discharge: ANOTHER HOSPITAL | DRG: 720 | End: 2023-09-27
Attending: INTERNAL MEDICINE | Admitting: INTERNAL MEDICINE
Payer: COMMERCIAL

## 2023-09-25 VITALS
RESPIRATION RATE: 18 BRPM | TEMPERATURE: 103 F | WEIGHT: 179.9 LBS | DIASTOLIC BLOOD PRESSURE: 86 MMHG | HEIGHT: 65 IN | HEART RATE: 130 BPM | SYSTOLIC BLOOD PRESSURE: 149 MMHG | OXYGEN SATURATION: 99 %

## 2023-09-25 DIAGNOSIS — R89.7 ABNORMAL HISTOLOGICAL FINDINGS IN SPECIMENS FROM OTHER ORGANS, SYSTEMS AND TISSUES: Chronic | ICD-10-CM

## 2023-09-25 DIAGNOSIS — Z90.710 ACQUIRED ABSENCE OF BOTH CERVIX AND UTERUS: Chronic | ICD-10-CM

## 2023-09-25 DIAGNOSIS — Z94.0 KIDNEY TRANSPLANT STATUS: Chronic | ICD-10-CM

## 2023-09-25 LAB
ALBUMIN SERPL ELPH-MCNC: 3.6 G/DL — SIGNIFICANT CHANGE UP (ref 3.3–5)
ALP SERPL-CCNC: 132 U/L — HIGH (ref 40–120)
ALT FLD-CCNC: 46 U/L — SIGNIFICANT CHANGE UP (ref 12–78)
ANION GAP SERPL CALC-SCNC: 7 MMOL/L — SIGNIFICANT CHANGE UP (ref 5–17)
ANISOCYTOSIS BLD QL: SLIGHT — SIGNIFICANT CHANGE UP
APPEARANCE UR: CLEAR — SIGNIFICANT CHANGE UP
APTT BLD: 31.5 SEC — SIGNIFICANT CHANGE UP (ref 24.5–35.6)
AST SERPL-CCNC: 35 U/L — SIGNIFICANT CHANGE UP (ref 15–37)
BACTERIA # UR AUTO: ABNORMAL
BASE EXCESS BLDV CALC-SCNC: 2.4 MMOL/L — SIGNIFICANT CHANGE UP (ref -2–3)
BASOPHILS # BLD AUTO: 0 K/UL — SIGNIFICANT CHANGE UP (ref 0–0.2)
BASOPHILS NFR BLD AUTO: 0 % — SIGNIFICANT CHANGE UP (ref 0–2)
BILIRUB SERPL-MCNC: 0.6 MG/DL — SIGNIFICANT CHANGE UP (ref 0.2–1.2)
BILIRUB UR-MCNC: NEGATIVE — SIGNIFICANT CHANGE UP
BUN SERPL-MCNC: 19 MG/DL — SIGNIFICANT CHANGE UP (ref 7–23)
CALCIUM SERPL-MCNC: 9.4 MG/DL — SIGNIFICANT CHANGE UP (ref 8.5–10.1)
CHLORIDE SERPL-SCNC: 104 MMOL/L — SIGNIFICANT CHANGE UP (ref 96–108)
CO2 SERPL-SCNC: 25 MMOL/L — SIGNIFICANT CHANGE UP (ref 22–31)
COLOR SPEC: YELLOW — SIGNIFICANT CHANGE UP
CREAT SERPL-MCNC: 1.38 MG/DL — HIGH (ref 0.5–1.3)
DIFF PNL FLD: ABNORMAL
EGFR: 43 ML/MIN/1.73M2 — LOW
EOSINOPHIL # BLD AUTO: 0.01 K/UL — SIGNIFICANT CHANGE UP (ref 0–0.5)
EOSINOPHIL NFR BLD AUTO: 1 % — SIGNIFICANT CHANGE UP (ref 0–6)
EPI CELLS # UR: SIGNIFICANT CHANGE UP
FLUAV AG NPH QL: SIGNIFICANT CHANGE UP
FLUBV AG NPH QL: SIGNIFICANT CHANGE UP
GLUCOSE SERPL-MCNC: 126 MG/DL — HIGH (ref 70–99)
GLUCOSE UR QL: NEGATIVE — SIGNIFICANT CHANGE UP
HCO3 BLDV-SCNC: 27 MMOL/L — SIGNIFICANT CHANGE UP (ref 22–29)
HCT VFR BLD CALC: 36.6 % — SIGNIFICANT CHANGE UP (ref 34.5–45)
HGB BLD-MCNC: 12.4 G/DL — SIGNIFICANT CHANGE UP (ref 11.5–15.5)
INR BLD: 1.78 RATIO — HIGH (ref 0.85–1.18)
KETONES UR-MCNC: NEGATIVE — SIGNIFICANT CHANGE UP
LACTATE SERPL-SCNC: 0.8 MMOL/L — SIGNIFICANT CHANGE UP (ref 0.7–2)
LEUKOCYTE ESTERASE UR-ACNC: ABNORMAL
LYMPHOCYTES # BLD AUTO: 0.14 K/UL — LOW (ref 1–3.3)
LYMPHOCYTES # BLD AUTO: 19 % — SIGNIFICANT CHANGE UP (ref 13–44)
MANUAL SMEAR VERIFICATION: SIGNIFICANT CHANGE UP
MCHC RBC-ENTMCNC: 32.5 PG — SIGNIFICANT CHANGE UP (ref 27–34)
MCHC RBC-ENTMCNC: 33.9 GM/DL — SIGNIFICANT CHANGE UP (ref 32–36)
MCV RBC AUTO: 96.1 FL — SIGNIFICANT CHANGE UP (ref 80–100)
MONOCYTES # BLD AUTO: 0.1 K/UL — SIGNIFICANT CHANGE UP (ref 0–0.9)
MONOCYTES NFR BLD AUTO: 13 % — SIGNIFICANT CHANGE UP (ref 2–14)
NEUTROPHILS # BLD AUTO: 0.49 K/UL — LOW (ref 1.8–7.4)
NEUTROPHILS NFR BLD AUTO: 65 % — SIGNIFICANT CHANGE UP (ref 43–77)
NITRITE UR-MCNC: NEGATIVE — SIGNIFICANT CHANGE UP
NRBC # BLD: 0 /100 — SIGNIFICANT CHANGE UP (ref 0–0)
NRBC # BLD: SIGNIFICANT CHANGE UP /100 WBCS (ref 0–0)
PCO2 BLDV: 42 MMHG — SIGNIFICANT CHANGE UP (ref 39–42)
PH BLDV: 7.42 — SIGNIFICANT CHANGE UP (ref 7.32–7.43)
PH UR: 6 — SIGNIFICANT CHANGE UP (ref 5–8)
PLAT MORPH BLD: NORMAL — SIGNIFICANT CHANGE UP
PLATELET # BLD AUTO: 154 K/UL — SIGNIFICANT CHANGE UP (ref 150–400)
PLATELET COUNT - ESTIMATE: NORMAL — SIGNIFICANT CHANGE UP
PO2 BLDV: 52 MMHG — HIGH (ref 25–45)
POLYCHROMASIA BLD QL SMEAR: SLIGHT — SIGNIFICANT CHANGE UP
POTASSIUM SERPL-MCNC: 3.9 MMOL/L — SIGNIFICANT CHANGE UP (ref 3.5–5.3)
POTASSIUM SERPL-SCNC: 3.9 MMOL/L — SIGNIFICANT CHANGE UP (ref 3.5–5.3)
PROT SERPL-MCNC: 7.3 GM/DL — SIGNIFICANT CHANGE UP (ref 6–8.3)
PROT UR-MCNC: 30 MG/DL
PROTHROM AB SERPL-ACNC: 19.8 SEC — HIGH (ref 9.5–13)
RBC # BLD: 3.81 M/UL — SIGNIFICANT CHANGE UP (ref 3.8–5.2)
RBC # FLD: 15.6 % — HIGH (ref 10.3–14.5)
RBC BLD AUTO: ABNORMAL
RBC CASTS # UR COMP ASSIST: ABNORMAL /HPF (ref 0–4)
RSV RNA NPH QL NAA+NON-PROBE: SIGNIFICANT CHANGE UP
SAO2 % BLDV: 83 % — SIGNIFICANT CHANGE UP (ref 67–88)
SARS-COV-2 RNA SPEC QL NAA+PROBE: SIGNIFICANT CHANGE UP
SODIUM SERPL-SCNC: 136 MMOL/L — SIGNIFICANT CHANGE UP (ref 135–145)
SP GR SPEC: 1.01 — SIGNIFICANT CHANGE UP (ref 1.01–1.02)
UROBILINOGEN FLD QL: NEGATIVE — SIGNIFICANT CHANGE UP
VARIANT LYMPHS # BLD: 2 % — SIGNIFICANT CHANGE UP (ref 0–6)
WBC # BLD: 0.76 K/UL — CRITICAL LOW (ref 3.8–10.5)
WBC # FLD AUTO: 0.76 K/UL — CRITICAL LOW (ref 3.8–10.5)
WBC UR QL: ABNORMAL /HPF (ref 0–5)

## 2023-09-25 PROCEDURE — 93010 ELECTROCARDIOGRAM REPORT: CPT

## 2023-09-25 PROCEDURE — 74176 CT ABD & PELVIS W/O CONTRAST: CPT | Mod: 26,MA

## 2023-09-25 PROCEDURE — 71045 X-RAY EXAM CHEST 1 VIEW: CPT | Mod: 26

## 2023-09-25 PROCEDURE — 99285 EMERGENCY DEPT VISIT HI MDM: CPT

## 2023-09-25 RX ORDER — ACETAMINOPHEN 500 MG
650 TABLET ORAL EVERY 6 HOURS
Refills: 0 | Status: DISCONTINUED | OUTPATIENT
Start: 2023-09-25 | End: 2023-09-25

## 2023-09-25 RX ORDER — ONDANSETRON 8 MG/1
4 TABLET, FILM COATED ORAL EVERY 6 HOURS
Refills: 0 | Status: DISCONTINUED | OUTPATIENT
Start: 2023-09-25 | End: 2023-09-27

## 2023-09-25 RX ORDER — SODIUM CHLORIDE 9 MG/ML
1750 INJECTION INTRAMUSCULAR; INTRAVENOUS; SUBCUTANEOUS ONCE
Refills: 0 | Status: COMPLETED | OUTPATIENT
Start: 2023-09-25 | End: 2023-09-25

## 2023-09-25 RX ORDER — METRONIDAZOLE 500 MG
500 TABLET ORAL ONCE
Refills: 0 | Status: DISCONTINUED | OUTPATIENT
Start: 2023-09-25 | End: 2023-09-25

## 2023-09-25 RX ORDER — VANCOMYCIN HCL 1 G
1250 VIAL (EA) INTRAVENOUS ONCE
Refills: 0 | Status: COMPLETED | OUTPATIENT
Start: 2023-09-25 | End: 2023-09-25

## 2023-09-25 RX ORDER — L.ACIDOPH/B.ANIMALIS/B.LONGUM 15B CELL
1 CAPSULE ORAL
Qty: 0 | Refills: 0 | DISCHARGE

## 2023-09-25 RX ORDER — UBIDECARENONE 100 MG
1 CAPSULE ORAL
Qty: 0 | Refills: 0 | DISCHARGE

## 2023-09-25 RX ORDER — CEFEPIME 1 G/1
2000 INJECTION, POWDER, FOR SOLUTION INTRAMUSCULAR; INTRAVENOUS ONCE
Refills: 0 | Status: COMPLETED | OUTPATIENT
Start: 2023-09-25 | End: 2023-09-25

## 2023-09-25 RX ORDER — ACETAMINOPHEN 500 MG
1000 TABLET ORAL ONCE
Refills: 0 | Status: COMPLETED | OUTPATIENT
Start: 2023-09-25 | End: 2023-09-25

## 2023-09-25 RX ORDER — VANCOMYCIN HCL 1 G
1000 VIAL (EA) INTRAVENOUS ONCE
Refills: 0 | Status: DISCONTINUED | OUTPATIENT
Start: 2023-09-25 | End: 2023-09-25

## 2023-09-25 RX ORDER — OMEGA-3 ACID ETHYL ESTERS 1 G
1 CAPSULE ORAL
Qty: 0 | Refills: 0 | DISCHARGE

## 2023-09-25 RX ORDER — CEFEPIME 1 G/1
2000 INJECTION, POWDER, FOR SOLUTION INTRAMUSCULAR; INTRAVENOUS ONCE
Refills: 0 | Status: DISCONTINUED | OUTPATIENT
Start: 2023-09-25 | End: 2023-09-25

## 2023-09-25 RX ADMIN — SODIUM CHLORIDE 1750 MILLILITER(S): 9 INJECTION INTRAMUSCULAR; INTRAVENOUS; SUBCUTANEOUS at 23:22

## 2023-09-25 RX ADMIN — Medication 1000 MILLIGRAM(S): at 22:35

## 2023-09-25 RX ADMIN — Medication 166.67 MILLIGRAM(S): at 22:34

## 2023-09-25 RX ADMIN — ONDANSETRON 4 MILLIGRAM(S): 8 TABLET, FILM COATED ORAL at 22:20

## 2023-09-25 RX ADMIN — Medication 1250 MILLIGRAM(S): at 23:34

## 2023-09-25 RX ADMIN — CEFEPIME 2000 MILLIGRAM(S): 1 INJECTION, POWDER, FOR SOLUTION INTRAMUSCULAR; INTRAVENOUS at 22:21

## 2023-09-25 RX ADMIN — SODIUM CHLORIDE 1750 MILLILITER(S): 9 INJECTION INTRAMUSCULAR; INTRAVENOUS; SUBCUTANEOUS at 22:22

## 2023-09-25 RX ADMIN — Medication 400 MILLIGRAM(S): at 22:20

## 2023-09-25 NOTE — PHARMACOTHERAPY INTERVENTION NOTE - COMMENTS
eMERGENCY dEPARTMENT eNCOUnter      PCP: No primary care provider on file. CHIEF COMPLAINT    Chief Complaint   Patient presents with    Fatigue    Generalized Body Aches    Headache       HPI    Dione Don is a 47 y.o. male who presents with generalized fatigue, body aches, headache and cough. Symptoms have been ongoing for the past several days. He states that he has been around people that have tested positive for COVID-19 is concerned for similar. Cough is nonproductive. No associated chest pain or shortness of breath. No known fevers. No nausea, vomiting or diarrhea. No abdominal pain. REVIEW OF SYSTEMS    Constitutional:  Denies fever, chills  HEENT:  See above  Cardiovascular:  Denies chest pain, palpitations.   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    Pneumonia     Psychiatric problem      Past Surgical History:   Procedure Laterality Date    COSMETIC SURGERY      nose    FRACTURE SURGERY      nose    TONSILLECTOMY         CURRENT MEDICATIONS    Current Outpatient Rx   Medication Sig Dispense Refill    benzonatate (TESSALON) 100 MG capsule Take 1 capsule by mouth 2 times daily as needed for Cough 20 capsule 0    ibuprofen (ADVIL;MOTRIN) 600 MG tablet Take 1 tablet by mouth every 6 hours as needed for Pain 30 tablet 0    acetaminophen (APAP EXTRA STRENGTH) 500 MG tablet Take 1 tablet by mouth every 6 hours as needed for Pain 30 tablet 0    divalproex (DEPAKOTE ER) 500 MG ER tablet Take 2 tablets by mouth daily 60 tablet 0    buPROPion
vancomycin dose adjusted based on first dose protocol
(WELLBUTRIN XL) 150 MG XL tablet Take 1 tablet by mouth daily 30 tablet 0    traZODone (DESYREL) 50 MG tablet Take 1 tablet by mouth nightly as needed for Sleep 30 tablet 0    lidocaine (LIDODERM) 5 % Place 1 patch onto the skin daily 12 hours on, 12 hours off.  10 patch 0    ARIPiprazole (ABILIFY) 20 MG TABS Take 20 mg by mouth daily      DULoxetine (CYMBALTA) 60 MG CPEP Take 120 mg by mouth daily         ALLERGIES    No Known Allergies    FAMILY AND SOCIAL HISTORY    Family History   Problem Relation Age of Onset    High Blood Pressure Mother     Early Death Father     Mental Illness Father      Social History     Socioeconomic History    Marital status: Legally      Spouse name: None    Number of children: 10    Years of education: None    Highest education level: None   Occupational History    None   Social Needs    Financial resource strain: None    Food insecurity     Worry: None     Inability: None    Transportation needs     Medical: None     Non-medical: None   Tobacco Use    Smoking status: Current Every Day Smoker     Packs/day: 0.50     Types: Cigarettes    Smokeless tobacco: Never Used   Substance and Sexual Activity    Alcohol use: Yes     Comment: occasioanl use    Drug use: No    Sexual activity: Yes     Partners: Female   Lifestyle    Physical activity     Days per week: None     Minutes per session: None    Stress: None   Relationships    Social connections     Talks on phone: None     Gets together: None     Attends Congregational service: None     Active member of club or organization: None     Attends meetings of clubs or organizations: None     Relationship status: None    Intimate partner violence     Fear of current or ex partner: None     Emotionally abused: None     Physically abused: None     Forced sexual activity: None   Other Topics Concern    None   Social History Narrative    None       PHYSICAL EXAM    VITAL SIGNS: BP (!) 125/90   Pulse 71   Temp 97.7 °F
Medication history complete. Medications and allergies reviewed with patient and confirmed with . 
(36.5 °C) (Oral)   Resp 20   Ht 6' 1\" (1.854 m)   Wt 165 lb (74.8 kg)   SpO2 99%   BMI 21.77 kg/m²   Constitutional:  Well developed, well nourished, no acute distress, non-toxic appearance   Eyes: Conjunctiva normal, sclera non-icteric  HEENT:     - Normocephalic, atraumatic   - PERRL, EOM intact. Conjunctiva normal   Neck/Lymphatics:    - supple, no JVD, no swollen nodes     Respiratory:     Clear to auscultation bilateral lung fields, no wheezes/rales/rhonchi. No retractions, no accessory muscle use  Cardiovascular:  Normal rate, normal rhythm   GI:  Soft, no abdominal tenderness, no guarding. Musculoskeletal:  No obvious deficits, no edema   Integument:  Skin pink, warm, dry, intact       RADIOLOGY/PROCEDURES    Chest xray:   XR CHEST PORTABLE   Final Result   1. No acute cardiopulmonary disease. No results found for this visit on 09/07/20. ED COURSE & MEDICAL DECISION MAKING       Vital signs and nursing notes reviewed during ED course. I have independently evaluated this patient. Supervising MD present in the Emergency Department, available for consultation, throughout entirety of  patient care. All pertinent Lab data and radiographic results reviewed with patient at bedside. Patient presents as above with constellation of symptoms. He is hemodynamically stable, afebrile, not tachycardic and oxygenating at 97% on arrival on room air here in the ED. Overall very well-appearing on exam.  Lungs clear. Chest x-ray without acute cardiopulmonary process. Yogesh Forte COVID-19 swab obtained here in the ED with recent exposure presenting symptoms. At this time, believe that he is appropriate for further outpatient evaluation and treatment. Suspect likely viral process. Low suspicion for ACS as he has no significant risk factors or associated chest pain or shortness of breath.   We discussed immediately returning with any new or worsening symptoms, discussed symptoms may rapidly progressed if
this is COVID-19. Patient agreeable with this plan and comfortable with discharge with symptomatic medications. Diagnosis, disposition, and plan discussed in detail with patient who understands and agrees. Patient understands and agrees to follow up with PCP in the next 2-3 days for recheck. Patient understands and agrees to return emergency department if symptoms worsen or any new symptoms develop. Vitals:    09/07/20 1330 09/07/20 1400 09/07/20 1458 09/07/20 1642   BP:  95/61 110/82 (!) 125/90   Pulse:   71    Resp:   18 20   Temp:       TempSrc:       SpO2: 96% 96% 98% 99%   Weight:       Height:           Clinical  IMPRESSION    1. Close exposure to COVID-19 virus    2. Cough    3. Fatigue, unspecified type            Comment: Please note this report has been produced using speech recognition software and may contain errors related to that system including errors in grammar, punctuation, and spelling, as well as words and phrases that may be inappropriate. If there are any questions or concerns please feel free to contact the dictating provider for clarification.         FARNAZ Gaytan  09/07/20 [id number]

## 2023-09-25 NOTE — ED STATDOCS - PROGRESS NOTE DETAILS
Lorraine White for attending Dr. Grace: 63 y/o female with PMHx of HLD, kidney transplant 4/2022 presents to the ED c/o urinary symptoms, fever tmax 102. Patient states she has recurrent UTIs, thinks she has one now. Endorses kidney pain. Denies back pain, N/V. Patient states she is neutropenic. Took anti-rejection medication today. Will send to main for further evaluation. Lorraine White for attending Dr. Grace: 63 y/o female with PMHx of HLD, kidney transplant 4/2022 presents to the ED c/o urinary symptoms, fever tmax 102. Patient states she has recurrent UTIs, thinks she has one now. Endorses kidney pain. Denies back pain, N/V. Patient states she is neutropenic. Took anti-rejection medication today. Pt febrile, tachycardic, possible immunocompromised, Will send to main for further evaluation.

## 2023-09-25 NOTE — ED PROVIDER NOTE - ATTENDING CONTRIBUTION TO CARE
I, Remberto Barajas MD, personally saw the patient with the resident, and completed the key components of the history and physical exam. I then discussed the management plan with the resident.     62F hx renal transplant here with urinary symptoms, fever to 103. Tachycardia, normotensive.  Sepsis protocol - fluids, labs, abx, admit

## 2023-09-25 NOTE — ED PROVIDER NOTE - PHYSICAL EXAMINATION
Gen: NAD, non-toxic appearing  Head: normal appearing  HEENT: normal conjunctiva  Lung: no respiratory distress, speaking in full sentences, ctab      CV: regular rate and rhythm, no murmurs  Abd: soft, non distended,  + LLQ tenderness  MSK: no visible deformities  Neuro: alert and grossly oriented, no gross motor deficits  Skin: No cynthia rashes,   A full skin exam was done, there are no lesions over the , perineum or perianal area.  The skin over the back is clear.

## 2023-09-25 NOTE — ED PROVIDER NOTE - OBJECTIVE STATEMENT
62-year-old female, history of immunosuppression sent to secondary to renal transplantation done last year, currently on mycophenolate, prednisone and etanercept, presenting with a chief complaint of generalized fatigue and fever.  Ongoing for the past 2 days.  Reports nonspecific urinary symptoms, no cynthia dysuria.  Associated left lower quadrant pain.  Associated cough.  No back pain or chest pain or shortness of breath.  No cynthia rash.  No neck pain.  No allergies to medications.  No recent changes to her medications.

## 2023-09-25 NOTE — ED PROVIDER NOTE - CARE PLAN
1 Principal Discharge DX:	Clinical sepsis  Secondary Diagnosis:	Fever   Principal Discharge DX:	Clinical sepsis  Secondary Diagnosis:	Fever  Secondary Diagnosis:	Neutropenic fever

## 2023-09-25 NOTE — ED PROVIDER NOTE - NS ED ROS FT
GENERAL: + fever  EYES: no eye pain  HEENT: no neck pain  CARDIAC: no chest pain  PULMONARY: no SOB  GI: + abdominal pain  : + frequency  SKIN: no rashes  NEURO: no headache  MSK: no new joint pain

## 2023-09-25 NOTE — ED ADULT NURSE NOTE - OBJECTIVE STATEMENT
pt presented to er c/o urinary symptoms. pt states she had a kidney transplant april 2022 and has had frequent UTIs since. pt states she takes an injection for it that causes her to be neutropenic. pt states she is currently not having urinary symptoms but states "I just don't feel right". pt endorsing abdominal pain, fevers. pt denies cp, sob. neutropenic precautions in place.

## 2023-09-25 NOTE — ED STATDOCS - NS_ ATTENDINGSCRIBEDETAILS _ED_A_ED_FT
I, Gilbert Grace MD, performed the initial face to face bedside interview with this patient regarding history of present illness and determined that the patient should be seen in the main ED.  The history, was documented by the scribe in my presence and I attest to the accuracy of the documentation.

## 2023-09-25 NOTE — ED PROVIDER NOTE - CLINICAL SUMMARY MEDICAL DECISION MAKING FREE TEXT BOX
Adult female status post kidney transplant on immunosuppression presenting with fever in the context of tachycardia.  Hemodynamically stable otherwise.  Febrile here to 103.9.  Currently meets clinical categorization for sepsis, currently of unknown source.  Empiric treatment for same via broad-spectrum antibiotics and 30 cc/kg fluid bolus.  Will obtain lactate to guide resuscitation.  We will go searching for likely sources of bacterial infection, possibilities include pneumonia, urinary tract infection, abdominal sepsis, bacteremia.  No suggestion of meningitis or encephalitis.  No evidence of soft tissue infection on exam.  Will obtain CT chest abdomen pelvis as well as urine analysis, urine culture, blood culture, chest x-ray, EKG.  Admission for sepsis. Adult female status post kidney transplant on immunosuppression presenting with fever in the context of tachycardia.  Hemodynamically stable otherwise.  Febrile here to 103.9.  Currently meets clinical categorization for sepsis, currently of unknown source.  Empiric treatment for same via broad-spectrum antibiotics and 30 cc/kg fluid bolus.  Will obtain lactate to guide resuscitation.  We will go searching for likely sources of bacterial infection, possibilities include pneumonia, urinary tract infection, abdominal sepsis, bacteremia.  No suggestion of meningitis or encephalitis.  No evidence of soft tissue infection on exam.  Will obtain CT abdomen pelvis as well as urine analysis, urine culture, blood culture, chest x-ray, EKG.  Admission for sepsis. Adult female status post kidney transplant on immunosuppression presenting with fever in the context of tachycardia.  Hemodynamically stable otherwise.  Febrile here to 103.9.  Currently meets clinical categorization for sepsis, currently of unknown source.  Empiric treatment for same via broad-spectrum antibiotics and 30 cc/kg fluid bolus.  Will obtain lactate to guide resuscitation.  We will go searching for likely sources of bacterial infection, possibilities include pneumonia, urinary tract infection, abdominal sepsis, bacteremia.  No suggestion of meningitis or encephalitis.  No evidence of soft tissue infection on exam.  Will obtain CT abdomen pelvis as well as urine analysis, urine culture, blood culture, chest x-ray, EKG.  Admission for sepsis.  Pamela - assumed care. +neutropenia, ANC on my calculation 494. Lactate neg. HR improved with IVF and apap. HDS in ED.   Given history of renal transplant with neutropenic fever, UTI with concern for transplant infection, I spoke with the [facility] transfer center, on-call transplant nephrologist Dr Alvarez. Also spoke with on call Hospitalist Dr Ruggiero who accepted pt for transfer.  unable to transfer at this time as there is no bed availability with neutropenic precautions at [facility] facilities currently, both [place] and [place].  Patient placed on emergent wait list for transplant to medical service at [facility]  Discussed case with hospitalist here for admission

## 2023-09-25 NOTE — ED ADULT NURSE NOTE - NSFALLHARMRISKINTERV_ED_ALL_ED

## 2023-09-25 NOTE — ED ADULT TRIAGE NOTE - CHIEF COMPLAINT QUOTE
PT C/O URINARY SYMPTOMS, FEVER, TMAX 102.. DENIES CP/SOB/N/V/D. PT IS NEUTROPENIC. PMH: kIDNEY TRANSPLANT, 4/22, NEUTROPENIA.

## 2023-09-26 ENCOUNTER — TRANSCRIPTION ENCOUNTER (OUTPATIENT)
Age: 63
End: 2023-09-26

## 2023-09-26 DIAGNOSIS — A41.9 SEPSIS, UNSPECIFIED ORGANISM: ICD-10-CM

## 2023-09-26 LAB
MRSA PCR RESULT.: SIGNIFICANT CHANGE UP
S AUREUS DNA NOSE QL NAA+PROBE: SIGNIFICANT CHANGE UP

## 2023-09-26 PROCEDURE — 99239 HOSP IP/OBS DSCHRG MGMT >30: CPT

## 2023-09-26 PROCEDURE — 99223 1ST HOSP IP/OBS HIGH 75: CPT

## 2023-09-26 RX ORDER — ACETAMINOPHEN 500 MG
650 TABLET ORAL EVERY 6 HOURS
Refills: 0 | Status: DISCONTINUED | OUTPATIENT
Start: 2023-09-26 | End: 2023-09-27

## 2023-09-26 RX ORDER — HALOPERIDOL DECANOATE 100 MG/ML
1 INJECTION INTRAMUSCULAR DAILY
Refills: 0 | Status: DISCONTINUED | OUTPATIENT
Start: 2023-09-26 | End: 2023-09-27

## 2023-09-26 RX ORDER — CEFTRIAXONE 500 MG/1
1000 INJECTION, POWDER, FOR SOLUTION INTRAMUSCULAR; INTRAVENOUS EVERY 24 HOURS
Refills: 0 | Status: DISCONTINUED | OUTPATIENT
Start: 2023-09-26 | End: 2023-09-27

## 2023-09-26 RX ORDER — SODIUM CHLORIDE 9 MG/ML
500 INJECTION, SOLUTION INTRAVENOUS ONCE
Refills: 0 | Status: COMPLETED | OUTPATIENT
Start: 2023-09-26 | End: 2023-09-26

## 2023-09-26 RX ORDER — CHOLECALCIFEROL (VITAMIN D3) 125 MCG
1000 CAPSULE ORAL DAILY
Refills: 0 | Status: DISCONTINUED | OUTPATIENT
Start: 2023-09-26 | End: 2023-09-27

## 2023-09-26 RX ORDER — VALGANCICLOVIR 450 MG/1
900 TABLET, FILM COATED ORAL
Refills: 0 | Status: DISCONTINUED | OUTPATIENT
Start: 2023-09-26 | End: 2023-09-26

## 2023-09-26 RX ORDER — CLONAZEPAM 1 MG
0.5 TABLET ORAL DAILY
Refills: 0 | Status: DISCONTINUED | OUTPATIENT
Start: 2023-09-26 | End: 2023-09-27

## 2023-09-26 RX ORDER — MYCOPHENOLATE MOFETIL 250 MG/1
1 CAPSULE ORAL
Refills: 0 | DISCHARGE

## 2023-09-26 RX ORDER — FILGRASTIM 480MCG/1.6
480 VIAL (ML) INJECTION ONCE
Refills: 0 | Status: COMPLETED | OUTPATIENT
Start: 2023-09-26 | End: 2023-09-26

## 2023-09-26 RX ORDER — CEFTRIAXONE 500 MG/1
1000 INJECTION, POWDER, FOR SOLUTION INTRAMUSCULAR; INTRAVENOUS EVERY 24 HOURS
Refills: 0 | Status: DISCONTINUED | OUTPATIENT
Start: 2023-09-26 | End: 2023-09-26

## 2023-09-26 RX ORDER — MAGNESIUM OXIDE 400 MG ORAL TABLET 241.3 MG
400 TABLET ORAL
Refills: 0 | Status: DISCONTINUED | OUTPATIENT
Start: 2023-09-26 | End: 2023-09-27

## 2023-09-26 RX ORDER — MYCOPHENOLATE MOFETIL 250 MG/1
250 CAPSULE ORAL
Refills: 0 | Status: DISCONTINUED | OUTPATIENT
Start: 2023-09-26 | End: 2023-09-27

## 2023-09-26 RX ORDER — APIXABAN 2.5 MG/1
5 TABLET, FILM COATED ORAL EVERY 12 HOURS
Refills: 0 | Status: DISCONTINUED | OUTPATIENT
Start: 2023-09-26 | End: 2023-09-27

## 2023-09-26 RX ADMIN — Medication 650 MILLIGRAM(S): at 12:23

## 2023-09-26 RX ADMIN — Medication 650 MILLIGRAM(S): at 08:55

## 2023-09-26 RX ADMIN — Medication 480 MICROGRAM(S): at 13:10

## 2023-09-26 RX ADMIN — APIXABAN 5 MILLIGRAM(S): 2.5 TABLET, FILM COATED ORAL at 10:53

## 2023-09-26 RX ADMIN — Medication 1 TABLET(S): at 10:53

## 2023-09-26 RX ADMIN — MYCOPHENOLATE MOFETIL 250 MILLIGRAM(S): 250 CAPSULE ORAL at 11:23

## 2023-09-26 RX ADMIN — MYCOPHENOLATE MOFETIL 250 MILLIGRAM(S): 250 CAPSULE ORAL at 21:43

## 2023-09-26 RX ADMIN — Medication 650 MILLIGRAM(S): at 05:30

## 2023-09-26 RX ADMIN — HALOPERIDOL DECANOATE 1 MILLIGRAM(S): 100 INJECTION INTRAMUSCULAR at 10:53

## 2023-09-26 RX ADMIN — Medication 650 MILLIGRAM(S): at 14:27

## 2023-09-26 RX ADMIN — Medication 7.5 MILLIGRAM(S): at 12:11

## 2023-09-26 RX ADMIN — SODIUM CHLORIDE 500 MILLILITER(S): 9 INJECTION, SOLUTION INTRAVENOUS at 02:10

## 2023-09-26 RX ADMIN — APIXABAN 5 MILLIGRAM(S): 2.5 TABLET, FILM COATED ORAL at 21:43

## 2023-09-26 RX ADMIN — MAGNESIUM OXIDE 400 MG ORAL TABLET 400 MILLIGRAM(S): 241.3 TABLET ORAL at 17:58

## 2023-09-26 RX ADMIN — Medication 1000 UNIT(S): at 11:22

## 2023-09-26 RX ADMIN — CEFTRIAXONE 1000 MILLIGRAM(S): 500 INJECTION, POWDER, FOR SOLUTION INTRAMUSCULAR; INTRAVENOUS at 12:10

## 2023-09-26 NOTE — CONSULT NOTE ADULT - SUBJECTIVE AND OBJECTIVE BOX
HPI:  62-year-old female, history of immunosuppression sent to secondary to renal transplantation done last year - 2022, currently on mycophenolate, prednisone and etanercept, presenting with a chief complaint of generalized fatigue and fever.  Ongoing for the past 2 days.  Reports nonspecific urinary symptoms, no cynthia dysuria. Associated left lower quadrant pain. Noted with fever, wbc ct 0.76, Cr 1.38, UA positive, Imaging remarkable for Inflammatory change surrounds the renal transplant and transplanted ureter. Differential considerations include ascending urinary tract infection, transplant rejection and arterial stenosis. given IV vancomycin/cefepime in ER.         PAST MEDICAL HISTORY:  Bipolar disorder     Breast CA DCIS, left breast, s/p RT    Cardiac murmur     Degenerative disc disease, lumbar     Depression     GERD (gastroesophageal reflux disease)     Gout     History of secondary hyperparathyroidism     HLD (hyperlipidemia)     Kidney disease "stage 4" as per patient secondary to lithium treatment many years ago    Lyme disease     MGUS (monoclonal gammopathy of unknown significance)     Uterine leiomyoma.     PAST SURGICAL HISTORY:  Abnormal biopsy of kidney     H/O: hysterectomy     Kidney transplanted     S/P coronary angiogram 10 yrs ago, no intervention    S/P dilation and curettage uterine polyps    S/P lumpectomy of breast , left breast, no lymph nodes removed    S/P tonsillectomy.    Meds: per reconciliation sheet, noted below  MEDICATIONS  (STANDING):  apixaban 5 milliGRAM(s) Oral every 12 hours  cefTRIAXone   IVPB 1000 milliGRAM(s) IV Intermittent every 24 hours  cholecalciferol 1000 Unit(s) Oral daily  haloperidol     Tablet 1 milliGRAM(s) Oral daily  magnesium oxide 400 milliGRAM(s) Oral two times a day with meals  multivitamin 1 Tablet(s) Oral daily  mycophenolate mofetil 250 milliGRAM(s) Oral two times a day  predniSONE   Tablet 7.5 milliGRAM(s) Oral daily  valGANciclovir 900 milliGRAM(s) Oral two times a day      Allergies    No Known Allergies    Intolerances      Social: no smoking, no alcohol, no illegal drugs; no recent travel, no exposure to TB  FAMILY HISTORY:     no history of premature cardiovascular disease in first degree relatives    ROS:+ fever, chills, no HA, no dizziness, no sore throat, no blurry vision, no CP, no palpitations, no SOB, no cough, no abdominal pain, no diarrhea, no N/V, + dysuria, no leg pain, no claudication, no rash, no joint aches, no rectal pain or bleeding, no night sweats    All other systems reviewed and are negative    Vital Signs Last 24 Hrs  T(C): 37.9 (26 Sep 2023 08:55), Max: 39.5 (25 Sep 2023 21:12)  T(F): 100.2 (26 Sep 2023 08:55), Max: 103.1 (25 Sep 2023 21:12)  HR: 78 (26 Sep 2023 04:27) (66 - 130)  BP: 112/61 (26 Sep 2023 08:00) (88/55 - 149/86)  BP(mean): 75 (26 Sep 2023 08:00) (63 - 120)  RR: 22 (26 Sep 2023 04:04) (17 - 22)  SpO2: 94% (26 Sep 2023 04:04) (90% - 99%)    Parameters below as of 26 Sep 2023 04:04  Patient On (Oxygen Delivery Method): room air      Daily Height in cm: 165.1 (25 Sep 2023 21:12)    Daily Weight in k.7 (26 Sep 2023 04:27)    PE:  Constitutional: NAD   HEENT: NC/AT, EOMI, PERRLA, conjunctivae clear; ears and nose atraumatic; pharynx benign  Neck: supple; thyroid not palpable  Back: no tenderness  Respiratory: respiratory effort normal; clear to auscultation  Cardiovascular: S1S2 regular, no murmurs  Abdomen: soft, not tender, not distended, positive BS; liver and spleen WNL  Genitourinary: no suprapubic tenderness + flank pain  Lymphatic: no LN palpable  Musculoskeletal: no muscle tenderness, no joint swelling or tenderness  Extremities: no pedal edema  Neurological/ Psychiatric: AxOx3, Judgement and insight normal;  moving all extremities  Skin: no rashes; no palpable lesions    Labs: all available labs reviewed                        12.4   0.76  )-----------( 154      ( 25 Sep 2023 21:57 )             36.6     -    136  |  104  |  19  ----------------------------<  126<H>  3.9   |  25  |  1.38<H>    Ca    9.4      25 Sep 2023 21:57    TPro  7.3  /  Alb  3.6  /  TBili  0.6  /  DBili  x   /  AST  35  /  ALT  46  /  AlkPhos  132<H>       LIVER FUNCTIONS - ( 25 Sep 2023 21:57 )  Alb: 3.6 g/dL / Pro: 7.3 gm/dL / ALK PHOS: 132 U/L / ALT: 46 U/L / AST: 35 U/L / GGT: x           Urinalysis Basic - ( 25 Sep 2023 21:57 )    Color: Yellow / Appearance: Clear / S.010 / pH: x  Gluc: 126 mg/dL / Ketone: Negative  / Bili: Negative / Urobili: Negative   Blood: x / Protein: 30 mg/dL / Nitrite: Negative   Leuk Esterase: Trace / RBC: 11-25 /HPF / WBC 6-10 /HPF   Sq Epi: x / Non Sq Epi: x / Bacteria: Occasional          Radiology: all available radiological tests reviewed  < from: CT Abdomen and Pelvis No Cont (23 @ 22:52) >  ACC: [id number] EXAM:  CT ABDOMEN AND PELVIS   ORDERED BY: CHAD HUTTON     PROCEDURE DATE:  2023          INTERPRETATION:  CLINICAL INFORMATION: fever, renal transplant    COMPARISON: 2011    CONTRAST/COMPLICATIONS:  IV Contrast: NONE  Oral Contrast: NONE  Complications: None reported at time of study completion    PROCEDURE:  CT of the Abdomen and Pelvis was performed.  Sagittal and coronal reformats were performed.    Note: The exam is limited because some types of pathology may not be   adequately demonstrated due to lack of contrast enhancement.    FINDINGS:  LOWER CHEST: 3mm micronodule in the right lower lobe.    LIVER: Unremarkable.  BILE DUCTS: Unremarkable.  GALLBLADDER: Unremarkable.  SPLEEN: Unremarkable.  PANCREAS: Unremarkable.  ADRENALS: Unremarkable.  KIDNEYS/URETERS: Severe atrophy of the native kidneys, with multiple   cysts that are incompletely evaluated.  Right lower quadrant renal transplant has prominent perinephric and   periureteral fat stranding.    BLADDER: Unremarkable.  REPRODUCTIVE ORGANS: Atrophic    BOWEL: No bowel obstruction. Appendix is normal. Diverticulosis coli.  PERITONEUM: No ascites.  VESSELS: Atherosclerotic changes.  RETROPERITONEUM/LYMPH NODES: No lymphadenopathy.  ABDOMINAL WALL: Unremarkable.  BONES: Degenerative changes.    IMPRESSION:  Inflammatory change surrounds the renal transplant and transplanted   ureter. Differential considerations include ascending urinary tract   infection, transplant rejection and arterial stenosis.        Advanced directives addressed: full resuscitation
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  CT showed inflamm changes perinephric transplant, and periureter  Started on Rocephin  Pt await transfer to [facility]  case d/w tx unit  Neupogen 480 mg sq x 1 for wbc of 0.76  Hold Valcyte  Cont Cellcept 250 mg q12h      PAST MEDICAL & SURGICAL HISTORY:  Cardiac murmur      Kidney disease  "stage 4" as per patient secondary to lithium treatment many years ago      Breast CA  DCIS, left breast, s/p RT      Uterine leiomyoma      GERD (gastroesophageal reflux disease)      Lyme disease      Depression      Bipolar disorder      Degenerative disc disease, lumbar      HLD (hyperlipidemia)      History of secondary hyperparathyroidism      MGUS (monoclonal gammopathy of unknown significance)      Gout      S/P lumpectomy of breast  , left breast, no lymph nodes removed      S/P tonsillectomy      S/P dilation and curettage  uterine polyps      S/P coronary angiogram  10 yrs ago, no intervention      H/O: hysterectomy      Abnormal biopsy of kidney      Kidney transplanted          FAMILY HISTORY:      MEDICATIONS  (STANDING):  apixaban 5 milliGRAM(s) Oral every 12 hours  cefTRIAXone Injectable. 1000 milliGRAM(s) IV Push every 24 hours  cholecalciferol 1000 Unit(s) Oral daily  haloperidol     Tablet 1 milliGRAM(s) Oral daily  magnesium oxide 400 milliGRAM(s) Oral two times a day with meals  multivitamin 1 Tablet(s) Oral daily  mycophenolate mofetil 250 milliGRAM(s) Oral two times a day  predniSONE   Tablet 7.5 milliGRAM(s) Oral daily    MEDICATIONS  (PRN):  acetaminophen     Tablet .. 650 milliGRAM(s) Oral every 6 hours PRN Mild Pain (1 - 3)  clonazePAM  Tablet 0.5 milliGRAM(s) Oral daily PRN nerves  ondansetron Injectable 4 milliGRAM(s) IV Push every 6 hours PRN Nausea and/or Vomiting      Allergies    Levaquin (Anaphylaxis)    Intolerances        I&O's Summary        REVIEW OF SYSTEMS:    CONSTITUTIONAL:  As per HPI.  CONSTITUTIONAL: No weakness, fevers or chills  EYES/ENT: No visual changes;  No vertigo or throat pain   NECK: No pain or stiffness  CARDIOVASCULAR: No chest pain or palpitations  GASTROINTESTINAL: No abdominal or epigastric pain. No nausea, vomiting, or hematemesis; No diarrhea or constipation. No melena or hematochezia.  GENITOURINARY: No dysuria, frequency or hematuria  NEUROLOGICAL: No numbness or weakness  SKIN: No itching, burning, rashes, or lesions   All other review of systems is negative unless indicated above      Vital Signs Last 24 Hrs  T(C): 38.9 (26 Sep 2023 12:23), Max: 39.5 (25 Sep 2023 21:12)  T(F): 102 (26 Sep 2023 12:23), Max: 103.1 (25 Sep 2023 21:12)  HR: 78 (26 Sep 2023 04:27) (66 - 130)  BP: 114/63 (26 Sep 2023 13:07) (88/55 - 149/86)  BP(mean): 79 (26 Sep 2023 13:07) (61 - 120)  RR: 22 (26 Sep 2023 04:04) (17 - 22)  SpO2: 93% (26 Sep 2023 13:07) (90% - 100%)    Parameters below as of 26 Sep 2023 04:04  Patient On (Oxygen Delivery Method): room air        Daily Height in cm: 165.1 (25 Sep 2023 21:12)    Daily Weight in k.7 (26 Sep 2023 04:27)    I&O's Summary      PHYSICAL EXAM:    General:  Alert, No acute distress.    Neuro:  Alert and oriented to person, place, and time. Able to communicate  well.      HEENT:  No JVD, no masses, Eyes anicteric, ,    Cardiovascular:  Regular rate and rhythm, with normal S1 and S2.    Lungs:  clear. no rales, no wheezing, .    Abdomen:  Normoactive bowel sounds. Soft, flat, non-tender, and non-distended.  positive bowel sounds  RLQ no tenderness over the graft site    Skin:  Warm, dry    Extremities:  warm,  no cyanosis    LABS:                        12.4   0.76  )-----------( 154      ( 25 Sep 2023 21:57 )             36.6         136  |  104  |  19  ----------------------------<  126<H>  3.9   |  25  |  1.38<H>    Ca    9.4      25 Sep 2023 21:57    TPro  7.3  /  Alb  3.6  /  TBili  0.6  /  DBili  x   /  AST  35  /  ALT  46  /  AlkPhos  132<H>  -    PT/INR - ( 25 Sep 2023 21:57 )   PT: 19.8 sec;   INR: 1.78 ratio         PTT - ( 25 Sep 2023 21:57 )  PTT:31.5 sec  Urinalysis Basic - ( 25 Sep 2023 21:57 )    Color: Yellow / Appearance: Clear / S.010 / pH: x  Gluc: 126 mg/dL / Ketone: Negative  / Bili: Negative / Urobili: Negative   Blood: x / Protein: 30 mg/dL / Nitrite: Negative   Leuk Esterase: Trace / RBC: 11-25 /HPF / WBC 6-10 /HPF   Sq Epi: x / Non Sq Epi: x / Bacteria: Occasional

## 2023-09-26 NOTE — PATIENT PROFILE ADULT - FALL HARM RISK - HARM RISK INTERVENTIONS

## 2023-09-26 NOTE — DISCHARGE NOTE PROVIDER - NSDCCPCAREPLAN_GEN_ALL_CORE_FT
PRINCIPAL DISCHARGE DIAGNOSIS  Diagnosis: Clinical sepsis  Assessment and Plan of Treatment: transfer to [facility]      SECONDARY DISCHARGE DIAGNOSES  Diagnosis: Fever  Assessment and Plan of Treatment:     Diagnosis: Neutropenic fever  Assessment and Plan of Treatment:

## 2023-09-26 NOTE — CONSULT NOTE ADULT - ASSESSMENT
62-year-old female, history of immunosuppression sent to secondary to renal transplantation done last year - April 2022, currently on mycophenolate, prednisone and etanercept, presenting with a chief complaint of generalized fatigue and fever.  Ongoing for the past 2 days.  Reports nonspecific urinary symptoms, no cynthia dysuria. Associated left lower quadrant pain. Noted with fever, wbc ct 0.76, Cr 1.38, UA positive, Imaging remarkable for Inflammatory change surrounds the renal transplant and transplanted ureter. Differential considerations include ascending urinary tract  infection, transplant rejection and arterial stenosis. given IV vancomycin/cefepime in ER.     1. Neutropenic sepsis. UTI. L Pyelonephritis. s/p Renal transplant. Immunocompromised host. VALERIE  - imaging reviewed  - prior cultures noted  - start rocephin 1gm daily  - continue with abx coverage  - f/u urine cx blood cx  - tolerating abx well so far; no side effects noted  - reason for abx use and side effects reviewed with patient  - supportive care  - fu cbc    2. other issues - care per medicine

## 2023-09-26 NOTE — DISCHARGE NOTE PROVIDER - HOSPITAL COURSE
62-year-old female, history of immunosuppression sent to secondary to renal transplantation done last year - April 2022, currently on mycophenolate, prednisone and etanercept, presenting with a chief complaint of generalized fatigue and fever.  Ongoing for the past 2 days.  Reports nonspecific urinary symptoms, no cynthia dysuria. Associated left lower quadrant pain. Noted with fever, wbc ct 0.76, Cr 1.38, UA positive, Imaging remarkable for Inflammatory change surrounds the renal transplant and transplanted ureter. Differential considerations include ascending urinary tract infection, transplant rejection and arterial stenosis. given IV vancomycin/cefepime in ER.       Vital Signs Last 24 Hrs  T(C): 38.9 (26 Sep 2023 12:23), Max: 39.5 (25 Sep 2023 21:12)  T(F): 102 (26 Sep 2023 12:23), Max: 103.1 (25 Sep 2023 21:12)  HR: 78 (26 Sep 2023 04:27) (66 - 130)  BP: 112/61 (26 Sep 2023 08:00) (88/55 - 149/86)  BP(mean): 75 (26 Sep 2023 08:00) (63 - 120)  RR: 22 (26 Sep 2023 04:04) (17 - 22)  SpO2: 94% (26 Sep 2023 04:04) (90% - 99%)    Parameters below as of 26 Sep 2023 04:04  Patient On (Oxygen Delivery Method): room air     62-year-old female, history of immunosuppression sent to secondary to renal transplantation done last year - April 2022, currently on mycophenolate, prednisone and etanercept, presenting with a chief complaint of generalized fatigue and fever.  Ongoing for the past 2 days.  Reports nonspecific urinary symptoms, no cynthia dysuria. Associated left lower quadrant pain. Noted with fever, wbc ct 0.76, Cr 1.38, UA positive, Imaging remarkable for Inflammatory change surrounds the renal transplant and transplanted ureter. Differential considerations include ascending urinary tract infection, transplant rejection and arterial stenosis. given IV vancomycin/cefepime in ER.       Vital Signs Last 24 Hrs  T(C): 38.9 (26 Sep 2023 12:23), Max: 39.5 (25 Sep 2023 21:12)  T(F): 102 (26 Sep 2023 12:23), Max: 103.1 (25 Sep 2023 21:12)  HR: 78 (26 Sep 2023 04:27) (66 - 130)  BP: 112/61 (26 Sep 2023 08:00) (88/55 - 149/86)  BP(mean): 75 (26 Sep 2023 08:00) (63 - 120)  RR: 22 (26 Sep 2023 04:04) (17 - 22)  SpO2: 94% (26 Sep 2023 04:04) (90% - 99%)    Parameters below as of 26 Sep 2023 04:04  Patient On (Oxygen Delivery Method): room air    < from: CT Abdomen and Pelvis No Cont (09.25.23 @ 22:52) >    IMPRESSION:  Inflammatory change surrounds the renal transplant and transplanted   ureter. Differential considerations include ascending urinary tract   infection, transplant rejection and arterial stenosis.      * febrile neutropenia, abnormal CT transfer to [facility]  s/p broad spectrum abx, VSS, no c/o

## 2023-09-26 NOTE — CONSULT NOTE ADULT - ASSESSMENT
:63 yo F h/o Li induced CKD, s/p Renal transplant 1 yr ago admitted with 2 day h/o UTI sxs, presented to ED w fever chills, Temp up to 102.  CT showed inflamm changes perinephric transplant, and periureter regions  Started on Rocephin, UA positive, urine cxs pending result  Pt await transfer to [facility]  case d/w tx unit  Neupogen 480 mg sq x 1 for wbc of 0.76  Hold Valcyte  Cont Cellcept 250 mg q12h

## 2023-09-26 NOTE — PROVIDER CONTACT NOTE (OTHER) - SITUATION
Dr. Julieth Osman (covering for Dr. Parker, [facility] Kidney Transplant Team; office: )  paged [phone] Dr. Julieth Osman (covering for Dr. Parker, [facility] Kidney Transplant Team; office: [phone])  paged [phone], [phone] Dr. Santiago spoke to RN Julieth Osman (covering for Dr. Parker, [facility] Kidney Transplant Team; office: [phone])  paged [phone], @[phone], returned page @[phone]

## 2023-09-26 NOTE — DISCHARGE NOTE PROVIDER - NSDCMRMEDTOKEN_GEN_ALL_CORE_FT
cholecalciferol 25 mcg (1000 intl units) oral tablet: 1 tab(s) orally once a day  clonazePAM 0.5 mg oral tablet: 1 tab(s) orally once a day as needed for  Eliquis 5 mg oral tablet: 1 tab(s) orally 2 times a day  Haldol 1 mg oral tablet: 1 tab(s) orally once a day  magnesium oxide 400 mg oral tablet: 1 tab(s) orally 2 times a day  Multiple Vitamins oral tablet: 1 tab(s) orally once a day  mycophenolate mofetil 250 mg oral capsule: 1 cap(s) orally 2 times a day  predniSONE 5 mg oral tablet: 1.5 tab(s) orally once a day  valGANciclovir 450 mg oral tablet: 2 tab(s) orally 2 times a day

## 2023-09-27 ENCOUNTER — TRANSCRIPTION ENCOUNTER (OUTPATIENT)
Age: 63
End: 2023-09-27

## 2023-09-27 VITALS — TEMPERATURE: 98 F

## 2023-09-27 RX ADMIN — HALOPERIDOL DECANOATE 1 MILLIGRAM(S): 100 INJECTION INTRAMUSCULAR at 09:09

## 2023-09-27 RX ADMIN — Medication 1000 UNIT(S): at 09:07

## 2023-09-27 RX ADMIN — MAGNESIUM OXIDE 400 MG ORAL TABLET 400 MILLIGRAM(S): 241.3 TABLET ORAL at 09:07

## 2023-09-27 RX ADMIN — Medication 1 TABLET(S): at 09:07

## 2023-09-27 RX ADMIN — MYCOPHENOLATE MOFETIL 250 MILLIGRAM(S): 250 CAPSULE ORAL at 09:07

## 2023-09-27 RX ADMIN — APIXABAN 5 MILLIGRAM(S): 2.5 TABLET, FILM COATED ORAL at 09:08

## 2023-09-27 RX ADMIN — Medication 7.5 MILLIGRAM(S): at 09:08

## 2023-09-27 NOTE — DISCHARGE NOTE NURSING/CASE MANAGEMENT/SOCIAL WORK - PATIENT PORTAL LINK FT
You can access the FollowMyHealth Patient Portal offered by [facility] by registering at the following website: [email or web address]. By joining [email or web address]’s FollowMyHealth portal, you will also be able to view your health information using other applications (apps) compatible with our system.

## 2023-09-27 NOTE — DISCHARGE NOTE NURSING/CASE MANAGEMENT/SOCIAL WORK - NSDCPEFALRISK_GEN_ALL_CORE
For information on Fall & Injury Prevention, visit: [email or web address] OR  [email or web address] OR  https://www.cdc.gov/steadi/patient.html

## 2023-09-29 DIAGNOSIS — N17.9 ACUTE KIDNEY FAILURE, UNSPECIFIED: ICD-10-CM
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
## 2023-09-29 DIAGNOSIS — D70.9 NEUTROPENIA, UNSPECIFIED: ICD-10-CM

## 2023-09-29 DIAGNOSIS — M10.9 GOUT, UNSPECIFIED: ICD-10-CM

## 2023-09-29 DIAGNOSIS — D84.821 IMMUNODEFICIENCY DUE TO DRUGS: ICD-10-CM

## 2023-09-29 DIAGNOSIS — E78.5 HYPERLIPIDEMIA, UNSPECIFIED: ICD-10-CM

## 2023-09-29 DIAGNOSIS — Z88.1 ALLERGY STATUS TO OTHER ANTIBIOTIC AGENTS STATUS: ICD-10-CM

## 2023-09-29 DIAGNOSIS — Z85.3 PERSONAL HISTORY OF MALIGNANT NEOPLASM OF BREAST: ICD-10-CM

## 2023-09-29 DIAGNOSIS — A41.9 SEPSIS, UNSPECIFIED ORGANISM: ICD-10-CM

## 2023-09-29 DIAGNOSIS — N12 TUBULO-INTERSTITIAL NEPHRITIS, NOT SPECIFIED AS ACUTE OR CHRONIC: ICD-10-CM
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
## 2023-09-29 DIAGNOSIS — Z90.710 ACQUIRED ABSENCE OF BOTH CERVIX AND UTERUS: ICD-10-CM

## 2023-09-29 DIAGNOSIS — Z79.52 LONG TERM (CURRENT) USE OF SYSTEMIC STEROIDS: ICD-10-CM

## 2023-09-29 DIAGNOSIS — Z92.3 PERSONAL HISTORY OF IRRADIATION: ICD-10-CM

## 2023-09-29 DIAGNOSIS — F31.9 BIPOLAR DISORDER, UNSPECIFIED: ICD-10-CM

## 2023-09-29 DIAGNOSIS — Z79.620 LONG TERM (CURRENT) USE OF IMMUNOSUPPRESSIVE BIOLOGIC: ICD-10-CM

## 2023-09-29 DIAGNOSIS — Z20.822 CONTACT WITH AND (SUSPECTED) EXPOSURE TO COVID-19: ICD-10-CM

## 2023-09-29 DIAGNOSIS — T86.19 OTHER COMPLICATION OF KIDNEY TRANSPLANT: ICD-10-CM

## 2023-10-28 ENCOUNTER — INPATIENT (INPATIENT)
Facility: HOSPITAL | Age: 63
LOS: 3 days | Discharge: HOME | DRG: 463 | End: 2023-11-01
Attending: INTERNAL MEDICINE | Admitting: INTERNAL MEDICINE
Payer: COMMERCIAL

## 2023-10-28 VITALS
SYSTOLIC BLOOD PRESSURE: 120 MMHG | HEART RATE: 120 BPM | OXYGEN SATURATION: 96 % | WEIGHT: 182.98 LBS | DIASTOLIC BLOOD PRESSURE: 83 MMHG | TEMPERATURE: 100 F | HEIGHT: 65 IN | RESPIRATION RATE: 18 BRPM

## 2023-10-28 DIAGNOSIS — Z94.0 KIDNEY TRANSPLANT STATUS: Chronic | ICD-10-CM

## 2023-10-28 DIAGNOSIS — N39.0 URINARY TRACT INFECTION, SITE NOT SPECIFIED: ICD-10-CM

## 2023-10-28 DIAGNOSIS — R89.7 ABNORMAL HISTOLOGICAL FINDINGS IN SPECIMENS FROM OTHER ORGANS, SYSTEMS AND TISSUES: Chronic | ICD-10-CM

## 2023-10-28 DIAGNOSIS — Z90.710 ACQUIRED ABSENCE OF BOTH CERVIX AND UTERUS: Chronic | ICD-10-CM

## 2023-10-28 LAB
ALBUMIN SERPL ELPH-MCNC: 3.4 G/DL — SIGNIFICANT CHANGE UP (ref 3.3–5)
ALBUMIN SERPL ELPH-MCNC: 3.4 G/DL — SIGNIFICANT CHANGE UP (ref 3.3–5)
ALP SERPL-CCNC: 157 U/L — HIGH (ref 40–120)
ALP SERPL-CCNC: 157 U/L — HIGH (ref 40–120)
ALT FLD-CCNC: 102 U/L — HIGH (ref 12–78)
ALT FLD-CCNC: 102 U/L — HIGH (ref 12–78)
ANION GAP SERPL CALC-SCNC: 4 MMOL/L — LOW (ref 5–17)
ANION GAP SERPL CALC-SCNC: 4 MMOL/L — LOW (ref 5–17)
ANISOCYTOSIS BLD QL: SLIGHT — SIGNIFICANT CHANGE UP
ANISOCYTOSIS BLD QL: SLIGHT — SIGNIFICANT CHANGE UP
APPEARANCE UR: CLEAR — SIGNIFICANT CHANGE UP
APPEARANCE UR: CLEAR — SIGNIFICANT CHANGE UP
APTT BLD: 35.2 SEC — SIGNIFICANT CHANGE UP (ref 24.5–35.6)
APTT BLD: 35.2 SEC — SIGNIFICANT CHANGE UP (ref 24.5–35.6)
AST SERPL-CCNC: 58 U/L — HIGH (ref 15–37)
AST SERPL-CCNC: 58 U/L — HIGH (ref 15–37)
BACTERIA # UR AUTO: ABNORMAL /HPF
BACTERIA # UR AUTO: ABNORMAL /HPF
BASOPHILS # BLD AUTO: 0.04 K/UL — SIGNIFICANT CHANGE UP (ref 0–0.2)
BASOPHILS # BLD AUTO: 0.04 K/UL — SIGNIFICANT CHANGE UP (ref 0–0.2)
BASOPHILS NFR BLD AUTO: 0.7 % — SIGNIFICANT CHANGE UP (ref 0–2)
BASOPHILS NFR BLD AUTO: 0.7 % — SIGNIFICANT CHANGE UP (ref 0–2)
BILIRUB SERPL-MCNC: 0.5 MG/DL — SIGNIFICANT CHANGE UP (ref 0.2–1.2)
BILIRUB SERPL-MCNC: 0.5 MG/DL — SIGNIFICANT CHANGE UP (ref 0.2–1.2)
BILIRUB UR-MCNC: NEGATIVE — SIGNIFICANT CHANGE UP
BILIRUB UR-MCNC: NEGATIVE — SIGNIFICANT CHANGE UP
BUN SERPL-MCNC: 19 MG/DL — SIGNIFICANT CHANGE UP (ref 7–23)
BUN SERPL-MCNC: 19 MG/DL — SIGNIFICANT CHANGE UP (ref 7–23)
CALCIUM SERPL-MCNC: 9.4 MG/DL — SIGNIFICANT CHANGE UP (ref 8.5–10.1)
CALCIUM SERPL-MCNC: 9.4 MG/DL — SIGNIFICANT CHANGE UP (ref 8.5–10.1)
CAST: 0 /LPF — SIGNIFICANT CHANGE UP (ref 0–4)
CAST: 0 /LPF — SIGNIFICANT CHANGE UP (ref 0–4)
CHLORIDE SERPL-SCNC: 106 MMOL/L — SIGNIFICANT CHANGE UP (ref 96–108)
CHLORIDE SERPL-SCNC: 106 MMOL/L — SIGNIFICANT CHANGE UP (ref 96–108)
CO2 SERPL-SCNC: 28 MMOL/L — SIGNIFICANT CHANGE UP (ref 22–31)
CO2 SERPL-SCNC: 28 MMOL/L — SIGNIFICANT CHANGE UP (ref 22–31)
COLOR SPEC: YELLOW — SIGNIFICANT CHANGE UP
COLOR SPEC: YELLOW — SIGNIFICANT CHANGE UP
CREAT SERPL-MCNC: 1.2 MG/DL — SIGNIFICANT CHANGE UP (ref 0.5–1.3)
CREAT SERPL-MCNC: 1.2 MG/DL — SIGNIFICANT CHANGE UP (ref 0.5–1.3)
DIFF PNL FLD: ABNORMAL
DIFF PNL FLD: ABNORMAL
EGFR: 51 ML/MIN/1.73M2 — LOW
EGFR: 51 ML/MIN/1.73M2 — LOW
EOSINOPHIL # BLD AUTO: 0.01 K/UL — SIGNIFICANT CHANGE UP (ref 0–0.5)
EOSINOPHIL # BLD AUTO: 0.01 K/UL — SIGNIFICANT CHANGE UP (ref 0–0.5)
EOSINOPHIL NFR BLD AUTO: 0.2 % — SIGNIFICANT CHANGE UP (ref 0–6)
EOSINOPHIL NFR BLD AUTO: 0.2 % — SIGNIFICANT CHANGE UP (ref 0–6)
GLUCOSE SERPL-MCNC: 119 MG/DL — HIGH (ref 70–99)
GLUCOSE SERPL-MCNC: 119 MG/DL — HIGH (ref 70–99)
GLUCOSE UR QL: NEGATIVE MG/DL — SIGNIFICANT CHANGE UP
GLUCOSE UR QL: NEGATIVE MG/DL — SIGNIFICANT CHANGE UP
HCT VFR BLD CALC: 36.7 % — SIGNIFICANT CHANGE UP (ref 34.5–45)
HCT VFR BLD CALC: 36.7 % — SIGNIFICANT CHANGE UP (ref 34.5–45)
HGB BLD-MCNC: 12.2 G/DL — SIGNIFICANT CHANGE UP (ref 11.5–15.5)
HGB BLD-MCNC: 12.2 G/DL — SIGNIFICANT CHANGE UP (ref 11.5–15.5)
IMM GRANULOCYTES NFR BLD AUTO: 1.4 % — HIGH (ref 0–0.9)
IMM GRANULOCYTES NFR BLD AUTO: 1.4 % — HIGH (ref 0–0.9)
INR BLD: 1.32 RATIO — HIGH (ref 0.85–1.18)
INR BLD: 1.32 RATIO — HIGH (ref 0.85–1.18)
KETONES UR-MCNC: NEGATIVE MG/DL — SIGNIFICANT CHANGE UP
KETONES UR-MCNC: NEGATIVE MG/DL — SIGNIFICANT CHANGE UP
LACTATE SERPL-SCNC: 0.7 MMOL/L — SIGNIFICANT CHANGE UP (ref 0.7–2)
LACTATE SERPL-SCNC: 0.7 MMOL/L — SIGNIFICANT CHANGE UP (ref 0.7–2)
LEUKOCYTE ESTERASE UR-ACNC: ABNORMAL
LEUKOCYTE ESTERASE UR-ACNC: ABNORMAL
LYMPHOCYTES # BLD AUTO: 0.34 K/UL — LOW (ref 1–3.3)
LYMPHOCYTES # BLD AUTO: 0.34 K/UL — LOW (ref 1–3.3)
LYMPHOCYTES # BLD AUTO: 5.8 % — LOW (ref 13–44)
LYMPHOCYTES # BLD AUTO: 5.8 % — LOW (ref 13–44)
MANUAL SMEAR VERIFICATION: SIGNIFICANT CHANGE UP
MANUAL SMEAR VERIFICATION: SIGNIFICANT CHANGE UP
MCHC RBC-ENTMCNC: 32.3 PG — SIGNIFICANT CHANGE UP (ref 27–34)
MCHC RBC-ENTMCNC: 32.3 PG — SIGNIFICANT CHANGE UP (ref 27–34)
MCHC RBC-ENTMCNC: 33.2 GM/DL — SIGNIFICANT CHANGE UP (ref 32–36)
MCHC RBC-ENTMCNC: 33.2 GM/DL — SIGNIFICANT CHANGE UP (ref 32–36)
MCV RBC AUTO: 97.1 FL — SIGNIFICANT CHANGE UP (ref 80–100)
MCV RBC AUTO: 97.1 FL — SIGNIFICANT CHANGE UP (ref 80–100)
MONOCYTES # BLD AUTO: 0.53 K/UL — SIGNIFICANT CHANGE UP (ref 0–0.9)
MONOCYTES # BLD AUTO: 0.53 K/UL — SIGNIFICANT CHANGE UP (ref 0–0.9)
MONOCYTES NFR BLD AUTO: 9 % — SIGNIFICANT CHANGE UP (ref 2–14)
MONOCYTES NFR BLD AUTO: 9 % — SIGNIFICANT CHANGE UP (ref 2–14)
NEUTROPHILS # BLD AUTO: 4.86 K/UL — SIGNIFICANT CHANGE UP (ref 1.8–7.4)
NEUTROPHILS # BLD AUTO: 4.86 K/UL — SIGNIFICANT CHANGE UP (ref 1.8–7.4)
NEUTROPHILS NFR BLD AUTO: 82.9 % — HIGH (ref 43–77)
NEUTROPHILS NFR BLD AUTO: 82.9 % — HIGH (ref 43–77)
NITRITE UR-MCNC: POSITIVE
NITRITE UR-MCNC: POSITIVE
PH UR: 6 — SIGNIFICANT CHANGE UP (ref 5–8)
PH UR: 6 — SIGNIFICANT CHANGE UP (ref 5–8)
PLAT MORPH BLD: NORMAL — SIGNIFICANT CHANGE UP
PLAT MORPH BLD: NORMAL — SIGNIFICANT CHANGE UP
PLATELET # BLD AUTO: 177 K/UL — SIGNIFICANT CHANGE UP (ref 150–400)
PLATELET # BLD AUTO: 177 K/UL — SIGNIFICANT CHANGE UP (ref 150–400)
PLATELET COUNT - ESTIMATE: NORMAL — SIGNIFICANT CHANGE UP
PLATELET COUNT - ESTIMATE: NORMAL — SIGNIFICANT CHANGE UP
POLYCHROMASIA BLD QL SMEAR: SLIGHT — SIGNIFICANT CHANGE UP
POLYCHROMASIA BLD QL SMEAR: SLIGHT — SIGNIFICANT CHANGE UP
POTASSIUM SERPL-MCNC: 4 MMOL/L — SIGNIFICANT CHANGE UP (ref 3.5–5.3)
POTASSIUM SERPL-MCNC: 4 MMOL/L — SIGNIFICANT CHANGE UP (ref 3.5–5.3)
POTASSIUM SERPL-SCNC: 4 MMOL/L — SIGNIFICANT CHANGE UP (ref 3.5–5.3)
POTASSIUM SERPL-SCNC: 4 MMOL/L — SIGNIFICANT CHANGE UP (ref 3.5–5.3)
PROT SERPL-MCNC: 7.3 GM/DL — SIGNIFICANT CHANGE UP (ref 6–8.3)
PROT SERPL-MCNC: 7.3 GM/DL — SIGNIFICANT CHANGE UP (ref 6–8.3)
PROT UR-MCNC: SIGNIFICANT CHANGE UP MG/DL
PROT UR-MCNC: SIGNIFICANT CHANGE UP MG/DL
PROTHROM AB SERPL-ACNC: 14.8 SEC — HIGH (ref 9.5–13)
PROTHROM AB SERPL-ACNC: 14.8 SEC — HIGH (ref 9.5–13)
RAPID RVP RESULT: SIGNIFICANT CHANGE UP
RAPID RVP RESULT: SIGNIFICANT CHANGE UP
RBC # BLD: 3.78 M/UL — LOW (ref 3.8–5.2)
RBC # BLD: 3.78 M/UL — LOW (ref 3.8–5.2)
RBC # FLD: 14.5 % — SIGNIFICANT CHANGE UP (ref 10.3–14.5)
RBC # FLD: 14.5 % — SIGNIFICANT CHANGE UP (ref 10.3–14.5)
RBC BLD AUTO: ABNORMAL
RBC BLD AUTO: ABNORMAL
RBC CASTS # UR COMP ASSIST: 6 /HPF — HIGH (ref 0–4)
RBC CASTS # UR COMP ASSIST: 6 /HPF — HIGH (ref 0–4)
SARS-COV-2 RNA SPEC QL NAA+PROBE: SIGNIFICANT CHANGE UP
SARS-COV-2 RNA SPEC QL NAA+PROBE: SIGNIFICANT CHANGE UP
SODIUM SERPL-SCNC: 138 MMOL/L — SIGNIFICANT CHANGE UP (ref 135–145)
SODIUM SERPL-SCNC: 138 MMOL/L — SIGNIFICANT CHANGE UP (ref 135–145)
SP GR SPEC: 1.02 — SIGNIFICANT CHANGE UP (ref 1–1.03)
SP GR SPEC: 1.02 — SIGNIFICANT CHANGE UP (ref 1–1.03)
SQUAMOUS # UR AUTO: 0 /HPF — SIGNIFICANT CHANGE UP (ref 0–5)
SQUAMOUS # UR AUTO: 0 /HPF — SIGNIFICANT CHANGE UP (ref 0–5)
UROBILINOGEN FLD QL: 0.2 MG/DL — SIGNIFICANT CHANGE UP (ref 0.2–1)
UROBILINOGEN FLD QL: 0.2 MG/DL — SIGNIFICANT CHANGE UP (ref 0.2–1)
WBC # BLD: 5.86 K/UL — SIGNIFICANT CHANGE UP (ref 3.8–10.5)
WBC # BLD: 5.86 K/UL — SIGNIFICANT CHANGE UP (ref 3.8–10.5)
WBC # FLD AUTO: 5.86 K/UL — SIGNIFICANT CHANGE UP (ref 3.8–10.5)
WBC # FLD AUTO: 5.86 K/UL — SIGNIFICANT CHANGE UP (ref 3.8–10.5)
WBC UR QL: 147 /HPF — HIGH (ref 0–5)
WBC UR QL: 147 /HPF — HIGH (ref 0–5)

## 2023-10-28 PROCEDURE — 80053 COMPREHEN METABOLIC PANEL: CPT

## 2023-10-28 PROCEDURE — 80074 ACUTE HEPATITIS PANEL: CPT

## 2023-10-28 PROCEDURE — G1004: CPT

## 2023-10-28 PROCEDURE — 74176 CT ABD & PELVIS W/O CONTRAST: CPT | Mod: 26,ME

## 2023-10-28 PROCEDURE — 99285 EMERGENCY DEPT VISIT HI MDM: CPT

## 2023-10-28 PROCEDURE — 80048 BASIC METABOLIC PNL TOTAL CA: CPT

## 2023-10-28 PROCEDURE — 86704 HEP B CORE ANTIBODY TOTAL: CPT

## 2023-10-28 PROCEDURE — 93010 ELECTROCARDIOGRAM REPORT: CPT

## 2023-10-28 PROCEDURE — 85027 COMPLETE CBC AUTOMATED: CPT

## 2023-10-28 PROCEDURE — 36415 COLL VENOUS BLD VENIPUNCTURE: CPT

## 2023-10-28 PROCEDURE — 85025 COMPLETE CBC W/AUTO DIFF WBC: CPT

## 2023-10-28 PROCEDURE — 76705 ECHO EXAM OF ABDOMEN: CPT

## 2023-10-28 PROCEDURE — 71045 X-RAY EXAM CHEST 1 VIEW: CPT | Mod: 26

## 2023-10-28 RX ORDER — SODIUM CHLORIDE 9 MG/ML
1000 INJECTION INTRAMUSCULAR; INTRAVENOUS; SUBCUTANEOUS ONCE
Refills: 0 | Status: COMPLETED | OUTPATIENT
Start: 2023-10-28 | End: 2023-10-28

## 2023-10-28 RX ORDER — CEFTRIAXONE 500 MG/1
1000 INJECTION, POWDER, FOR SOLUTION INTRAMUSCULAR; INTRAVENOUS ONCE
Refills: 0 | Status: DISCONTINUED | OUTPATIENT
Start: 2023-10-28 | End: 2023-10-28

## 2023-10-28 RX ORDER — MYCOPHENOLATE MOFETIL 250 MG/1
250 CAPSULE ORAL
Refills: 0 | Status: DISCONTINUED | OUTPATIENT
Start: 2023-10-28 | End: 2023-11-01

## 2023-10-28 RX ORDER — CEFTRIAXONE 500 MG/1
1000 INJECTION, POWDER, FOR SOLUTION INTRAMUSCULAR; INTRAVENOUS ONCE
Refills: 0 | Status: COMPLETED | OUTPATIENT
Start: 2023-10-28 | End: 2023-10-28

## 2023-10-28 RX ORDER — ACETAMINOPHEN 500 MG
650 TABLET ORAL ONCE
Refills: 0 | Status: COMPLETED | OUTPATIENT
Start: 2023-10-28 | End: 2023-10-28

## 2023-10-28 RX ADMIN — MYCOPHENOLATE MOFETIL 250 MILLIGRAM(S): 250 CAPSULE ORAL at 22:55

## 2023-10-28 RX ADMIN — SODIUM CHLORIDE 1000 MILLILITER(S): 9 INJECTION INTRAMUSCULAR; INTRAVENOUS; SUBCUTANEOUS at 19:34

## 2023-10-28 RX ADMIN — Medication 650 MILLIGRAM(S): at 19:35

## 2023-10-28 RX ADMIN — CEFTRIAXONE 1000 MILLIGRAM(S): 500 INJECTION, POWDER, FOR SOLUTION INTRAMUSCULAR; INTRAVENOUS at 21:18

## 2023-10-28 NOTE — ED PROVIDER NOTE - PHYSICAL EXAMINATION
GEN - NAD; well appearing; A+O x3  HEAD - NC/AT    EYES - EOMI, no conjunctival pallor, no scleral icterus  ENT -   mucous membranes  moist , no discharge  NECK - Neck supple  PULM - CTA b/l,  symmetric breath sounds  COR -  RRR, S1 S2, no murmurs  ABD -lower abdominal tenderness palpation  BACK - no CVA tenderness, nontender spine     EXTREMS -no edema, no deformity, warm and well perfused   SKIN - no rash or bruising      NEUROLOGIC - alert, sensation nl, motor 5/5 RUE/LUE/RLE/LLE

## 2023-10-28 NOTE — ED PROVIDER NOTE - CLINICAL SUMMARY MEDICAL DECISION MAKING FREE TEXT BOX
Patient with history of renal transplant presenting with lower abdominal pain, also history of hysterectomy in the past. Possible pyelonephritis, possible bowel obstruction. Will CT, patient received Tylenol prior to my evaluation and declined more pain meds at this time.

## 2023-10-28 NOTE — ED STATDOCS - PROGRESS NOTE DETAILS
Lorraine Mccabe for attending Dr. Bowling  62 year old female with a PMHx of chronic UTI, kidney disease s/p kidney transplant, HLD, cardiac murmur, breast cancer, uterine leiomyoma, GERD, hyperparathyroidism, MGUS, Lyme disease; presents to the ED c/o groin pain radiating across the lower abdomen and fever tmax 102. Patient recently hospitalized for sepsis from UTI. Endorses lethargy, chills, dysuria, lower back pain, nausea, SOB. Denies vomiting, chest pain. Transplant MD Parker at [facility] #[phone].  BL lower abdominal TTP. Sending to the MAIN for further evaluation.

## 2023-10-28 NOTE — ED ADULT NURSE NOTE - OBJECTIVE STATEMENT
Pt AOx4 from home c/o b/l lower abdomen pain 10/10,  fevers, chills, decrease PO intake and nausea. Pt states she has had numerous UTIs since the kidney transplant and was recently d/c from hospital in Sept for septic UTI. PMhx kidney transplant in 2022 on immunosuppressants.

## 2023-10-28 NOTE — ED PROVIDER NOTE - OBJECTIVE STATEMENT
63 y/o female with PMHx of chronic UTI, kidney disease s/p kidney transplant, breast cancer, uterine leiomyoma, hyperparathyroidism, MGUS, GERD, HLD, and Lyme disease presents to the ED c/o groin pain radiating across the lower abdomen with radiation to the lower back and fever, Tmax 102. Patient recently hospitalized for sepsis from UTI. Pt also c/o lethargy, chills, dysuria, nausea, and SOB. Denies vomiting, or chest pain.  Transplant MD Keith Newby #[phone].

## 2023-10-28 NOTE — ED ADULT TRIAGE NOTE - CHIEF COMPLAINT QUOTE
The patient presents to the ED c/o R groin pain that radiates to both sides. Pt states that she had a kidney transplant 1 1/2 years ago and is taking immunosuppressants. The pt states that she has nausea, fevers, chills, SOB. Pt denies vomiting or CP.

## 2023-10-29 LAB
ALBUMIN SERPL ELPH-MCNC: 3 G/DL — LOW (ref 3.3–5)
ALBUMIN SERPL ELPH-MCNC: 3 G/DL — LOW (ref 3.3–5)
ALP SERPL-CCNC: 128 U/L — HIGH (ref 40–120)
ALP SERPL-CCNC: 128 U/L — HIGH (ref 40–120)
ALT FLD-CCNC: 75 U/L — SIGNIFICANT CHANGE UP (ref 12–78)
ALT FLD-CCNC: 75 U/L — SIGNIFICANT CHANGE UP (ref 12–78)
ANION GAP SERPL CALC-SCNC: 9 MMOL/L — SIGNIFICANT CHANGE UP (ref 5–17)
ANION GAP SERPL CALC-SCNC: 9 MMOL/L — SIGNIFICANT CHANGE UP (ref 5–17)
AST SERPL-CCNC: 40 U/L — HIGH (ref 15–37)
AST SERPL-CCNC: 40 U/L — HIGH (ref 15–37)
BASOPHILS # BLD AUTO: 0.02 K/UL — SIGNIFICANT CHANGE UP (ref 0–0.2)
BASOPHILS # BLD AUTO: 0.02 K/UL — SIGNIFICANT CHANGE UP (ref 0–0.2)
BASOPHILS NFR BLD AUTO: 0.4 % — SIGNIFICANT CHANGE UP (ref 0–2)
BASOPHILS NFR BLD AUTO: 0.4 % — SIGNIFICANT CHANGE UP (ref 0–2)
BILIRUB SERPL-MCNC: 0.6 MG/DL — SIGNIFICANT CHANGE UP (ref 0.2–1.2)
BILIRUB SERPL-MCNC: 0.6 MG/DL — SIGNIFICANT CHANGE UP (ref 0.2–1.2)
BUN SERPL-MCNC: 14 MG/DL — SIGNIFICANT CHANGE UP (ref 7–23)
BUN SERPL-MCNC: 14 MG/DL — SIGNIFICANT CHANGE UP (ref 7–23)
CALCIUM SERPL-MCNC: 8.7 MG/DL — SIGNIFICANT CHANGE UP (ref 8.5–10.1)
CALCIUM SERPL-MCNC: 8.7 MG/DL — SIGNIFICANT CHANGE UP (ref 8.5–10.1)
CHLORIDE SERPL-SCNC: 106 MMOL/L — SIGNIFICANT CHANGE UP (ref 96–108)
CHLORIDE SERPL-SCNC: 106 MMOL/L — SIGNIFICANT CHANGE UP (ref 96–108)
CO2 SERPL-SCNC: 23 MMOL/L — SIGNIFICANT CHANGE UP (ref 22–31)
CO2 SERPL-SCNC: 23 MMOL/L — SIGNIFICANT CHANGE UP (ref 22–31)
CREAT SERPL-MCNC: 1 MG/DL — SIGNIFICANT CHANGE UP (ref 0.5–1.3)
CREAT SERPL-MCNC: 1 MG/DL — SIGNIFICANT CHANGE UP (ref 0.5–1.3)
EGFR: 63 ML/MIN/1.73M2 — SIGNIFICANT CHANGE UP
EGFR: 63 ML/MIN/1.73M2 — SIGNIFICANT CHANGE UP
EOSINOPHIL # BLD AUTO: 0.01 K/UL — SIGNIFICANT CHANGE UP (ref 0–0.5)
EOSINOPHIL # BLD AUTO: 0.01 K/UL — SIGNIFICANT CHANGE UP (ref 0–0.5)
EOSINOPHIL NFR BLD AUTO: 0.2 % — SIGNIFICANT CHANGE UP (ref 0–6)
EOSINOPHIL NFR BLD AUTO: 0.2 % — SIGNIFICANT CHANGE UP (ref 0–6)
GLUCOSE SERPL-MCNC: 103 MG/DL — HIGH (ref 70–99)
GLUCOSE SERPL-MCNC: 103 MG/DL — HIGH (ref 70–99)
HAV IGM SER-ACNC: SIGNIFICANT CHANGE UP
HAV IGM SER-ACNC: SIGNIFICANT CHANGE UP
HBV CORE AB SER-ACNC: SIGNIFICANT CHANGE UP
HBV CORE AB SER-ACNC: SIGNIFICANT CHANGE UP
HBV CORE IGM SER-ACNC: SIGNIFICANT CHANGE UP
HBV CORE IGM SER-ACNC: SIGNIFICANT CHANGE UP
HBV SURFACE AG SER-ACNC: SIGNIFICANT CHANGE UP
HBV SURFACE AG SER-ACNC: SIGNIFICANT CHANGE UP
HCT VFR BLD CALC: 32.8 % — LOW (ref 34.5–45)
HCT VFR BLD CALC: 32.8 % — LOW (ref 34.5–45)
HCV AB S/CO SERPL IA: 0.05 S/CO — SIGNIFICANT CHANGE UP (ref 0–0.99)
HCV AB S/CO SERPL IA: 0.05 S/CO — SIGNIFICANT CHANGE UP (ref 0–0.99)
HCV AB SERPL-IMP: SIGNIFICANT CHANGE UP
HCV AB SERPL-IMP: SIGNIFICANT CHANGE UP
HGB BLD-MCNC: 10.8 G/DL — LOW (ref 11.5–15.5)
HGB BLD-MCNC: 10.8 G/DL — LOW (ref 11.5–15.5)
IMM GRANULOCYTES NFR BLD AUTO: 1.5 % — HIGH (ref 0–0.9)
IMM GRANULOCYTES NFR BLD AUTO: 1.5 % — HIGH (ref 0–0.9)
LYMPHOCYTES # BLD AUTO: 0.31 K/UL — LOW (ref 1–3.3)
LYMPHOCYTES # BLD AUTO: 0.31 K/UL — LOW (ref 1–3.3)
LYMPHOCYTES # BLD AUTO: 6.7 % — LOW (ref 13–44)
LYMPHOCYTES # BLD AUTO: 6.7 % — LOW (ref 13–44)
MCHC RBC-ENTMCNC: 32 PG — SIGNIFICANT CHANGE UP (ref 27–34)
MCHC RBC-ENTMCNC: 32 PG — SIGNIFICANT CHANGE UP (ref 27–34)
MCHC RBC-ENTMCNC: 32.9 GM/DL — SIGNIFICANT CHANGE UP (ref 32–36)
MCHC RBC-ENTMCNC: 32.9 GM/DL — SIGNIFICANT CHANGE UP (ref 32–36)
MCV RBC AUTO: 97 FL — SIGNIFICANT CHANGE UP (ref 80–100)
MCV RBC AUTO: 97 FL — SIGNIFICANT CHANGE UP (ref 80–100)
MONOCYTES # BLD AUTO: 0.45 K/UL — SIGNIFICANT CHANGE UP (ref 0–0.9)
MONOCYTES # BLD AUTO: 0.45 K/UL — SIGNIFICANT CHANGE UP (ref 0–0.9)
MONOCYTES NFR BLD AUTO: 9.8 % — SIGNIFICANT CHANGE UP (ref 2–14)
MONOCYTES NFR BLD AUTO: 9.8 % — SIGNIFICANT CHANGE UP (ref 2–14)
NEUTROPHILS # BLD AUTO: 3.75 K/UL — SIGNIFICANT CHANGE UP (ref 1.8–7.4)
NEUTROPHILS # BLD AUTO: 3.75 K/UL — SIGNIFICANT CHANGE UP (ref 1.8–7.4)
NEUTROPHILS NFR BLD AUTO: 81.4 % — HIGH (ref 43–77)
NEUTROPHILS NFR BLD AUTO: 81.4 % — HIGH (ref 43–77)
PLATELET # BLD AUTO: 165 K/UL — SIGNIFICANT CHANGE UP (ref 150–400)
PLATELET # BLD AUTO: 165 K/UL — SIGNIFICANT CHANGE UP (ref 150–400)
POTASSIUM SERPL-MCNC: 3.7 MMOL/L — SIGNIFICANT CHANGE UP (ref 3.5–5.3)
POTASSIUM SERPL-MCNC: 3.7 MMOL/L — SIGNIFICANT CHANGE UP (ref 3.5–5.3)
POTASSIUM SERPL-SCNC: 3.7 MMOL/L — SIGNIFICANT CHANGE UP (ref 3.5–5.3)
POTASSIUM SERPL-SCNC: 3.7 MMOL/L — SIGNIFICANT CHANGE UP (ref 3.5–5.3)
PROT SERPL-MCNC: 6.5 GM/DL — SIGNIFICANT CHANGE UP (ref 6–8.3)
PROT SERPL-MCNC: 6.5 GM/DL — SIGNIFICANT CHANGE UP (ref 6–8.3)
RBC # BLD: 3.38 M/UL — LOW (ref 3.8–5.2)
RBC # BLD: 3.38 M/UL — LOW (ref 3.8–5.2)
RBC # FLD: 14.7 % — HIGH (ref 10.3–14.5)
RBC # FLD: 14.7 % — HIGH (ref 10.3–14.5)
SODIUM SERPL-SCNC: 138 MMOL/L — SIGNIFICANT CHANGE UP (ref 135–145)
SODIUM SERPL-SCNC: 138 MMOL/L — SIGNIFICANT CHANGE UP (ref 135–145)
WBC # BLD: 4.61 K/UL — SIGNIFICANT CHANGE UP (ref 3.8–10.5)
WBC # BLD: 4.61 K/UL — SIGNIFICANT CHANGE UP (ref 3.8–10.5)
WBC # FLD AUTO: 4.61 K/UL — SIGNIFICANT CHANGE UP (ref 3.8–10.5)
WBC # FLD AUTO: 4.61 K/UL — SIGNIFICANT CHANGE UP (ref 3.8–10.5)

## 2023-10-29 PROCEDURE — 99223 1ST HOSP IP/OBS HIGH 75: CPT

## 2023-10-29 PROCEDURE — 76705 ECHO EXAM OF ABDOMEN: CPT | Mod: 26

## 2023-10-29 RX ORDER — ACETAMINOPHEN 500 MG
650 TABLET ORAL EVERY 6 HOURS
Refills: 0 | Status: DISCONTINUED | OUTPATIENT
Start: 2023-10-29 | End: 2023-11-01

## 2023-10-29 RX ORDER — APIXABAN 2.5 MG/1
2.5 TABLET, FILM COATED ORAL EVERY 12 HOURS
Refills: 0 | Status: DISCONTINUED | OUTPATIENT
Start: 2023-10-29 | End: 2023-11-01

## 2023-10-29 RX ORDER — LETERMOVIR 480 MG/1
480 TABLET, FILM COATED ORAL DAILY
Refills: 0 | Status: DISCONTINUED | OUTPATIENT
Start: 2023-10-29 | End: 2023-11-01

## 2023-10-29 RX ORDER — ONDANSETRON 8 MG/1
4 TABLET, FILM COATED ORAL EVERY 8 HOURS
Refills: 0 | Status: DISCONTINUED | OUTPATIENT
Start: 2023-10-29 | End: 2023-11-01

## 2023-10-29 RX ORDER — HALOPERIDOL DECANOATE 100 MG/ML
1 INJECTION INTRAMUSCULAR DAILY
Refills: 0 | Status: DISCONTINUED | OUTPATIENT
Start: 2023-10-29 | End: 2023-11-01

## 2023-10-29 RX ORDER — LANOLIN ALCOHOL/MO/W.PET/CERES
3 CREAM (GRAM) TOPICAL AT BEDTIME
Refills: 0 | Status: DISCONTINUED | OUTPATIENT
Start: 2023-10-29 | End: 2023-11-01

## 2023-10-29 RX ORDER — CEFTRIAXONE 500 MG/1
1000 INJECTION, POWDER, FOR SOLUTION INTRAMUSCULAR; INTRAVENOUS EVERY 24 HOURS
Refills: 0 | Status: DISCONTINUED | OUTPATIENT
Start: 2023-10-29 | End: 2023-10-29

## 2023-10-29 RX ORDER — SODIUM CHLORIDE 9 MG/ML
1000 INJECTION INTRAMUSCULAR; INTRAVENOUS; SUBCUTANEOUS
Refills: 0 | Status: DISCONTINUED | OUTPATIENT
Start: 2023-10-29 | End: 2023-11-01

## 2023-10-29 RX ORDER — CLONAZEPAM 1 MG
0.5 TABLET ORAL DAILY
Refills: 0 | Status: DISCONTINUED | OUTPATIENT
Start: 2023-10-29 | End: 2023-11-01

## 2023-10-29 RX ORDER — CEFTRIAXONE 500 MG/1
2000 INJECTION, POWDER, FOR SOLUTION INTRAMUSCULAR; INTRAVENOUS EVERY 24 HOURS
Refills: 0 | Status: DISCONTINUED | OUTPATIENT
Start: 2023-10-29 | End: 2023-10-30

## 2023-10-29 RX ORDER — ACETAMINOPHEN 500 MG
650 TABLET ORAL EVERY 6 HOURS
Refills: 0 | Status: DISCONTINUED | OUTPATIENT
Start: 2023-10-29 | End: 2023-10-29

## 2023-10-29 RX ORDER — OXYCODONE HYDROCHLORIDE 5 MG/1
5 TABLET ORAL EVERY 6 HOURS
Refills: 0 | Status: DISCONTINUED | OUTPATIENT
Start: 2023-10-29 | End: 2023-11-01

## 2023-10-29 RX ORDER — CHOLECALCIFEROL (VITAMIN D3) 125 MCG
1000 CAPSULE ORAL DAILY
Refills: 0 | Status: DISCONTINUED | OUTPATIENT
Start: 2023-10-29 | End: 2023-11-01

## 2023-10-29 RX ORDER — MAGNESIUM OXIDE 400 MG ORAL TABLET 241.3 MG
400 TABLET ORAL
Refills: 0 | Status: DISCONTINUED | OUTPATIENT
Start: 2023-10-29 | End: 2023-11-01

## 2023-10-29 RX ORDER — VALGANCICLOVIR 450 MG/1
2 TABLET, FILM COATED ORAL
Refills: 0 | DISCHARGE

## 2023-10-29 RX ADMIN — Medication 1000 UNIT(S): at 10:10

## 2023-10-29 RX ADMIN — Medication 1 TABLET(S): at 10:11

## 2023-10-29 RX ADMIN — MAGNESIUM OXIDE 400 MG ORAL TABLET 400 MILLIGRAM(S): 241.3 TABLET ORAL at 19:30

## 2023-10-29 RX ADMIN — Medication 3 MILLIGRAM(S): at 21:11

## 2023-10-29 RX ADMIN — SODIUM CHLORIDE 100 MILLILITER(S): 9 INJECTION INTRAMUSCULAR; INTRAVENOUS; SUBCUTANEOUS at 04:03

## 2023-10-29 RX ADMIN — APIXABAN 2.5 MILLIGRAM(S): 2.5 TABLET, FILM COATED ORAL at 10:10

## 2023-10-29 RX ADMIN — HALOPERIDOL DECANOATE 1 MILLIGRAM(S): 100 INJECTION INTRAMUSCULAR at 10:11

## 2023-10-29 RX ADMIN — CEFTRIAXONE 2000 MILLIGRAM(S): 500 INJECTION, POWDER, FOR SOLUTION INTRAMUSCULAR; INTRAVENOUS at 17:00

## 2023-10-29 RX ADMIN — MYCOPHENOLATE MOFETIL 250 MILLIGRAM(S): 250 CAPSULE ORAL at 21:12

## 2023-10-29 RX ADMIN — MAGNESIUM OXIDE 400 MG ORAL TABLET 400 MILLIGRAM(S): 241.3 TABLET ORAL at 10:11

## 2023-10-29 RX ADMIN — APIXABAN 2.5 MILLIGRAM(S): 2.5 TABLET, FILM COATED ORAL at 21:12

## 2023-10-29 RX ADMIN — Medication 5 MILLIGRAM(S): at 10:10

## 2023-10-29 RX ADMIN — MYCOPHENOLATE MOFETIL 250 MILLIGRAM(S): 250 CAPSULE ORAL at 10:10

## 2023-10-29 NOTE — PATIENT PROFILE ADULT - FUNCTIONAL ASSESSMENT - BASIC MOBILITY 6.
4-calculated by average/Not able to assess (calculate score using [facility] averaging method)

## 2023-10-29 NOTE — CONSULT NOTE ADULT - SUBJECTIVE AND OBJECTIVE BOX
NEPHROLOGY INTERVAL HPI/OVERNIGHT EVENTS:  LUIZ [id number]  HPI:  61 yo F with a PMH of schizoaffective disorder (bipolar type), renal failure (s/p R renal transplant, due to prior lithium use), breast cancer s/p lumpectomy, fibroid s/p hysterectomy, hyperparathyroidism, DVT (on Eliquis), MGUS, HLD, and GERD who presents with abdominal pain. It is lower abdominal, and has been going on for several months. It is sporadic, worse upon movement, and denies relation to urination or bowel movements. However, it worsened today, and she also had fevers up to 102F, and chills with weakness today. She has also had nausea during the past few days as well. She denies pain elsewhere, including CP and SOB. She notes she gets frequent UTIs and often gets fevers with them, which has been occurring since her renal transplant about 2 years ago. She was recently prescribed methenamine 1 mg BID, but has not received the medicine yet (it is being mailed to her).    In terms of her transplant, she was recently decreased on her prednisone from 7.5 to 5 mg QD about 2 weeks ago. About a month ago, she was also changed from valganciclovir for antiviral PPX to letermovir because the Valcyte was causing leukopenia (which has now improved) but was leading to recurrent infections.    In the ED, she was given ceftriaxone 1 g IV x1 and acetaminophen 650 mg PO x1,  mg PO x1, and NS 1L x1. (29 Oct 2023 03:18)      Patient is a 62y old  Female who presents with a chief complaint of UTI, sepsis (29 Oct 2023 13:31)      PAST MEDICAL & SURGICAL HISTORY:  Cardiac murmur      Kidney disease  "stage 4" as per patient secondary to lithium treatment many years ago      Breast CA  DCIS, left breast, s/p RT      Uterine leiomyoma      GERD (gastroesophageal reflux disease)      Lyme disease      Degenerative disc disease, lumbar      HLD (hyperlipidemia)      History of secondary hyperparathyroidism      MGUS (monoclonal gammopathy of unknown significance)      Gout      Schizoaffective disorder, bipolar type      S/P lumpectomy of breast  2011, left breast, no lymph nodes removed      S/P tonsillectomy      S/P dilation and curettage  uterine polyps      S/P coronary angiogram  10 yrs ago, no intervention      H/O: hysterectomy      Abnormal biopsy of kidney      Kidney transplanted          FAMILY HISTORY:  FH: lymphoma        MEDICATIONS  (STANDING):  apixaban 2.5 milliGRAM(s) Oral every 12 hours  cefTRIAXone Injectable. 2000 milliGRAM(s) IV Push every 24 hours  cholecalciferol 1000 Unit(s) Oral daily  haloperidol     Tablet 1 milliGRAM(s) Oral daily  letermovir 480 milliGRAM(s) Oral daily  magnesium oxide 400 milliGRAM(s) Oral two times a day with meals  multivitamin 1 Tablet(s) Oral daily  mycophenolate mofetil 250 milliGRAM(s) Oral two times a day  predniSONE   Tablet 5 milliGRAM(s) Oral daily  sodium chloride 0.9%. 1000 milliLiter(s) (100 mL/Hr) IV Continuous <Continuous>    MEDICATIONS  (PRN):  acetaminophen     Tablet .. 650 milliGRAM(s) Oral every 6 hours PRN Temp greater or equal to 38C (100.4F), Mild Pain (1 - 3), Moderate Pain (4 - 6)  aluminum hydroxide/magnesium hydroxide/simethicone Suspension 30 milliLiter(s) Oral every 4 hours PRN Dyspepsia  clonazePAM  Tablet 0.5 milliGRAM(s) Oral daily PRN Anxiety  melatonin 3 milliGRAM(s) Oral at bedtime PRN Insomnia  ondansetron Injectable 4 milliGRAM(s) IV Push every 8 hours PRN Nausea and/or Vomiting  oxyCODONE    IR 5 milliGRAM(s) Oral every 6 hours PRN Severe Pain (7 - 10)      Allergies    Levaquin (Anaphylaxis)    Intolerances        I&O's Summary      Home Medications:  cholecalciferol 25 mcg (1000 intl units) oral tablet: 1 tab(s) orally once a day (28 Oct 2023 23:35)  clonazePAM 0.5 mg oral tablet: 1 tab(s) orally once a day as needed for (28 Oct 2023 23:35)  Eliquis 5 mg oral tablet: 1 tab(s) orally 2 times a day (28 Oct 2023 23:35)  Haldol 1 mg oral tablet: 1 tab(s) orally once a day (28 Oct 2023 23:35)  letermovir 480 mg oral tablet: 1 tab(s) orally once a day (29 Oct 2023 03:27)  magnesium oxide 400 mg oral tablet: 1 tab(s) orally 2 times a day (28 Oct 2023 23:35)  methenamine hippurate 1 g oral tablet: 1 tab(s) orally 2 times a day Patient has not received yet (29 Oct 2023 03:40)  Multiple Vitamins oral tablet: 1 tab(s) orally once a day (28 Oct 2023 23:35)  mycophenolate mofetil 250 mg oral capsule: 1 cap(s) orally 2 times a day (28 Oct 2023 23:35)  predniSONE 5 mg oral tablet: 1 tab(s) orally once a day (29 Oct 2023 03:26)      Patient was seen and evaluated on dialysis.   Patient is tolerating the procedure well.   Continue dialysis:   Dialyzer:          QB:        QD:   Goal UF ___ over ___ Hours       Vital Signs Last 24 Hrs  T(C): 36.9 (29 Oct 2023 15:39), Max: 37.5 (29 Oct 2023 00:29)  T(F): 98.5 (29 Oct 2023 15:39), Max: 99.5 (29 Oct 2023 00:29)  HR: 77 (29 Oct 2023 15:39) (77 - 96)  BP: 106/68 (29 Oct 2023 15:39) (106/60 - 135/79)  BP(mean): 91 (28 Oct 2023 23:02) (91 - 91)  RR: 18 (29 Oct 2023 15:39) (18 - 18)  SpO2: 93% (29 Oct 2023 15:39) (93% - 100%)    Parameters below as of 29 Oct 2023 15:39  Patient On (Oxygen Delivery Method): room air      Daily     Daily   I&O's Summary      PHYSICAL EXAM:  GEN: alert awake O X 3  HEENT: MMM  NECK supple no jvd  CV: RRR s1s2  LUNGS: b/l CTA  ABD: + soft,   EXT: no edema    LABS:                        10.8   4.61  )-----------( 165      ( 29 Oct 2023 07:55 )             32.8     10-29    138  |  106  |  14  ----------------------------<  103<H>  3.7   |  23  |  1.00    Ca    8.7      29 Oct 2023 07:55    TPro  6.5  /  Alb  3.0<L>  /  TBili  0.6  /  DBili  x   /  AST  40<H>  /  ALT  75  /  AlkPhos  128<H>  10-29    PT/INR - ( 28 Oct 2023 18:55 )   PT: 14.8 sec;   INR: 1.32 ratio         PTT - ( 28 Oct 2023 18:55 )  PTT:35.2 sec  Urinalysis Basic - ( 29 Oct 2023 07:55 )    Color: x / Appearance: x / SG: x / pH: x  Gluc: 103 mg/dL / Ketone: x  / Bili: x / Urobili: x   Blood: x / Protein: x / Nitrite: x   Leuk Esterase: x / RBC: x / WBC x   Sq Epi: x / Non Sq Epi: x / Bacteria: x                     NEPHROLOGY INTERVAL HPI/OVERNIGHT EVENTS:  LUIZ [id number]  HPI:  61 yo F with a PMH of schizoaffective disorder (bipolar type), renal failure (s/p R renal transplant, due to prior lithium use), breast cancer s/p lumpectomy, fibroid s/p hysterectomy, hyperparathyroidism, DVT (on Eliquis), MGUS, HLD, and GERD who presents with abdominal pain. It is lower abdominal, and has been going on for several months. It is sporadic, worse upon movement, and denies relation to urination or bowel movements. However, it worsened today, and she also had fevers up to 102F, and chills with weakness today. She has also had nausea during the past few days as well. She denies pain elsewhere, including CP and SOB. She notes she gets frequent UTIs and often gets fevers with them, which has been occurring since her renal transplant about 2 years ago. She was recently prescribed methenamine 1 mg BID, but has not received the medicine yet (it is being mailed to her).    In terms of her transplant, she was recently decreased on her prednisone from 7.5 to 5 mg QD about 2 weeks ago. About a month ago, she was also changed from valganciclovir for antiviral PPX to letermovir because the Valcyte was causing leukopenia (which has now improved) but was leading to recurrent infections.    In the ED, she was given ceftriaxone 1 g IV x1 and acetaminophen 650 mg PO x1,  mg PO x1, and NS 1L x1. (29 Oct 2023 03:18)      Patient is a 62y old  Female who presents with a chief complaint of UTI, sepsis (29 Oct 2023 13:31)  -----------------------------------------------  Nephrology Consult   pt seen and dw transplant nephrologist, dr finley   pt with hx of recurrent uti   last uti 9/26 klebsiella tx with cefepime x10 days then dc ( due to neutropenia): no concern for rejection   10/10 ucx hafyonatan garcíai asymptomatic and contaminant  now with fever, no neutropenia and pain over transplant kidney    complaint with all her medicaiton s      PAST MEDICAL & SURGICAL HISTORY:  - renal tx 2021/ [facility]  - dvt  - hyperpara   - schozoaffective disorder with hx of lithium use   - MGUS   Followed by a hematologist  - Hx of Breast Lumpectomy post RT.  - Hx of Hysterectomy with Fibroid removal  - Hx of hydronephrosis    FAMILY HISTORY:  FH: lymphoma    MEDICATIONS  (STANDING):  apixaban 2.5 milliGRAM(s) Oral every 12 hours  cefTRIAXone Injectable. 2000 milliGRAM(s) IV Push every 24 hours  cholecalciferol 1000 Unit(s) Oral daily  haloperidol     Tablet 1 milliGRAM(s) Oral daily  letermovir 480 milliGRAM(s) Oral daily  magnesium oxide 400 milliGRAM(s) Oral two times a day with meals  multivitamin 1 Tablet(s) Oral daily  mycophenolate mofetil 250 milliGRAM(s) Oral two times a day  predniSONE   Tablet 5 milliGRAM(s) Oral daily  sodium chloride 0.9%. 1000 milliLiter(s) (100 mL/Hr) IV Continuous <Continuous>    MEDICATIONS  (PRN):  acetaminophen     Tablet .. 650 milliGRAM(s) Oral every 6 hours PRN Temp greater or equal to 38C (100.4F), Mild Pain (1 - 3), Moderate Pain (4 - 6)  aluminum hydroxide/magnesium hydroxide/simethicone Suspension 30 milliLiter(s) Oral every 4 hours PRN Dyspepsia  clonazePAM  Tablet 0.5 milliGRAM(s) Oral daily PRN Anxiety  melatonin 3 milliGRAM(s) Oral at bedtime PRN Insomnia  ondansetron Injectable 4 milliGRAM(s) IV Push every 8 hours PRN Nausea and/or Vomiting  oxyCODONE    IR 5 milliGRAM(s) Oral every 6 hours PRN Severe Pain (7 - 10)      Allergies    Levaquin (Anaphylaxis)    Intolerances        I&O's Summary      Home Medications:  cholecalciferol 25 mcg (1000 intl units) oral tablet: 1 tab(s) orally once a day (28 Oct 2023 23:35)  clonazePAM 0.5 mg oral tablet: 1 tab(s) orally once a day as needed for (28 Oct 2023 23:35)  Eliquis 5 mg oral tablet: 1 tab(s) orally 2 times a day (28 Oct 2023 23:35)  Haldol 1 mg oral tablet: 1 tab(s) orally once a day (28 Oct 2023 23:35)  letermovir 480 mg oral tablet: 1 tab(s) orally once a day (29 Oct 2023 03:27)  magnesium oxide 400 mg oral tablet: 1 tab(s) orally 2 times a day (28 Oct 2023 23:35)  methenamine hippurate 1 g oral tablet: 1 tab(s) orally 2 times a day Patient has not received yet (29 Oct 2023 03:40)  Multiple Vitamins oral tablet: 1 tab(s) orally once a day (28 Oct 2023 23:35)  mycophenolate mofetil 250 mg oral capsule: 1 cap(s) orally 2 times a day (28 Oct 2023 23:35)  predniSONE 5 mg oral tablet: 1 tab(s) orally once a day (29 Oct 2023 03:26)            Vital Signs Last 24 Hrs  T(C): 36.9 (29 Oct 2023 15:39), Max: 37.5 (29 Oct 2023 00:29)  T(F): 98.5 (29 Oct 2023 15:39), Max: 99.5 (29 Oct 2023 00:29)  HR: 77 (29 Oct 2023 15:39) (77 - 96)  BP: 106/68 (29 Oct 2023 15:39) (106/60 - 135/79)  BP(mean): 91 (28 Oct 2023 23:02) (91 - 91)  RR: 18 (29 Oct 2023 15:39) (18 - 18)  SpO2: 93% (29 Oct 2023 15:39) (93% - 100%)    Parameters below as of 29 Oct 2023 15:39  Patient On (Oxygen Delivery Method): room air      Daily     Daily   I&O's Summary      PHYSICAL EXAM:  GEN: alert awake NAD  HEENT: MMM  NECK supple no jvd  CV: RRR s1s2  LUNGS: b/l CTA  ABD: +bs soft, nt/nd  EXT: no edema    LABS:                        10.8   4.61  )-----------( 165      ( 29 Oct 2023 07:55 )             32.8     10-29    138  |  106  |  14  ----------------------------<  103<H>  3.7   |  23  |  1.00    Ca    8.7      29 Oct 2023 07:55    TPro  6.5  /  Alb  3.0<L>  /  TBili  0.6  /  DBili  x   /  AST  40<H>  /  ALT  75  /  AlkPhos  128<H>  10-29    PT/INR - ( 28 Oct 2023 18:55 )   PT: 14.8 sec;   INR: 1.32 ratio         PTT - ( 28 Oct 2023 18:55 )  PTT:35.2 sec  Urinalysis Basic - ( 29 Oct 2023 07:55 )    Color: x / Appearance: x / SG: x / pH: x  Gluc: 103 mg/dL / Ketone: x  / Bili: x / Urobili: x   Blood: x / Protein: x / Nitrite: x   Leuk Esterase: x / RBC: x / WBC x   Sq Epi: x / Non Sq Epi: x / Bacteria: x

## 2023-10-29 NOTE — CONSULT NOTE ADULT - ASSESSMENT
63 yo F with a PMH of schizoaffective disorder (bipolar type), renal failure (s/p R renal transplant, due to prior lithium use), breast cancer s/p lumpectomy, fibroid s/p hysterectomy, hyperparathyroidism, DVT (on Eliquis), MGUS, HLD, and GERD who presents with abdominal pain. It is lower abdominal, and has been going on for several months. It is sporadic, worse upon movement, and denies relation to urination or bowel movements. However, it worsened  and she also had fevers up to 102F, and chills with weakness. She has also had nausea during the past few days as well. She notes she gets frequent UTIs and often gets fevers with them, which has been occurring since her renal transplant about 2 years ago. She was recently prescribed methenamine 1 mg BID, but has not received the medicine yet (it is being mailed to her). In terms of her transplant, she was recently decreased on her prednisone from 7.5 to 5 mg QD about 2 weeks ago. About a month ago, she was also changed from valganciclovir for antiviral PPX to letermovir because the Valcyte was causing leukopenia (which has now improved) but was leading to recurrent infections. In the ED, she was given ceftriaxone 1 g IV x1 and acetaminophen 650 mg PO x1,  mg PO x1, and NS 1L x1.    1. Fever. Pyuria, Recurrent UTI. Pyelonephritis. S/p Renal transplant. Immunocompromised host  - imaging reviewed  - f/u urine cx, blood cx  - monitor temps  - agree with IV rocephin 1gm daily  - continue with antibiotic coverage  - monitor temps  - tolerating abx well so far; no side effects noted  - reason for abx use and side effects reviewed with patient  - on letermovir for cmv prophylaxis, on cellcept/steroids   - supportive care  - fu cbc    2 other issues - care per medicine  63 yo F with a PMH of schizoaffective disorder (bipolar type), renal failure (s/p R renal transplant, due to prior lithium use), breast cancer s/p lumpectomy, fibroid s/p hysterectomy, hyperparathyroidism, DVT (on Eliquis), MGUS, HLD, and GERD who presents with abdominal pain. It is lower abdominal, and has been going on for several months. It is sporadic, worse upon movement, and denies relation to urination or bowel movements. However, it worsened  and she also had fevers up to 102F, and chills with weakness. She has also had nausea during the past few days as well. She notes she gets frequent UTIs and often gets fevers with them, which has been occurring since her renal transplant about 2 years ago. She was recently prescribed methenamine 1 mg BID, but has not received the medicine yet (it is being mailed to her). In terms of her transplant, she was recently decreased on her prednisone from 7.5 to 5 mg QD about 2 weeks ago. About a month ago, she was also changed from valganciclovir for antiviral PPX to letermovir because the Valcyte was causing leukopenia (which has now improved) but was leading to recurrent infections. In the ED, she was given ceftriaxone 1 g IV x1 and acetaminophen 650 mg PO x1,  mg PO x1, and NS 1L x1.    1. Fever. Pyuria, Recurrent UTI. Pyelonephritis. S/p Renal transplant. Immunocompromised host  - imaging reviewed  - f/u urine cx, blood cx  - monitor temps  - agree with IV rocephin adjust dose to 2gm daily   - continue with antibiotic coverage  - monitor temps  - tolerating abx well so far; no side effects noted  - reason for abx use and side effects reviewed with patient  - on letermovir for cmv prophylaxis, on cellcept/steroids   - supportive care  - fu cbc    2 other issues - care per medicine

## 2023-10-29 NOTE — H&P ADULT - NSHPREVIEWOFSYSTEMS_GEN_ALL_CORE
Gen: + fevers, chills, malaise  Eyes: no blurred vision or lacrimation  ENT: no tinnitus, vertigo, or decreased hearing  Resp: no wheezing, dyspnea, pleuritic chest pain, hemoptysis, or orthopnea  CV: no chest pain, dyspnea on exertion, or palpitations  GI: + abdominal pain, nausea. No vomiting, abdominal pain, diarrhea, constipation, melena, or hematochezia  : no dysuria or hematuria  MSK: no arthralgias, joint swelling, or myalgias  Neuro: no focal deficits, confusion, dizziness, tremors, or seizures  Skin: no rash, lesions, or edema

## 2023-10-29 NOTE — CONSULT NOTE ADULT - ASSESSMENT
61 yo F with a PMH of schizoaffective disorder (bipolar type), renal failure (s/p R renal transplant, due to prior lithium use), breast cancer s/p lumpectomy, fibroid s/p hysterectomy, hyperparathyroidism, DVT (on Eliquis), MGUS, HLD, and GERD who presents with abdominal pain.  CT non contrast with perinephric stranding infx vs rejection   recurrent uti now febrile  hx of leukocytosis, improved with change of antiviral     REC  - cw ivf  - cs IS medicaiton   - fu cx send, pt febrile and fu trend of fever curve   - for now will continue care at , if pt becomes neutropenic, will transfer to [facility] transplant team     risa finley, tranplant nephrologist    risa cooney

## 2023-10-29 NOTE — H&P ADULT - NSHPLABSRESULTS_GEN_ALL_CORE
Labs personally reviewed and interpreted. Notable for no leukocytosis (WBC 5.86) with 82.9% neutrophils and lymphopenia (0.34). Hb 12.2, plt 177, INR 1.32, lactate normal at 0.7, Na 138, K 4.0, Cl 106, HCO3 28, BUN/creatinine 19/1.2 (at baseline), calcium 9.4 with albumin 3.4, Tbili 0.5, alk phos and AST mildly elevated at 157, 58, and 102, respectively.  UA shows SG 1.016, positive nitrites, large LE, small blood, 147 WBCs, 6 RBCs, many bacteria, and no epithelial cells.  RVP/COVID-19 negative.    CXR personally reviewed and interpreted. Notable for clear lungs, no focal consolidations, effusions, interstitial markings, pneumothorax, or obvious cardiomegaly.  CT A/P without contrast personally reviewed and interpreted. Notable for:  Lungs: No new parenchymal masses or consolidation detected at the lung bases. Small pericardial effusion seen anteriorly with no coronary calcifications or pleural effusion detected at the levels imaged.  Liver: No focal hepatic lesions detected.  Gallbladder and bile ducts: Unremarkable with no calcified stone or ductal dilatation detected.  Pancreas: No pancreatic mass or ductal dilation.  Spleen: No splenomegaly or splenic mass.  Adrenal glands: Normal. No mass.  Kidneys and ureters: A few fluid attenuation cysts are again seen in association with the markedly atrophic native kidneys bilaterally and require no further follow-up. Renal allograft again evident in the right lower quadrant with no associated renal mass or evidence of hydronephrosis/hydroureter.  Stomach and bowel: Moderate fecal loading is seen throughout colonic segments and could reflect an element of constipation with no evidence of bowel obstruction or perforation detected.  Appendix: Normal appendix is identified without evidence of inflammation.  Intraperitoneal space: No pneumoperitoneum or free intraperitoneal fluid detected.  Vasculature: No abdominal aortic aneurysm.  Lymph nodes: No lymphadenopathy detected.  Urinary bladder: Unremarkable as visualized.  Reproductive:  Question atrophic uterus versus prior partial hysterectomy.  Bones/joints: No acute fracture.  Soft tissues: Unremarkable.    IMPRESSION:  1. No new evidence of mass or hydronephrosis involving the patient and renal allograft in the region the right lower quadrant on this noncontrast examination.  2. No other evidence of an acute abdominopelvic process is detected.    EKG personally reviewed._________________ Labs personally reviewed and interpreted. Notable for no leukocytosis (WBC 5.86) with 82.9% neutrophils and lymphopenia (0.34). Hb 12.2, plt 177, INR 1.32, lactate normal at 0.7, Na 138, K 4.0, Cl 106, HCO3 28, BUN/creatinine 19/1.2 (at baseline), calcium 9.4 with albumin 3.4, Tbili 0.5, alk phos and AST mildly elevated at 157, 58, and 102, respectively.  UA shows SG 1.016, positive nitrites, large LE, small blood, 147 WBCs, 6 RBCs, many bacteria, and no epithelial cells.  RVP/COVID-19 negative.    CXR personally reviewed and interpreted. Notable for clear lungs, no focal consolidations, effusions, interstitial markings, pneumothorax, or obvious cardiomegaly.  CT A/P without contrast personally reviewed and interpreted. Notable for:  Lungs: No new parenchymal masses or consolidation detected at the lung bases. Small pericardial effusion seen anteriorly with no coronary calcifications or pleural effusion detected at the levels imaged.  Liver: No focal hepatic lesions detected.  Gallbladder and bile ducts: Unremarkable with no calcified stone or ductal dilatation detected.  Pancreas: No pancreatic mass or ductal dilation.  Spleen: No splenomegaly or splenic mass.  Adrenal glands: Normal. No mass.  Kidneys and ureters: A few fluid attenuation cysts are again seen in association with the markedly atrophic native kidneys bilaterally and require no further follow-up. Renal allograft again evident in the right lower quadrant with no associated renal mass or evidence of hydronephrosis/hydroureter.  Stomach and bowel: Moderate fecal loading is seen throughout colonic segments and could reflect an element of constipation with no evidence of bowel obstruction or perforation detected.  Appendix: Normal appendix is identified without evidence of inflammation.  Intraperitoneal space: No pneumoperitoneum or free intraperitoneal fluid detected.  Vasculature: No abdominal aortic aneurysm.  Lymph nodes: No lymphadenopathy detected.  Urinary bladder: Unremarkable as visualized.  Reproductive:  Question atrophic uterus versus prior partial hysterectomy.  Bones/joints: No acute fracture.  Soft tissues: Unremarkable.    IMPRESSION:  1. No new evidence of mass or hydronephrosis involving the patient and renal allograft in the region the right lower quadrant on this noncontrast examination.  2. No other evidence of an acute abdominopelvic process is detected.      EKG personally reviewed. Sinus tachycardia, borderline LAD (unchanged from previous). No pathological Q waves or ST changes. Rate 103, , QTc 424.

## 2023-10-29 NOTE — CONSULT NOTE ADULT - REASON FOR ADMISSION
"Chief Complaint   Patient presents with     RECHECK     NICU 'question about eatting'       BP (!) 83/69 (BP [facility]: Right leg, Patient Position: Sitting, Cuff Size: Child)   Pulse 188   Ht 2' 6.59\" (77.7 cm)   Wt 19 lb 4.6 oz (8.75 kg)   HC 46.8 cm (18.43\")   BMI 14.49 kg/m      Mid-arm circumference: 16cm  Tricept skinfold: 16mm  Sub-scapular skinfold: 8mm    Lesia Kelley, EMT  December 18, 2020  "
UTI, sepsis
UTI, sepsis

## 2023-10-29 NOTE — CONSULT NOTE ADULT - SUBJECTIVE AND OBJECTIVE BOX
Patient is a 62y old  Female who presents with a chief complaint of UTI, sepsis (29 Oct 2023 03:18)    HPI:  61 yo F with a PMH of schizoaffective disorder (bipolar type), renal failure (s/p R renal transplant, due to prior lithium use), breast cancer s/p lumpectomy, fibroid s/p hysterectomy, hyperparathyroidism, DVT (on Eliquis), MGUS, HLD, and GERD who presents with abdominal pain. It is lower abdominal, and has been going on for several months. It is sporadic, worse upon movement, and denies relation to urination or bowel movements. However, it worsened  and she also had fevers up to 102F, and chills with weakness. She has also had nausea during the past few days as well. She notes she gets frequent UTIs and often gets fevers with them, which has been occurring since her renal transplant about 2 years ago. She was recently prescribed methenamine 1 mg BID, but has not received the medicine yet (it is being mailed to her). In terms of her transplant, she was recently decreased on her prednisone from 7.5 to 5 mg QD about 2 weeks ago. About a month ago, she was also changed from valganciclovir for antiviral PPX to letermovir because the Valcyte was causing leukopenia (which has now improved) but was leading to recurrent infections. In the ED, she was given ceftriaxone 1 g IV x1 and acetaminophen 650 mg PO x1,  mg PO x1, and NS 1L x1.      PMH: as above  PSH: as above  Meds: per reconciliation sheet, noted below  MEDICATIONS  (STANDING):  apixaban 2.5 milliGRAM(s) Oral every 12 hours  cefTRIAXone Injectable. 1000 milliGRAM(s) IV Push every 24 hours  cholecalciferol 1000 Unit(s) Oral daily  haloperidol     Tablet 1 milliGRAM(s) Oral daily  letermovir 480 milliGRAM(s) Oral daily  magnesium oxide 400 milliGRAM(s) Oral two times a day with meals  multivitamin 1 Tablet(s) Oral daily  mycophenolate mofetil 250 milliGRAM(s) Oral two times a day  predniSONE   Tablet 5 milliGRAM(s) Oral daily  sodium chloride 0.9%. 1000 milliLiter(s) (100 mL/Hr) IV Continuous <Continuous    Allergies    Levaquin (Anaphylaxis)    Intolerances      Social: no smoking, no alcohol, no illegal drugs; no recent travel, no exposure to TB  FAMILY HISTORY:  FH: lymphoma       no history of premature cardiovascular disease in first degree relatives    ROS: +fever, chills, no HA, no dizziness, no sore throat, no blurry vision, no CP, no palpitations, no SOB, no cough, +abdominal pain, no diarrhea, no N/V, no dysuria, no leg pain, no claudication, no rash, no joint aches, no rectal pain or bleeding, no night sweats    All other systems reviewed and are negative    Vital Signs Last 24 Hrs  T(C): 37.2 (29 Oct 2023 08:08), Max: 37.8 (28 Oct 2023 16:08)  T(F): 99 (29 Oct 2023 08:08), Max: 100.1 (28 Oct 2023 16:08)  HR: 96 (29 Oct 2023 08:08) (79 - 120)  BP: 106/60 (29 Oct 2023 08:08) (106/60 - 135/79)  BP(mean): 91 (28 Oct 2023 23:02) (91 - 94)  RR: 18 (29 Oct 2023 08:08) (18 - 18)  SpO2: 96% (29 Oct 2023 08:08) (96% - 100%)    Parameters below as of 29 Oct 2023 08:08  Patient On (Oxygen Delivery Method): room air      Daily Height in cm: 165.1 (28 Oct 2023 16:08)    Daily     PE:  Constitutional: NAD   HEENT: NC/AT, EOMI, PERRLA, conjunctivae clear; ears and nose atraumatic; pharynx benign  Neck: supple; thyroid not palpable  Back: no tenderness  Respiratory: respiratory effort normal; clear to auscultation  Cardiovascular: S1S2 regular, no murmurs  Abdomen: soft, not tender, not distended, positive BS; liver and spleen WNL  Genitourinary: no suprapubic tenderness  Lymphatic: no LN palpable  Musculoskeletal: no muscle tenderness, no joint swelling or tenderness  Extremities: no pedal edema  Neurological/ Psychiatric: AxOx3, Judgement and insight normal;  moving all extremities  Skin: no rashes; no palpable lesions    Labs: all available labs reviewed                        10.8   4.61  )-----------( 165      ( 29 Oct 2023 07:55 )             32.8     10-29    138  |  106  |  14  ----------------------------<  103<H>  3.7   |  23  |  1.00    Ca    8.7      29 Oct 2023 07:55    TPro  6.5  /  Alb  3.0<L>  /  TBili  0.6  /  DBili  x   /  AST  40<H>  /  ALT  75  /  AlkPhos  128<H>  10-29     LIVER FUNCTIONS - ( 29 Oct 2023 07:55 )  Alb: 3.0 g/dL / Pro: 6.5 gm/dL / ALK PHOS: 128 U/L / ALT: 75 U/L / AST: 40 U/L / GGT: x           Urinalysis Basic - ( 29 Oct 2023 07:55 )    Color: x / Appearance: x / SG: x / pH: x  Gluc: 103 mg/dL / Ketone: x  / Bili: x / Urobili: x   Blood: x / Protein: x / Nitrite: x   Leuk Esterase: x / RBC: x / WBC x   Sq Epi: x / Non Sq Epi: x / Bacteria: x          Radiology: all available radiological tests reviewed  < from: CT Abdomen and Pelvis No Cont (10.28.23 @ 19:57) >  ACC: [id number] EXAM:  CT ABDOMEN AND PELVIS   ORDERED BY: FABIANO LINARES     PROCEDURE DATE:  10/28/2023          INTERPRETATION:  [facility] RADIOLOGIST PRELIMINARY REPORT    PROCEDURE INFORMATION:  Exam: CT Abdomen And Pelvis Without Contrast  Exam date and time: 10/28/2023 7:55 PM  Age: 62 years old  Clinical indication: Pyelonephritis suspected, history of transplant    TECHNIQUE:  Imaging protocol: Computed tomography of the abdomen and pelvis without  contrast.    COMPARISON:  CT ABDOMEN AND PELVIS9/25/2023 10:47 PM    FINDINGS:  Lungs: No new parenchymal masses or consolidation detected at the lung   bases.  Small pericardial effusion seen anteriorly with no coronary   calcifications or  pleural effusion detected at the levels imaged. Unchanged 3.5 mm right   lower lobe nodule.    Liver: Normal in size. No focal hepatic lesions detected.  Gallbladder and bile ducts: Unremarkable with no calcified stone or ductal  dilatation detected.  Pancreas: No pancreatic mass or ductal dilation.  Spleen: Borderline in size.  Adrenal glands: No mass.  Kidneys and ureters: A few fluid attenuation cysts and tiny focal   calcifications are again seen in  association with the markedly atrophic native kidneys bilaterally and   require  no further follow-up. No gross change in perinephric stranding   surrounding right lower quadrant transplant or in slight prominence of   transplant renal pelvis and intrarenal collecting system since 9/25/2023.  Stomach and bowel: Moderate fecal loading is seen throughout colonic   segments  and could reflect an element of constipation with no evidence of bowel  obstruction or perforation detected.  Appendix: Normal appendix is identified without evidence of inflammation.    Intraperitoneal space: No pneumoperitoneum or free intraperitoneal fluid  detected.  Vasculature: No abdominal aortic aneurysm.  Lymph nodes: No lymphadenopathy detected.  Urinary bladder: Unremarkable as visualized.  Reproductive:  Question atrophic uterus versus prior partial hysterectomy.  Bones/joints: No acute fracture. No focal destructive or blastic lesion.   Degenerative change L5-S1.  Soft tissues: Unremarkable.    IMPRESSION:    No gross change in perinephric stranding surrounding right lower quadrant   transplant or in slight prominence of transplant renal pelvis and   intrarenal collecting system since 9/25/2023. Differential diagnosis   would include transplant infection and rejection.    No other evidence of an acute abdominopelvic process is detected.      Advanced directives addressed: full resuscitation

## 2023-10-29 NOTE — H&P ADULT - NSHPPHYSICALEXAM_GEN_ALL_CORE
Vital Signs Last 24 Hrs  T(C): 37.5 (29 Oct 2023 00:29), Max: 37.8 (28 Oct 2023 16:08)  T(F): 99.5 (29 Oct 2023 00:29), Max: 100.1 (28 Oct 2023 16:08)  HR: 92 (29 Oct 2023 00:29) (79 - 120)  BP: 135/79 (29 Oct 2023 00:29) (120/83 - 135/79)  BP(mean): 91 (28 Oct 2023 23:02) (91 - 94)  RR: 18 (29 Oct 2023 00:29) (18 - 18)  SpO2: 100% (29 Oct 2023 00:29) (96% - 100%)    GENERAL: No acute distress  HEENT: PERRL, EOMI, MMM, no oropharyngeal lesions  NECK: supple, no stiffness, no JVD, no thyromegaly  PULM: respirations non-labored, clear to auscultation bilaterally, no rales, rhonchi, or wheezes  CV: regular rate and rhythm, II/VI RUSB systolic murmur. No gallops or rubs  GI: abdomen soft, nontender, nondistended, no masses felt, normal bowel sounds  : no CVA TTP  MSK: strength 5/5 bilateral upper/lower extremities. No joint swelling, erythema, or warmth.  LYMPH: no anterior cervical, posterior cervical, supraclavicular, or inguinal lymphadenopathy  NEURO: A&Ox3, no tremors, sensation intact  SKIN: no rashes, lesions, or edema

## 2023-10-29 NOTE — H&P ADULT - HISTORY OF PRESENT ILLNESS
63 yo F with a PMH of schizoaffective disorder (bipolar type), renal failure (s/p R renal transplant, due to prior lithium use), breast cancer s/p lumpectomy, fibroid s/p hysterectomy, hyperparathyroidism, DVT (on Eliquis), MGUS, HLD, and GERD who presents with abdominal pain. It is lower abdominal, and has been going on for several months. It is sporadic, worse upon movement, and denies relation to urination or bowel movements. However, it worsened today, and she also had fevers up to 102F, and chills with weakness today. She has also had nausea during the past few days as well. She denies pain elsewhere, including CP and SOB. She notes she gets frequent UTIs and often gets fevers with them, which has been occurring since her renal transplant about 2 years ago. She was recently prescribed methenamine 1 mg BID, but has not received the medicine yet (it is being mailed to her).    In terms of her transplant, she was recently decreased on her prednisone from 7.5 to 5 mg QD about 2 weeks ago. About a month ago, she was also changed from valganciclovir for antiviral PPX to letermovir because the Valcyte was causing leukopenia (which has now improved) but was leading to recurrent infections.    In the ED, she was given ceftriaxone 1 g IV x1 and acetaminophen 650 mg PO x1,  mg PO x1, and NS 1L x1. [facility]ura Pregnancy And Lactation Text: There is insufficient data to evaluate drug-associated risk for major birth defects, miscarriage, or other adverse maternal or fetal outcomes.  There is a pregnancy registry that monitors pregnancy outcomes in pregnant persons exposed to the medication during pregnancy.  It is unknown if this medication is excreted in breast milk.  Do not breastfeed during treatment and for about 4 weeks after the last dose.

## 2023-10-29 NOTE — H&P ADULT - ASSESSMENT
61 yo F with a PMH of schizoaffective disorder (bipolar type), renal failure (s/p R renal transplant, due to prior lithium use), breast cancer s/p lumpectomy, fibroid s/p hysterectomy, hyperparathyroidism, DVT (on Eliquis), MGUS, HLD, and GERD who presents with abdominal pain and fevers, found to have a UTI.    #Complicated Urinary Tract Infection / Sepsis / Suprapubic Pain  - Patient had temp 100.1F in ED with tachycardia to 120 but was 102F at home  - Has gotten recurrent UTIs since transplant, unclear etiology, although is on immunosuppressants  - May be related to her abdominal pain (ddx includes recurrent fibroids given her hysterectomy was only partial)  - CT A/P without contrast shows no kidney stone  - Lactate normal  - IVFs  - Pain control PRN  - F/u blood and urine cx  - Ceftriaxone 1 g IV QD    #Transaminitis  - No known history  - Check RUQ US  - Check hepatitis panel    #Renal Transplant Recipient  - In the setting of decades of lithium use for bipolar disorder  - Renal function stable at CKD3 levels  - C/w MMF (Prograf)  - Check Prograf level  - C/w prednisone 5 mg QD    #Schizoaffective Disorder, Bipolar Type  - C/w Haldol 1 mg QD  - C/w clonazepam 0.5 mg QD PRN    #History of DVT  - Bilateral DVT post-transplant  - C/w Eliquis 5 mg BID    #Prophylactic Measure  - DVT PPX: Eliquis  - Diet: DASH  - Dispo: pending clinical improvement

## 2023-10-29 NOTE — H&P ADULT - NSICDXPASTMEDICALHX_GEN_ALL_CORE_FT
PAST MEDICAL HISTORY:  Breast CA DCIS, left breast, s/p RT    Cardiac murmur     Degenerative disc disease, lumbar     GERD (gastroesophageal reflux disease)     Gout     History of secondary hyperparathyroidism     HLD (hyperlipidemia)     Kidney disease "stage 4" as per patient secondary to lithium treatment many years ago    Lyme disease     MGUS (monoclonal gammopathy of unknown significance)     Schizoaffective disorder, bipolar type     Uterine leiomyoma

## 2023-10-29 NOTE — CHART NOTE - NSCHARTNOTEFT_GEN_A_CORE
Patient is seen and examined at bedside. Chart reviewed  Imaging studies reviewed  On IV Rocephin for pyelo  ID eval noted- cont IV abx per ID  Cultures pending  Nephrology eval for kidney transplant and abnormal imaging studies

## 2023-10-30 LAB
ANION GAP SERPL CALC-SCNC: 6 MMOL/L — SIGNIFICANT CHANGE UP (ref 5–17)
ANION GAP SERPL CALC-SCNC: 6 MMOL/L — SIGNIFICANT CHANGE UP (ref 5–17)
BUN SERPL-MCNC: 17 MG/DL — SIGNIFICANT CHANGE UP (ref 7–23)
BUN SERPL-MCNC: 17 MG/DL — SIGNIFICANT CHANGE UP (ref 7–23)
CALCIUM SERPL-MCNC: 9 MG/DL — SIGNIFICANT CHANGE UP (ref 8.5–10.1)
CALCIUM SERPL-MCNC: 9 MG/DL — SIGNIFICANT CHANGE UP (ref 8.5–10.1)
CHLORIDE SERPL-SCNC: 109 MMOL/L — HIGH (ref 96–108)
CHLORIDE SERPL-SCNC: 109 MMOL/L — HIGH (ref 96–108)
CO2 SERPL-SCNC: 26 MMOL/L — SIGNIFICANT CHANGE UP (ref 22–31)
CO2 SERPL-SCNC: 26 MMOL/L — SIGNIFICANT CHANGE UP (ref 22–31)
CREAT SERPL-MCNC: 0.93 MG/DL — SIGNIFICANT CHANGE UP (ref 0.5–1.3)
CREAT SERPL-MCNC: 0.93 MG/DL — SIGNIFICANT CHANGE UP (ref 0.5–1.3)
EGFR: 69 ML/MIN/1.73M2 — SIGNIFICANT CHANGE UP
EGFR: 69 ML/MIN/1.73M2 — SIGNIFICANT CHANGE UP
GLUCOSE SERPL-MCNC: 107 MG/DL — HIGH (ref 70–99)
GLUCOSE SERPL-MCNC: 107 MG/DL — HIGH (ref 70–99)
HCT VFR BLD CALC: 33 % — LOW (ref 34.5–45)
HCT VFR BLD CALC: 33 % — LOW (ref 34.5–45)
HGB BLD-MCNC: 11 G/DL — LOW (ref 11.5–15.5)
HGB BLD-MCNC: 11 G/DL — LOW (ref 11.5–15.5)
MCHC RBC-ENTMCNC: 32.3 PG — SIGNIFICANT CHANGE UP (ref 27–34)
MCHC RBC-ENTMCNC: 32.3 PG — SIGNIFICANT CHANGE UP (ref 27–34)
MCHC RBC-ENTMCNC: 33.3 GM/DL — SIGNIFICANT CHANGE UP (ref 32–36)
MCHC RBC-ENTMCNC: 33.3 GM/DL — SIGNIFICANT CHANGE UP (ref 32–36)
MCV RBC AUTO: 96.8 FL — SIGNIFICANT CHANGE UP (ref 80–100)
MCV RBC AUTO: 96.8 FL — SIGNIFICANT CHANGE UP (ref 80–100)
PLATELET # BLD AUTO: 157 K/UL — SIGNIFICANT CHANGE UP (ref 150–400)
PLATELET # BLD AUTO: 157 K/UL — SIGNIFICANT CHANGE UP (ref 150–400)
POTASSIUM SERPL-MCNC: 3.7 MMOL/L — SIGNIFICANT CHANGE UP (ref 3.5–5.3)
POTASSIUM SERPL-MCNC: 3.7 MMOL/L — SIGNIFICANT CHANGE UP (ref 3.5–5.3)
POTASSIUM SERPL-SCNC: 3.7 MMOL/L — SIGNIFICANT CHANGE UP (ref 3.5–5.3)
POTASSIUM SERPL-SCNC: 3.7 MMOL/L — SIGNIFICANT CHANGE UP (ref 3.5–5.3)
RBC # BLD: 3.41 M/UL — LOW (ref 3.8–5.2)
RBC # BLD: 3.41 M/UL — LOW (ref 3.8–5.2)
RBC # FLD: 14.5 % — SIGNIFICANT CHANGE UP (ref 10.3–14.5)
RBC # FLD: 14.5 % — SIGNIFICANT CHANGE UP (ref 10.3–14.5)
SODIUM SERPL-SCNC: 141 MMOL/L — SIGNIFICANT CHANGE UP (ref 135–145)
SODIUM SERPL-SCNC: 141 MMOL/L — SIGNIFICANT CHANGE UP (ref 135–145)
WBC # BLD: 3.91 K/UL — SIGNIFICANT CHANGE UP (ref 3.8–10.5)
WBC # BLD: 3.91 K/UL — SIGNIFICANT CHANGE UP (ref 3.8–10.5)
WBC # FLD AUTO: 3.91 K/UL — SIGNIFICANT CHANGE UP (ref 3.8–10.5)
WBC # FLD AUTO: 3.91 K/UL — SIGNIFICANT CHANGE UP (ref 3.8–10.5)

## 2023-10-30 PROCEDURE — 99232 SBSQ HOSP IP/OBS MODERATE 35: CPT

## 2023-10-30 RX ORDER — MEROPENEM 1 G/30ML
1000 INJECTION INTRAVENOUS EVERY 8 HOURS
Refills: 0 | Status: DISCONTINUED | OUTPATIENT
Start: 2023-10-30 | End: 2023-11-01

## 2023-10-30 RX ORDER — MEROPENEM 1 G/30ML
1000 INJECTION INTRAVENOUS EVERY 8 HOURS
Refills: 0 | Status: DISCONTINUED | OUTPATIENT
Start: 2023-10-30 | End: 2023-10-30

## 2023-10-30 RX ORDER — CEFTRIAXONE 500 MG/1
2000 INJECTION, POWDER, FOR SOLUTION INTRAMUSCULAR; INTRAVENOUS EVERY 24 HOURS
Refills: 0 | Status: DISCONTINUED | OUTPATIENT
Start: 2023-10-30 | End: 2023-10-30

## 2023-10-30 RX ADMIN — MYCOPHENOLATE MOFETIL 250 MILLIGRAM(S): 250 CAPSULE ORAL at 22:46

## 2023-10-30 RX ADMIN — MYCOPHENOLATE MOFETIL 250 MILLIGRAM(S): 250 CAPSULE ORAL at 10:55

## 2023-10-30 RX ADMIN — MEROPENEM 1000 MILLIGRAM(S): 1 INJECTION INTRAVENOUS at 15:36

## 2023-10-30 RX ADMIN — MAGNESIUM OXIDE 400 MG ORAL TABLET 400 MILLIGRAM(S): 241.3 TABLET ORAL at 18:37

## 2023-10-30 RX ADMIN — Medication 650 MILLIGRAM(S): at 05:53

## 2023-10-30 RX ADMIN — Medication 5 MILLIGRAM(S): at 10:54

## 2023-10-30 RX ADMIN — MEROPENEM 1000 MILLIGRAM(S): 1 INJECTION INTRAVENOUS at 22:12

## 2023-10-30 RX ADMIN — MAGNESIUM OXIDE 400 MG ORAL TABLET 400 MILLIGRAM(S): 241.3 TABLET ORAL at 10:55

## 2023-10-30 RX ADMIN — Medication 1000 UNIT(S): at 10:56

## 2023-10-30 RX ADMIN — APIXABAN 2.5 MILLIGRAM(S): 2.5 TABLET, FILM COATED ORAL at 22:13

## 2023-10-30 RX ADMIN — Medication 1 TABLET(S): at 10:58

## 2023-10-30 RX ADMIN — APIXABAN 2.5 MILLIGRAM(S): 2.5 TABLET, FILM COATED ORAL at 10:54

## 2023-10-30 RX ADMIN — HALOPERIDOL DECANOATE 1 MILLIGRAM(S): 100 INJECTION INTRAMUSCULAR at 10:55

## 2023-10-30 NOTE — PROGRESS NOTE ADULT - SUBJECTIVE AND OBJECTIVE BOX
NEPHROLOGY INTERVAL HPI/OVERNIGHT EVENTS:  10-30-23 @ 12:37    10/30 concerned about her abd pain   HPI:  63 yo F with a PMH of schizoaffective disorder (bipolar type), renal failure (s/p R renal transplant, due to prior lithium use), breast cancer s/p lumpectomy, fibroid s/p hysterectomy, hyperparathyroidism, DVT (on Eliquis), MGUS, HLD, and GERD who presents with abdominal pain. It is lower abdominal, and has been going on for several months. It is sporadic, worse upon movement, and denies relation to urination or bowel movements. However, it worsened today, and she also had fevers up to 102F, and chills with weakness today. She has also had nausea during the past few days as well. She denies pain elsewhere, including CP and SOB. She notes she gets frequent UTIs and often gets fevers with them, which has been occurring since her renal transplant about 2 years ago. She was recently prescribed methenamine 1 mg BID, but has not received the medicine yet (it is being mailed to her).    In terms of her transplant, she was recently decreased on her prednisone from 7.5 to 5 mg QD about 2 weeks ago. About a month ago, she was also changed from valganciclovir for antiviral PPX to letermovir because the Valcyte was causing leukopenia (which has now improved) but was leading to recurrent infections.    In the ED, she was given ceftriaxone 1 g IV x1 and acetaminophen 650 mg PO x1,  mg PO x1, and NS 1L x1. (29 Oct 2023 03:18)      Patient is a 62y old  Female who presents with a chief complaint of UTI, sepsis (29 Oct 2023 13:31)  -----------------------------------------------  Nephrology Consult   pt seen and dw transplant nephrologist, dr finley   pt with hx of recurrent uti   last uti 9/26 klebsiella tx with cefepime x10 days then dc ( due to neutropenia): no concern for rejection   10/10 ucx luis alberto malcolm asymptomatic and contaminant  now with fever, no neutropenia and pain over transplant kidney    complaint with all her medicaiton s      PAST MEDICAL & SURGICAL HISTORY:  - renal tx 2021/ [facility]  - dvt  - hyperpara   - schozoaffective disorder with hx of lithium use   - MGUS   Followed by a hematologist  - Hx of Breast Lumpectomy post RT.  - Hx of Hysterectomy with Fibroid removal  - Hx of hydronephrosis    MEDICATIONS  (STANDING):  apixaban 2.5 milliGRAM(s) Oral every 12 hours  cholecalciferol 1000 Unit(s) Oral daily  haloperidol     Tablet 1 milliGRAM(s) Oral daily  letermovir 480 milliGRAM(s) Oral daily  magnesium oxide 400 milliGRAM(s) Oral two times a day with meals  meropenem  IVPB 1000 milliGRAM(s) IV Intermittent every 8 hours  multivitamin 1 Tablet(s) Oral daily  mycophenolate mofetil 250 milliGRAM(s) Oral two times a day  predniSONE   Tablet 5 milliGRAM(s) Oral daily  sodium chloride 0.9%. 1000 milliLiter(s) (100 mL/Hr) IV Continuous <Continuous>    MEDICATIONS  (PRN):  acetaminophen     Tablet .. 650 milliGRAM(s) Oral every 6 hours PRN Temp greater or equal to 38C (100.4F), Mild Pain (1 - 3), Moderate Pain (4 - 6)  aluminum hydroxide/magnesium hydroxide/simethicone Suspension 30 milliLiter(s) Oral every 4 hours PRN Dyspepsia  clonazePAM  Tablet 0.5 milliGRAM(s) Oral daily PRN Anxiety  melatonin 3 milliGRAM(s) Oral at bedtime PRN Insomnia  ondansetron Injectable 4 milliGRAM(s) IV Push every 8 hours PRN Nausea and/or Vomiting  oxyCODONE    IR 5 milliGRAM(s) Oral every 6 hours PRN Severe Pain (7 - 10)      Allergies    Levaquin (Anaphylaxis)    Intolerances        I&O's Detail        Vital Signs Last 24 Hrs  T(C): 37.2 (30 Oct 2023 08:25), Max: 37.4 (29 Oct 2023 22:20)  T(F): 99 (30 Oct 2023 08:25), Max: 99.4 (29 Oct 2023 22:20)  HR: 65 (30 Oct 2023 08:25) (65 - 80)  BP: 95/69 (30 Oct 2023 08:25) (95/69 - 121/73)  BP(mean): --  RR: 18 (30 Oct 2023 08:25) (18 - 18)  SpO2: 93% (30 Oct 2023 08:25) (93% - 98%)    Parameters below as of 30 Oct 2023 08:25  Patient On (Oxygen Delivery Method): room air      Daily     Daily     PHYSICAL EXAM:  General: alert. awake NAD  HEENT: MMM  CV: s1s2 rrr  LUNGS: B/L CTA  EXT: no edema    LABS:                        11.0   3.91  )-----------( 157      ( 30 Oct 2023 08:15 )             33.0     10-30    141  |  109<H>  |  17  ----------------------------<  107<H>  3.7   |  26  |  0.93    Ca    9.0      30 Oct 2023 08:15    TPro  6.5  /  Alb  3.0<L>  /  TBili  0.6  /  DBili  x   /  AST  40<H>  /  ALT  75  /  AlkPhos  128<H>  10-29    PT/INR - ( 28 Oct 2023 18:55 )   PT: 14.8 sec;   INR: 1.32 ratio         PTT - ( 28 Oct 2023 18:55 )  PTT:35.2 sec  Urinalysis Basic - ( 30 Oct 2023 08:15 )    Color: x / Appearance: x / SG: x / pH: x  Gluc: 107 mg/dL / Ketone: x  / Bili: x / Urobili: x   Blood: x / Protein: x / Nitrite: x   Leuk Esterase: x / RBC: x / WBC x   Sq Epi: x / Non Sq Epi: x / Bacteria: x

## 2023-10-30 NOTE — PROGRESS NOTE ADULT - SUBJECTIVE AND OBJECTIVE BOX
Date of service: 10-30-23 @ 12:59    pt seen and examined  has bladder spasms, lower abd discomfort  no fevers  10/25 urine cx resistant hafnia alvei growing     ROS: no fever or chills; denies dizziness, no HA, no SOB or cough, no abdominal pain, no diarrhea or constipation; no legs pain, no rashes    MEDICATIONS  (STANDING):  apixaban 2.5 milliGRAM(s) Oral every 12 hours  cholecalciferol 1000 Unit(s) Oral daily  haloperidol     Tablet 1 milliGRAM(s) Oral daily  letermovir 480 milliGRAM(s) Oral daily  magnesium oxide 400 milliGRAM(s) Oral two times a day with meals  meropenem  IVPB 1000 milliGRAM(s) IV Intermittent every 8 hours  multivitamin 1 Tablet(s) Oral daily  mycophenolate mofetil 250 milliGRAM(s) Oral two times a day  predniSONE   Tablet 5 milliGRAM(s) Oral daily  sodium chloride 0.9%. 1000 milliLiter(s) (100 mL/Hr) IV Continuous <Continuous>    Vital Signs Last 24 Hrs  T(C): 37.2 (30 Oct 2023 08:25), Max: 37.4 (29 Oct 2023 22:20)  T(F): 99 (30 Oct 2023 08:25), Max: 99.4 (29 Oct 2023 22:20)  HR: 65 (30 Oct 2023 08:25) (65 - 80)  BP: 95/69 (30 Oct 2023 08:25) (95/69 - 121/73)  BP(mean): --  RR: 18 (30 Oct 2023 08:25) (18 - 18)  SpO2: 93% (30 Oct 2023 08:25) (93% - 98%)    Parameters below as of 30 Oct 2023 08:25  Patient On (Oxygen Delivery Method): room air    PE:  Constitutional: NAD   HEENT: NC/AT, EOMI, PERRLA, conjunctivae clear; ears and nose atraumatic; pharynx benign  Neck: supple; thyroid not palpable  Back: no tenderness  Respiratory: respiratory effort normal; clear to auscultation  Cardiovascular: S1S2 regular, no murmurs  Abdomen: soft, not tender, not distended, positive BS; liver and spleen WNL  Genitourinary: no suprapubic tenderness  Lymphatic: no LN palpable  Musculoskeletal: no muscle tenderness, no joint swelling or tenderness  Extremities: no pedal edema  Neurological/ Psychiatric: AxOx3, Judgement and insight normal;  moving all extremities  Skin: no rashes; no palpable lesions    Labs: all available labs reviewed                        11.0   3.91  )-----------( 157      ( 30 Oct 2023 08:15 )             33.0     10-30    141  |  109<H>  |  17  ----------------------------<  107<H>  3.7   |  26  |  0.93    Ca    9.0      30 Oct 2023 08:15    TPro  6.5  /  Alb  3.0<L>  /  TBili  0.6  /  DBili  x   /  AST  40<H>  /  ALT  75  /  AlkPhos  128<H>  10-29         LIVER FUNCTIONS - ( 29 Oct 2023 07:55 )  Alb: 3.0 g/dL / Pro: 6.5 gm/dL / ALK PHOS: 128 U/L / ALT: 75 U/L / AST: 40 U/L / GGT: x           Urinalysis Basic - ( 29 Oct 2023 07:55 )    Color: x / Appearance: x / SG: x / pH: x  Gluc: 103 mg/dL / Ketone: x  / Bili: x / Urobili: x   Blood: x / Protein: x / Nitrite: x   Leuk Esterase: x / RBC: x / WBC x   Sq Epi: x / Non Sq Epi: x / Bacteria: x    Culture - Blood (10.28.23 @ 19:25)   Specimen Source: .Blood None  Culture Results:   No growth at 24 hours  Culture - Blood (10.28.23 @ 18:55)   Specimen Source: .Blood None  Culture Results:   No growth at 24 hours  Culture - Urine (10.28.23 @ 18:47)   Specimen Source: Clean Catch Clean Catch (Midstream)  Culture Results:   >100,000 CFU/ml Gram Negative Rods    Radiology: all available radiological tests reviewed  < from: CT Abdomen and Pelvis No Cont (10.28.23 @ 19:57) >  ACC: [id number] EXAM:  CT ABDOMEN AND PELVIS   ORDERED BY: FABIANO LINARES     PROCEDURE DATE:  10/28/2023          INTERPRETATION:  [facility] RADIOLOGIST PRELIMINARY REPORT    PROCEDURE INFORMATION:  Exam: CT Abdomen And Pelvis Without Contrast  Exam date and time: 10/28/2023 7:55 PM  Age: 62 years old  Clinical indication: Pyelonephritis suspected, history of transplant    TECHNIQUE:  Imaging protocol: Computed tomography of the abdomen and pelvis without  contrast.    COMPARISON:  CT ABDOMEN AND PELVIS9/25/2023 10:47 PM    FINDINGS:  Lungs: No new parenchymal masses or consolidation detected at the lung   bases.  Small pericardial effusion seen anteriorly with no coronary   calcifications or  pleural effusion detected at the levels imaged. Unchanged 3.5 mm right   lower lobe nodule.    Liver: Normal in size. No focal hepatic lesions detected.  Gallbladder and bile ducts: Unremarkable with no calcified stone or ductal  dilatation detected.  Pancreas: No pancreatic mass or ductal dilation.  Spleen: Borderline in size.  Adrenal glands: No mass.  Kidneys and ureters: A few fluid attenuation cysts and tiny focal   calcifications are again seen in  association with the markedly atrophic native kidneys bilaterally and   require  no further follow-up. No gross change in perinephric stranding   surrounding right lower quadrant transplant or in slight prominence of   transplant renal pelvis and intrarenal collecting system since 9/25/2023.  Stomach and bowel: Moderate fecal loading is seen throughout colonic   segments  and could reflect an element of constipation with no evidence of bowel  obstruction or perforation detected.  Appendix: Normal appendix is identified without evidence of inflammation.    Intraperitoneal space: No pneumoperitoneum or free intraperitoneal fluid  detected.  Vasculature: No abdominal aortic aneurysm.  Lymph nodes: No lymphadenopathy detected.  Urinary bladder: Unremarkable as visualized.  Reproductive:  Question atrophic uterus versus prior partial hysterectomy.  Bones/joints: No acute fracture. No focal destructive or blastic lesion.   Degenerative change L5-S1.  Soft tissues: Unremarkable.    IMPRESSION:    No gross change in perinephric stranding surrounding right lower quadrant   transplant or in slight prominence of transplant renal pelvis and   intrarenal collecting system since 9/25/2023. Differential diagnosis   would include transplant infection and rejection.    No other evidence of an acute abdominopelvic process is detected.      Advanced directives addressed: full resuscitation

## 2023-10-30 NOTE — [facility]I QUERY NOTE - [id number]_GEN_ALL_CORE_HH
This patient is documented to have Sepsis.      This patient does not appear to meet [place]'s sepsis criteria using SIRS (Temp. >101F or <96.8F, HR>90bpm, RR>20/min or PaCO2<32mmHg, WBC>12,000 or <4000 or Bands>10% Unexplained altered mental status).    Can you please clarify the status of the sepsis, after review?    -Sepsis ruled out  -Sepsis POA)  ruled in (please also document the criteria you are using to support the diagnosis of sepsis)  -Other (please specify)  -Not clinically significant        Chart Documentation/Evidence:    WBC Count: 3.91 K/uL (10.30.23 @ 08:15)   WBC Count: 4.61 K/uL (10.29.23 @ 07:55)   WBC Count: 5.86 K/uL (10.28.23 @ 18:55)     Lactate, Blood: 0.7 mmol/L (10.28.23 @ 18:55)     Culture - Blood (10.28.23 @ 19:25)   Specimen Source: .Blood None  Culture Results:   No growth at 24 hours    T(C): 37.5 (29 Oct 2023 00:29), Max: 37.8 (28 Oct 2023 16:08)  T(F): 99.5 (29 Oct 2023 00:29), Max: 100.1 (28 Oct 2023 16:08)  HR: 92 (29 Oct 2023 00:29) (79 - 120)  BP: 135/79 (29 Oct 2023 00:29) (120/83 - 135/79)  BP(mean): 91 (28 Oct 2023 23:02) (91 - 94)  RR: 18 (29 Oct 2023 00:29) (18 - 18)  SpO2: 100% (29 Oct 2023 00:29) (96% - 100%)      H&P documentation on 10/29/2023:  #Complicated Urinary Tract Infection / Sepsis / Suprapubic Pain  - Patient had temp 100.1F in ED with tachycardia to 120 but was 102F at home  - Has gotten recurrent UTIs since transplant, unclear etiology, although is on immunosuppressants  - May be related to her abdominal pain (ddx includes recurrent fibroids given her hysterectomy was only partial)  - CT A/P without contrast shows no kidney stone  - Lactate normal  - IVFs  - Pain control PRN  - F/u blood and urine cx  - Ceftriaxone 1 g IV QD

## 2023-10-30 NOTE — PROGRESS NOTE ADULT - SUBJECTIVE AND OBJECTIVE BOX
CHIEF COMPLAINT/INTERVAL HISTORY:    Patient is a 62y old  Female who presents with a chief complaint of UTI, sepsis (29 Oct 2023 17:22)      HPI:  63 yo F with a PMH of schizoaffective disorder (bipolar type), renal failure (s/p R renal transplant, due to prior lithium use), breast cancer s/p lumpectomy, fibroid s/p hysterectomy, hyperparathyroidism, DVT (on Eliquis), MGUS, HLD, and GERD who presents with abdominal pain. It is lower abdominal, and has been going on for several months. It is sporadic, worse upon movement, and denies relation to urination or bowel movements. However, it worsened today, and she also had fevers up to 102F, and chills with weakness today. She has also had nausea during the past few days as well. She denies pain elsewhere, including CP and SOB. She notes she gets frequent UTIs and often gets fevers with them, which has been occurring since her renal transplant about 2 years ago. She was recently prescribed methenamine 1 mg BID, but has not received the medicine yet (it is being mailed to her).    In terms of her transplant, she was recently decreased on her prednisone from 7.5 to 5 mg QD about 2 weeks ago. About a month ago, she was also changed from valganciclovir for antiviral PPX to letermovir because the Valcyte was causing leukopenia (which has now improved) but was leading to recurrent infections.    In the ED, she was given ceftriaxone 1 g IV x1 and acetaminophen 650 mg PO x1,  mg PO x1, and NS 1L x1. (29 Oct 2023 03:18)      SUBJECTIVE & OBJECTIVE: Pt seen and examined at bedside.     ICU Vital Signs Last 24 Hrs  T(C): 37.2 (30 Oct 2023 08:25), Max: 37.4 (29 Oct 2023 22:20)  T(F): 99 (30 Oct 2023 08:25), Max: 99.4 (29 Oct 2023 22:20)  HR: 65 (30 Oct 2023 08:25) (65 - 80)  BP: 95/69 (30 Oct 2023 08:25) (95/69 - 121/73)  BP(mean): --  ABP: --  ABP(mean): --  RR: 18 (30 Oct 2023 08:25) (18 - 18)  SpO2: 93% (30 Oct 2023 08:25) (93% - 98%)    O2 Parameters below as of 30 Oct 2023 08:25  Patient On (Oxygen Delivery Method): room air              MEDICATIONS  (STANDING):  apixaban 2.5 milliGRAM(s) Oral every 12 hours  cefTRIAXone Injectable. 2000 milliGRAM(s) IV Push every 24 hours  cholecalciferol 1000 Unit(s) Oral daily  haloperidol     Tablet 1 milliGRAM(s) Oral daily  letermovir 480 milliGRAM(s) Oral daily  magnesium oxide 400 milliGRAM(s) Oral two times a day with meals  multivitamin 1 Tablet(s) Oral daily  mycophenolate mofetil 250 milliGRAM(s) Oral two times a day  predniSONE   Tablet 5 milliGRAM(s) Oral daily  sodium chloride 0.9%. 1000 milliLiter(s) (100 mL/Hr) IV Continuous <Continuous>    MEDICATIONS  (PRN):  acetaminophen     Tablet .. 650 milliGRAM(s) Oral every 6 hours PRN Temp greater or equal to 38C (100.4F), Mild Pain (1 - 3), Moderate Pain (4 - 6)  aluminum hydroxide/magnesium hydroxide/simethicone Suspension 30 milliLiter(s) Oral every 4 hours PRN Dyspepsia  clonazePAM  Tablet 0.5 milliGRAM(s) Oral daily PRN Anxiety  melatonin 3 milliGRAM(s) Oral at bedtime PRN Insomnia  ondansetron Injectable 4 milliGRAM(s) IV Push every 8 hours PRN Nausea and/or Vomiting  oxyCODONE    IR 5 milliGRAM(s) Oral every 6 hours PRN Severe Pain (7 - 10)        PHYSICAL EXAM:    GENERAL: NAD, well-groomed, well-developed  NERVOUS SYSTEM:  Alert & Oriented X3, Motor Strength 5/5 B/L upper and lower extremities; DTRs 2+ intact and symmetric  CHEST/LUNG: Clear to auscultation bilaterally; No rales, rhonchi, wheezing, or rubs  HEART: Regular rate and rhythm; No murmurs, rubs, or gallops  ABDOMEN: Soft, Nontender, Nondistended; Bowel sounds present  EXTREMITIES:  2+ Peripheral Pulses, No clubbing, cyanosis, or edema    LABS:                        11.0   3.91  )-----------( 157      ( 30 Oct 2023 08:15 )             33.0     10-30    141  |  109<H>  |  17  ----------------------------<  107<H>  3.7   |  26  |  0.93    Ca    9.0      30 Oct 2023 08:15    TPro  6.5  /  Alb  3.0<L>  /  TBili  0.6  /  DBili  x   /  AST  40<H>  /  ALT  75  /  AlkPhos  128<H>  10-29    PT/INR - ( 28 Oct 2023 18:55 )   PT: 14.8 sec;   INR: 1.32 ratio         PTT - ( 28 Oct 2023 18:55 )  PTT:35.2 sec  Urinalysis Basic - ( 30 Oct 2023 08:15 )    Color: x / Appearance: x / SG: x / pH: x  Gluc: 107 mg/dL / Ketone: x  / Bili: x / Urobili: x   Blood: x / Protein: x / Nitrite: x   Leuk Esterase: x / RBC: x / WBC x   Sq Epi: x / Non Sq Epi: x / Bacteria: x        63 yo F with a PMH of schizoaffective disorder (bipolar type), renal failure (s/p R renal transplant, due to prior lithium use), breast cancer s/p lumpectomy, fibroid s/p hysterectomy, hyperparathyroidism, DVT (on Eliquis), MGUS, HLD, and GERD who presents with abdominal pain and fevers, found to have a UTI.    #Complicated Urinary Tract Infection / Sepsis / Suprapubic Pain  - Patient had temp 100.1F in ED with tachycardia to 120 but was 102F at home  - Has gotten recurrent UTIs since transplant, unclear etiology, although is on immunosuppressants  - May be related to her abdominal pain (ddx includes recurrent fibroids given her hysterectomy was only partial)  - CT A/P without contrast shows no kidney stone  - Lactate normal  - IVFs  - Pain control PRN  - F/u blood and urine cx  - Ceftriaxone 1 g IV QD    #Transaminitis  - No known history  - Check RUQ US  - Check hepatitis panel    #Renal Transplant Recipient  - In the setting of decades of lithium use for bipolar disorder  - Renal function stable at CKD3 levels  - C/w MMF (Prograf)  - Check Prograf level  - C/w prednisone 5 mg QD    #Schizoaffective Disorder, Bipolar Type  - C/w Haldol 1 mg QD  - C/w clonazepam 0.5 mg QD PRN    #History of DVT  - Bilateral DVT post-transplant  - C/w Eliquis 5 mg BID       CHIEF COMPLAINT/INTERVAL HISTORY:    Patient is a 62y old  Female who presents with a chief complaint of UTI, sepsis (29 Oct 2023 17:22)      HPI:  63 yo F with a PMH of schizoaffective disorder (bipolar type), renal failure (s/p R renal transplant, due to prior lithium use), breast cancer s/p lumpectomy, fibroid s/p hysterectomy, hyperparathyroidism, DVT (on Eliquis), MGUS, HLD, and GERD who presents with abdominal pain. It is lower abdominal, and has been going on for several months. It is sporadic, worse upon movement, and denies relation to urination or bowel movements. However, it worsened today, and she also had fevers up to 102F, and chills with weakness today. She has also had nausea during the past few days as well. She denies pain elsewhere, including CP and SOB. She notes she gets frequent UTIs and often gets fevers with them, which has been occurring since her renal transplant about 2 years ago. She was recently prescribed methenamine 1 mg BID, but has not received the medicine yet (it is being mailed to her).    In terms of her transplant, she was recently decreased on her prednisone from 7.5 to 5 mg QD about 2 weeks ago. About a month ago, she was also changed from valganciclovir for antiviral PPX to letermovir because the Valcyte was causing leukopenia (which has now improved) but was leading to recurrent infections.    In the ED, she was given ceftriaxone 1 g IV x1 and acetaminophen 650 mg PO x1,  mg PO x1, and NS 1L x1. (29 Oct 2023 03:18)      SUBJECTIVE & OBJECTIVE: Pt seen and examined at bedside.     ICU Vital Signs Last 24 Hrs  T(C): 37.2 (30 Oct 2023 08:25), Max: 37.4 (29 Oct 2023 22:20)  T(F): 99 (30 Oct 2023 08:25), Max: 99.4 (29 Oct 2023 22:20)  HR: 65 (30 Oct 2023 08:25) (65 - 80)  BP: 95/69 (30 Oct 2023 08:25) (95/69 - 121/73)  BP(mean): --  ABP: --  ABP(mean): --  RR: 18 (30 Oct 2023 08:25) (18 - 18)  SpO2: 93% (30 Oct 2023 08:25) (93% - 98%)    O2 Parameters below as of 30 Oct 2023 08:25  Patient On (Oxygen Delivery Method): room air              MEDICATIONS  (STANDING):  apixaban 2.5 milliGRAM(s) Oral every 12 hours  cefTRIAXone Injectable. 2000 milliGRAM(s) IV Push every 24 hours  cholecalciferol 1000 Unit(s) Oral daily  haloperidol     Tablet 1 milliGRAM(s) Oral daily  letermovir 480 milliGRAM(s) Oral daily  magnesium oxide 400 milliGRAM(s) Oral two times a day with meals  multivitamin 1 Tablet(s) Oral daily  mycophenolate mofetil 250 milliGRAM(s) Oral two times a day  predniSONE   Tablet 5 milliGRAM(s) Oral daily  sodium chloride 0.9%. 1000 milliLiter(s) (100 mL/Hr) IV Continuous <Continuous>    MEDICATIONS  (PRN):  acetaminophen     Tablet .. 650 milliGRAM(s) Oral every 6 hours PRN Temp greater or equal to 38C (100.4F), Mild Pain (1 - 3), Moderate Pain (4 - 6)  aluminum hydroxide/magnesium hydroxide/simethicone Suspension 30 milliLiter(s) Oral every 4 hours PRN Dyspepsia  clonazePAM  Tablet 0.5 milliGRAM(s) Oral daily PRN Anxiety  melatonin 3 milliGRAM(s) Oral at bedtime PRN Insomnia  ondansetron Injectable 4 milliGRAM(s) IV Push every 8 hours PRN Nausea and/or Vomiting  oxyCODONE    IR 5 milliGRAM(s) Oral every 6 hours PRN Severe Pain (7 - 10)        PHYSICAL EXAM:    GENERAL: NAD, well-groomed, well-developed  NERVOUS SYSTEM:  Alert & Oriented X3, Motor Strength 5/5 B/L upper and lower extremities; DTRs 2+ intact and symmetric  CHEST/LUNG: Clear to auscultation bilaterally; No rales, rhonchi, wheezing, or rubs  HEART: Regular rate and rhythm; No murmurs, rubs, or gallops  ABDOMEN: Soft, Nontender, Nondistended; Bowel sounds present  EXTREMITIES:  2+ Peripheral Pulses, No clubbing, cyanosis, or edema    LABS:                        11.0   3.91  )-----------( 157      ( 30 Oct 2023 08:15 )             33.0     10-30    141  |  109<H>  |  17  ----------------------------<  107<H>  3.7   |  26  |  0.93    Ca    9.0      30 Oct 2023 08:15    TPro  6.5  /  Alb  3.0<L>  /  TBili  0.6  /  DBili  x   /  AST  40<H>  /  ALT  75  /  AlkPhos  128<H>  10-29    PT/INR - ( 28 Oct 2023 18:55 )   PT: 14.8 sec;   INR: 1.32 ratio         PTT - ( 28 Oct 2023 18:55 )  PTT:35.2 sec  Urinalysis Basic - ( 30 Oct 2023 08:15 )    Color: x / Appearance: x / SG: x / pH: x  Gluc: 107 mg/dL / Ketone: x  / Bili: x / Urobili: x   Blood: x / Protein: x / Nitrite: x   Leuk Esterase: x / RBC: x / WBC x   Sq Epi: x / Non Sq Epi: x / Bacteria: x        63 yo F with a PMH of schizoaffective disorder (bipolar type), renal failure (s/p R renal transplant, due to prior lithium use), breast cancer s/p lumpectomy, fibroid s/p hysterectomy, hyperparathyroidism, DVT (on Eliquis), MGUS, HLD, and GERD who presents with abdominal pain and fevers, found to have a UTI.    #Complicated Urinary Tract Infection / no Sepsis POA/ Suprapubic Pain  OP cx available case d/w Dr Coulter cjange to terri cover esbl and descalate if not      #Renal Transplant Recipient  - In the setting of decades of lithium use for bipolar disorder  - Renal function stable at CKD3 levels  - C/w MMF (Prograf)  - Check Prograf level  - C/w prednisone 5 mg QD    #Schizoaffective Disorder, Bipolar Type  - C/w Haldol 1 mg QD  - C/w clonazepam 0.5 mg QD PRN    #History of DVT  - Bilateral DVT post-transplant  - C/w Eliquis 5 mg BID

## 2023-10-31 LAB
-  AMIKACIN: SIGNIFICANT CHANGE UP
-  AMIKACIN: SIGNIFICANT CHANGE UP
-  AMOXICILLIN/CLAVULANIC ACID: SIGNIFICANT CHANGE UP
-  AMOXICILLIN/CLAVULANIC ACID: SIGNIFICANT CHANGE UP
-  AMPICILLIN/SULBACTAM: SIGNIFICANT CHANGE UP
-  AMPICILLIN/SULBACTAM: SIGNIFICANT CHANGE UP
-  AMPICILLIN: SIGNIFICANT CHANGE UP
-  AMPICILLIN: SIGNIFICANT CHANGE UP
-  AZTREONAM: SIGNIFICANT CHANGE UP
-  AZTREONAM: SIGNIFICANT CHANGE UP
-  CEFAZOLIN: SIGNIFICANT CHANGE UP
-  CEFAZOLIN: SIGNIFICANT CHANGE UP
-  CEFEPIME: SIGNIFICANT CHANGE UP
-  CEFEPIME: SIGNIFICANT CHANGE UP
-  CEFOXITIN: SIGNIFICANT CHANGE UP
-  CEFOXITIN: SIGNIFICANT CHANGE UP
-  CEFTRIAXONE: SIGNIFICANT CHANGE UP
-  CEFTRIAXONE: SIGNIFICANT CHANGE UP
-  CIPROFLOXACIN: SIGNIFICANT CHANGE UP
-  CIPROFLOXACIN: SIGNIFICANT CHANGE UP
-  ERTAPENEM: SIGNIFICANT CHANGE UP
-  ERTAPENEM: SIGNIFICANT CHANGE UP
-  GENTAMICIN: SIGNIFICANT CHANGE UP
-  GENTAMICIN: SIGNIFICANT CHANGE UP
-  IMIPENEM: SIGNIFICANT CHANGE UP
-  IMIPENEM: SIGNIFICANT CHANGE UP
-  LEVOFLOXACIN: SIGNIFICANT CHANGE UP
-  LEVOFLOXACIN: SIGNIFICANT CHANGE UP
-  MEROPENEM: SIGNIFICANT CHANGE UP
-  MEROPENEM: SIGNIFICANT CHANGE UP
-  NITROFURANTOIN: SIGNIFICANT CHANGE UP
-  NITROFURANTOIN: SIGNIFICANT CHANGE UP
-  PIPERACILLIN/TAZOBACTAM: SIGNIFICANT CHANGE UP
-  PIPERACILLIN/TAZOBACTAM: SIGNIFICANT CHANGE UP
-  TOBRAMYCIN: SIGNIFICANT CHANGE UP
-  TOBRAMYCIN: SIGNIFICANT CHANGE UP
-  TRIMETHOPRIM/SULFAMETHOXAZOLE: SIGNIFICANT CHANGE UP
-  TRIMETHOPRIM/SULFAMETHOXAZOLE: SIGNIFICANT CHANGE UP
CULTURE RESULTS: ABNORMAL
CULTURE RESULTS: ABNORMAL
METHOD TYPE: SIGNIFICANT CHANGE UP
METHOD TYPE: SIGNIFICANT CHANGE UP
ORGANISM # SPEC MICROSCOPIC CNT: ABNORMAL
ORGANISM # SPEC MICROSCOPIC CNT: ABNORMAL
ORGANISM # SPEC MICROSCOPIC CNT: SIGNIFICANT CHANGE UP
ORGANISM # SPEC MICROSCOPIC CNT: SIGNIFICANT CHANGE UP
SPECIMEN SOURCE: SIGNIFICANT CHANGE UP
SPECIMEN SOURCE: SIGNIFICANT CHANGE UP

## 2023-10-31 PROCEDURE — 99232 SBSQ HOSP IP/OBS MODERATE 35: CPT

## 2023-10-31 RX ADMIN — MYCOPHENOLATE MOFETIL 250 MILLIGRAM(S): 250 CAPSULE ORAL at 10:53

## 2023-10-31 RX ADMIN — MAGNESIUM OXIDE 400 MG ORAL TABLET 400 MILLIGRAM(S): 241.3 TABLET ORAL at 06:28

## 2023-10-31 RX ADMIN — MAGNESIUM OXIDE 400 MG ORAL TABLET 400 MILLIGRAM(S): 241.3 TABLET ORAL at 18:53

## 2023-10-31 RX ADMIN — OXYCODONE HYDROCHLORIDE 5 MILLIGRAM(S): 5 TABLET ORAL at 10:47

## 2023-10-31 RX ADMIN — MYCOPHENOLATE MOFETIL 250 MILLIGRAM(S): 250 CAPSULE ORAL at 21:54

## 2023-10-31 RX ADMIN — APIXABAN 2.5 MILLIGRAM(S): 2.5 TABLET, FILM COATED ORAL at 10:46

## 2023-10-31 RX ADMIN — Medication 1 TABLET(S): at 10:48

## 2023-10-31 RX ADMIN — Medication 5 MILLIGRAM(S): at 10:48

## 2023-10-31 RX ADMIN — Medication 0.5 MILLIGRAM(S): at 10:46

## 2023-10-31 RX ADMIN — MEROPENEM 1000 MILLIGRAM(S): 1 INJECTION INTRAVENOUS at 21:54

## 2023-10-31 RX ADMIN — HALOPERIDOL DECANOATE 1 MILLIGRAM(S): 100 INJECTION INTRAMUSCULAR at 10:49

## 2023-10-31 RX ADMIN — LETERMOVIR 480 MILLIGRAM(S): 480 TABLET, FILM COATED ORAL at 18:53

## 2023-10-31 RX ADMIN — MEROPENEM 1000 MILLIGRAM(S): 1 INJECTION INTRAVENOUS at 06:27

## 2023-10-31 RX ADMIN — MEROPENEM 1000 MILLIGRAM(S): 1 INJECTION INTRAVENOUS at 14:28

## 2023-10-31 RX ADMIN — Medication 1000 UNIT(S): at 10:47

## 2023-10-31 RX ADMIN — APIXABAN 2.5 MILLIGRAM(S): 2.5 TABLET, FILM COATED ORAL at 21:54

## 2023-10-31 NOTE — PROGRESS NOTE ADULT - SUBJECTIVE AND OBJECTIVE BOX
NEPHROLOGY INTERVAL HPI/OVERNIGHT EVENTS:  10-31-23 @ 10:21  HPI:  63 yo F with a PMH of schizoaffective disorder (bipolar type), renal failure (s/p R renal transplant, due to prior lithium use), breast cancer s/p lumpectomy, fibroid s/p hysterectomy, hyperparathyroidism, DVT (on Eliquis), MGUS, HLD, and GERD who presents with abdominal pain. It is lower abdominal, and has been going on for several months. It is sporadic, worse upon movement, and denies relation to urination or bowel movements. However, it worsened today, and she also had fevers up to 102F, and chills with weakness today. She has also had nausea during the past few days as well. She denies pain elsewhere, including CP and SOB. She notes she gets frequent UTIs and often gets fevers with them, which has been occurring since her renal transplant about 2 years ago. She was recently prescribed methenamine 1 mg BID, but has not received the medicine yet (it is being mailed to her).    In terms of her transplant, she was recently decreased on her prednisone from 7.5 to 5 mg QD about 2 weeks ago. About a month ago, she was also changed from valganciclovir for antiviral PPX to letermovir because the Valcyte was causing leukopenia (which has now improved) but was leading to recurrent infections.    In the ED, she was given ceftriaxone 1 g IV x1 and acetaminophen 650 mg PO x1,  mg PO x1, and NS 1L x1. (29 Oct 2023 03:18)      Patient is a 62y old  Female who presents with a chief complaint of UTI, sepsis (31 Oct 2023 08:25)      MEDICATIONS  (STANDING):  apixaban 2.5 milliGRAM(s) Oral every 12 hours  cholecalciferol 1000 Unit(s) Oral daily  haloperidol     Tablet 1 milliGRAM(s) Oral daily  letermovir 480 milliGRAM(s) Oral daily  magnesium oxide 400 milliGRAM(s) Oral two times a day with meals  meropenem Injectable 1000 milliGRAM(s) IV Push every 8 hours  multivitamin 1 Tablet(s) Oral daily  mycophenolate mofetil 250 milliGRAM(s) Oral two times a day  predniSONE   Tablet 5 milliGRAM(s) Oral daily  sodium chloride 0.9%. 1000 milliLiter(s) (100 mL/Hr) IV Continuous <Continuous>    MEDICATIONS  (PRN):  acetaminophen     Tablet .. 650 milliGRAM(s) Oral every 6 hours PRN Temp greater or equal to 38C (100.4F), Mild Pain (1 - 3), Moderate Pain (4 - 6)  aluminum hydroxide/magnesium hydroxide/simethicone Suspension 30 milliLiter(s) Oral every 4 hours PRN Dyspepsia  clonazePAM  Tablet 0.5 milliGRAM(s) Oral daily PRN Anxiety  melatonin 3 milliGRAM(s) Oral at bedtime PRN Insomnia  ondansetron Injectable 4 milliGRAM(s) IV Push every 8 hours PRN Nausea and/or Vomiting  oxyCODONE    IR 5 milliGRAM(s) Oral every 6 hours PRN Severe Pain (7 - 10)      Allergies    Levaquin (Anaphylaxis)    Intolerances        I&O's Detail      .    Patient was seen and evaluated on dialysis.   Patient is tolerating the procedure well.   Continue dialysis:   Dialyzer:          QB:        QD:   Goal UF ___ over ___ Hours       Vital Signs Last 24 Hrs  T(C): 36.6 (31 Oct 2023 07:19), Max: 36.9 (30 Oct 2023 21:58)  T(F): 97.8 (31 Oct 2023 07:19), Max: 98.4 (30 Oct 2023 21:58)  HR: 65 (31 Oct 2023 07:19) (65 - 69)  BP: 112/68 (31 Oct 2023 07:19) (111/57 - 118/74)  BP(mean): 85 (30 Oct 2023 21:58) (71 - 85)  RR: 18 (31 Oct 2023 07:19) (18 - 18)  SpO2: 94% (31 Oct 2023 07:19) (94% - 97%)    Parameters below as of 31 Oct 2023 07:19  Patient On (Oxygen Delivery Method): room air      Daily     Daily     PHYSICAL EXAM:  General: alert. awake Ox3  HEENT: MMM  CV: s1s2 rrr  LUNGS: B/L CTA  EXT: no edema    LABS:                        11.0   3.91  )-----------( 157      ( 30 Oct 2023 08:15 )             33.0     10-30    141  |  109<H>  |  17  ----------------------------<  107<H>  3.7   |  26  |  0.93    Ca    9.0      30 Oct 2023 08:15        Urinalysis Basic - ( 30 Oct 2023 08:15 )    Color: x / Appearance: x / SG: x / pH: x  Gluc: 107 mg/dL / Ketone: x  / Bili: x / Urobili: x   Blood: x / Protein: x / Nitrite: x   Leuk Esterase: x / RBC: x / WBC x   Sq Epi: x / Non Sq Epi: x / Bacteria: x           NEPHROLOGY INTERVAL HPI/OVERNIGHT EVENTS:  10-31-23 @ 10:21  10/31 klebsiella in urine tolerating po   10/30 concerned about her abd pain   HPI:  63 yo F with a PMH of schizoaffective disorder (bipolar type), renal failure (s/p R renal transplant, due to prior lithium use), breast cancer s/p lumpectomy, fibroid s/p hysterectomy, hyperparathyroidism, DVT (on Eliquis), MGUS, HLD, and GERD who presents with abdominal pain. It is lower abdominal, and has been going on for several months. It is sporadic, worse upon movement, and denies relation to urination or bowel movements. However, it worsened today, and she also had fevers up to 102F, and chills with weakness today. She has also had nausea during the past few days as well. She denies pain elsewhere, including CP and SOB. She notes she gets frequent UTIs and often gets fevers with them, which has been occurring since her renal transplant about 2 years ago. She was recently prescribed methenamine 1 mg BID, but has not received the medicine yet (it is being mailed to her).    In terms of her transplant, she was recently decreased on her prednisone from 7.5 to 5 mg QD about 2 weeks ago. About a month ago, she was also changed from valganciclovir for antiviral PPX to letermovir because the Valcyte was causing leukopenia (which has now improved) but was leading to recurrent infections.    In the ED, she was given ceftriaxone 1 g IV x1 and acetaminophen 650 mg PO x1,  mg PO x1, and NS 1L x1. (29 Oct 2023 03:18)      Patient is a 62y old  Female who presents with a chief complaint of UTI, sepsis (29 Oct 2023 13:31)  -----------------------------------------------  Nephrology Consult   pt seen and dw transplant nephrologist, dr finley   pt with hx of recurrent uti   last uti 9/26 klebsiella tx with cefepime x10 days then dc ( due to neutropenia): no concern for rejection   10/10 ucx [facility] malcolm asymptomatic and contaminant  now with fever, no neutropenia and pain over transplant kidney    complaint with all her medicaiton s      PAST MEDICAL & SURGICAL HISTORY:  - renal tx 2021/ [facility]  - dvt  - hyperpara   - schozoaffective disorder with hx of lithium use   - MGUS   Followed by a hematologist  - Hx of Breast Lumpectomy post RT.  - Hx of Hysterectomy with Fibroid removal  - Hx of hydronephrosis      MEDICATIONS  (STANDING):  apixaban 2.5 milliGRAM(s) Oral every 12 hours  cholecalciferol 1000 Unit(s) Oral daily  haloperidol     Tablet 1 milliGRAM(s) Oral daily  letermovir 480 milliGRAM(s) Oral daily  magnesium oxide 400 milliGRAM(s) Oral two times a day with meals  meropenem Injectable 1000 milliGRAM(s) IV Push every 8 hours  multivitamin 1 Tablet(s) Oral daily  mycophenolate mofetil 250 milliGRAM(s) Oral two times a day  predniSONE   Tablet 5 milliGRAM(s) Oral daily  sodium chloride 0.9%. 1000 milliLiter(s) (100 mL/Hr) IV Continuous <Continuous>    MEDICATIONS  (PRN):  acetaminophen     Tablet .. 650 milliGRAM(s) Oral every 6 hours PRN Temp greater or equal to 38C (100.4F), Mild Pain (1 - 3), Moderate Pain (4 - 6)  aluminum hydroxide/magnesium hydroxide/simethicone Suspension 30 milliLiter(s) Oral every 4 hours PRN Dyspepsia  clonazePAM  Tablet 0.5 milliGRAM(s) Oral daily PRN Anxiety  melatonin 3 milliGRAM(s) Oral at bedtime PRN Insomnia  ondansetron Injectable 4 milliGRAM(s) IV Push every 8 hours PRN Nausea and/or Vomiting  oxyCODONE    IR 5 milliGRAM(s) Oral every 6 hours PRN Severe Pain (7 - 10)      Allergies    Levaquin (Anaphylaxis)    Intolerances        I&O's Detail          Vital Signs Last 24 Hrs  T(C): 36.6 (31 Oct 2023 07:19), Max: 36.9 (30 Oct 2023 21:58)  T(F): 97.8 (31 Oct 2023 07:19), Max: 98.4 (30 Oct 2023 21:58)  HR: 65 (31 Oct 2023 07:19) (65 - 69)  BP: 112/68 (31 Oct 2023 07:19) (111/57 - 118/74)  BP(mean): 85 (30 Oct 2023 21:58) (71 - 85)  RR: 18 (31 Oct 2023 07:19) (18 - 18)  SpO2: 94% (31 Oct 2023 07:19) (94% - 97%)    Parameters below as of 31 Oct 2023 07:19  Patient On (Oxygen Delivery Method): room air      Daily     Daily     PHYSICAL EXAM:  General: alert. awake Ox3  HEENT: MMM  CV: s1s2 rrr  LUNGS: B/L CTA  EXT: no edema    LABS:                        11.0   3.91  )-----------( 157      ( 30 Oct 2023 08:15 )             33.0     10-30    141  |  109<H>  |  17  ----------------------------<  107<H>  3.7   |  26  |  0.93    Ca    9.0      30 Oct 2023 08:15        Urinalysis Basic - ( 30 Oct 2023 08:15 )    Color: x / Appearance: x / SG: x / pH: x  Gluc: 107 mg/dL / Ketone: x  / Bili: x / Urobili: x   Blood: x / Protein: x / Nitrite: x   Leuk Esterase: x / RBC: x / WBC x   Sq Epi: x / Non Sq Epi: x / Bacteria: x

## 2023-10-31 NOTE — PROGRESS NOTE ADULT - SUBJECTIVE AND OBJECTIVE BOX
CHIEF COMPLAINT/INTERVAL HISTORY:    Patient is a 62y old  Female who presents with a chief complaint of UTI, sepsis (29 Oct 2023 17:22)      HPI:  63 yo F with a PMH of schizoaffective disorder (bipolar type), renal failure (s/p R renal transplant, due to prior lithium use), breast cancer s/p lumpectomy, fibroid s/p hysterectomy, hyperparathyroidism, DVT (on Eliquis), MGUS, HLD, and GERD who presents with abdominal pain. It is lower abdominal, and has been going on for several months. It is sporadic, worse upon movement, and denies relation to urination or bowel movements. However, it worsened today, and she also had fevers up to 102F, and chills with weakness today. She has also had nausea during the past few days as well. She denies pain elsewhere, including CP and SOB. She notes she gets frequent UTIs and often gets fevers with them, which has been occurring since her renal transplant about 2 years ago. She was recently prescribed methenamine 1 mg BID, but has not received the medicine yet (it is being mailed to her).    In terms of her transplant, she was recently decreased on her prednisone from 7.5 to 5 mg QD about 2 weeks ago. About a month ago, she was also changed from valganciclovir for antiviral PPX to letermovir because the Valcyte was causing leukopenia (which has now improved) but was leading to recurrent infections.    In the ED, she was given ceftriaxone 1 g IV x1 and acetaminophen 650 mg PO x1,  mg PO x1, and NS 1L x1. (29 Oct 2023 03:18)      Klebsiella in the urine    Vital Signs Last 24 Hrs  T(C): 36.6 (31 Oct 2023 07:19), Max: 36.9 (30 Oct 2023 21:58)  T(F): 97.8 (31 Oct 2023 07:19), Max: 98.4 (30 Oct 2023 21:58)  HR: 65 (31 Oct 2023 07:19) (65 - 69)  BP: 112/68 (31 Oct 2023 07:19) (111/57 - 118/74)  BP(mean): 85 (30 Oct 2023 21:58) (71 - 85)  RR: 18 (31 Oct 2023 07:19) (18 - 18)  SpO2: 94% (31 Oct 2023 07:19) (94% - 97%)    Parameters below as of 31 Oct 2023 07:19  Patient On (Oxygen Delivery Method): room air              Vital Signs Last 24 Hrs  T(C): 36.6 (31 Oct 2023 07:19), Max: 36.9 (30 Oct 2023 21:58)  T(F): 97.8 (31 Oct 2023 07:19), Max: 98.4 (30 Oct 2023 21:58)  HR: 65 (31 Oct 2023 07:19) (65 - 69)  BP: 112/68 (31 Oct 2023 07:19) (111/57 - 118/74)  BP(mean): 85 (30 Oct 2023 21:58) (71 - 85)  RR: 18 (31 Oct 2023 07:19) (18 - 18)  SpO2: 94% (31 Oct 2023 07:19) (94% - 97%)    Parameters below as of 31 Oct 2023 07:19  Patient On (Oxygen Delivery Method): room air          PHYSICAL EXAM:    GENERAL: NAD, well-groomed, well-developed  NERVOUS SYSTEM:  Alert & Oriented X3, Motor Strength 5/5 B/L upper and lower extremities; DTRs 2+ intact and symmetric  CHEST/LUNG: Clear to auscultation bilaterally; No rales, rhonchi, wheezing, or rubs  HEART: Regular rate and rhythm; No murmurs, rubs, or gallops  ABDOMEN: Soft, Nontender, Nondistended; Bowel sounds present  EXTREMITIES:  2+ Peripheral Pulses, No clubbing, cyanosis, or edema    LABS:                        11.0   3.91  )-----------( 157      ( 30 Oct 2023 08:15 )             33.0     10-30    141  |  109<H>  |  17  ----------------------------<  107<H>  3.7   |  26  |  0.93    Ca    9.0      30 Oct 2023 08:15    TPro  6.5  /  Alb  3.0<L>  /  TBili  0.6  /  DBili  x   /  AST  40<H>  /  ALT  75  /  AlkPhos  128<H>  10-29    PT/INR - ( 28 Oct 2023 18:55 )   PT: 14.8 sec;   INR: 1.32 ratio         PTT - ( 28 Oct 2023 18:55 )  PTT:35.2 sec  Urinalysis Basic - ( 30 Oct 2023 08:15 )    Color: x / Appearance: x / SG: x / pH: x  Gluc: 107 mg/dL / Ketone: x  / Bili: x / Urobili: x   Blood: x / Protein: x / Nitrite: x   Leuk Esterase: x / RBC: x / WBC x   Sq Epi: x / Non Sq Epi: x / Bacteria: x        63 yo F with a PMH of schizoaffective disorder (bipolar type), renal failure (s/p R renal transplant, due to prior lithium use), breast cancer s/p lumpectomy, fibroid s/p hysterectomy, hyperparathyroidism, DVT (on Eliquis), MGUS, HLD, and GERD who presents with abdominal pain and fevers, found to have a UTI.    #Complicated Urinary Tract Infection / no Sepsis POA/ Suprapubic Pain  klebsiella      #Renal Transplant Recipient i was on hold unable to leave message yesterday [phone]  - In the setting of decades of lithium use for bipolar disorder  - Renal function stable at CKD3 levels  - C/w MMF (Prograf)  - Check Prograf level  - C/w prednisone 5 mg QD    #Schizoaffective Disorder, Bipolar Type  - C/w Haldol 1 mg QD  - C/w clonazepam 0.5 mg QD PRN    #History of DVT  - Bilateral DVT post-transplant  - C/w Eliquis 5 mg BID      time spent d/w Id re: multiple cultures ( personally reviewed) fro OP clinic and here, calling [facility] transplant again unsuccesfully- 60 min

## 2023-11-01 ENCOUNTER — TRANSCRIPTION ENCOUNTER (OUTPATIENT)
Age: 63
End: 2023-11-01

## 2023-11-01 VITALS
RESPIRATION RATE: 18 BRPM | HEART RATE: 71 BPM | DIASTOLIC BLOOD PRESSURE: 78 MMHG | TEMPERATURE: 98 F | OXYGEN SATURATION: 87 % | SYSTOLIC BLOOD PRESSURE: 111 MMHG

## 2023-11-01 PROCEDURE — 99239 HOSP IP/OBS DSCHRG MGMT >30: CPT

## 2023-11-01 RX ORDER — CEFUROXIME AXETIL 250 MG
1 TABLET ORAL
Qty: 20 | Refills: 0
Start: 2023-11-01 | End: 2023-11-10

## 2023-11-01 RX ADMIN — Medication 1 TABLET(S): at 09:44

## 2023-11-01 RX ADMIN — APIXABAN 2.5 MILLIGRAM(S): 2.5 TABLET, FILM COATED ORAL at 09:44

## 2023-11-01 RX ADMIN — MEROPENEM 1000 MILLIGRAM(S): 1 INJECTION INTRAVENOUS at 06:00

## 2023-11-01 RX ADMIN — Medication 1000 UNIT(S): at 09:44

## 2023-11-01 RX ADMIN — MYCOPHENOLATE MOFETIL 250 MILLIGRAM(S): 250 CAPSULE ORAL at 09:44

## 2023-11-01 RX ADMIN — LETERMOVIR 480 MILLIGRAM(S): 480 TABLET, FILM COATED ORAL at 09:45

## 2023-11-01 RX ADMIN — HALOPERIDOL DECANOATE 1 MILLIGRAM(S): 100 INJECTION INTRAMUSCULAR at 09:44

## 2023-11-01 RX ADMIN — MAGNESIUM OXIDE 400 MG ORAL TABLET 400 MILLIGRAM(S): 241.3 TABLET ORAL at 09:44

## 2023-11-01 RX ADMIN — Medication 5 MILLIGRAM(S): at 09:44

## 2023-11-01 NOTE — DISCHARGE NOTE PROVIDER - HOSPITAL COURSE
61 yo F with a PMH of schizoaffective disorder (bipolar type), renal failure (s/p R renal transplant, due to prior lithium use), breast cancer s/p lumpectomy, fibroid s/p hysterectomy, hyperparathyroidism, DVT (on Eliquis), MGUS, HLD, and GERD who presents with abdominal pain. It is lower abdominal, and has been going on for several months. It is sporadic, worse upon movement, and denies relation to urination or bowel movements. However, it worsened today, and she also had fevers up to 102F, and chills with weakness today. She has also had nausea during the past few days as well. She denies pain elsewhere, including CP and SOB. She notes she gets frequent UTIs and often gets fevers with them, which has been occurring since her renal transplant about 2 years ago. She was recently prescribed methenamine 1 mg BID, but has not received the medicine yet (it is being mailed to her).    In terms of her transplant, she was recently decreased on her prednisone from 7.5 to 5 mg QD about 2 weeks ago. About a month ago, she was also changed from valganciclovir for antiviral PPX to letermovir because the Valcyte was causing leukopenia (which has now improved) but was leading to recurrent infections.    In the ED, she was given ceftriaxone 1 g IV x1 and acetaminophen 650 mg PO x1,  mg PO x1, and NS 1L x1. (29 Oct 2023 03:18)      Culture - Urine (10.28.23 @ 18:47)   - Nitrofurantoin: I 64 Should not be used to treat pyelonephritis  - Piperacillin/Tazobactam: S <=8  - Tobramycin: S <=2  - Trimethoprim/Sulfamethoxazole: S <=0.5/9.5  - Ceftriaxone: S <=1  - Ciprofloxacin: S <=0.25  - Ertapenem: S <=0.5  - Gentamicin: S <=2  - Imipenem: S <=1  - Levofloxacin: S <=0.5  - Meropenem: S <=1  - Amikacin: S <=16  - Amoxicillin/Clavulanic Acid: S <=8/4  - Ampicillin: R >16 These ampicillin results predict results for amoxicillin  - Ampicillin/Sulbactam: S <=4/2  - Aztreonam: S <=4  - Cefazolin: S <=2  - Cefepime: S <=2  - Cefoxitin: S <=8  Specimen Source: Clean Catch Clean Catch (Midstream)  Culture Results:   >100,000 CFU/ml Klebsiella variicola  Organism Identification: Klebsiella variicola  Organism: Klebsiella variicola  Method Type: OMAYRA         PTT - ( 28 Oct 2023 18:55 )  PTT:35.2 sec  Urinalysis Basic - ( 30 Oct 2023 08:15 )    Color: x / Appearance: x / SG: x / pH: x  Gluc: 107 mg/dL / Ketone: x  / Bili: x / Urobili: x   Blood: x / Protein: x / Nitrite: x   Leuk Esterase: x / RBC: x / WBC x   Sq Epi: x / Non Sq Epi: x / Bacteria: x        61 yo F with a PMH of schizoaffective disorder (bipolar type), renal failure (s/p R renal transplant, due to prior lithium use), breast cancer s/p lumpectomy, fibroid s/p hysterectomy, hyperparathyroidism, DVT (on Eliquis), MGUS, HLD, and GERD who presents with abdominal pain and fevers, found to have a UTI.    #Complicated Urinary Tract Infection / no Sepsis POA/ Suprapubic Pain  klebsiella dc home on PO Ceftin 10 days      #Renal Transplant Recipient i was on hold unable to leave message yesterday [phone]  - In the setting of decades of lithium use for bipolar disorder  - Renal function stable at CKD3 levels  - C/w MMF (Prograf)  - Check Prograf level  - C/w prednisone 5 mg QD    #Schizoaffective Disorder, Bipolar Type  - C/w Haldol 1 mg QD  - C/w clonazepam 0.5 mg QD PRN    #History of DVT  - Bilateral DVT post-transplant  - C/w Eliquis 5 mg BID

## 2023-11-01 NOTE — PROGRESS NOTE ADULT - SUBJECTIVE AND OBJECTIVE BOX
Date of service: 11-01-23 @ 13:04    pt seen and examined  less bladder spasms, less abd discomfort  no fevers  feels better    ROS: no fever or chills; denies dizziness, no HA, no SOB or cough, no abdominal pain, no diarrhea or constipation; no legs pain, no rashes      MEDICATIONS  (STANDING):  apixaban 2.5 milliGRAM(s) Oral every 12 hours  cholecalciferol 1000 Unit(s) Oral daily  haloperidol     Tablet 1 milliGRAM(s) Oral daily  letermovir 480 milliGRAM(s) Oral daily  magnesium oxide 400 milliGRAM(s) Oral two times a day with meals  meropenem Injectable 1000 milliGRAM(s) IV Push every 8 hours  multivitamin 1 Tablet(s) Oral daily  mycophenolate mofetil 250 milliGRAM(s) Oral two times a day  predniSONE   Tablet 5 milliGRAM(s) Oral daily  sodium chloride 0.9%. 1000 milliLiter(s) (100 mL/Hr) IV Continuous <Continuous>    Vital Signs Last 24 Hrs  T(C): 36.6 (01 Nov 2023 07:42), Max: 36.6 (31 Oct 2023 15:35)  T(F): 97.8 (01 Nov 2023 07:42), Max: 97.9 (31 Oct 2023 15:35)  HR: 71 (01 Nov 2023 07:42) (70 - 78)  BP: 111/78 (01 Nov 2023 07:42) (106/72 - 114/68)  BP(mean): --  RR: 18 (01 Nov 2023 07:42) (18 - 18)  SpO2: 87% (01 Nov 2023 07:42) (87% - 96%)    Parameters below as of 01 Nov 2023 07:42  Patient On (Oxygen Delivery Method): room air      PE:  Constitutional: NAD   HEENT: NC/AT, EOMI, PERRLA, conjunctivae clear; ears and nose atraumatic; pharynx benign  Neck: supple; thyroid not palpable  Back: no tenderness  Respiratory: respiratory effort normal; clear to auscultation  Cardiovascular: S1S2 regular, no murmurs  Abdomen: soft, not tender, not distended, positive BS; liver and spleen WNL  Genitourinary: no suprapubic tenderness  Lymphatic: no LN palpable  Musculoskeletal: no muscle tenderness, no joint swelling or tenderness  Extremities: no pedal edema  Neurological/ Psychiatric: AxOx3, Judgement and insight normal;  moving all extremities  Skin: no rashes; no palpable lesions    Labs: all available labs reviewed                      LIVER FUNCTIONS - ( 29 Oct 2023 07:55 )  Alb: 3.0 g/dL / Pro: 6.5 gm/dL / ALK PHOS: 128 U/L / ALT: 75 U/L / AST: 40 U/L / GGT: x           Urinalysis Basic - ( 29 Oct 2023 07:55 )    Color: x / Appearance: x / SG: x / pH: x  Gluc: 103 mg/dL / Ketone: x  / Bili: x / Urobili: x   Blood: x / Protein: x / Nitrite: x   Leuk Esterase: x / RBC: x / WBC x   Sq Epi: x / Non Sq Epi: x / Bacteria: x    Culture - Blood (10.28.23 @ 19:25)   Specimen Source: .Blood None  Culture Results:   No growth at 24 hours  Culture - Blood (10.28.23 @ 18:55)   Specimen Source: .Blood None  Culture Results:   No growth at 24 hours  Culture - Urine (10.28.23 @ 18:47)   Specimen Source: Clean Catch Clean Catch (Midstream)  Culture Results:   >100,000 CFU/ml Gram Negative Rods    Radiology: all available radiological tests reviewed  < from: CT Abdomen and Pelvis No Cont (10.28.23 @ 19:57) >  ACC: [id number] EXAM:  CT ABDOMEN AND PELVIS   ORDERED BY: FABIANO LINARES     PROCEDURE DATE:  10/28/2023          INTERPRETATION:  [facility] RADIOLOGIST PRELIMINARY REPORT    PROCEDURE INFORMATION:  Exam: CT Abdomen And Pelvis Without Contrast  Exam date and time: 10/28/2023 7:55 PM  Age: 62 years old  Clinical indication: Pyelonephritis suspected, history of transplant    TECHNIQUE:  Imaging protocol: Computed tomography of the abdomen and pelvis without  contrast.    COMPARISON:  CT ABDOMEN AND PELVIS9/25/2023 10:47 PM    FINDINGS:  Lungs: No new parenchymal masses or consolidation detected at the lung   bases.  Small pericardial effusion seen anteriorly with no coronary   calcifications or  pleural effusion detected at the levels imaged. Unchanged 3.5 mm right   lower lobe nodule.    Liver: Normal in size. No focal hepatic lesions detected.  Gallbladder and bile ducts: Unremarkable with no calcified stone or ductal  dilatation detected.  Pancreas: No pancreatic mass or ductal dilation.  Spleen: Borderline in size.  Adrenal glands: No mass.  Kidneys and ureters: A few fluid attenuation cysts and tiny focal   calcifications are again seen in  association with the markedly atrophic native kidneys bilaterally and   require  no further follow-up. No gross change in perinephric stranding   surrounding right lower quadrant transplant or in slight prominence of   transplant renal pelvis and intrarenal collecting system since 9/25/2023.  Stomach and bowel: Moderate fecal loading is seen throughout colonic   segments  and could reflect an element of constipation with no evidence of bowel  obstruction or perforation detected.  Appendix: Normal appendix is identified without evidence of inflammation.    Intraperitoneal space: No pneumoperitoneum or free intraperitoneal fluid  detected.  Vasculature: No abdominal aortic aneurysm.  Lymph nodes: No lymphadenopathy detected.  Urinary bladder: Unremarkable as visualized.  Reproductive:  Question atrophic uterus versus prior partial hysterectomy.  Bones/joints: No acute fracture. No focal destructive or blastic lesion.   Degenerative change L5-S1.  Soft tissues: Unremarkable.    IMPRESSION:    No gross change in perinephric stranding surrounding right lower quadrant   transplant or in slight prominence of transplant renal pelvis and   intrarenal collecting system since 9/25/2023. Differential diagnosis   would include transplant infection and rejection.    No other evidence of an acute abdominopelvic process is detected.      Advanced directives addressed: full resuscitation

## 2023-11-01 NOTE — DISCHARGE NOTE NURSING/CASE MANAGEMENT/SOCIAL WORK - PATIENT PORTAL LINK FT
You can access the FollowMyHealth Patient Portal offered by [facility] by registering at the following website: [email or web address]. By joining [facility]’s FollowMyHealth portal, you will also be able to view your health information using other applications (apps) compatible with our system.

## 2023-11-01 NOTE — DISCHARGE NOTE PROVIDER - NSDCMRMEDTOKEN_GEN_ALL_CORE_FT
cefuroxime 500 mg oral tablet: 1 tab(s) orally 2 times a day  cholecalciferol 25 mcg (1000 intl units) oral tablet: 1 tab(s) orally once a day  clonazePAM 0.5 mg oral tablet: 1 tab(s) orally once a day as needed for  Eliquis 5 mg oral tablet: 1 tab(s) orally 2 times a day  Haldol 1 mg oral tablet: 1 tab(s) orally once a day  letermovir 480 mg oral tablet: 1 tab(s) orally once a day  magnesium oxide 400 mg oral tablet: 1 tab(s) orally 2 times a day  methenamine hippurate 1 g oral tablet: 1 tab(s) orally 2 times a day Patient has not received yet  Multiple Vitamins oral tablet: 1 tab(s) orally once a day  mycophenolate mofetil 250 mg oral capsule: 1 cap(s) orally 2 times a day  predniSONE 5 mg oral tablet: 1 tab(s) orally once a day

## 2023-11-01 NOTE — DISCHARGE NOTE NURSING/CASE MANAGEMENT/SOCIAL WORK - NSDCPEFALRISK_GEN_ALL_CORE
For information on Fall & Injury Prevention, visit: [email or web address] OR  [email or web address] OR  https://www.cdc.gov/steadi/patient.html

## 2023-11-01 NOTE — DISCHARGE NOTE PROVIDER - NSDCCPCAREPLAN_GEN_ALL_CORE_FT
PRINCIPAL DISCHARGE DIAGNOSIS  Diagnosis: Acute UTI  Assessment and Plan of Treatment: pls f/up with transplant team; Ceftin was prescribed for 10 days; take copy of US and CT scan with you= copy provided

## 2023-11-01 NOTE — PROGRESS NOTE ADULT - ASSESSMENT
61 yo F with a PMH of schizoaffective disorder (bipolar type), renal failure (s/p R renal transplant, due to prior lithium use), breast cancer s/p lumpectomy, fibroid s/p hysterectomy, hyperparathyroidism, DVT (on Eliquis), MGUS, HLD, and GERD who presents with abdominal pain. It is lower abdominal, and has been going on for several months. It is sporadic, worse upon movement, and denies relation to urination or bowel movements. However, it worsened  and she also had fevers up to 102F, and chills with weakness. She has also had nausea during the past few days as well. She notes she gets frequent UTIs and often gets fevers with them, which has been occurring since her renal transplant about 2 years ago. She was recently prescribed methenamine 1 mg BID, but has not received the medicine yet (it is being mailed to her). In terms of her transplant, she was recently decreased on her prednisone from 7.5 to 5 mg QD about 2 weeks ago. About a month ago, she was also changed from valganciclovir for antiviral PPX to letermovir because the Valcyte was causing leukopenia (which has now improved) but was leading to recurrent infections. In the ED, she was given ceftriaxone 1 g IV x1 and acetaminophen 650 mg PO x1,  mg PO x1, and NS 1L x1.    1. Fever. Pyuria, Recurrent UTI. Pyelonephritis. S/p Renal transplant. Immunocompromised host  - imaging reviewed  - f/u urine cx - growing Kleb variicola blood cx no growth   - monitor temps  - s/p rocephin day #3 - outpt urine cx 10/24 and 10/25 grew Hafnia Alvei > 100,000 and most recent one R zosyn, S bactrim, I fortaz  - s/p IV merrem 1gmq8h #2-3  - continue with antibiotic coverage  - monitor temps  - tolerating abx well so far; no side effects noted  - reason for abx use and side effects reviewed with patient  - dc with po ceftin 500mg BID complete 14 day course  - on letermovir for cmv prophylaxis, on cellcept/steroids   - supportive care  - fu cbc    2 other issues - care per medicine 
63 yo F with a PMH of schizoaffective disorder (bipolar type), renal failure (s/p R renal transplant, due to prior lithium use), breast cancer s/p lumpectomy, fibroid s/p hysterectomy, hyperparathyroidism, DVT (on Eliquis), MGUS, HLD, and GERD who presents with abdominal pain. It is lower abdominal, and has been going on for several months. It is sporadic, worse upon movement, and denies relation to urination or bowel movements. However, it worsened  and she also had fevers up to 102F, and chills with weakness. She has also had nausea during the past few days as well. She notes she gets frequent UTIs and often gets fevers with them, which has been occurring since her renal transplant about 2 years ago. She was recently prescribed methenamine 1 mg BID, but has not received the medicine yet (it is being mailed to her). In terms of her transplant, she was recently decreased on her prednisone from 7.5 to 5 mg QD about 2 weeks ago. About a month ago, she was also changed from valganciclovir for antiviral PPX to letermovir because the Valcyte was causing leukopenia (which has now improved) but was leading to recurrent infections. In the ED, she was given ceftriaxone 1 g IV x1 and acetaminophen 650 mg PO x1,  mg PO x1, and NS 1L x1.    1. Fever. Pyuria, Recurrent UTI. Pyelonephritis. S/p Renal transplant. Immunocompromised host  - imaging reviewed  - f/u urine cx - growing gnrs f/u final cx , blood cx no growth   - monitor temps  - s/p rocephin day #3 - outpt urine cx 10/24 and 10/25 grew Hafnia Alvei > 100,000 and most recent one R zosyn, S bactrim, I fortaz  - change abx to IV merrem 1gmq8h   - continue with antibiotic coverage  - monitor temps  - tolerating abx well so far; no side effects noted  - reason for abx use and side effects reviewed with patient  - on letermovir for cmv prophylaxis, on cellcept/steroids   - supportive care  - fu cbc    2 other issues - care per medicine 
61 yo F with a PMH of schizoaffective disorder (bipolar type), renal failure (s/p R renal transplant, due to prior lithium use), breast cancer s/p lumpectomy, fibroid s/p hysterectomy, hyperparathyroidism, DVT (on Eliquis), MGUS, HLD, and GERD who presents with abdominal pain.  CT non contrast with perinephric stranding infx vs rejection   recurrent uti now febrile  hx of leukocytosis, improved with change of antiviral     REC  - cw ivf  - cs IS medicaiton   - fu cx send, pt febrile and fu trend of fever curve   - for now will continue care at , if pt becomes neutropenic, will transfer to [facility] transplant team     risa finley, tranplant nephrologist    risa cooney     10/30 MK  - renal tx     cw current IS   - recurrent utir now with ucx with GNR    recent outpt ucx discussed with ID     abx discussed 
61 yo F with a PMH of schizoaffective disorder (bipolar type), renal failure (s/p R renal transplant, due to prior lithium use), breast cancer s/p lumpectomy, fibroid s/p hysterectomy, hyperparathyroidism, DVT (on Eliquis), MGUS, HLD, and GERD who presents with abdominal pain.  CT non contrast with perinephric stranding infx vs rejection   recurrent uti now febrile  hx of leukocytosis, improved with change of antiviral     REC  - cw ivf  - cs IS medicaiton   - fu cx send, pt febrile and fu trend of fever curve   - for now will continue care at , if pt becomes neutropenic, will transfer to [facility] transplant team     risa finley, tranplant nephrologist    risa cooney     10/30 MK  - renal tx     cw current IS   - recurrent utir now with ucx with GNR    recent outpt ucx discussed with ID     abx discussed     10/31 MK   - renal tx     cw current IS   - recurrent utir now with ucx with klebsiella fu sens    recent outpt ucx discussed with ID and hosp  risa lopez

## 2023-11-03 LAB
CULTURE RESULTS: SIGNIFICANT CHANGE UP
SPECIMEN SOURCE: SIGNIFICANT CHANGE UP

## 2023-11-06 DIAGNOSIS — Z85.3 PERSONAL HISTORY OF MALIGNANT NEOPLASM OF BREAST: ICD-10-CM

## 2023-11-06 DIAGNOSIS — B96.1 KLEBSIELLA PNEUMONIAE [K. PNEUMONIAE] AS THE CAUSE OF DISEASES CLASSIFIED ELSEWHERE: ICD-10-CM

## 2023-11-06 DIAGNOSIS — D84.9 IMMUNODEFICIENCY, UNSPECIFIED: ICD-10-CM

## 2023-11-06 DIAGNOSIS — M51.36 OTHER INTERVERTEBRAL DISC DEGENERATION, LUMBAR REGION: ICD-10-CM

## 2023-11-06 DIAGNOSIS — K21.9 GASTRO-ESOPHAGEAL REFLUX DISEASE WITHOUT ESOPHAGITIS: ICD-10-CM

## 2023-11-06 DIAGNOSIS — E78.5 HYPERLIPIDEMIA, UNSPECIFIED: ICD-10-CM

## 2023-11-06 DIAGNOSIS — N12 TUBULO-INTERSTITIAL NEPHRITIS, NOT SPECIFIED AS ACUTE OR CHRONIC: ICD-10-CM

## 2023-11-06 DIAGNOSIS — N18.30 CHRONIC KIDNEY DISEASE, STAGE 3 UNSPECIFIED: ICD-10-CM

## 2023-11-06 DIAGNOSIS — E21.3 HYPERPARATHYROIDISM, UNSPECIFIED: ICD-10-CM

## 2023-11-06 DIAGNOSIS — Z94.0 KIDNEY TRANSPLANT STATUS: ICD-10-CM

## 2023-11-06 DIAGNOSIS — Z79.01 LONG TERM (CURRENT) USE OF ANTICOAGULANTS: ICD-10-CM

## 2023-11-06 DIAGNOSIS — Z86.718 PERSONAL HISTORY OF OTHER VENOUS THROMBOSIS AND EMBOLISM: ICD-10-CM

## 2023-11-06 DIAGNOSIS — N39.0 URINARY TRACT INFECTION, SITE NOT SPECIFIED: ICD-10-CM

## 2023-11-06 DIAGNOSIS — F25.0 SCHIZOAFFECTIVE DISORDER, BIPOLAR TYPE: ICD-10-CM

## 2023-11-06 DIAGNOSIS — Z88.8 ALLERGY STATUS TO OTHER DRUGS, MEDICAMENTS AND BIOLOGICAL SUBSTANCES: ICD-10-CM

## 2023-12-01 VITALS
HEART RATE: 114 BPM | SYSTOLIC BLOOD PRESSURE: 142 MMHG | RESPIRATION RATE: 18 BRPM | OXYGEN SATURATION: 95 % | DIASTOLIC BLOOD PRESSURE: 81 MMHG | WEIGHT: 181 LBS | HEIGHT: 65 IN | TEMPERATURE: 103 F

## 2023-12-01 PROCEDURE — 99285 EMERGENCY DEPT VISIT HI MDM: CPT

## 2023-12-01 NOTE — ED ADULT TRIAGE NOTE - CHIEF COMPLAINT QUOTE
pt presents to the ED with nausea, fever, lower abd pain. pt with hx of kidney transplant in 2022 and has been having frequent UTI since having transplant

## 2023-12-02 ENCOUNTER — INPATIENT (INPATIENT)
Facility: HOSPITAL | Age: 63
LOS: 2 days | Discharge: HOME | DRG: 720 | End: 2023-12-05
Attending: HOSPITALIST | Admitting: STUDENT IN AN ORGANIZED HEALTH CARE EDUCATION/TRAINING PROGRAM
Payer: COMMERCIAL

## 2023-12-02 DIAGNOSIS — Z94.0 KIDNEY TRANSPLANT STATUS: Chronic | ICD-10-CM

## 2023-12-02 DIAGNOSIS — R89.7 ABNORMAL HISTOLOGICAL FINDINGS IN SPECIMENS FROM OTHER ORGANS, SYSTEMS AND TISSUES: Chronic | ICD-10-CM

## 2023-12-02 DIAGNOSIS — A41.9 SEPSIS, UNSPECIFIED ORGANISM: ICD-10-CM

## 2023-12-02 DIAGNOSIS — Z90.710 ACQUIRED ABSENCE OF BOTH CERVIX AND UTERUS: Chronic | ICD-10-CM

## 2023-12-02 PROBLEM — F25.0 SCHIZOAFFECTIVE DISORDER, BIPOLAR TYPE: Chronic | Status: ACTIVE | Noted: 2023-10-29

## 2023-12-02 LAB
ALBUMIN SERPL ELPH-MCNC: 3.2 G/DL — LOW (ref 3.3–5)
ALBUMIN SERPL ELPH-MCNC: 3.2 G/DL — LOW (ref 3.3–5)
ALBUMIN SERPL ELPH-MCNC: 3.4 G/DL — SIGNIFICANT CHANGE UP (ref 3.3–5)
ALBUMIN SERPL ELPH-MCNC: 3.4 G/DL — SIGNIFICANT CHANGE UP (ref 3.3–5)
ALP SERPL-CCNC: 115 U/L — SIGNIFICANT CHANGE UP (ref 40–120)
ALP SERPL-CCNC: 115 U/L — SIGNIFICANT CHANGE UP (ref 40–120)
ALP SERPL-CCNC: 130 U/L — HIGH (ref 40–120)
ALP SERPL-CCNC: 130 U/L — HIGH (ref 40–120)
ALT FLD-CCNC: 108 U/L — HIGH (ref 12–78)
ALT FLD-CCNC: 108 U/L — HIGH (ref 12–78)
ALT FLD-CCNC: 128 U/L — HIGH (ref 12–78)
ALT FLD-CCNC: 128 U/L — HIGH (ref 12–78)
ANION GAP SERPL CALC-SCNC: 2 MMOL/L — LOW (ref 5–17)
ANION GAP SERPL CALC-SCNC: 2 MMOL/L — LOW (ref 5–17)
ANION GAP SERPL CALC-SCNC: 5 MMOL/L — SIGNIFICANT CHANGE UP (ref 5–17)
ANION GAP SERPL CALC-SCNC: 5 MMOL/L — SIGNIFICANT CHANGE UP (ref 5–17)
APPEARANCE UR: CLEAR — SIGNIFICANT CHANGE UP
APPEARANCE UR: CLEAR — SIGNIFICANT CHANGE UP
APTT BLD: 29.5 SEC — SIGNIFICANT CHANGE UP (ref 24.5–35.6)
APTT BLD: 29.5 SEC — SIGNIFICANT CHANGE UP (ref 24.5–35.6)
AST SERPL-CCNC: 136 U/L — HIGH (ref 15–37)
AST SERPL-CCNC: 136 U/L — HIGH (ref 15–37)
AST SERPL-CCNC: 82 U/L — HIGH (ref 15–37)
AST SERPL-CCNC: 82 U/L — HIGH (ref 15–37)
BACTERIA # UR AUTO: NEGATIVE /HPF — SIGNIFICANT CHANGE UP
BACTERIA # UR AUTO: NEGATIVE /HPF — SIGNIFICANT CHANGE UP
BASOPHILS # BLD AUTO: 0 K/UL — SIGNIFICANT CHANGE UP (ref 0–0.2)
BASOPHILS # BLD AUTO: 0 K/UL — SIGNIFICANT CHANGE UP (ref 0–0.2)
BASOPHILS NFR BLD AUTO: 0 % — SIGNIFICANT CHANGE UP (ref 0–2)
BASOPHILS NFR BLD AUTO: 0 % — SIGNIFICANT CHANGE UP (ref 0–2)
BILIRUB SERPL-MCNC: 0.6 MG/DL — SIGNIFICANT CHANGE UP (ref 0.2–1.2)
BILIRUB UR-MCNC: NEGATIVE — SIGNIFICANT CHANGE UP
BILIRUB UR-MCNC: NEGATIVE — SIGNIFICANT CHANGE UP
BUN SERPL-MCNC: 15 MG/DL — SIGNIFICANT CHANGE UP (ref 7–23)
BUN SERPL-MCNC: 15 MG/DL — SIGNIFICANT CHANGE UP (ref 7–23)
BUN SERPL-MCNC: 18 MG/DL — SIGNIFICANT CHANGE UP (ref 7–23)
BUN SERPL-MCNC: 18 MG/DL — SIGNIFICANT CHANGE UP (ref 7–23)
CALCIUM SERPL-MCNC: 9.4 MG/DL — SIGNIFICANT CHANGE UP (ref 8.5–10.1)
CAST: 1 /LPF — SIGNIFICANT CHANGE UP (ref 0–4)
CAST: 1 /LPF — SIGNIFICANT CHANGE UP (ref 0–4)
CHLORIDE SERPL-SCNC: 110 MMOL/L — HIGH (ref 96–108)
CHLORIDE SERPL-SCNC: 110 MMOL/L — HIGH (ref 96–108)
CHLORIDE SERPL-SCNC: 111 MMOL/L — HIGH (ref 96–108)
CHLORIDE SERPL-SCNC: 111 MMOL/L — HIGH (ref 96–108)
CO2 SERPL-SCNC: 27 MMOL/L — SIGNIFICANT CHANGE UP (ref 22–31)
CO2 SERPL-SCNC: 27 MMOL/L — SIGNIFICANT CHANGE UP (ref 22–31)
CO2 SERPL-SCNC: 29 MMOL/L — SIGNIFICANT CHANGE UP (ref 22–31)
CO2 SERPL-SCNC: 29 MMOL/L — SIGNIFICANT CHANGE UP (ref 22–31)
COLOR SPEC: YELLOW — SIGNIFICANT CHANGE UP
COLOR SPEC: YELLOW — SIGNIFICANT CHANGE UP
CREAT SERPL-MCNC: 1.12 MG/DL — SIGNIFICANT CHANGE UP (ref 0.5–1.3)
CREAT SERPL-MCNC: 1.12 MG/DL — SIGNIFICANT CHANGE UP (ref 0.5–1.3)
CREAT SERPL-MCNC: 1.18 MG/DL — SIGNIFICANT CHANGE UP (ref 0.5–1.3)
CREAT SERPL-MCNC: 1.18 MG/DL — SIGNIFICANT CHANGE UP (ref 0.5–1.3)
DACRYOCYTES BLD QL SMEAR: SLIGHT — SIGNIFICANT CHANGE UP
DACRYOCYTES BLD QL SMEAR: SLIGHT — SIGNIFICANT CHANGE UP
DIFF PNL FLD: ABNORMAL
DIFF PNL FLD: ABNORMAL
EGFR: 52 ML/MIN/1.73M2 — LOW
EGFR: 52 ML/MIN/1.73M2 — LOW
EGFR: 55 ML/MIN/1.73M2 — LOW
EGFR: 55 ML/MIN/1.73M2 — LOW
EOSINOPHIL # BLD AUTO: 0 K/UL — SIGNIFICANT CHANGE UP (ref 0–0.5)
EOSINOPHIL # BLD AUTO: 0 K/UL — SIGNIFICANT CHANGE UP (ref 0–0.5)
EOSINOPHIL NFR BLD AUTO: 0 % — SIGNIFICANT CHANGE UP (ref 0–6)
EOSINOPHIL NFR BLD AUTO: 0 % — SIGNIFICANT CHANGE UP (ref 0–6)
FLUAV AG NPH QL: SIGNIFICANT CHANGE UP
FLUAV AG NPH QL: SIGNIFICANT CHANGE UP
FLUBV AG NPH QL: SIGNIFICANT CHANGE UP
FLUBV AG NPH QL: SIGNIFICANT CHANGE UP
GLUCOSE SERPL-MCNC: 114 MG/DL — HIGH (ref 70–99)
GLUCOSE SERPL-MCNC: 114 MG/DL — HIGH (ref 70–99)
GLUCOSE SERPL-MCNC: 126 MG/DL — HIGH (ref 70–99)
GLUCOSE SERPL-MCNC: 126 MG/DL — HIGH (ref 70–99)
GLUCOSE UR QL: NEGATIVE MG/DL — SIGNIFICANT CHANGE UP
GLUCOSE UR QL: NEGATIVE MG/DL — SIGNIFICANT CHANGE UP
HAV IGM SER-ACNC: SIGNIFICANT CHANGE UP
HAV IGM SER-ACNC: SIGNIFICANT CHANGE UP
HBV CORE IGM SER-ACNC: SIGNIFICANT CHANGE UP
HBV CORE IGM SER-ACNC: SIGNIFICANT CHANGE UP
HBV SURFACE AG SER-ACNC: SIGNIFICANT CHANGE UP
HBV SURFACE AG SER-ACNC: SIGNIFICANT CHANGE UP
HCT VFR BLD CALC: 36.4 % — SIGNIFICANT CHANGE UP (ref 34.5–45)
HCT VFR BLD CALC: 36.4 % — SIGNIFICANT CHANGE UP (ref 34.5–45)
HCT VFR BLD CALC: 36.9 % — SIGNIFICANT CHANGE UP (ref 34.5–45)
HCT VFR BLD CALC: 36.9 % — SIGNIFICANT CHANGE UP (ref 34.5–45)
HCV AB S/CO SERPL IA: 0.05 S/CO — SIGNIFICANT CHANGE UP (ref 0–0.99)
HCV AB S/CO SERPL IA: 0.05 S/CO — SIGNIFICANT CHANGE UP (ref 0–0.99)
HCV AB SERPL-IMP: SIGNIFICANT CHANGE UP
HCV AB SERPL-IMP: SIGNIFICANT CHANGE UP
HGB BLD-MCNC: 12 G/DL — SIGNIFICANT CHANGE UP (ref 11.5–15.5)
HGB BLD-MCNC: 12 G/DL — SIGNIFICANT CHANGE UP (ref 11.5–15.5)
HGB BLD-MCNC: 12.1 G/DL — SIGNIFICANT CHANGE UP (ref 11.5–15.5)
HGB BLD-MCNC: 12.1 G/DL — SIGNIFICANT CHANGE UP (ref 11.5–15.5)
HYPOCHROMIA BLD QL: SLIGHT — SIGNIFICANT CHANGE UP
HYPOCHROMIA BLD QL: SLIGHT — SIGNIFICANT CHANGE UP
INR BLD: 1.24 RATIO — HIGH (ref 0.85–1.18)
INR BLD: 1.24 RATIO — HIGH (ref 0.85–1.18)
KETONES UR-MCNC: NEGATIVE MG/DL — SIGNIFICANT CHANGE UP
KETONES UR-MCNC: NEGATIVE MG/DL — SIGNIFICANT CHANGE UP
LACTATE SERPL-SCNC: 0.9 MMOL/L — SIGNIFICANT CHANGE UP (ref 0.7–2)
LACTATE SERPL-SCNC: 0.9 MMOL/L — SIGNIFICANT CHANGE UP (ref 0.7–2)
LEUKOCYTE ESTERASE UR-ACNC: ABNORMAL
LEUKOCYTE ESTERASE UR-ACNC: ABNORMAL
LYMPHOCYTES # BLD AUTO: 0.07 K/UL — LOW (ref 1–3.3)
LYMPHOCYTES # BLD AUTO: 0.07 K/UL — LOW (ref 1–3.3)
LYMPHOCYTES # BLD AUTO: 2 % — LOW (ref 13–44)
LYMPHOCYTES # BLD AUTO: 2 % — LOW (ref 13–44)
MACROCYTES BLD QL: SLIGHT — SIGNIFICANT CHANGE UP
MACROCYTES BLD QL: SLIGHT — SIGNIFICANT CHANGE UP
MANUAL SMEAR VERIFICATION: SIGNIFICANT CHANGE UP
MANUAL SMEAR VERIFICATION: SIGNIFICANT CHANGE UP
MCHC RBC-ENTMCNC: 31.7 PG — SIGNIFICANT CHANGE UP (ref 27–34)
MCHC RBC-ENTMCNC: 31.7 PG — SIGNIFICANT CHANGE UP (ref 27–34)
MCHC RBC-ENTMCNC: 31.8 PG — SIGNIFICANT CHANGE UP (ref 27–34)
MCHC RBC-ENTMCNC: 31.8 PG — SIGNIFICANT CHANGE UP (ref 27–34)
MCHC RBC-ENTMCNC: 32.8 GM/DL — SIGNIFICANT CHANGE UP (ref 32–36)
MCHC RBC-ENTMCNC: 32.8 GM/DL — SIGNIFICANT CHANGE UP (ref 32–36)
MCHC RBC-ENTMCNC: 33 GM/DL — SIGNIFICANT CHANGE UP (ref 32–36)
MCHC RBC-ENTMCNC: 33 GM/DL — SIGNIFICANT CHANGE UP (ref 32–36)
MCV RBC AUTO: 96 FL — SIGNIFICANT CHANGE UP (ref 80–100)
MCV RBC AUTO: 96 FL — SIGNIFICANT CHANGE UP (ref 80–100)
MCV RBC AUTO: 96.9 FL — SIGNIFICANT CHANGE UP (ref 80–100)
MCV RBC AUTO: 96.9 FL — SIGNIFICANT CHANGE UP (ref 80–100)
MONOCYTES # BLD AUTO: 0.39 K/UL — SIGNIFICANT CHANGE UP (ref 0–0.9)
MONOCYTES # BLD AUTO: 0.39 K/UL — SIGNIFICANT CHANGE UP (ref 0–0.9)
MONOCYTES NFR BLD AUTO: 11 % — SIGNIFICANT CHANGE UP (ref 2–14)
MONOCYTES NFR BLD AUTO: 11 % — SIGNIFICANT CHANGE UP (ref 2–14)
NEUTROPHILS # BLD AUTO: 3.12 K/UL — SIGNIFICANT CHANGE UP (ref 1.8–7.4)
NEUTROPHILS # BLD AUTO: 3.12 K/UL — SIGNIFICANT CHANGE UP (ref 1.8–7.4)
NEUTROPHILS NFR BLD AUTO: 84 % — HIGH (ref 43–77)
NEUTROPHILS NFR BLD AUTO: 84 % — HIGH (ref 43–77)
NEUTS BAND # BLD: 3 % — SIGNIFICANT CHANGE UP (ref 0–8)
NEUTS BAND # BLD: 3 % — SIGNIFICANT CHANGE UP (ref 0–8)
NITRITE UR-MCNC: NEGATIVE — SIGNIFICANT CHANGE UP
NITRITE UR-MCNC: NEGATIVE — SIGNIFICANT CHANGE UP
NRBC # BLD: 0 /100 — SIGNIFICANT CHANGE UP (ref 0–0)
NRBC # BLD: 0 /100 — SIGNIFICANT CHANGE UP (ref 0–0)
NRBC # BLD: SIGNIFICANT CHANGE UP /100 WBCS (ref 0–0)
NRBC # BLD: SIGNIFICANT CHANGE UP /100 WBCS (ref 0–0)
PH UR: 5.5 — SIGNIFICANT CHANGE UP (ref 5–8)
PH UR: 5.5 — SIGNIFICANT CHANGE UP (ref 5–8)
PLAT MORPH BLD: NORMAL — SIGNIFICANT CHANGE UP
PLAT MORPH BLD: NORMAL — SIGNIFICANT CHANGE UP
PLATELET # BLD AUTO: 145 K/UL — LOW (ref 150–400)
PLATELET # BLD AUTO: 145 K/UL — LOW (ref 150–400)
PLATELET # BLD AUTO: 148 K/UL — LOW (ref 150–400)
PLATELET # BLD AUTO: 148 K/UL — LOW (ref 150–400)
POIKILOCYTOSIS BLD QL AUTO: SLIGHT — SIGNIFICANT CHANGE UP
POIKILOCYTOSIS BLD QL AUTO: SLIGHT — SIGNIFICANT CHANGE UP
POTASSIUM SERPL-MCNC: 3.7 MMOL/L — SIGNIFICANT CHANGE UP (ref 3.5–5.3)
POTASSIUM SERPL-MCNC: 3.7 MMOL/L — SIGNIFICANT CHANGE UP (ref 3.5–5.3)
POTASSIUM SERPL-MCNC: 4.1 MMOL/L — SIGNIFICANT CHANGE UP (ref 3.5–5.3)
POTASSIUM SERPL-MCNC: 4.1 MMOL/L — SIGNIFICANT CHANGE UP (ref 3.5–5.3)
POTASSIUM SERPL-SCNC: 3.7 MMOL/L — SIGNIFICANT CHANGE UP (ref 3.5–5.3)
POTASSIUM SERPL-SCNC: 3.7 MMOL/L — SIGNIFICANT CHANGE UP (ref 3.5–5.3)
POTASSIUM SERPL-SCNC: 4.1 MMOL/L — SIGNIFICANT CHANGE UP (ref 3.5–5.3)
POTASSIUM SERPL-SCNC: 4.1 MMOL/L — SIGNIFICANT CHANGE UP (ref 3.5–5.3)
PROT SERPL-MCNC: 6.8 GM/DL — SIGNIFICANT CHANGE UP (ref 6–8.3)
PROT SERPL-MCNC: 6.8 GM/DL — SIGNIFICANT CHANGE UP (ref 6–8.3)
PROT SERPL-MCNC: 7 GM/DL — SIGNIFICANT CHANGE UP (ref 6–8.3)
PROT SERPL-MCNC: 7 GM/DL — SIGNIFICANT CHANGE UP (ref 6–8.3)
PROT UR-MCNC: SIGNIFICANT CHANGE UP MG/DL
PROT UR-MCNC: SIGNIFICANT CHANGE UP MG/DL
PROTHROM AB SERPL-ACNC: 13.9 SEC — HIGH (ref 9.5–13)
PROTHROM AB SERPL-ACNC: 13.9 SEC — HIGH (ref 9.5–13)
RBC # BLD: 3.79 M/UL — LOW (ref 3.8–5.2)
RBC # BLD: 3.79 M/UL — LOW (ref 3.8–5.2)
RBC # BLD: 3.81 M/UL — SIGNIFICANT CHANGE UP (ref 3.8–5.2)
RBC # BLD: 3.81 M/UL — SIGNIFICANT CHANGE UP (ref 3.8–5.2)
RBC # FLD: 13.3 % — SIGNIFICANT CHANGE UP (ref 10.3–14.5)
RBC # FLD: 13.3 % — SIGNIFICANT CHANGE UP (ref 10.3–14.5)
RBC # FLD: 13.4 % — SIGNIFICANT CHANGE UP (ref 10.3–14.5)
RBC # FLD: 13.4 % — SIGNIFICANT CHANGE UP (ref 10.3–14.5)
RBC BLD AUTO: ABNORMAL
RBC BLD AUTO: ABNORMAL
RBC CASTS # UR COMP ASSIST: 3 /HPF — SIGNIFICANT CHANGE UP (ref 0–4)
RBC CASTS # UR COMP ASSIST: 3 /HPF — SIGNIFICANT CHANGE UP (ref 0–4)
RSV RNA NPH QL NAA+NON-PROBE: SIGNIFICANT CHANGE UP
RSV RNA NPH QL NAA+NON-PROBE: SIGNIFICANT CHANGE UP
SARS-COV-2 RNA SPEC QL NAA+PROBE: SIGNIFICANT CHANGE UP
SARS-COV-2 RNA SPEC QL NAA+PROBE: SIGNIFICANT CHANGE UP
SODIUM SERPL-SCNC: 142 MMOL/L — SIGNIFICANT CHANGE UP (ref 135–145)
SP GR SPEC: 1.02 — SIGNIFICANT CHANGE UP (ref 1–1.03)
SP GR SPEC: 1.02 — SIGNIFICANT CHANGE UP (ref 1–1.03)
SQUAMOUS # UR AUTO: 3 /HPF — SIGNIFICANT CHANGE UP (ref 0–5)
SQUAMOUS # UR AUTO: 3 /HPF — SIGNIFICANT CHANGE UP (ref 0–5)
UROBILINOGEN FLD QL: 0.2 MG/DL — SIGNIFICANT CHANGE UP (ref 0.2–1)
UROBILINOGEN FLD QL: 0.2 MG/DL — SIGNIFICANT CHANGE UP (ref 0.2–1)
WBC # BLD: 3.59 K/UL — LOW (ref 3.8–10.5)
WBC # BLD: 3.59 K/UL — LOW (ref 3.8–10.5)
WBC # BLD: 3.64 K/UL — LOW (ref 3.8–10.5)
WBC # BLD: 3.64 K/UL — LOW (ref 3.8–10.5)
WBC # FLD AUTO: 3.59 K/UL — LOW (ref 3.8–10.5)
WBC # FLD AUTO: 3.59 K/UL — LOW (ref 3.8–10.5)
WBC # FLD AUTO: 3.64 K/UL — LOW (ref 3.8–10.5)
WBC # FLD AUTO: 3.64 K/UL — LOW (ref 3.8–10.5)
WBC UR QL: 44 /HPF — HIGH (ref 0–5)
WBC UR QL: 44 /HPF — HIGH (ref 0–5)

## 2023-12-02 PROCEDURE — 80074 ACUTE HEPATITIS PANEL: CPT

## 2023-12-02 PROCEDURE — 86038 ANTINUCLEAR ANTIBODIES: CPT

## 2023-12-02 PROCEDURE — 74176 CT ABD & PELVIS W/O CONTRAST: CPT | Mod: 26

## 2023-12-02 PROCEDURE — 86255 FLUORESCENT ANTIBODY SCREEN: CPT

## 2023-12-02 PROCEDURE — 36415 COLL VENOUS BLD VENIPUNCTURE: CPT

## 2023-12-02 PROCEDURE — 71045 X-RAY EXAM CHEST 1 VIEW: CPT | Mod: 26

## 2023-12-02 PROCEDURE — 93010 ELECTROCARDIOGRAM REPORT: CPT

## 2023-12-02 PROCEDURE — 80053 COMPREHEN METABOLIC PANEL: CPT

## 2023-12-02 PROCEDURE — 76776 US EXAM K TRANSPL W/DOPPLER: CPT | Mod: RT

## 2023-12-02 PROCEDURE — 76700 US EXAM ABDOM COMPLETE: CPT | Mod: 26

## 2023-12-02 PROCEDURE — 76700 US EXAM ABDOM COMPLETE: CPT

## 2023-12-02 PROCEDURE — 87517 HEPATITIS B DNA QUANT: CPT

## 2023-12-02 PROCEDURE — 99223 1ST HOSP IP/OBS HIGH 75: CPT

## 2023-12-02 PROCEDURE — 82962 GLUCOSE BLOOD TEST: CPT

## 2023-12-02 PROCEDURE — 74176 CT ABD & PELVIS W/O CONTRAST: CPT

## 2023-12-02 PROCEDURE — 82728 ASSAY OF FERRITIN: CPT

## 2023-12-02 PROCEDURE — 76776 US EXAM K TRANSPL W/DOPPLER: CPT | Mod: 26,59

## 2023-12-02 PROCEDURE — 85027 COMPLETE CBC AUTOMATED: CPT

## 2023-12-02 RX ORDER — ACETAMINOPHEN 500 MG
975 TABLET ORAL ONCE
Refills: 0 | Status: COMPLETED | OUTPATIENT
Start: 2023-12-02 | End: 2023-12-02

## 2023-12-02 RX ORDER — ONDANSETRON 8 MG/1
4 TABLET, FILM COATED ORAL EVERY 8 HOURS
Refills: 0 | Status: DISCONTINUED | OUTPATIENT
Start: 2023-12-02 | End: 2023-12-05

## 2023-12-02 RX ORDER — CHOLECALCIFEROL (VITAMIN D3) 125 MCG
1000 CAPSULE ORAL DAILY
Refills: 0 | Status: DISCONTINUED | OUTPATIENT
Start: 2023-12-02 | End: 2023-12-05

## 2023-12-02 RX ORDER — MEROPENEM 1 G/30ML
1000 INJECTION INTRAVENOUS ONCE
Refills: 0 | Status: DISCONTINUED | OUTPATIENT
Start: 2023-12-02 | End: 2023-12-02

## 2023-12-02 RX ORDER — LANOLIN ALCOHOL/MO/W.PET/CERES
3 CREAM (GRAM) TOPICAL AT BEDTIME
Refills: 0 | Status: DISCONTINUED | OUTPATIENT
Start: 2023-12-02 | End: 2023-12-05

## 2023-12-02 RX ORDER — ACETAMINOPHEN 500 MG
650 TABLET ORAL EVERY 6 HOURS
Refills: 0 | Status: DISCONTINUED | OUTPATIENT
Start: 2023-12-02 | End: 2023-12-05

## 2023-12-02 RX ORDER — CEFTRIAXONE 500 MG/1
1000 INJECTION, POWDER, FOR SOLUTION INTRAMUSCULAR; INTRAVENOUS ONCE
Refills: 0 | Status: COMPLETED | OUTPATIENT
Start: 2023-12-02 | End: 2023-12-02

## 2023-12-02 RX ORDER — MORPHINE SULFATE 50 MG/1
1 CAPSULE, EXTENDED RELEASE ORAL EVERY 4 HOURS
Refills: 0 | Status: DISCONTINUED | OUTPATIENT
Start: 2023-12-02 | End: 2023-12-05

## 2023-12-02 RX ORDER — MEROPENEM 1 G/30ML
1000 INJECTION INTRAVENOUS EVERY 8 HOURS
Refills: 0 | Status: DISCONTINUED | OUTPATIENT
Start: 2023-12-02 | End: 2023-12-02

## 2023-12-02 RX ORDER — HALOPERIDOL DECANOATE 100 MG/ML
1 INJECTION INTRAMUSCULAR DAILY
Refills: 0 | Status: DISCONTINUED | OUTPATIENT
Start: 2023-12-02 | End: 2023-12-05

## 2023-12-02 RX ORDER — CEFTRIAXONE 500 MG/1
1000 INJECTION, POWDER, FOR SOLUTION INTRAMUSCULAR; INTRAVENOUS ONCE
Refills: 0 | Status: DISCONTINUED | OUTPATIENT
Start: 2023-12-02 | End: 2023-12-02

## 2023-12-02 RX ORDER — MEROPENEM 1 G/30ML
1000 INJECTION INTRAVENOUS ONCE
Refills: 0 | Status: COMPLETED | OUTPATIENT
Start: 2023-12-02 | End: 2023-12-02

## 2023-12-02 RX ORDER — KETOROLAC TROMETHAMINE 30 MG/ML
15 SYRINGE (ML) INJECTION EVERY 8 HOURS
Refills: 0 | Status: DISCONTINUED | OUTPATIENT
Start: 2023-12-02 | End: 2023-12-05

## 2023-12-02 RX ORDER — APIXABAN 2.5 MG/1
5 TABLET, FILM COATED ORAL EVERY 12 HOURS
Refills: 0 | Status: DISCONTINUED | OUTPATIENT
Start: 2023-12-02 | End: 2023-12-05

## 2023-12-02 RX ORDER — MEROPENEM 1 G/30ML
1000 INJECTION INTRAVENOUS EVERY 8 HOURS
Refills: 0 | Status: DISCONTINUED | OUTPATIENT
Start: 2023-12-02 | End: 2023-12-05

## 2023-12-02 RX ORDER — SODIUM CHLORIDE 9 MG/ML
2500 INJECTION, SOLUTION INTRAVENOUS ONCE
Refills: 0 | Status: COMPLETED | OUTPATIENT
Start: 2023-12-02 | End: 2023-12-02

## 2023-12-02 RX ORDER — CLONAZEPAM 1 MG
0.5 TABLET ORAL DAILY
Refills: 0 | Status: DISCONTINUED | OUTPATIENT
Start: 2023-12-02 | End: 2023-12-05

## 2023-12-02 RX ORDER — MYCOPHENOLATE MOFETIL 250 MG/1
250 CAPSULE ORAL
Refills: 0 | Status: DISCONTINUED | OUTPATIENT
Start: 2023-12-02 | End: 2023-12-05

## 2023-12-02 RX ORDER — MAGNESIUM OXIDE 400 MG ORAL TABLET 241.3 MG
400 TABLET ORAL
Refills: 0 | Status: DISCONTINUED | OUTPATIENT
Start: 2023-12-02 | End: 2023-12-05

## 2023-12-02 RX ADMIN — Medication 1000 UNIT(S): at 09:44

## 2023-12-02 RX ADMIN — CEFTRIAXONE 1000 MILLIGRAM(S): 500 INJECTION, POWDER, FOR SOLUTION INTRAMUSCULAR; INTRAVENOUS at 02:35

## 2023-12-02 RX ADMIN — Medication 1 TABLET(S): at 09:43

## 2023-12-02 RX ADMIN — MAGNESIUM OXIDE 400 MG ORAL TABLET 400 MILLIGRAM(S): 241.3 TABLET ORAL at 09:43

## 2023-12-02 RX ADMIN — APIXABAN 5 MILLIGRAM(S): 2.5 TABLET, FILM COATED ORAL at 21:11

## 2023-12-02 RX ADMIN — Medication 975 MILLIGRAM(S): at 01:09

## 2023-12-02 RX ADMIN — Medication 5 MILLIGRAM(S): at 10:47

## 2023-12-02 RX ADMIN — MYCOPHENOLATE MOFETIL 250 MILLIGRAM(S): 250 CAPSULE ORAL at 21:11

## 2023-12-02 RX ADMIN — SODIUM CHLORIDE 2500 MILLILITER(S): 9 INJECTION, SOLUTION INTRAVENOUS at 00:41

## 2023-12-02 RX ADMIN — APIXABAN 5 MILLIGRAM(S): 2.5 TABLET, FILM COATED ORAL at 09:44

## 2023-12-02 RX ADMIN — Medication 650 MILLIGRAM(S): at 17:50

## 2023-12-02 RX ADMIN — MEROPENEM 1000 MILLIGRAM(S): 1 INJECTION INTRAVENOUS at 13:39

## 2023-12-02 RX ADMIN — MORPHINE SULFATE 1 MILLIGRAM(S): 50 CAPSULE, EXTENDED RELEASE ORAL at 13:38

## 2023-12-02 RX ADMIN — MEROPENEM 1000 MILLIGRAM(S): 1 INJECTION INTRAVENOUS at 21:12

## 2023-12-02 RX ADMIN — ONDANSETRON 4 MILLIGRAM(S): 8 TABLET, FILM COATED ORAL at 17:52

## 2023-12-02 RX ADMIN — MORPHINE SULFATE 1 MILLIGRAM(S): 50 CAPSULE, EXTENDED RELEASE ORAL at 14:38

## 2023-12-02 RX ADMIN — MYCOPHENOLATE MOFETIL 250 MILLIGRAM(S): 250 CAPSULE ORAL at 10:47

## 2023-12-02 RX ADMIN — Medication 650 MILLIGRAM(S): at 07:59

## 2023-12-02 RX ADMIN — MEROPENEM 1000 MILLIGRAM(S): 1 INJECTION INTRAVENOUS at 02:36

## 2023-12-02 RX ADMIN — MAGNESIUM OXIDE 400 MG ORAL TABLET 400 MILLIGRAM(S): 241.3 TABLET ORAL at 12:54

## 2023-12-02 RX ADMIN — Medication 650 MILLIGRAM(S): at 08:59

## 2023-12-02 RX ADMIN — Medication 0.5 MILLIGRAM(S): at 18:24

## 2023-12-02 NOTE — H&P ADULT - ASSESSMENT
A/P:    1.  Sepsis  Possible UTI  Rule out bacteremia  -Got IV fluid in the ED  -Started on IV meropenem  -Follow cultures  -Follow ID consult    2.  History of renal transplant  -Creatinine stable  -Continue Prograf and prednisone    3.  History of DVT  -Continue Eliquis    4.  Transaminitis  - worsening  - follow repeat CMP, hepatitis panel  - Check RUQ US  - follow GI consult    5.  Schizoaffective Disorder, Bipolar Type  - C/w Haldol 1 mg QD  - C/w clonazepam 0.5 mg QD PRN    6.  Eliquis for DVT ppx    7.  Code status  -Full code

## 2023-12-02 NOTE — ED PROVIDER NOTE - BIRTH SEX
Introduction Text (Please End With A Colon): The following procedure was deferred:
Detail Level: Detailed
Instructions (Optional): Pt will schedule separate surgical appointment
Female

## 2023-12-02 NOTE — ED PROVIDER NOTE - CLINICAL SUMMARY MEDICAL DECISION MAKING FREE TEXT BOX
h/o MDR uti with immunocompromising condition 2.2 immunosuppressants from renal transplant. hemodynamically stable will require admission secondary to high risk for bacteremia and need for IV abx.

## 2023-12-02 NOTE — PATIENT PROFILE ADULT - FALL HARM RISK - HARM RISK INTERVENTIONS
Bed in lowest position, wheels locked, appropriate side rails in place/Call bell, personal items and telephone in reach/Instruct patient to call for assistance before getting out of bed or chair/Non-slip footwear when patient is out of bed/[place] to call system/Physically safe environment - no spills, clutter or unnecessary equipment/Purposeful Proactive Rounding/Room/bathroom lighting operational, light cord in reach

## 2023-12-02 NOTE — ED ADULT NURSE NOTE - OBJECTIVE STATEMENT
Pt presents to the ED with nausea, fever, lower abd pain. pt with hx of kidney transplant in 2022 and has been having frequent UTI since having transplant. Pt awake, alert, A&O x4.

## 2023-12-02 NOTE — H&P ADULT - NSHPPHYSICALEXAM_GEN_ALL_CORE
T(C): 36.6 (12-02-23 @ 06:03), Max: 39.3 (12-01-23 @ 22:48)  HR: 73 (12-02-23 @ 06:03) (71 - 114)  BP: 110/54 (12-02-23 @ 06:03) (103/71 - 142/81)  RR: 18 (12-02-23 @ 06:03) (17 - 18)  SpO2: 96% (12-02-23 @ 06:03) (95% - 96%)    CONSTITUTIONAL: Well groomed, no apparent distress  EYES: PERRLA and symmetric, EOMI, No conjunctival or scleral injection, non-icteric  ENMT: Oral mucosa with moist membranes. Normal dentition; no pharyngeal injection or exudates             NECK: Supple, symmetric and without tracheal deviation   RESP: No respiratory distress, no use of accessory muscles; CTA b/l, no WRR  CV: RRR, +S1S2, no MRG; no JVD; no peripheral edema  GI: Soft, NT, ND, no rebound, no guarding; no palpable masses;   LYMPH: No cervical LAD or tenderness;   MSK: Normal ROM without pain, normal muscle strength/tone  SKIN: No rashes or ulcers noted;   NEURO: CN II-XII intact; normal reflexes in upper and lower extremities, sensation intact in upper and lower extremities b/l to light touch   PSYCH: Appropriate insight/judgment; A+O x 3, mood and affect appropriate, recent/remote memory intact

## 2023-12-02 NOTE — PROGRESS NOTE ADULT - SUBJECTIVE AND OBJECTIVE BOX
HPI:  61 yo Female with a PMH of schizoaffective disorder (bipolar type), renal failure (s/p R renal transplant, due to prior lithium use), breast cancer s/p lumpectomy, fibroid s/p hysterectomy, hyperparathyroidism, DVT (on Eliquis), MGUS, HLD, and GERD who presented with fever. She had fever at home for 1 day.  Fevers up to 102.8 F for , She also has chills with weakness. She has some pain in the lower abdominal area. But no dysuria or hematuria. She denies pain elsewhere. She is getting this frequent fever episodes, occurring since her renal transplant about 2 years ago.    (02 Dec 2023 06:03)      Review of Systems:  CONSTITUTIONAL: , fevers or chills+  EYES/ENT: No visual changes;  No vertigo or throat pain   NECK: No pain or stiffness  RESPIRATORY: No cough, wheezing, hemoptysis; No shortness of breath,   CARDIOVASCULAR: No chest pain or palpitations  GASTROINTESTINAL: No abdominal or epigastric pain. No nausea, vomiting, or hematemesis; No diarrhea or constipation.   GENITOURINARY: No dysuria, frequency or hematuria  NEUROLOGICAL: No numbness or weakness  SKIN: No itching, burning, rashes, or lesions   All other review of systems is negative unless indicated above    PHYSICAL EXAM:    Vital Signs Last 24 Hrs  T(C): 36.7 (02 Dec 2023 09:30), Max: 39.3 (01 Dec 2023 22:48)  T(F): 98.1 (02 Dec 2023 09:30), Max: 102.8 (01 Dec 2023 22:48)  HR: 101 (02 Dec 2023 08:01) (71 - 114)  BP: 135/57 (02 Dec 2023 08:01) (103/71 - 142/81)  BP(mean): 71 (02 Dec 2023 06:03) (71 - 96)  RR: 17 (02 Dec 2023 08:01) (17 - 18)  SpO2: 98% (02 Dec 2023 08:01) (95% - 98%)    Parameters below as of 02 Dec 2023 08:01  Patient On (Oxygen Delivery Method): room air        GENERAL: comfortable   HEAD:  Atraumatic, Normocephalic  EYES: EOMI, PERRLA, conjunctiva and sclera clear  HEENT: Moist mucous membranes  NECK: Supple, No JVD  NERVOUS SYSTEM:  Alert & Oriented X3, Motor Strength 5/5 B/L upper and lower extremities; DTRs 2+ intact and symmetric  CHEST/LUNG: Clear to auscultation bilaterally; No rales, rhonchi, wheezing, or rubs  HEART:S1S2 normal, no murmer  ABDOMEN: Soft, Nontender, Nondistended; Bowel sounds present  GENITOURINARY- Voiding, no palpable bladder  EXTREMITIES:  2+ Peripheral Pulses, No clubbing, cyanosis, or edema  MUSCULOSKELTAL- No muscle tenderness, Muscle tone normal, No joint tenderness, no Joint swelling, Joint range of motion-normal  SKIN-no rash, no lesion  PSYCH- Mood stable  LYMPH Node- No palpable lymph node    LABS:                        12.1   3.64  )-----------( 148      ( 02 Dec 2023 07:28 )             36.9     12-02    142  |  111<H>  |  15  ----------------------------<  114<H>  4.1   |  29  |  1.12    Ca    9.4      02 Dec 2023 07:28    TPro  6.8  /  Alb  3.2<L>  /  TBili  0.6  /  DBili  x   /  AST  82<H>  /  ALT  108<H>  /  AlkPhos  115  12-02    PT/INR - ( 02 Dec 2023 00:35 )   PT: 13.9 sec;   INR: 1.24 ratio         PTT - ( 02 Dec 2023 00:35 )  PTT:29.5 sec  Urinalysis Basic - ( 02 Dec 2023 07:28 )    Color: x / Appearance: x / SG: x / pH: x  Gluc: 114 mg/dL / Ketone: x  / Bili: x / Urobili: x   Blood: x / Protein: x / Nitrite: x   Leuk Esterase: x / RBC: x / WBC x   Sq Epi: x / Non Sq Epi: x / Bacteria: x        CAPILLARY BLOOD GLUCOSE                Standing medicine  acetaminophen     Tablet .. 650 milliGRAM(s) Oral every 6 hours PRN  aluminum hydroxide/magnesium hydroxide/simethicone Suspension 30 milliLiter(s) Oral every 4 hours PRN  apixaban 5 milliGRAM(s) Oral every 12 hours  cholecalciferol 1000 Unit(s) Oral daily  clonazePAM  Tablet 0.5 milliGRAM(s) Oral daily PRN  haloperidol     Tablet 1 milliGRAM(s) Oral daily  magnesium oxide 400 milliGRAM(s) Oral three times a day with meals  melatonin 3 milliGRAM(s) Oral at bedtime PRN  meropenem Injectable 1000 milliGRAM(s) IV Push every 8 hours  morphine  - Injectable 1 milliGRAM(s) IV Push every 4 hours PRN  multivitamin 1 Tablet(s) Oral daily  mycophenolate mofetil 250 milliGRAM(s) Oral two times a day  ondansetron Injectable 4 milliGRAM(s) IV Push every 8 hours PRN  predniSONE   Tablet 5 milliGRAM(s) Oral daily

## 2023-12-02 NOTE — CONSULT NOTE ADULT - ASSESSMENT
61 yo Female with a PMH of schizoaffective disorder (bipolar type), renal failure (s/p R renal transplant, due to prior lithium use), breast cancer s/p lumpectomy, fibroid s/p hysterectomy, hyperparathyroidism, DVT (on Eliquis), MGUS, HLD, and GERD who presented with fever. She had fever at home for 1 day.  Fevers up to 102.8 F for , She also has chills with weakness. She has some pain in the lower abdominal area. But no dysuria or hematuria. She denies pain elsewhere. She is getting this frequent fever episodes, occurring since her renal transplant about 2 years ago. Here UA with pyuria, US with transplanted kidney prominence. Started on IV abx -merrem.     1. Fever. UTI. Pyelonephritis. s/p Renal transplant. Immunocompromised host  - imaging reviewed  - agree with IV meropenem [id number]   - continue with antibiotic coverage  - monitor temps  - tolerating abx well so far; no side effects noted  - reason for abx use and side effects reviewed with patient  - supportive care  - fu cbc    2. other issues - care per medicine

## 2023-12-02 NOTE — ED ADULT NURSE NOTE - NS ED NOTE ABUSE RESPONSE YN
"Chief Complaint   Patient presents with     Well Child     15 month [facility]     Health Maintenance     Dtap, HIB, PCV       Initial Temp 96.8  F (36  C) (Rectal)  Ht 2' 10.65\" (0.88 m)  Wt 23 lb 5.5 oz (10.6 kg)  HC 18.5\" (47 cm)  BMI 13.67 kg/m2 Estimated body mass index is 13.67 kg/(m^2) as calculated from the following:    Height as of this encounter: 2' 10.65\" (0.88 m).    Weight as of this encounter: 23 lb 5.5 oz (10.6 kg).  Medication Reconciliation: complete   Cyndi Platt MA      " Yes

## 2023-12-02 NOTE — H&P ADULT - HISTORY OF PRESENT ILLNESS
63 yo Female with a PMH of schizoaffective disorder (bipolar type), renal failure (s/p R renal transplant, due to prior lithium use), breast cancer s/p lumpectomy, fibroid s/p hysterectomy, hyperparathyroidism, DVT (on Eliquis), MGUS, HLD, and GERD who presented with fever. She had fever at home for 1 day.  Fevers up to 102.8 F for , She also has chills with weakness. She has some pain in the lower abdominal area. But no dysuria or hematuria. She denies pain elsewhere. She is getting this frequent fever episodes, occurring since her renal transplant about 2 years ago.

## 2023-12-02 NOTE — CONSULT NOTE ADULT - SUBJECTIVE AND OBJECTIVE BOX
Patient is a 63y old  Female who presents with a chief complaint of Fever, Renal transplant. ?? UTI, Transaminitis (02 Dec 2023 06:03)    HPI:  61 yo Female with a PMH of schizoaffective disorder (bipolar type), renal failure (s/p R renal transplant, due to prior lithium use), breast cancer s/p lumpectomy, fibroid s/p hysterectomy, hyperparathyroidism, DVT (on Eliquis), MGUS, HLD, and GERD who presented with fever. She had fever at home for 1 day.  Fevers up to 102.8 F for , She also has chills with weakness. She has some pain in the lower abdominal area. But no dysuria or hematuria. She denies pain elsewhere. She is getting this frequent fever episodes, occurring since her renal transplant about 2 years ago. Here UA with pyuria, US with transplanted kidney prominence. Started on IV abx -merrem.       PMH: as above  PSH: as above  Meds: per reconciliation sheet, noted below  MEDICATIONS  (STANDING):  apixaban 5 milliGRAM(s) Oral every 12 hours  cholecalciferol 1000 Unit(s) Oral daily  haloperidol     Tablet 1 milliGRAM(s) Oral daily  magnesium oxide 400 milliGRAM(s) Oral three times a day with meals  meropenem Injectable 1000 milliGRAM(s) IV Push every 8 hours  multivitamin 1 Tablet(s) Oral daily  mycophenolate mofetil 250 milliGRAM(s) Oral two times a day  predniSONE   Tablet 5 milliGRAM(s) Oral daily      Allergies    Levaquin (Anaphylaxis)    Intolerances      Social: no smoking, no alcohol, no illegal drugs; no recent travel, no exposure to TB  FAMILY HISTORY:  FH: lymphoma       no history of premature cardiovascular disease in first degree relatives    ROS: +fever, chills, no HA, no dizziness, no sore throat, no blurry vision, no CP, no palpitations, no SOB, no cough, no abdominal pain, no diarrhea, no N/V, + dysuria, no leg pain, no claudication, no rash, no joint aches, no rectal pain or bleeding, no night sweats    All other systems reviewed and are negative    Vital Signs Last 24 Hrs  T(C): 36.7 (02 Dec 2023 09:30), Max: 39.3 (01 Dec 2023 22:48)  T(F): 98.1 (02 Dec 2023 09:30), Max: 102.8 (01 Dec 2023 22:48)  HR: 101 (02 Dec 2023 08:01) (71 - 114)  BP: 135/57 (02 Dec 2023 08:01) (103/71 - 142/81)  BP(mean): 71 (02 Dec 2023 06:03) (71 - 96)  RR: 17 (02 Dec 2023 08:01) (17 - 18)  SpO2: 98% (02 Dec 2023 08:01) (95% - 98%)    Parameters below as of 02 Dec 2023 08:01  Patient On (Oxygen Delivery Method): room air      Daily Height in cm: 165.1 (01 Dec 2023 22:48)    Daily     PE:  Constitutional: NAD   HEENT: NC/AT, EOMI, PERRLA, conjunctivae clear; ears and nose atraumatic; pharynx benign  Neck: supple; thyroid not palpable  Back: no tenderness  Respiratory: respiratory effort normal; clear to auscultation  Cardiovascular: S1S2 regular, no murmurs  Abdomen: soft, not tender, not distended, positive BS; liver and spleen WNL  Genitourinary: no suprapubic tenderness   Lymphatic: no LN palpable  Musculoskeletal: no muscle tenderness, no joint swelling or tenderness  Extremities: no pedal edema  Neurological/ Psychiatric: AxOx3, Judgement and insight normal;  moving all extremities  Skin: no rashes; no palpable lesions    Labs: all available labs reviewed                        12.1   3.64  )-----------( 148      ( 02 Dec 2023 07:28 )             36.9     12-02    142  |  111<H>  |  15  ----------------------------<  114<H>  4.1   |  29  |  1.12    Ca    9.4      02 Dec 2023 07:28    TPro  6.8  /  Alb  3.2<L>  /  TBili  0.6  /  DBili  x   /  AST  82<H>  /  ALT  108<H>  /  AlkPhos  115  12-02     LIVER FUNCTIONS - ( 02 Dec 2023 07:28 )  Alb: 3.2 g/dL / Pro: 6.8 gm/dL / ALK PHOS: 115 U/L / ALT: 108 U/L / AST: 82 U/L / GGT: x           Urinalysis Basic - ( 02 Dec 2023 07:28 )    Color: x / Appearance: x / SG: x / pH: x  Gluc: 114 mg/dL / Ketone: x  / Bili: x / Urobili: x   Blood: x / Protein: x / Nitrite: x   Leuk Esterase: x / RBC: x / WBC x   Sq Epi: x / Non Sq Epi: x / Bacteria: x          Radiology: all available radiological tests reviewed  < from: Xray Chest 1 View-PORTABLE IMMEDIATE (12.02.23 @ 00:12) >    ACC: [id number] EXAM:  XR CHEST PORTABLE IMMED 1V   ORDERED BY: ELLEN VILCHIS     PROCEDURE DATE:  12/02/2023          INTERPRETATION:  INDICATIONS: 63-year-old female with sepsis.    Prior examination for comparison: 10/28/2023    Technique: AP view ofchest    Findings:    The lungs are clear. There are no pleural effusions. The   cardiomediastinal silhouette is normal. Bones are grossly normal.    IMPRESSION: No evidence of acute cardiopulmonary disease.    --- End of Report ---    < end of copied text >    Advanced directives addressed: full resuscitation

## 2023-12-02 NOTE — ED ADULT NURSE NOTE - NSFALLUNIVINTERV_ED_ALL_ED
Bed/Stretcher in lowest position, wheels locked, appropriate side rails in place/Call bell, personal items and telephone in reach/Instruct patient to call for assistance before getting out of bed/chair/stretcher/Non-slip footwear applied when patient is off stretcher/[place] to call system/Physically safe environment - no spills, clutter or unnecessary equipment/Purposeful proactive rounding/Room/bathroom lighting operational, light cord in reach

## 2023-12-02 NOTE — ED PROVIDER NOTE - OBJECTIVE STATEMENT
63 yo Female with a PMH of schizoaffective disorder (bipolar type), renal failure (s/p R renal transplant, due to prior lithium use), breast cancer s/p lumpectomy, fibroid s/p hysterectomy, hyperparathyroidism, DVT (on Eliquis), MGUS, HLD, and GERD who presented with fever. She had fever at home for 1 day.  Fevers up to 102.8 F for , She also has chills with weakness. She has some pain in the lower abdominal area. But no dysuria or hematuria. She denies pain elsewhere. She is getting this frequent fever episodes, occurring since her renal transplant about 2 years ago. 61 yo Female with a PMH of schizoaffective disorder (bipolar type), renal failure (s/p R renal transplant, due to prior lithium use), breast cancer s/p lumpectomy, fibroid s/p hysterectomy, hyperparathyroidism, DVT (on Eliquis), MGUS, HLD, and GERD who presented with fever. She had fever at home for 1 day.  Fevers up to 102.8 F for , She also has chills with weakness. She has some pain in the lower abdominal area. But no dysuria or hematuria. She denies pain elsewhere. She is getting this frequent fever episodes, occurring since her renal transplant about 2 years ago.

## 2023-12-02 NOTE — PROGRESS NOTE ADULT - ASSESSMENT
A/P:    1.  Sepsis  Possible UTI  -ct abx     2.  History of renal transplant  -Creatinine stable  -Continue Prograf and prednisone    3.  History of DVT  -Continue Eliquis    4.  Transaminitis  - worsening  - follow repeat CMP, hepatitis panel  - Check RUQ US  -  GI consult    5.  Schizoaffective Disorder, Bipolar Type  - C/w Haldol 1 mg QD  - C/w clonazepam 0.5 mg QD PRN    6.  Eliquis for DVT ppx    7.  Code status  -Full code

## 2023-12-03 PROCEDURE — 99222 1ST HOSP IP/OBS MODERATE 55: CPT

## 2023-12-03 PROCEDURE — 99232 SBSQ HOSP IP/OBS MODERATE 35: CPT

## 2023-12-03 RX ORDER — SENNA PLUS 8.6 MG/1
2 TABLET ORAL AT BEDTIME
Refills: 0 | Status: DISCONTINUED | OUTPATIENT
Start: 2023-12-03 | End: 2023-12-05

## 2023-12-03 RX ADMIN — MAGNESIUM OXIDE 400 MG ORAL TABLET 400 MILLIGRAM(S): 241.3 TABLET ORAL at 09:40

## 2023-12-03 RX ADMIN — APIXABAN 5 MILLIGRAM(S): 2.5 TABLET, FILM COATED ORAL at 09:40

## 2023-12-03 RX ADMIN — MYCOPHENOLATE MOFETIL 250 MILLIGRAM(S): 250 CAPSULE ORAL at 21:41

## 2023-12-03 RX ADMIN — Medication 5 MILLIGRAM(S): at 09:41

## 2023-12-03 RX ADMIN — Medication 1 TABLET(S): at 09:41

## 2023-12-03 RX ADMIN — MAGNESIUM OXIDE 400 MG ORAL TABLET 400 MILLIGRAM(S): 241.3 TABLET ORAL at 12:24

## 2023-12-03 RX ADMIN — Medication 1000 UNIT(S): at 09:41

## 2023-12-03 RX ADMIN — HALOPERIDOL DECANOATE 1 MILLIGRAM(S): 100 INJECTION INTRAMUSCULAR at 09:40

## 2023-12-03 RX ADMIN — SENNA PLUS 2 TABLET(S): 8.6 TABLET ORAL at 21:56

## 2023-12-03 RX ADMIN — MAGNESIUM OXIDE 400 MG ORAL TABLET 400 MILLIGRAM(S): 241.3 TABLET ORAL at 16:45

## 2023-12-03 RX ADMIN — MYCOPHENOLATE MOFETIL 250 MILLIGRAM(S): 250 CAPSULE ORAL at 09:42

## 2023-12-03 RX ADMIN — APIXABAN 5 MILLIGRAM(S): 2.5 TABLET, FILM COATED ORAL at 21:41

## 2023-12-03 RX ADMIN — MEROPENEM 1000 MILLIGRAM(S): 1 INJECTION INTRAVENOUS at 14:31

## 2023-12-03 RX ADMIN — MEROPENEM 1000 MILLIGRAM(S): 1 INJECTION INTRAVENOUS at 05:46

## 2023-12-03 RX ADMIN — MEROPENEM 1000 MILLIGRAM(S): 1 INJECTION INTRAVENOUS at 21:42

## 2023-12-03 NOTE — PROGRESS NOTE ADULT - SUBJECTIVE AND OBJECTIVE BOX
Date of service: 12-03-23 @ 14:53    pt seen and examined  feels better  no fevers  comfortable    ROS: no fever or chills; denies dizziness, no HA, no SOB or cough, no abdominal pain, no diarrhea or constipation, no legs pain, no rashes    MEDICATIONS  (STANDING):  apixaban 5 milliGRAM(s) Oral every 12 hours  cholecalciferol 1000 Unit(s) Oral daily  haloperidol     Tablet 1 milliGRAM(s) Oral daily  magnesium oxide 400 milliGRAM(s) Oral three times a day with meals  meropenem Injectable 1000 milliGRAM(s) IV Push every 8 hours  multivitamin 1 Tablet(s) Oral daily  mycophenolate mofetil 250 milliGRAM(s) Oral two times a day  predniSONE   Tablet 5 milliGRAM(s) Oral daily    Vital Signs Last 24 Hrs  T(C): 37.3 (03 Dec 2023 09:41), Max: 39.4 (02 Dec 2023 17:45)  T(F): 99.1 (03 Dec 2023 09:41), Max: 102.9 (02 Dec 2023 17:45)  HR: 81 (03 Dec 2023 08:16) (78 - 91)  BP: 104/66 (03 Dec 2023 08:16) (95/75 - 110/63)  BP(mean): --  RR: 17 (03 Dec 2023 08:16) (17 - 18)  SpO2: 94% (03 Dec 2023 08:16) (94% - 99%)    Parameters below as of 03 Dec 2023 08:16  Patient On (Oxygen Delivery Method): room air      PE:  Constitutional: NAD   HEENT: NC/AT, EOMI, PERRLA, conjunctivae clear; ears and nose atraumatic; pharynx benign  Neck: supple; thyroid not palpable  Back: no tenderness  Respiratory: respiratory effort normal; clear to auscultation  Cardiovascular: S1S2 regular, no murmurs  Abdomen: soft, not tender, not distended, positive BS; liver and spleen WNL  Genitourinary: no suprapubic tenderness   Lymphatic: no LN palpable  Musculoskeletal: no muscle tenderness, no joint swelling or tenderness  Extremities: no pedal edema  Neurological/ Psychiatric: AxOx3, Judgement and insight normal;  moving all extremities  Skin: no rashes; no palpable lesions    Labs: all available labs reviewed                        12.1   3.64  )-----------( 148      ( 02 Dec 2023 07:28 )             36.9     12-02    142  |  111<H>  |  15  ----------------------------<  114<H>  4.1   |  29  |  1.12    Ca    9.4      02 Dec 2023 07:28    TPro  6.8  /  Alb  3.2<L>  /  TBili  0.6  /  DBili  x   /  AST  82<H>  /  ALT  108<H>  /  AlkPhos  115  12-02           Urinalysis Basic - ( 02 Dec 2023 07:28 )    Color: x / Appearance: x / SG: x / pH: x  Gluc: 114 mg/dL / Ketone: x  / Bili: x / Urobili: x   Blood: x / Protein: x / Nitrite: x   Leuk Esterase: x / RBC: x / WBC x   Sq Epi: x / Non Sq Epi: x / Bacteria: x    Culture - Blood (12.02.23 @ 00:35)   Specimen Source: .Blood None  Culture Results:   No growth at 24 hours  Culture - Blood (12.02.23 @ 00:35)   Specimen Source: .Blood None  Culture Results:   No growth at 24 hours    Radiology: all available radiological tests reviewed      ACC: [id number] EXAM:  XR CHEST PORTABLE IMMED 1V   ORDERED BY: ELLEN VILCHIS     PROCEDURE DATE:  12/02/2023          INTERPRETATION:  INDICATIONS: 63-year-old female with sepsis.    Prior examination for comparison: 10/28/2023    Technique: AP view ofchest    Findings:    The lungs are clear. There are no pleural effusions. The   cardiomediastinal silhouette is normal. Bones are grossly normal.    IMPRESSION: No evidence of acute cardiopulmonary disease.    --- End of Report ---    < end of copied text >    Advanced directives addressed: full resuscitation Date of service: 12-03-23 @ 14:53    pt seen and examined  feels better  no fevers  comfortable    ROS: no fever or chills; denies dizziness, no HA, no SOB or cough, no abdominal pain, no diarrhea or constipation, no legs pain, no rashes    MEDICATIONS  (STANDING):  apixaban 5 milliGRAM(s) Oral every 12 hours  cholecalciferol 1000 Unit(s) Oral daily  haloperidol     Tablet 1 milliGRAM(s) Oral daily  magnesium oxide 400 milliGRAM(s) Oral three times a day with meals  meropenem Injectable 1000 milliGRAM(s) IV Push every 8 hours  multivitamin 1 Tablet(s) Oral daily  mycophenolate mofetil 250 milliGRAM(s) Oral two times a day  predniSONE   Tablet 5 milliGRAM(s) Oral daily    Vital Signs Last 24 Hrs  T(C): 37.3 (03 Dec 2023 09:41), Max: 39.4 (02 Dec 2023 17:45)  T(F): 99.1 (03 Dec 2023 09:41), Max: 102.9 (02 Dec 2023 17:45)  HR: 81 (03 Dec 2023 08:16) (78 - 91)  BP: 104/66 (03 Dec 2023 08:16) (95/75 - 110/63)  BP(mean): --  RR: 17 (03 Dec 2023 08:16) (17 - 18)  SpO2: 94% (03 Dec 2023 08:16) (94% - 99%)    Parameters below as of 03 Dec 2023 08:16  Patient On (Oxygen Delivery Method): room air      PE:  Constitutional: NAD   HEENT: NC/AT, EOMI, PERRLA, conjunctivae clear; ears and nose atraumatic; pharynx benign  Neck: supple; thyroid not palpable  Back: no tenderness  Respiratory: respiratory effort normal; clear to auscultation  Cardiovascular: S1S2 regular, no murmurs  Abdomen: soft, not tender, not distended, positive BS; liver and spleen WNL  Genitourinary: no suprapubic tenderness   Lymphatic: no LN palpable  Musculoskeletal: no muscle tenderness, no joint swelling or tenderness  Extremities: no pedal edema  Neurological/ Psychiatric: AxOx3, Judgement and insight normal;  moving all extremities  Skin: no rashes; no palpable lesions    Labs: all available labs reviewed                        12.1   3.64  )-----------( 148      ( 02 Dec 2023 07:28 )             36.9     12-02    142  |  111<H>  |  15  ----------------------------<  114<H>  4.1   |  29  |  1.12    Ca    9.4      02 Dec 2023 07:28    TPro  6.8  /  Alb  3.2<L>  /  TBili  0.6  /  DBili  x   /  AST  82<H>  /  ALT  108<H>  /  AlkPhos  115  12-02           Urinalysis Basic - ( 02 Dec 2023 07:28 )    Color: x / Appearance: x / SG: x / pH: x  Gluc: 114 mg/dL / Ketone: x  / Bili: x / Urobili: x   Blood: x / Protein: x / Nitrite: x   Leuk Esterase: x / RBC: x / WBC x   Sq Epi: x / Non Sq Epi: x / Bacteria: x    Culture - Blood (12.02.23 @ 00:35)   Specimen Source: .Blood None  Culture Results:   No growth at 24 hours  Culture - Blood (12.02.23 @ 00:35)   Specimen Source: .Blood None  Culture Results:   No growth at 24 hours    Radiology: all available radiological tests reviewed      ACC: [id number] EXAM:  XR CHEST PORTABLE IMMED 1V   ORDERED BY: ELLEN VILCHIS     PROCEDURE DATE:  12/02/2023          INTERPRETATION:  INDICATIONS: 63-year-old female with sepsis.    Prior examination for comparison: 10/28/2023    Technique: AP view ofchest    Findings:    The lungs are clear. There are no pleural effusions. The   cardiomediastinal silhouette is normal. Bones are grossly normal.    IMPRESSION: No evidence of acute cardiopulmonary disease.    --- End of Report ---    < end of copied text >    Advanced directives addressed: full resuscitation

## 2023-12-03 NOTE — PROGRESS NOTE ADULT - SUBJECTIVE AND OBJECTIVE BOX
HPI:  63 yo Female with a PMH of schizoaffective disorder (bipolar type), renal failure (s/p R renal transplant, due to prior lithium use), breast cancer s/p lumpectomy, fibroid s/p hysterectomy, hyperparathyroidism, DVT (on Eliquis), MGUS, HLD, and GERD who presented with fever. She had fever at home for 1 day.  Fevers up to 102.8 F for , She also has chills with weakness. She has some pain in the lower abdominal area. But no dysuria or hematuria. She denies pain elsewhere. She is getting this frequent fever episodes, occurring since her renal transplant about 2 years ago.    (02 Dec 2023 06:03)      Review of Systems:  CONSTITUTIONAL: , fevers or chills+  EYES/ENT: No visual changes;  No vertigo or throat pain   NECK: No pain or stiffness  RESPIRATORY: No cough, wheezing, hemoptysis; No shortness of breath,   CARDIOVASCULAR: No chest pain or palpitations  GASTROINTESTINAL: No abdominal or epigastric pain. No nausea, vomiting, or hematemesis; No diarrhea or constipation.   GENITOURINARY: No dysuria, frequency or hematuria  NEUROLOGICAL: No numbness or weakness  SKIN: No itching, burning, rashes, or lesions   All other review of systems is negative unless indicated above    PHYSICAL EXAM:    Vital Signs Last 24 Hrs  T(C): 37.5 (03 Dec 2023 08:16), Max: 39.4 (02 Dec 2023 17:45)  T(F): 99.5 (03 Dec 2023 08:16), Max: 102.9 (02 Dec 2023 17:45)  HR: 81 (03 Dec 2023 08:16) (78 - 91)  BP: 104/66 (03 Dec 2023 08:16) (95/75 - 110/63)  BP(mean): --  RR: 17 (03 Dec 2023 08:16) (17 - 18)  SpO2: 94% (03 Dec 2023 08:16) (94% - 99%)    Parameters below as of 03 Dec 2023 08:16  Patient On (Oxygen Delivery Method): room air          GENERAL: comfortable   HEAD:  Atraumatic, Normocephalic  EYES: EOMI, PERRLA, conjunctiva and sclera clear  HEENT: Moist mucous membranes  NECK: Supple, No JVD  NERVOUS SYSTEM:  Alert & Oriented X3, Motor Strength 5/5 B/L upper and lower extremities; DTRs 2+ intact and symmetric  CHEST/LUNG: Clear to auscultation bilaterally; No rales, rhonchi, wheezing, or rubs  HEART:S1S2 normal, no murmer  ABDOMEN: Soft, Nontender, Nondistended; Bowel sounds present  GENITOURINARY- Voiding, no palpable bladder  EXTREMITIES:  2+ Peripheral Pulses, No clubbing, cyanosis, or edema  MUSCULOSKELTAL- No muscle tenderness, Muscle tone normal, No joint tenderness, no Joint swelling, Joint range of motion-normal  SKIN-no rash, no lesion  PSYCH- Mood stable  LYMPH Node- No palpable lymph node                          12.1   3.64  )-----------( 148      ( 02 Dec 2023 07:28 )             36.9     12-02    142  |  111<H>  |  15  ----------------------------<  114<H>  4.1   |  29  |  1.12    Ca    9.4      02 Dec 2023 07:28    TPro  6.8  /  Alb  3.2<L>  /  TBili  0.6  /  DBili  x   /  AST  82<H>  /  ALT  108<H>  /  AlkPhos  115  12-02    LIVER FUNCTIONS - ( 02 Dec 2023 07:28 )  Alb: 3.2 g/dL / Pro: 6.8 gm/dL / ALK PHOS: 115 U/L / ALT: 108 U/L / AST: 82 U/L / GGT: x             Culture - Blood (collected 02 Dec 2023 00:35)  Source: .Blood None  Preliminary Report (03 Dec 2023 07:10):    No growth at 24 hours    Culture - Blood (collected 02 Dec 2023 00:35)  Source: .Blood None  Preliminary Report (03 Dec 2023 07:10):    No growth at 24 hours      PT/INR - ( 02 Dec 2023 00:35 )   PT: 13.9 sec;   INR: 1.24 ratio         PTT - ( 02 Dec 2023 00:35 )  PTT:29.5 sec  Urinalysis Basic - ( 02 Dec 2023 07:28 )    Color: x / Appearance: x / SG: x / pH: x  Gluc: 114 mg/dL / Ketone: x  / Bili: x / Urobili: x   Blood: x / Protein: x / Nitrite: x   Leuk Esterase: x / RBC: x / WBC x   Sq Epi: x / Non Sq Epi: x / Bacteria: x        PT/INR - ( 02 Dec 2023 00:35 )   PT: 13.9 sec;   INR: 1.24 ratio         PTT - ( 02 Dec 2023 00:35 )  PTT:29.5 sec  CAPILLARY BLOOD GLUCOSE          Culture - Blood (collected 02 Dec 2023 00:35)  Source: .Blood None  Preliminary Report (03 Dec 2023 07:10):    No growth at 24 hours    Culture - Blood (collected 02 Dec 2023 00:35)  Source: .Blood None  Preliminary Report (03 Dec 2023 07:10):    No growth at 24 hours      MEDICATIONS  (STANDING):  apixaban 5 milliGRAM(s) Oral every 12 hours  cholecalciferol 1000 Unit(s) Oral daily  haloperidol     Tablet 1 milliGRAM(s) Oral daily  magnesium oxide 400 milliGRAM(s) Oral three times a day with meals  meropenem Injectable 1000 milliGRAM(s) IV Push every 8 hours  multivitamin 1 Tablet(s) Oral daily  mycophenolate mofetil 250 milliGRAM(s) Oral two times a day  predniSONE   Tablet 5 milliGRAM(s) Oral daily    MEDICATIONS  (PRN):  acetaminophen     Tablet .. 650 milliGRAM(s) Oral every 6 hours PRN Temp greater or equal to 38C (100.4F), Mild Pain (1 - 3)  aluminum hydroxide/magnesium hydroxide/simethicone Suspension 30 milliLiter(s) Oral every 4 hours PRN Dyspepsia  clonazePAM  Tablet 0.5 milliGRAM(s) Oral daily PRN anxiety  ketorolac   Injectable 15 milliGRAM(s) IV Push every 8 hours PRN Severe Pain (7 - 10)  melatonin 3 milliGRAM(s) Oral at bedtime PRN Insomnia  morphine  - Injectable 1 milliGRAM(s) IV Push every 4 hours PRN Severe Pain (7 - 10)  ondansetron Injectable 4 milliGRAM(s) IV Push every 8 hours PRN Nausea and/or Vomiting

## 2023-12-03 NOTE — PROGRESS NOTE ADULT - ASSESSMENT
A/P:    1.  Sepsis  Possible UTI/pyelonephritis   -ct merrem     2.  History of renal transplant  -Creatinine stable  -Continue Prograf and prednisone    3.  History of DVT  -Continue Eliquis    4.  Transaminitis  - worsening  - follow repeat CMP, hepatitis panel  - Check RUQ US  -  GI consult    5.  Schizoaffective Disorder, Bipolar Type  - C/w Haldol 1 mg QD  - C/w clonazepam 0.5 mg QD PRN    6.  Eliquis for DVT ppx    7.  Code status  -Full code

## 2023-12-03 NOTE — CONSULT NOTE ADULT - SUBJECTIVE AND OBJECTIVE BOX
Patient is a 63y old  Female who presents with a chief complaint of Fever, Renal transplant. ?? UTI, Transaminitis (03 Dec 2023 08:22)      HPI:  63 yo Female with a PMH of schizoaffective disorder (bipolar type), renal failure (s/p R renal transplant, due to prior lithium use), breast cancer s/p lumpectomy, fibroid s/p hysterectomy, hyperparathyroidism, DVT (on Eliquis), MGUS, HLD, and GERD who presented with fever. She had fever at home for 1 day.  Fevers up to 102.8 F for , She also has chills with weakness. She has some pain in the lower abdominal area. But no dysuria or hematuria. She denies pain elsewhere. She is getting this frequent fever episodes, occurring since her renal transplant about 2 years ago.     Patient was noted to have elevated liver enzymes dating to September. Imaging has been unremarkable. No complaints of RUQ discomfort.    (02 Dec 2023 06:03)      PAST MEDICAL & SURGICAL HISTORY:  Cardiac murmur      Kidney disease  "stage 4" as per patient secondary to lithium treatment many years ago      Breast CA  DCIS, left breast, s/p RT      Uterine leiomyoma      GERD (gastroesophageal reflux disease)      Lyme disease      Degenerative disc disease, lumbar      HLD (hyperlipidemia)      History of secondary hyperparathyroidism      MGUS (monoclonal gammopathy of unknown significance)      Gout      Schizoaffective disorder, bipolar type      S/P lumpectomy of breast  2011, left breast, no lymph nodes removed      S/P tonsillectomy      S/P dilation and curettage  uterine polyps      S/P coronary angiogram  10 yrs ago, no intervention      H/O: hysterectomy      Abnormal biopsy of kidney      Kidney transplanted          MEDICATIONS  (STANDING):  apixaban 5 milliGRAM(s) Oral every 12 hours  cholecalciferol 1000 Unit(s) Oral daily  haloperidol     Tablet 1 milliGRAM(s) Oral daily  magnesium oxide 400 milliGRAM(s) Oral three times a day with meals  meropenem Injectable 1000 milliGRAM(s) IV Push every 8 hours  multivitamin 1 Tablet(s) Oral daily  mycophenolate mofetil 250 milliGRAM(s) Oral two times a day  predniSONE   Tablet 5 milliGRAM(s) Oral daily    MEDICATIONS  (PRN):  acetaminophen     Tablet .. 650 milliGRAM(s) Oral every 6 hours PRN Temp greater or equal to 38C (100.4F), Mild Pain (1 - 3)  aluminum hydroxide/magnesium hydroxide/simethicone Suspension 30 milliLiter(s) Oral every 4 hours PRN Dyspepsia  clonazePAM  Tablet 0.5 milliGRAM(s) Oral daily PRN anxiety  ketorolac   Injectable 15 milliGRAM(s) IV Push every 8 hours PRN Severe Pain (7 - 10)  melatonin 3 milliGRAM(s) Oral at bedtime PRN Insomnia  morphine  - Injectable 1 milliGRAM(s) IV Push every 4 hours PRN Severe Pain (7 - 10)  ondansetron Injectable 4 milliGRAM(s) IV Push every 8 hours PRN Nausea and/or Vomiting      Allergies    Levaquin (Anaphylaxis)    Intolerances        SOCIAL HISTORY:    FAMILY HISTORY:  FH: lymphoma          Vital Signs Last 24 Hrs  T(C): 37.3 (03 Dec 2023 09:41), Max: 39.4 (02 Dec 2023 17:45)  T(F): 99.1 (03 Dec 2023 09:41), Max: 102.9 (02 Dec 2023 17:45)  HR: 81 (03 Dec 2023 08:16) (78 - 91)  BP: 104/66 (03 Dec 2023 08:16) (95/75 - 110/63)  BP(mean): --  RR: 17 (03 Dec 2023 08:16) (17 - 18)  SpO2: 94% (03 Dec 2023 08:16) (94% - 99%)    Parameters below as of 03 Dec 2023 08:16  Patient On (Oxygen Delivery Method): room air        PHYSICAL EXAM:    Anxious female in nNAD  No jaundice  Respiratory: CTAB  Cardiovascular: S1 and S2, RRR, no M/G/R  Gastrointestinal: BS+, soft, NT/ND    LABS:                        12.1   3.64  )-----------( 148      ( 02 Dec 2023 07:28 )             36.9     12-02    142  |  111<H>  |  15  ----------------------------<  114<H>  4.1   |  29  |  1.12    Ca    9.4      02 Dec 2023 07:28    TPro  6.8  /  Alb  3.2<L>  /  TBili  0.6  /  DBili  x   /  AST  82<H>  /  ALT  108<H>  /  AlkPhos  115  12-02    PT/INR - ( 02 Dec 2023 00:35 )   PT: 13.9 sec;   INR: 1.24 ratio         PTT - ( 02 Dec 2023 00:35 )  PTT:29.5 sec  LIVER FUNCTIONS - ( 02 Dec 2023 07:28 )  Alb: 3.2 g/dL / Pro: 6.8 gm/dL / ALK PHOS: 115 U/L / ALT: 108 U/L / AST: 82 U/L / GGT: x             RADIOLOGY & ADDITIONAL STUDIES: Patient is a 63y old  Female who presents with a chief complaint of Fever, Renal transplant. ?? UTI, Transaminitis (03 Dec 2023 08:22)      HPI:  61 yo Female with a PMH of schizoaffective disorder (bipolar type), renal failure (s/p R renal transplant, due to prior lithium use), breast cancer s/p lumpectomy, fibroid s/p hysterectomy, hyperparathyroidism, DVT (on Eliquis), MGUS, HLD, and GERD who presented with fever. She had fever at home for 1 day.  Fevers up to 102.8 F for , She also has chills with weakness. She has some pain in the lower abdominal area. But no dysuria or hematuria. She denies pain elsewhere. She is getting this frequent fever episodes, occurring since her renal transplant about 2 years ago.     Patient was noted to have elevated liver enzymes dating to September. Imaging has been unremarkable. No complaints of RUQ discomfort.    (02 Dec 2023 06:03)      PAST MEDICAL & SURGICAL HISTORY:  Cardiac murmur      Kidney disease  "stage 4" as per patient secondary to lithium treatment many years ago      Breast CA  DCIS, left breast, s/p RT      Uterine leiomyoma      GERD (gastroesophageal reflux disease)      Lyme disease      Degenerative disc disease, lumbar      HLD (hyperlipidemia)      History of secondary hyperparathyroidism      MGUS (monoclonal gammopathy of unknown significance)      Gout      Schizoaffective disorder, bipolar type      S/P lumpectomy of breast  2011, left breast, no lymph nodes removed      S/P tonsillectomy      S/P dilation and curettage  uterine polyps      S/P coronary angiogram  10 yrs ago, no intervention      H/O: hysterectomy      Abnormal biopsy of kidney      Kidney transplanted          MEDICATIONS  (STANDING):  apixaban 5 milliGRAM(s) Oral every 12 hours  cholecalciferol 1000 Unit(s) Oral daily  haloperidol     Tablet 1 milliGRAM(s) Oral daily  magnesium oxide 400 milliGRAM(s) Oral three times a day with meals  meropenem Injectable 1000 milliGRAM(s) IV Push every 8 hours  multivitamin 1 Tablet(s) Oral daily  mycophenolate mofetil 250 milliGRAM(s) Oral two times a day  predniSONE   Tablet 5 milliGRAM(s) Oral daily    MEDICATIONS  (PRN):  acetaminophen     Tablet .. 650 milliGRAM(s) Oral every 6 hours PRN Temp greater or equal to 38C (100.4F), Mild Pain (1 - 3)  aluminum hydroxide/magnesium hydroxide/simethicone Suspension 30 milliLiter(s) Oral every 4 hours PRN Dyspepsia  clonazePAM  Tablet 0.5 milliGRAM(s) Oral daily PRN anxiety  ketorolac   Injectable 15 milliGRAM(s) IV Push every 8 hours PRN Severe Pain (7 - 10)  melatonin 3 milliGRAM(s) Oral at bedtime PRN Insomnia  morphine  - Injectable 1 milliGRAM(s) IV Push every 4 hours PRN Severe Pain (7 - 10)  ondansetron Injectable 4 milliGRAM(s) IV Push every 8 hours PRN Nausea and/or Vomiting      Allergies    Levaquin (Anaphylaxis)    Intolerances        SOCIAL HISTORY:    FAMILY HISTORY:  FH: lymphoma          Vital Signs Last 24 Hrs  T(C): 37.3 (03 Dec 2023 09:41), Max: 39.4 (02 Dec 2023 17:45)  T(F): 99.1 (03 Dec 2023 09:41), Max: 102.9 (02 Dec 2023 17:45)  HR: 81 (03 Dec 2023 08:16) (78 - 91)  BP: 104/66 (03 Dec 2023 08:16) (95/75 - 110/63)  BP(mean): --  RR: 17 (03 Dec 2023 08:16) (17 - 18)  SpO2: 94% (03 Dec 2023 08:16) (94% - 99%)    Parameters below as of 03 Dec 2023 08:16  Patient On (Oxygen Delivery Method): room air        PHYSICAL EXAM:    Anxious female in nNAD  No jaundice  Respiratory: CTAB  Cardiovascular: S1 and S2, RRR, no M/G/R  Gastrointestinal: BS+, soft, NT/ND    LABS:                        12.1   3.64  )-----------( 148      ( 02 Dec 2023 07:28 )             36.9     12-02    142  |  111<H>  |  15  ----------------------------<  114<H>  4.1   |  29  |  1.12    Ca    9.4      02 Dec 2023 07:28    TPro  6.8  /  Alb  3.2<L>  /  TBili  0.6  /  DBili  x   /  AST  82<H>  /  ALT  108<H>  /  AlkPhos  115  12-02    PT/INR - ( 02 Dec 2023 00:35 )   PT: 13.9 sec;   INR: 1.24 ratio         PTT - ( 02 Dec 2023 00:35 )  PTT:29.5 sec  LIVER FUNCTIONS - ( 02 Dec 2023 07:28 )  Alb: 3.2 g/dL / Pro: 6.8 gm/dL / ALK PHOS: 115 U/L / ALT: 108 U/L / AST: 82 U/L / GGT: x             RADIOLOGY & ADDITIONAL STUDIES:

## 2023-12-03 NOTE — CONSULT NOTE ADULT - ASSESSMENT
62 yo female with history of multiple medical issues, now with elevated transaminases on immunosuppression. Plan to obtain hepatitis testing, iron studies, KEITH. Patient admitted with FUO (most likely related to urinary origin). If no answer, may consider biopsy for diagnosis.  64 yo female with history of multiple medical issues, now with elevated transaminases on immunosuppression. Plan to obtain hepatitis testing, iron studies, KEITH. Patient admitted with FUO (most likely related to urinary origin). If no answer, may consider biopsy for diagnosis.

## 2023-12-03 NOTE — PROGRESS NOTE ADULT - ASSESSMENT
61 yo Female with a PMH of schizoaffective disorder (bipolar type), renal failure (s/p R renal transplant, due to prior lithium use), breast cancer s/p lumpectomy, fibroid s/p hysterectomy, hyperparathyroidism, DVT (on Eliquis), MGUS, HLD, and GERD who presented with fever. She had fever at home for 1 day.  Fevers up to 102.8 F for , She also has chills with weakness. She has some pain in the lower abdominal area. But no dysuria or hematuria. She denies pain elsewhere. She is getting this frequent fever episodes, occurring since her renal transplant about 2 years ago. Here UA with pyuria, US with transplanted kidney prominence. Started on IV abx -merrem.     1. Fever. UTI. Pyelonephritis. s/p Renal transplant. Immunocompromised host  - imaging reviewed  - on IV meropenem 1gmq8h #2  - continue with antibiotic coverage  - blood cx no growth f/u urine cx   - monitor temps  - tolerating abx well so far; no side effects noted  - reason for abx use and side effects reviewed with patient  - supportive care  - fu cbc    2. other issues - care per medicine

## 2023-12-04 LAB
FERRITIN SERPL-MCNC: 193 NG/ML — SIGNIFICANT CHANGE UP (ref 13–330)
FERRITIN SERPL-MCNC: 193 NG/ML — SIGNIFICANT CHANGE UP (ref 13–330)
GLUCOSE BLDC GLUCOMTR-MCNC: 111 MG/DL — HIGH (ref 70–99)
GLUCOSE BLDC GLUCOMTR-MCNC: 111 MG/DL — HIGH (ref 70–99)
HBV DNA # SERPL NAA+PROBE: SIGNIFICANT CHANGE UP
HBV DNA # SERPL NAA+PROBE: SIGNIFICANT CHANGE UP
HBV DNA SERPL NAA+PROBE-LOG#: SIGNIFICANT CHANGE UP LOGIU/ML
HBV DNA SERPL NAA+PROBE-LOG#: SIGNIFICANT CHANGE UP LOGIU/ML
SMOOTH MUSCLE AB SER-ACNC: SIGNIFICANT CHANGE UP
SMOOTH MUSCLE AB SER-ACNC: SIGNIFICANT CHANGE UP

## 2023-12-04 PROCEDURE — 99232 SBSQ HOSP IP/OBS MODERATE 35: CPT

## 2023-12-04 RX ORDER — ASCORBIC ACID 60 MG
500 TABLET,CHEWABLE ORAL DAILY
Refills: 0 | Status: DISCONTINUED | OUTPATIENT
Start: 2023-12-04 | End: 2023-12-05

## 2023-12-04 RX ORDER — POLYETHYLENE GLYCOL 3350 17 G/17G
17 POWDER, FOR SOLUTION ORAL DAILY
Refills: 0 | Status: DISCONTINUED | OUTPATIENT
Start: 2023-12-04 | End: 2023-12-05

## 2023-12-04 RX ADMIN — Medication 3 MILLIGRAM(S): at 22:17

## 2023-12-04 RX ADMIN — APIXABAN 5 MILLIGRAM(S): 2.5 TABLET, FILM COATED ORAL at 22:17

## 2023-12-04 RX ADMIN — MEROPENEM 1000 MILLIGRAM(S): 1 INJECTION INTRAVENOUS at 05:36

## 2023-12-04 RX ADMIN — MEROPENEM 1000 MILLIGRAM(S): 1 INJECTION INTRAVENOUS at 15:17

## 2023-12-04 RX ADMIN — MAGNESIUM OXIDE 400 MG ORAL TABLET 400 MILLIGRAM(S): 241.3 TABLET ORAL at 17:43

## 2023-12-04 RX ADMIN — SENNA PLUS 2 TABLET(S): 8.6 TABLET ORAL at 22:17

## 2023-12-04 RX ADMIN — Medication 1 TABLET(S): at 10:05

## 2023-12-04 RX ADMIN — MAGNESIUM OXIDE 400 MG ORAL TABLET 400 MILLIGRAM(S): 241.3 TABLET ORAL at 10:06

## 2023-12-04 RX ADMIN — Medication 5 MILLIGRAM(S): at 10:06

## 2023-12-04 RX ADMIN — MEROPENEM 1000 MILLIGRAM(S): 1 INJECTION INTRAVENOUS at 22:17

## 2023-12-04 RX ADMIN — Medication 0.5 MILLIGRAM(S): at 03:11

## 2023-12-04 RX ADMIN — APIXABAN 5 MILLIGRAM(S): 2.5 TABLET, FILM COATED ORAL at 10:05

## 2023-12-04 RX ADMIN — MYCOPHENOLATE MOFETIL 250 MILLIGRAM(S): 250 CAPSULE ORAL at 22:17

## 2023-12-04 RX ADMIN — MYCOPHENOLATE MOFETIL 250 MILLIGRAM(S): 250 CAPSULE ORAL at 10:06

## 2023-12-04 RX ADMIN — Medication 1000 UNIT(S): at 10:05

## 2023-12-04 RX ADMIN — POLYETHYLENE GLYCOL 3350 17 GRAM(S): 17 POWDER, FOR SOLUTION ORAL at 17:43

## 2023-12-04 NOTE — PROGRESS NOTE ADULT - ASSESSMENT
63 yo Female with a PMH of schizoaffective disorder (bipolar type), renal failure (s/p R renal transplant, due to prior lithium use), breast cancer s/p lumpectomy, fibroid s/p hysterectomy, hyperparathyroidism, DVT (on Eliquis), MGUS, HLD, and GERD who presented with fever. She had fever at home for 1 day.  Fevers up to 102.8 F for , She also has chills with weakness. She has some pain in the lower abdominal area. But no dysuria or hematuria. She denies pain elsewhere. She is getting this frequent fever episodes, occurring since her renal transplant about 2 years ago. Here UA with pyuria, US with transplanted kidney prominence. Started on IV abx -merrem.     1. Fever. UTI. Pyelonephritis. s/p Renal transplant. Immunocompromised host  - imaging reviewed  - on IV meropenem 1gmq8h #3  - continue with antibiotic coverage  - blood cx no growth f/u urine cx - GNRs   - monitor temps  - tolerating abx well so far; no side effects noted  - reason for abx use and side effects reviewed with patient  - supportive care  - fu cbc    2. other issues - care per medicine

## 2023-12-04 NOTE — PROGRESS NOTE ADULT - SUBJECTIVE AND OBJECTIVE BOX
Date of service: 12-04-23 @ 11:54    pt seen and examined  has RLQ pain  no fevers     ROS: no fever or chills; denies dizziness, no HA, no SOB or cough,  no diarrhea or constipation, no legs pain, no rashes      MEDICATIONS  (STANDING):  apixaban 5 milliGRAM(s) Oral every 12 hours  cholecalciferol 1000 Unit(s) Oral daily  haloperidol     Tablet 1 milliGRAM(s) Oral daily  magnesium oxide 400 milliGRAM(s) Oral three times a day with meals  meropenem Injectable 1000 milliGRAM(s) IV Push every 8 hours  multivitamin 1 Tablet(s) Oral daily  mycophenolate mofetil 250 milliGRAM(s) Oral two times a day  predniSONE   Tablet 5 milliGRAM(s) Oral daily  senna 2 Tablet(s) Oral at bedtime    Vital Signs Last 24 Hrs  T(C): 36.7 (04 Dec 2023 07:56), Max: 36.7 (04 Dec 2023 07:56)  T(F): 98.1 (04 Dec 2023 07:56), Max: 98.1 (04 Dec 2023 07:56)  HR: 62 (04 Dec 2023 07:56) (62 - 77)  BP: 97/71 (04 Dec 2023 07:56) (97/71 - 109/66)  BP(mean): --  RR: 18 (04 Dec 2023 07:56) (16 - 18)  SpO2: 98% (04 Dec 2023 07:56) (96% - 98%)    Parameters below as of 04 Dec 2023 07:56  Patient On (Oxygen Delivery Method): room air      PE:  Constitutional: NAD   HEENT: NC/AT, EOMI, PERRLA, conjunctivae clear; ears and nose atraumatic; pharynx benign  Neck: supple; thyroid not palpable  Back: no tenderness  Respiratory: respiratory effort normal; clear to auscultation  Cardiovascular: S1S2 regular, no murmurs  Abdomen: soft, not tender, not distended, positive BS; liver and spleen WNL  Genitourinary: no suprapubic tenderness   Lymphatic: no LN palpable  Musculoskeletal: no muscle tenderness, no joint swelling or tenderness  Extremities: no pedal edema  Neurological/ Psychiatric: AxOx3, Judgement and insight normal;  moving all extremities  Skin: no rashes; no palpable lesions    Labs: all available labs reviewed                    Urinalysis Basic - ( 02 Dec 2023 07:28 )    Color: x / Appearance: x / SG: x / pH: x  Gluc: 114 mg/dL / Ketone: x  / Bili: x / Urobili: x   Blood: x / Protein: x / Nitrite: x   Leuk Esterase: x / RBC: x / WBC x   Sq Epi: x / Non Sq Epi: x / Bacteria: x    Culture - Blood (12.02.23 @ 00:35)   Specimen Source: .Blood None  Culture Results:   No growth at 24 hours  Culture - Blood (12.02.23 @ 00:35)   Specimen Source: .Blood None  Culture Results:   No growth at 24 hours    Radiology: all available radiological tests reviewed      ACC: [id number] EXAM:  XR CHEST PORTABLE IMMED 1V   ORDERED BY: ELLEN VILCHIS     PROCEDURE DATE:  12/02/2023          INTERPRETATION:  INDICATIONS: 63-year-old female with sepsis.    Prior examination for comparison: 10/28/2023    Technique: AP view ofchest    Findings:    The lungs are clear. There are no pleural effusions. The   cardiomediastinal silhouette is normal. Bones are grossly normal.    IMPRESSION: No evidence of acute cardiopulmonary disease.    --- End of Report ---    < end of copied text >    Advanced directives addressed: full resuscitation   Date of service: 12-04-23 @ 11:54    pt seen and examined  has RLQ pain  no fevers     ROS: no fever or chills; denies dizziness, no HA, no SOB or cough,  no diarrhea or constipation, no legs pain, no rashes      MEDICATIONS  (STANDING):  apixaban 5 milliGRAM(s) Oral every 12 hours  cholecalciferol 1000 Unit(s) Oral daily  haloperidol     Tablet 1 milliGRAM(s) Oral daily  magnesium oxide 400 milliGRAM(s) Oral three times a day with meals  meropenem Injectable 1000 milliGRAM(s) IV Push every 8 hours  multivitamin 1 Tablet(s) Oral daily  mycophenolate mofetil 250 milliGRAM(s) Oral two times a day  predniSONE   Tablet 5 milliGRAM(s) Oral daily  senna 2 Tablet(s) Oral at bedtime    Vital Signs Last 24 Hrs  T(C): 36.7 (04 Dec 2023 07:56), Max: 36.7 (04 Dec 2023 07:56)  T(F): 98.1 (04 Dec 2023 07:56), Max: 98.1 (04 Dec 2023 07:56)  HR: 62 (04 Dec 2023 07:56) (62 - 77)  BP: 97/71 (04 Dec 2023 07:56) (97/71 - 109/66)  BP(mean): --  RR: 18 (04 Dec 2023 07:56) (16 - 18)  SpO2: 98% (04 Dec 2023 07:56) (96% - 98%)    Parameters below as of 04 Dec 2023 07:56  Patient On (Oxygen Delivery Method): room air      PE:  Constitutional: NAD   HEENT: NC/AT, EOMI, PERRLA, conjunctivae clear; ears and nose atraumatic; pharynx benign  Neck: supple; thyroid not palpable  Back: no tenderness  Respiratory: respiratory effort normal; clear to auscultation  Cardiovascular: S1S2 regular, no murmurs  Abdomen: soft, not tender, not distended, positive BS; liver and spleen WNL  Genitourinary: no suprapubic tenderness   Lymphatic: no LN palpable  Musculoskeletal: no muscle tenderness, no joint swelling or tenderness  Extremities: no pedal edema  Neurological/ Psychiatric: AxOx3, Judgement and insight normal;  moving all extremities  Skin: no rashes; no palpable lesions    Labs: all available labs reviewed                    Urinalysis Basic - ( 02 Dec 2023 07:28 )    Color: x / Appearance: x / SG: x / pH: x  Gluc: 114 mg/dL / Ketone: x  / Bili: x / Urobili: x   Blood: x / Protein: x / Nitrite: x   Leuk Esterase: x / RBC: x / WBC x   Sq Epi: x / Non Sq Epi: x / Bacteria: x    Culture - Blood (12.02.23 @ 00:35)   Specimen Source: .Blood None  Culture Results:   No growth at 24 hours  Culture - Blood (12.02.23 @ 00:35)   Specimen Source: .Blood None  Culture Results:   No growth at 24 hours    Radiology: all available radiological tests reviewed      ACC: [id number] EXAM:  XR CHEST PORTABLE IMMED 1V   ORDERED BY: ELLEN VILCHIS     PROCEDURE DATE:  12/02/2023          INTERPRETATION:  INDICATIONS: 63-year-old female with sepsis.    Prior examination for comparison: 10/28/2023    Technique: AP view ofchest    Findings:    The lungs are clear. There are no pleural effusions. The   cardiomediastinal silhouette is normal. Bones are grossly normal.    IMPRESSION: No evidence of acute cardiopulmonary disease.    --- End of Report ---    < end of copied text >    Advanced directives addressed: full resuscitation

## 2023-12-04 NOTE — PROGRESS NOTE ADULT - SUBJECTIVE AND OBJECTIVE BOX
HPI:  63 yo Female with a PMH of schizoaffective disorder (bipolar type), renal failure (s/p R renal transplant, due to prior lithium use), breast cancer s/p lumpectomy, fibroid s/p hysterectomy, hyperparathyroidism, DVT (on Eliquis), MGUS, HLD, and GERD who presented with fever. She had fever at home for 1 day.  Fevers up to 102.8 F for , She also has chills with weakness. She has some pain in the lower abdominal area. But no dysuria or hematuria. She denies pain elsewhere. She is getting this frequent fever episodes, occurring since her renal transplant about 2 years ago.    (02 Dec 2023 06:03)      Review of Systems:  CONSTITUTIONAL: , fevers or chills+  EYES/ENT: No visual changes;  No vertigo or throat pain   NECK: No pain or stiffness  RESPIRATORY: No cough, wheezing, hemoptysis; No shortness of breath,   CARDIOVASCULAR: No chest pain or palpitations  GASTROINTESTINAL: No abdominal or epigastric pain. No nausea, vomiting, or hematemesis; No diarrhea or constipation.   GENITOURINARY: No dysuria, frequency or hematuria  NEUROLOGICAL: No numbness or weakness  SKIN: No itching, burning, rashes, or lesions   All other review of systems is negative unless indicated above    PHYSICAL EXAM:    Vital Signs Last 24 Hrs  T(C): 36.7 (04 Dec 2023 07:56), Max: 36.7 (04 Dec 2023 07:56)  T(F): 98.1 (04 Dec 2023 07:56), Max: 98.1 (04 Dec 2023 07:56)  HR: 62 (04 Dec 2023 07:56) (62 - 77)  BP: 97/71 (04 Dec 2023 07:56) (97/71 - 109/66)  BP(mean): --  RR: 18 (04 Dec 2023 07:56) (16 - 18)  SpO2: 98% (04 Dec 2023 07:56) (96% - 98%)    Parameters below as of 04 Dec 2023 07:56  Patient On (Oxygen Delivery Method): room air            GENERAL: comfortable   HEAD:  Atraumatic, Normocephalic  EYES: EOMI, PERRLA, conjunctiva and sclera clear  HEENT: Moist mucous membranes  NECK: Supple, No JVD  NERVOUS SYSTEM:  Alert & Oriented X3, Motor Strength 5/5 B/L upper and lower extremities; DTRs 2+ intact and symmetric  CHEST/LUNG: Clear to auscultation bilaterally; No rales, rhonchi, wheezing, or rubs  HEART:S1S2 normal, no murmur  ABDOMEN: Soft, Nontender, Nondistended; Bowel sounds present  GENITOURINARY- Voiding, no palpable bladder  EXTREMITIES:  2+ Peripheral Pulses, No clubbing, cyanosis, or edema  MUSCULOSKELETAL No muscle tenderness, Muscle tone normal, No joint tenderness, no Joint swelling, Joint range of motion-normal  SKIN-no rash, no lesion  PSYCH- Mood stable  LYMPH Node- No palpable lymph node                Culture - Urine (collected 02 Dec 2023 01:23)  Source: Clean Catch Clean Catch (Midstream)  Preliminary Report (04 Dec 2023 07:29):    50,000 - 99,000 CFU/mL Gram Negative Rods    Culture - Blood (collected 02 Dec 2023 00:35)  Source: .Blood None  Preliminary Report (04 Dec 2023 07:01):    No growth at 48 Hours    Culture - Blood (collected 02 Dec 2023 00:35)  Source: .Blood None  Preliminary Report (04 Dec 2023 07:01):    No growth at 48 Hours              CAPILLARY BLOOD GLUCOSE      POCT Blood Glucose.: 111 mg/dL (04 Dec 2023 08:51)      Culture - Urine (collected 02 Dec 2023 01:23)  Source: Clean Catch Clean Catch (Midstream)  Preliminary Report (04 Dec 2023 07:29):    50,000 - 99,000 CFU/mL Gram Negative Rods    Culture - Blood (collected 02 Dec 2023 00:35)  Source: .Blood None  Preliminary Report (04 Dec 2023 07:01):    No growth at 48 Hours    Culture - Blood (collected 02 Dec 2023 00:35)  Source: .Blood None  Preliminary Report (04 Dec 2023 07:01):    No growth at 48 Hours      MEDICATIONS  (STANDING):  apixaban 5 milliGRAM(s) Oral every 12 hours  ascorbic acid 500 milliGRAM(s) Oral daily  cholecalciferol 1000 Unit(s) Oral daily  haloperidol     Tablet 1 milliGRAM(s) Oral daily  magnesium oxide 400 milliGRAM(s) Oral three times a day with meals  meropenem Injectable 1000 milliGRAM(s) IV Push every 8 hours  multivitamin 1 Tablet(s) Oral daily  mycophenolate mofetil 250 milliGRAM(s) Oral two times a day  predniSONE   Tablet 5 milliGRAM(s) Oral daily  senna 2 Tablet(s) Oral at bedtime    MEDICATIONS  (PRN):  acetaminophen     Tablet .. 650 milliGRAM(s) Oral every 6 hours PRN Temp greater or equal to 38C (100.4F), Mild Pain (1 - 3)  aluminum hydroxide/magnesium hydroxide/simethicone Suspension 30 milliLiter(s) Oral every 4 hours PRN Dyspepsia  clonazePAM  Tablet 0.5 milliGRAM(s) Oral daily PRN anxiety  ketorolac   Injectable 15 milliGRAM(s) IV Push every 8 hours PRN Severe Pain (7 - 10)  melatonin 3 milliGRAM(s) Oral at bedtime PRN Insomnia  morphine  - Injectable 1 milliGRAM(s) IV Push every 4 hours PRN Severe Pain (7 - 10)  ondansetron Injectable 4 milliGRAM(s) IV Push every 8 hours PRN Nausea and/or Vomiting           HPI:  61 yo Female with a PMH of schizoaffective disorder (bipolar type), renal failure (s/p R renal transplant, due to prior lithium use), breast cancer s/p lumpectomy, fibroid s/p hysterectomy, hyperparathyroidism, DVT (on Eliquis), MGUS, HLD, and GERD who presented with fever. She had fever at home for 1 day.  Fevers up to 102.8 F for , She also has chills with weakness. She has some pain in the lower abdominal area. But no dysuria or hematuria. She denies pain elsewhere. She is getting this frequent fever episodes, occurring since her renal transplant about 2 years ago.    (02 Dec 2023 06:03)      Review of Systems:  CONSTITUTIONAL: , fevers or chills+  EYES/ENT: No visual changes;  No vertigo or throat pain   NECK: No pain or stiffness  RESPIRATORY: No cough, wheezing, hemoptysis; No shortness of breath,   CARDIOVASCULAR: No chest pain or palpitations  GASTROINTESTINAL: No abdominal or epigastric pain. No nausea, vomiting, or hematemesis; No diarrhea or constipation.   GENITOURINARY: No dysuria, frequency or hematuria  NEUROLOGICAL: No numbness or weakness  SKIN: No itching, burning, rashes, or lesions   All other review of systems is negative unless indicated above    PHYSICAL EXAM:    Vital Signs Last 24 Hrs  T(C): 36.7 (04 Dec 2023 07:56), Max: 36.7 (04 Dec 2023 07:56)  T(F): 98.1 (04 Dec 2023 07:56), Max: 98.1 (04 Dec 2023 07:56)  HR: 62 (04 Dec 2023 07:56) (62 - 77)  BP: 97/71 (04 Dec 2023 07:56) (97/71 - 109/66)  BP(mean): --  RR: 18 (04 Dec 2023 07:56) (16 - 18)  SpO2: 98% (04 Dec 2023 07:56) (96% - 98%)    Parameters below as of 04 Dec 2023 07:56  Patient On (Oxygen Delivery Method): room air            GENERAL: comfortable   HEAD:  Atraumatic, Normocephalic  EYES: EOMI, PERRLA, conjunctiva and sclera clear  HEENT: Moist mucous membranes  NECK: Supple, No JVD  NERVOUS SYSTEM:  Alert & Oriented X3, Motor Strength 5/5 B/L upper and lower extremities; DTRs 2+ intact and symmetric  CHEST/LUNG: Clear to auscultation bilaterally; No rales, rhonchi, wheezing, or rubs  HEART:S1S2 normal, no murmur  ABDOMEN: Soft, Nontender, Nondistended; Bowel sounds present  GENITOURINARY- Voiding, no palpable bladder  EXTREMITIES:  2+ Peripheral Pulses, No clubbing, cyanosis, or edema  MUSCULOSKELETAL No muscle tenderness, Muscle tone normal, No joint tenderness, no Joint swelling, Joint range of motion-normal  SKIN-no rash, no lesion  PSYCH- Mood stable  LYMPH Node- No palpable lymph node                Culture - Urine (collected 02 Dec 2023 01:23)  Source: Clean Catch Clean Catch (Midstream)  Preliminary Report (04 Dec 2023 07:29):    50,000 - 99,000 CFU/mL Gram Negative Rods    Culture - Blood (collected 02 Dec 2023 00:35)  Source: .Blood None  Preliminary Report (04 Dec 2023 07:01):    No growth at 48 Hours    Culture - Blood (collected 02 Dec 2023 00:35)  Source: .Blood None  Preliminary Report (04 Dec 2023 07:01):    No growth at 48 Hours              CAPILLARY BLOOD GLUCOSE      POCT Blood Glucose.: 111 mg/dL (04 Dec 2023 08:51)      Culture - Urine (collected 02 Dec 2023 01:23)  Source: Clean Catch Clean Catch (Midstream)  Preliminary Report (04 Dec 2023 07:29):    50,000 - 99,000 CFU/mL Gram Negative Rods    Culture - Blood (collected 02 Dec 2023 00:35)  Source: .Blood None  Preliminary Report (04 Dec 2023 07:01):    No growth at 48 Hours    Culture - Blood (collected 02 Dec 2023 00:35)  Source: .Blood None  Preliminary Report (04 Dec 2023 07:01):    No growth at 48 Hours      MEDICATIONS  (STANDING):  apixaban 5 milliGRAM(s) Oral every 12 hours  ascorbic acid 500 milliGRAM(s) Oral daily  cholecalciferol 1000 Unit(s) Oral daily  haloperidol     Tablet 1 milliGRAM(s) Oral daily  magnesium oxide 400 milliGRAM(s) Oral three times a day with meals  meropenem Injectable 1000 milliGRAM(s) IV Push every 8 hours  multivitamin 1 Tablet(s) Oral daily  mycophenolate mofetil 250 milliGRAM(s) Oral two times a day  predniSONE   Tablet 5 milliGRAM(s) Oral daily  senna 2 Tablet(s) Oral at bedtime    MEDICATIONS  (PRN):  acetaminophen     Tablet .. 650 milliGRAM(s) Oral every 6 hours PRN Temp greater or equal to 38C (100.4F), Mild Pain (1 - 3)  aluminum hydroxide/magnesium hydroxide/simethicone Suspension 30 milliLiter(s) Oral every 4 hours PRN Dyspepsia  clonazePAM  Tablet 0.5 milliGRAM(s) Oral daily PRN anxiety  ketorolac   Injectable 15 milliGRAM(s) IV Push every 8 hours PRN Severe Pain (7 - 10)  melatonin 3 milliGRAM(s) Oral at bedtime PRN Insomnia  morphine  - Injectable 1 milliGRAM(s) IV Push every 4 hours PRN Severe Pain (7 - 10)  ondansetron Injectable 4 milliGRAM(s) IV Push every 8 hours PRN Nausea and/or Vomiting

## 2023-12-04 NOTE — PROGRESS NOTE ADULT - ASSESSMENT
A/P:    1.  Sepsis  Possible UTI/pyelonephritis   -ct merrem     2.  History of renal transplant  -Creatinine stable  -Continue Prograf and prednisone    3.  History of DVT  -Continue Eliquis    4.  Transaminitis  -stable   -out pt monitoring and management     5.  Schizoaffective Disorder, Bipolar Type  - C/w Haldol 1 mg QD  - C/w clonazepam 0.5 mg QD PRN    6.  Eliquis for DVT ppx    7.  Code status  -Full code     #d/c plan

## 2023-12-05 ENCOUNTER — TRANSCRIPTION ENCOUNTER (OUTPATIENT)
Age: 63
End: 2023-12-05

## 2023-12-05 VITALS
RESPIRATION RATE: 18 BRPM | TEMPERATURE: 98 F | SYSTOLIC BLOOD PRESSURE: 99 MMHG | OXYGEN SATURATION: 97 % | DIASTOLIC BLOOD PRESSURE: 72 MMHG | HEART RATE: 66 BPM

## 2023-12-05 LAB
-  AMOXICILLIN/CLAVULANIC ACID: SIGNIFICANT CHANGE UP
-  AMOXICILLIN/CLAVULANIC ACID: SIGNIFICANT CHANGE UP
-  AMPICILLIN/SULBACTAM: SIGNIFICANT CHANGE UP
-  AMPICILLIN/SULBACTAM: SIGNIFICANT CHANGE UP
-  AMPICILLIN: SIGNIFICANT CHANGE UP
-  AMPICILLIN: SIGNIFICANT CHANGE UP
-  AZTREONAM: SIGNIFICANT CHANGE UP
-  AZTREONAM: SIGNIFICANT CHANGE UP
-  CEFAZOLIN: SIGNIFICANT CHANGE UP
-  CEFAZOLIN: SIGNIFICANT CHANGE UP
-  CEFEPIME: SIGNIFICANT CHANGE UP
-  CEFEPIME: SIGNIFICANT CHANGE UP
-  CEFOXITIN: SIGNIFICANT CHANGE UP
-  CEFOXITIN: SIGNIFICANT CHANGE UP
-  CEFTRIAXONE: SIGNIFICANT CHANGE UP
-  CEFTRIAXONE: SIGNIFICANT CHANGE UP
-  CEFUROXIME: SIGNIFICANT CHANGE UP
-  CEFUROXIME: SIGNIFICANT CHANGE UP
-  CIPROFLOXACIN: SIGNIFICANT CHANGE UP
-  CIPROFLOXACIN: SIGNIFICANT CHANGE UP
-  ERTAPENEM: SIGNIFICANT CHANGE UP
-  ERTAPENEM: SIGNIFICANT CHANGE UP
-  GENTAMICIN: SIGNIFICANT CHANGE UP
-  GENTAMICIN: SIGNIFICANT CHANGE UP
-  IMIPENEM: SIGNIFICANT CHANGE UP
-  IMIPENEM: SIGNIFICANT CHANGE UP
-  LEVOFLOXACIN: SIGNIFICANT CHANGE UP
-  LEVOFLOXACIN: SIGNIFICANT CHANGE UP
-  MEROPENEM: SIGNIFICANT CHANGE UP
-  MEROPENEM: SIGNIFICANT CHANGE UP
-  NITROFURANTOIN: SIGNIFICANT CHANGE UP
-  NITROFURANTOIN: SIGNIFICANT CHANGE UP
-  PIPERACILLIN/TAZOBACTAM: SIGNIFICANT CHANGE UP
-  PIPERACILLIN/TAZOBACTAM: SIGNIFICANT CHANGE UP
-  TOBRAMYCIN: SIGNIFICANT CHANGE UP
-  TOBRAMYCIN: SIGNIFICANT CHANGE UP
-  TRIMETHOPRIM/SULFAMETHOXAZOLE: SIGNIFICANT CHANGE UP
-  TRIMETHOPRIM/SULFAMETHOXAZOLE: SIGNIFICANT CHANGE UP
ANA TITR SER: NEGATIVE — SIGNIFICANT CHANGE UP
ANA TITR SER: NEGATIVE — SIGNIFICANT CHANGE UP
CULTURE RESULTS: ABNORMAL
CULTURE RESULTS: ABNORMAL
METHOD TYPE: SIGNIFICANT CHANGE UP
METHOD TYPE: SIGNIFICANT CHANGE UP
ORGANISM # SPEC MICROSCOPIC CNT: ABNORMAL
ORGANISM # SPEC MICROSCOPIC CNT: ABNORMAL
ORGANISM # SPEC MICROSCOPIC CNT: SIGNIFICANT CHANGE UP
ORGANISM # SPEC MICROSCOPIC CNT: SIGNIFICANT CHANGE UP
SPECIMEN SOURCE: SIGNIFICANT CHANGE UP
SPECIMEN SOURCE: SIGNIFICANT CHANGE UP

## 2023-12-05 PROCEDURE — 99239 HOSP IP/OBS DSCHRG MGMT >30: CPT

## 2023-12-05 RX ORDER — CEFUROXIME AXETIL 250 MG
500 TABLET ORAL EVERY 12 HOURS
Refills: 0 | Status: DISCONTINUED | OUTPATIENT
Start: 2023-12-05 | End: 2023-12-05

## 2023-12-05 RX ORDER — CEFUROXIME AXETIL 250 MG
1 TABLET ORAL
Qty: 14 | Refills: 0
Start: 2023-12-05 | End: 2023-12-11

## 2023-12-05 RX ORDER — LETERMOVIR 480 MG/1
1 TABLET, FILM COATED ORAL
Refills: 0 | DISCHARGE

## 2023-12-05 RX ADMIN — MEROPENEM 1000 MILLIGRAM(S): 1 INJECTION INTRAVENOUS at 06:38

## 2023-12-05 RX ADMIN — APIXABAN 5 MILLIGRAM(S): 2.5 TABLET, FILM COATED ORAL at 11:00

## 2023-12-05 RX ADMIN — MAGNESIUM OXIDE 400 MG ORAL TABLET 400 MILLIGRAM(S): 241.3 TABLET ORAL at 15:13

## 2023-12-05 RX ADMIN — MAGNESIUM OXIDE 400 MG ORAL TABLET 400 MILLIGRAM(S): 241.3 TABLET ORAL at 09:51

## 2023-12-05 RX ADMIN — POLYETHYLENE GLYCOL 3350 17 GRAM(S): 17 POWDER, FOR SOLUTION ORAL at 09:51

## 2023-12-05 RX ADMIN — MYCOPHENOLATE MOFETIL 250 MILLIGRAM(S): 250 CAPSULE ORAL at 09:51

## 2023-12-05 RX ADMIN — Medication 1 TABLET(S): at 11:00

## 2023-12-05 RX ADMIN — Medication 5 MILLIGRAM(S): at 09:53

## 2023-12-05 RX ADMIN — Medication 1000 UNIT(S): at 11:00

## 2023-12-05 RX ADMIN — HALOPERIDOL DECANOATE 1 MILLIGRAM(S): 100 INJECTION INTRAMUSCULAR at 09:52

## 2023-12-05 RX ADMIN — Medication 500 MILLIGRAM(S): at 09:51

## 2023-12-05 NOTE — DISCHARGE NOTE NURSING/CASE MANAGEMENT/SOCIAL WORK - NSDCPEFALRISK_GEN_ALL_CORE
For information on Fall & Injury Prevention, visit: [email or web address] OR  [email or web address] OR  https://www.cdc.gov/steadi/patient.html For information on Fall & Injury Prevention, visit: [email or web address] OR  [email or web address] OR  https://www.cdc.gov/steadi/patient.html

## 2023-12-05 NOTE — DISCHARGE NOTE PROVIDER - NSDCCPCAREPLAN_GEN_ALL_CORE_FT
PRINCIPAL DISCHARGE DIAGNOSIS  Diagnosis: Sepsis secondary to UTI  Assessment and Plan of Treatment:

## 2023-12-05 NOTE — PROGRESS NOTE ADULT - REASON FOR ADMISSION
Fever, Renal transplant. ?? UTI, Transaminitis

## 2023-12-05 NOTE — DISCHARGE NOTE PROVIDER - HOSPITAL COURSE
HPI:  61 yo Female with a PMH of schizoaffective disorder (bipolar type), renal failure (s/p R renal transplant, due to prior lithium use), breast cancer s/p lumpectomy, fibroid s/p hysterectomy, hyperparathyroidism, DVT (on Eliquis), MGUS, HLD, and GERD who presented with fever. She had fever at home for 1 day.  Fevers up to 102.8 F for , She also has chills with weakness. She has some pain in the lower abdominal area.    #Diagnosed with Sepsis due to Pyelonephritis and started on abx with which she continued to improve, currently feeling better and is being discharged on po abx with further management as an outpt, time spent 45 minutes      (02 Dec 2023 06:      Vital Signs Last 24 Hrs  T(C): 36.4 (05 Dec 2023 08:01), Max: 36.7 (04 Dec 2023 23:05)  T(F): 97.5 (05 Dec 2023 08:01), Max: 98.1 (04 Dec 2023 23:05)  HR: 61 (05 Dec 2023 08:41) (61 - 76)  BP: 89/70 (05 Dec 2023 08:41) (89/70 - 105/70)  BP(mean): --  RR: 18 (05 Dec 2023 08:01) (18 - 18)  SpO2: 97% (05 Dec 2023 08:01) (95% - 97%)    Parameters below as of 05 Dec 2023 08:01  Patient On (Oxygen Delivery Method): room air        General: comfortable   Head- Atraumatic, normocephalic   Neurology: A&Ox3, nonfocal, CN II to XII intact, power intact 5/5 in all muscle group  HEENT- PERRLA, moist muscous membrane  Neck-supple, no JVD  Respiratory: Air entry equal b/l, CTA   CVS:  S1S2, no murmurs, rubs or gallops  Abdominal: Soft, NT, ND +BS,   Genitourinary- voiding, non palpable bladder  Extremities: No edema, + peripheral pulses  Skin- no rash, no ulcer  Psychiatric- mood stable   LN- no lymphadenopathy         12-05-23 @ 14:51

## 2023-12-05 NOTE — DISCHARGE NOTE NURSING/CASE MANAGEMENT/SOCIAL WORK - PATIENT PORTAL LINK FT
You can access the FollowMyHealth Patient Portal offered by [facility] by registering at the following website: [email or web address]. By joining [employer]’s FollowMyHealth portal, you will also be able to view your health information using other applications (apps) compatible with our system. You can access the FollowMyHealth Patient Portal offered by [facility] by registering at the following website: [email or web address]. By joining [employer]’s FollowMyHealth portal, you will also be able to view your health information using other applications (apps) compatible with our system.

## 2023-12-05 NOTE — DISCHARGE NOTE PROVIDER - CARE PROVIDER_API CALL
Verónica Rocha [facility]  [address], [address]  [place] [address]  Phone: [phone]  Fax: [phone]  Follow Up Time: 1 week   Verónica Rocha [facility]  [address], [address]  [place] [address]  Phone: [phone]  Fax: [phone]  Follow Up Time: 1 week

## 2023-12-05 NOTE — DISCHARGE NOTE PROVIDER - NSDCMRMEDTOKEN_GEN_ALL_CORE_FT
cefuroxime 500 mg oral tablet: 1 tab(s) orally every 12 hours  cholecalciferol 25 mcg (1000 intl units) oral tablet: 1 tab(s) orally once a day  clonazePAM 0.5 mg oral tablet: 1 tab(s) orally once a day as needed for  Eliquis 5 mg oral tablet: 1 tab(s) orally 2 times a day  Haldol 1 mg oral tablet: 1 tab(s) orally once a day  magnesium oxide 400 mg oral tablet: 1 tab(s) orally 2 times a day  methenamine hippurate 1 g oral tablet: 1 tab(s) orally 2 times a day Patient has not received yet  Multiple Vitamins oral tablet: 1 tab(s) orally once a day  mycophenolate mofetil 250 mg oral capsule: 1 cap(s) orally 2 times a day  predniSONE 5 mg oral tablet: 1 tab(s) orally once a day

## 2023-12-05 NOTE — PROGRESS NOTE ADULT - ASSESSMENT
63 yo Female with a PMH of schizoaffective disorder (bipolar type), renal failure (s/p R renal transplant, due to prior lithium use), breast cancer s/p lumpectomy, fibroid s/p hysterectomy, hyperparathyroidism, DVT (on Eliquis), MGUS, HLD, and GERD who presented with fever. She had fever at home for 1 day.  Fevers up to 102.8 F for , She also has chills with weakness. She has some pain in the lower abdominal area. But no dysuria or hematuria. She denies pain elsewhere. She is getting this frequent fever episodes, occurring since her renal transplant about 2 years ago. Here UA with pyuria, US with transplanted kidney prominence. Started on IV abx -merrem.     1. Fever. UTI. Pyelonephritis. s/p Renal transplant. Immunocompromised host  - imaging reviewed  - on IV meropenem 1gmq8h #4  - continue with antibiotic coverage  - blood cx no growth f/u urine cx -KLPN f/u sensitivities    - monitor temps  - tolerating abx well so far; no side effects noted  - reason for abx use and side effects reviewed with patient  - supportive care  - fu cbc    2. other issues - care per medicine    61 yo Female with a PMH of schizoaffective disorder (bipolar type), renal failure (s/p R renal transplant, due to prior lithium use), breast cancer s/p lumpectomy, fibroid s/p hysterectomy, hyperparathyroidism, DVT (on Eliquis), MGUS, HLD, and GERD who presented with fever. She had fever at home for 1 day.  Fevers up to 102.8 F for , She also has chills with weakness. She has some pain in the lower abdominal area. But no dysuria or hematuria. She denies pain elsewhere. She is getting this frequent fever episodes, occurring since her renal transplant about 2 years ago. Here UA with pyuria, US with transplanted kidney prominence. Started on IV abx -merrem.     1. Fever. UTI. Pyelonephritis. s/p Renal transplant. Immunocompromised host  - imaging reviewed  - on IV meropenem 1gmq8h #4  - continue with antibiotic coverage  - blood cx no growth f/u urine cx -KLPN f/u sensitivities    - monitor temps  - tolerating abx well so far; no side effects noted  - reason for abx use and side effects reviewed with patient  - supportive care  - fu cbc    2. other issues - care per medicine

## 2023-12-05 NOTE — DISCHARGE NOTE PROVIDER - PROVIDER TOKENS
PROVIDER:[TOKEN:[[id number]],FOLLOWUP:[1 week]] PROVIDER:[TOKEN:[[id number]],FOLLOWUP:[1 week]]

## 2023-12-05 NOTE — PROGRESS NOTE ADULT - SUBJECTIVE AND OBJECTIVE BOX
Date of service: 12-05-23 @ 10:45    pt seen and examined  feels better  no complaints     ROS: no fever or chills; denies dizziness, no HA, no SOB or cough,  no diarrhea or constipation, no legs pain, no rashes      MEDICATIONS  (STANDING):  apixaban 5 milliGRAM(s) Oral every 12 hours  ascorbic acid 500 milliGRAM(s) Oral daily  cholecalciferol 1000 Unit(s) Oral daily  haloperidol     Tablet 1 milliGRAM(s) Oral daily  magnesium oxide 400 milliGRAM(s) Oral three times a day with meals  meropenem Injectable 1000 milliGRAM(s) IV Push every 8 hours  multivitamin 1 Tablet(s) Oral daily  mycophenolate mofetil 250 milliGRAM(s) Oral two times a day  polyethylene glycol 3350 17 Gram(s) Oral daily  predniSONE   Tablet 5 milliGRAM(s) Oral daily  senna 2 Tablet(s) Oral at bedtime    Vital Signs Last 24 Hrs  T(C): 36.4 (05 Dec 2023 08:01), Max: 36.7 (04 Dec 2023 23:05)  T(F): 97.5 (05 Dec 2023 08:01), Max: 98.1 (04 Dec 2023 23:05)  HR: 61 (05 Dec 2023 08:41) (61 - 76)  BP: 89/70 (05 Dec 2023 08:41) (89/70 - 105/70)  BP(mean): --  RR: 18 (05 Dec 2023 08:01) (18 - 18)  SpO2: 97% (05 Dec 2023 08:01) (95% - 97%)    Parameters below as of 05 Dec 2023 08:01  Patient On (Oxygen Delivery Method): room air      PE:  Constitutional: NAD   HEENT: NC/AT, EOMI, PERRLA, conjunctivae clear; ears and nose atraumatic; pharynx benign  Neck: supple; thyroid not palpable  Back: no tenderness  Respiratory: respiratory effort normal; clear to auscultation  Cardiovascular: S1S2 regular, no murmurs  Abdomen: soft, not tender, not distended, positive BS; liver and spleen WNL  Genitourinary: no suprapubic tenderness   Lymphatic: no LN palpable  Musculoskeletal: no muscle tenderness, no joint swelling or tenderness  Extremities: no pedal edema  Neurological/ Psychiatric: AxOx3, Judgement and insight normal;  moving all extremities  Skin: no rashes; no palpable lesions    Labs: all available labs reviewed                    Urinalysis Basic - ( 02 Dec 2023 07:28 )    Color: x / Appearance: x / SG: x / pH: x  Gluc: 114 mg/dL / Ketone: x  / Bili: x / Urobili: x   Blood: x / Protein: x / Nitrite: x   Leuk Esterase: x / RBC: x / WBC x   Sq Epi: x / Non Sq Epi: x / Bacteria: x    Culture - Blood (12.02.23 @ 00:35)   Specimen Source: .Blood None  Culture Results:   No growth at 24 hours  Culture - Blood (12.02.23 @ 00:35)   Specimen Source: .Blood None  Culture Results:   No growth at 24 hours    Radiology: all available radiological tests reviewed      ACC: [id number] EXAM:  XR CHEST PORTABLE IMMED 1V   ORDERED BY: ELLEN VILCHIS     PROCEDURE DATE:  12/02/2023          INTERPRETATION:  INDICATIONS: 63-year-old female with sepsis.    Prior examination for comparison: 10/28/2023    Technique: AP view ofchest    Findings:    The lungs are clear. There are no pleural effusions. The   cardiomediastinal silhouette is normal. Bones are grossly normal.    IMPRESSION: No evidence of acute cardiopulmonary disease.    --- End of Report ---    < end of copied text >    Advanced directives addressed: full resuscitation   Date of service: 12-05-23 @ 10:45    pt seen and examined  feels better  no complaints     ROS: no fever or chills; denies dizziness, no HA, no SOB or cough,  no diarrhea or constipation, no legs pain, no rashes      MEDICATIONS  (STANDING):  apixaban 5 milliGRAM(s) Oral every 12 hours  ascorbic acid 500 milliGRAM(s) Oral daily  cholecalciferol 1000 Unit(s) Oral daily  haloperidol     Tablet 1 milliGRAM(s) Oral daily  magnesium oxide 400 milliGRAM(s) Oral three times a day with meals  meropenem Injectable 1000 milliGRAM(s) IV Push every 8 hours  multivitamin 1 Tablet(s) Oral daily  mycophenolate mofetil 250 milliGRAM(s) Oral two times a day  polyethylene glycol 3350 17 Gram(s) Oral daily  predniSONE   Tablet 5 milliGRAM(s) Oral daily  senna 2 Tablet(s) Oral at bedtime    Vital Signs Last 24 Hrs  T(C): 36.4 (05 Dec 2023 08:01), Max: 36.7 (04 Dec 2023 23:05)  T(F): 97.5 (05 Dec 2023 08:01), Max: 98.1 (04 Dec 2023 23:05)  HR: 61 (05 Dec 2023 08:41) (61 - 76)  BP: 89/70 (05 Dec 2023 08:41) (89/70 - 105/70)  BP(mean): --  RR: 18 (05 Dec 2023 08:01) (18 - 18)  SpO2: 97% (05 Dec 2023 08:01) (95% - 97%)    Parameters below as of 05 Dec 2023 08:01  Patient On (Oxygen Delivery Method): room air      PE:  Constitutional: NAD   HEENT: NC/AT, EOMI, PERRLA, conjunctivae clear; ears and nose atraumatic; pharynx benign  Neck: supple; thyroid not palpable  Back: no tenderness  Respiratory: respiratory effort normal; clear to auscultation  Cardiovascular: S1S2 regular, no murmurs  Abdomen: soft, not tender, not distended, positive BS; liver and spleen WNL  Genitourinary: no suprapubic tenderness   Lymphatic: no LN palpable  Musculoskeletal: no muscle tenderness, no joint swelling or tenderness  Extremities: no pedal edema  Neurological/ Psychiatric: AxOx3, Judgement and insight normal;  moving all extremities  Skin: no rashes; no palpable lesions    Labs: all available labs reviewed                    Urinalysis Basic - ( 02 Dec 2023 07:28 )    Color: x / Appearance: x / SG: x / pH: x  Gluc: 114 mg/dL / Ketone: x  / Bili: x / Urobili: x   Blood: x / Protein: x / Nitrite: x   Leuk Esterase: x / RBC: x / WBC x   Sq Epi: x / Non Sq Epi: x / Bacteria: x    Culture - Blood (12.02.23 @ 00:35)   Specimen Source: .Blood None  Culture Results:   No growth at 24 hours  Culture - Blood (12.02.23 @ 00:35)   Specimen Source: .Blood None  Culture Results:   No growth at 24 hours    Radiology: all available radiological tests reviewed      ACC: [id number] EXAM:  XR CHEST PORTABLE IMMED 1V   ORDERED BY: ELLEN VILCHIS     PROCEDURE DATE:  12/02/2023          INTERPRETATION:  INDICATIONS: 63-year-old female with sepsis.    Prior examination for comparison: 10/28/2023    Technique: AP view ofchest    Findings:    The lungs are clear. There are no pleural effusions. The   cardiomediastinal silhouette is normal. Bones are grossly normal.    IMPRESSION: No evidence of acute cardiopulmonary disease.    --- End of Report ---    < end of copied text >    Advanced directives addressed: full resuscitation

## 2023-12-07 LAB
CULTURE RESULTS: SIGNIFICANT CHANGE UP
SPECIMEN SOURCE: SIGNIFICANT CHANGE UP

## 2023-12-08 DIAGNOSIS — Z79.01 LONG TERM (CURRENT) USE OF ANTICOAGULANTS: ICD-10-CM

## 2023-12-08 DIAGNOSIS — F25.0 SCHIZOAFFECTIVE DISORDER, BIPOLAR TYPE: ICD-10-CM

## 2023-12-08 DIAGNOSIS — M51.36 OTHER INTERVERTEBRAL DISC DEGENERATION, LUMBAR REGION: ICD-10-CM

## 2023-12-08 DIAGNOSIS — A41.9 SEPSIS, UNSPECIFIED ORGANISM: ICD-10-CM

## 2023-12-08 DIAGNOSIS — Z94.0 KIDNEY TRANSPLANT STATUS: ICD-10-CM

## 2023-12-08 DIAGNOSIS — N12 TUBULO-INTERSTITIAL NEPHRITIS, NOT SPECIFIED AS ACUTE OR CHRONIC: ICD-10-CM

## 2023-12-08 DIAGNOSIS — M10.9 GOUT, UNSPECIFIED: ICD-10-CM

## 2023-12-08 DIAGNOSIS — E78.5 HYPERLIPIDEMIA, UNSPECIFIED: ICD-10-CM

## 2023-12-08 DIAGNOSIS — Z90.710 ACQUIRED ABSENCE OF BOTH CERVIX AND UTERUS: ICD-10-CM

## 2023-12-08 DIAGNOSIS — N39.0 URINARY TRACT INFECTION, SITE NOT SPECIFIED: ICD-10-CM

## 2023-12-08 DIAGNOSIS — D47.2 MONOCLONAL GAMMOPATHY: ICD-10-CM

## 2023-12-08 DIAGNOSIS — E21.3 HYPERPARATHYROIDISM, UNSPECIFIED: ICD-10-CM

## 2023-12-08 DIAGNOSIS — Z85.3 PERSONAL HISTORY OF MALIGNANT NEOPLASM OF BREAST: ICD-10-CM

## 2023-12-08 DIAGNOSIS — Z86.718 PERSONAL HISTORY OF OTHER VENOUS THROMBOSIS AND EMBOLISM: ICD-10-CM

## 2023-12-08 DIAGNOSIS — K21.9 GASTRO-ESOPHAGEAL REFLUX DISEASE WITHOUT ESOPHAGITIS: ICD-10-CM

## 2023-12-08 DIAGNOSIS — R74.01 ELEVATION OF LEVELS OF LIVER TRANSAMINASE LEVELS: ICD-10-CM

## 2023-12-08 DIAGNOSIS — D84.9 IMMUNODEFICIENCY, UNSPECIFIED: ICD-10-CM

## 2024-01-16 ENCOUNTER — NON-APPOINTMENT (OUTPATIENT)
Age: 64
End: 2024-01-16

## 2024-01-18 ENCOUNTER — INPATIENT (INPATIENT)
Facility: HOSPITAL | Age: 64
LOS: 1 days | Discharge: HOME | DRG: 466 | End: 2024-01-20
Attending: STUDENT IN AN ORGANIZED HEALTH CARE EDUCATION/TRAINING PROGRAM | Admitting: HOSPITALIST
Payer: COMMERCIAL

## 2024-01-18 VITALS — WEIGHT: 149.91 LBS | HEIGHT: 65 IN

## 2024-01-18 DIAGNOSIS — Z94.0 KIDNEY TRANSPLANT STATUS: Chronic | ICD-10-CM

## 2024-01-18 DIAGNOSIS — Z90.710 ACQUIRED ABSENCE OF BOTH CERVIX AND UTERUS: Chronic | ICD-10-CM

## 2024-01-18 DIAGNOSIS — N12 TUBULO-INTERSTITIAL NEPHRITIS, NOT SPECIFIED AS ACUTE OR CHRONIC: ICD-10-CM

## 2024-01-18 DIAGNOSIS — R89.7 ABNORMAL HISTOLOGICAL FINDINGS IN SPECIMENS FROM OTHER ORGANS, SYSTEMS AND TISSUES: Chronic | ICD-10-CM

## 2024-01-18 LAB
ALBUMIN SERPL ELPH-MCNC: 3.3 G/DL — SIGNIFICANT CHANGE UP (ref 3.3–5)
ALP SERPL-CCNC: 98 U/L — SIGNIFICANT CHANGE UP (ref 40–120)
ALT FLD-CCNC: 146 U/L — HIGH (ref 12–78)
ANION GAP SERPL CALC-SCNC: 3 MMOL/L — LOW (ref 5–17)
APPEARANCE UR: CLEAR — SIGNIFICANT CHANGE UP
APTT BLD: 32.2 SEC — SIGNIFICANT CHANGE UP (ref 24.5–35.6)
AST SERPL-CCNC: 165 U/L — HIGH (ref 15–37)
BACTERIA # UR AUTO: NEGATIVE /HPF — SIGNIFICANT CHANGE UP
BASOPHILS # BLD AUTO: 0 K/UL — SIGNIFICANT CHANGE UP (ref 0–0.2)
BASOPHILS NFR BLD AUTO: 0 % — SIGNIFICANT CHANGE UP (ref 0–2)
BILIRUB SERPL-MCNC: 0.8 MG/DL — SIGNIFICANT CHANGE UP (ref 0.2–1.2)
BILIRUB UR-MCNC: NEGATIVE — SIGNIFICANT CHANGE UP
BUN SERPL-MCNC: 24 MG/DL — HIGH (ref 7–23)
CALCIUM SERPL-MCNC: 9.8 MG/DL — SIGNIFICANT CHANGE UP (ref 8.5–10.1)
CHLORIDE SERPL-SCNC: 107 MMOL/L — SIGNIFICANT CHANGE UP (ref 96–108)
CO2 SERPL-SCNC: 24 MMOL/L — SIGNIFICANT CHANGE UP (ref 22–31)
COLOR SPEC: YELLOW — SIGNIFICANT CHANGE UP
CREAT SERPL-MCNC: 1.42 MG/DL — HIGH (ref 0.5–1.3)
DIFF PNL FLD: ABNORMAL
EGFR: 41 ML/MIN/1.73M2 — LOW
EOSINOPHIL # BLD AUTO: 0 K/UL — SIGNIFICANT CHANGE UP (ref 0–0.5)
EOSINOPHIL NFR BLD AUTO: 0 % — SIGNIFICANT CHANGE UP (ref 0–6)
FLUAV AG NPH QL: SIGNIFICANT CHANGE UP
FLUBV AG NPH QL: SIGNIFICANT CHANGE UP
GLUCOSE SERPL-MCNC: 146 MG/DL — HIGH (ref 70–99)
GLUCOSE UR QL: NEGATIVE MG/DL — SIGNIFICANT CHANGE UP
HCT VFR BLD CALC: 37.3 % — SIGNIFICANT CHANGE UP (ref 34.5–45)
HGB BLD-MCNC: 12.5 G/DL — SIGNIFICANT CHANGE UP (ref 11.5–15.5)
INR BLD: 1.44 RATIO — HIGH (ref 0.85–1.18)
KETONES UR-MCNC: NEGATIVE MG/DL — SIGNIFICANT CHANGE UP
LACTATE SERPL-SCNC: 0.8 MMOL/L — SIGNIFICANT CHANGE UP (ref 0.7–2)
LEUKOCYTE ESTERASE UR-ACNC: ABNORMAL
LYMPHOCYTES # BLD AUTO: 0.34 K/UL — LOW (ref 1–3.3)
LYMPHOCYTES # BLD AUTO: 8 % — LOW (ref 13–44)
MANUAL SMEAR VERIFICATION: SIGNIFICANT CHANGE UP
MCHC RBC-ENTMCNC: 30.5 PG — SIGNIFICANT CHANGE UP (ref 27–34)
MCHC RBC-ENTMCNC: 33.5 GM/DL — SIGNIFICANT CHANGE UP (ref 32–36)
MCV RBC AUTO: 91 FL — SIGNIFICANT CHANGE UP (ref 80–100)
MONOCYTES # BLD AUTO: 0.5 K/UL — SIGNIFICANT CHANGE UP (ref 0–0.9)
MONOCYTES NFR BLD AUTO: 12 % — SIGNIFICANT CHANGE UP (ref 2–14)
NEUTROPHILS # BLD AUTO: 3.35 K/UL — SIGNIFICANT CHANGE UP (ref 1.8–7.4)
NEUTROPHILS NFR BLD AUTO: 77 % — SIGNIFICANT CHANGE UP (ref 43–77)
NEUTS BAND # BLD: 3 % — SIGNIFICANT CHANGE UP (ref 0–8)
NITRITE UR-MCNC: NEGATIVE — SIGNIFICANT CHANGE UP
NRBC # BLD: 0 /100 WBCS — SIGNIFICANT CHANGE UP (ref 0–0)
NRBC # BLD: SIGNIFICANT CHANGE UP /100 WBCS (ref 0–0)
PH UR: 5.5 — SIGNIFICANT CHANGE UP (ref 5–8)
PLAT MORPH BLD: NORMAL — SIGNIFICANT CHANGE UP
PLATELET # BLD AUTO: 144 K/UL — LOW (ref 150–400)
POTASSIUM SERPL-MCNC: 3.7 MMOL/L — SIGNIFICANT CHANGE UP (ref 3.5–5.3)
POTASSIUM SERPL-SCNC: 3.7 MMOL/L — SIGNIFICANT CHANGE UP (ref 3.5–5.3)
PROT SERPL-MCNC: 7 GM/DL — SIGNIFICANT CHANGE UP (ref 6–8.3)
PROT UR-MCNC: 30 MG/DL
PROTHROM AB SERPL-ACNC: 16.1 SEC — HIGH (ref 9.5–13)
RBC # BLD: 4.1 M/UL — SIGNIFICANT CHANGE UP (ref 3.8–5.2)
RBC # FLD: 13.1 % — SIGNIFICANT CHANGE UP (ref 10.3–14.5)
RBC BLD AUTO: NORMAL — SIGNIFICANT CHANGE UP
RBC CASTS # UR COMP ASSIST: 36 /HPF — HIGH (ref 0–4)
RSV RNA NPH QL NAA+NON-PROBE: SIGNIFICANT CHANGE UP
SARS-COV-2 RNA SPEC QL NAA+PROBE: SIGNIFICANT CHANGE UP
SODIUM SERPL-SCNC: 134 MMOL/L — LOW (ref 135–145)
SP GR SPEC: 1.01 — SIGNIFICANT CHANGE UP (ref 1–1.03)
SQUAMOUS # UR AUTO: 4 /HPF — SIGNIFICANT CHANGE UP (ref 0–5)
TROPONIN I, HIGH SENSITIVITY RESULT: 8.61 NG/L — SIGNIFICANT CHANGE UP
UROBILINOGEN FLD QL: 0.2 MG/DL — SIGNIFICANT CHANGE UP (ref 0.2–1)
WBC # BLD: 4.19 K/UL — SIGNIFICANT CHANGE UP (ref 3.8–10.5)
WBC # FLD AUTO: 4.19 K/UL — SIGNIFICANT CHANGE UP (ref 3.8–10.5)
WBC UR QL: 26 /HPF — HIGH (ref 0–5)

## 2024-01-18 PROCEDURE — 85027 COMPLETE CBC AUTOMATED: CPT

## 2024-01-18 PROCEDURE — 36415 COLL VENOUS BLD VENIPUNCTURE: CPT

## 2024-01-18 PROCEDURE — 74176 CT ABD & PELVIS W/O CONTRAST: CPT | Mod: 26,MD

## 2024-01-18 PROCEDURE — 80053 COMPREHEN METABOLIC PANEL: CPT

## 2024-01-18 PROCEDURE — 99285 EMERGENCY DEPT VISIT HI MDM: CPT

## 2024-01-18 PROCEDURE — 71045 X-RAY EXAM CHEST 1 VIEW: CPT | Mod: 26

## 2024-01-18 PROCEDURE — 93010 ELECTROCARDIOGRAM REPORT: CPT

## 2024-01-18 PROCEDURE — 99223 1ST HOSP IP/OBS HIGH 75: CPT

## 2024-01-18 RX ORDER — MYCOPHENOLATE MOFETIL 250 MG/1
250 CAPSULE ORAL ONCE
Refills: 0 | Status: COMPLETED | OUTPATIENT
Start: 2024-01-18 | End: 2024-01-18

## 2024-01-18 RX ORDER — ONDANSETRON 8 MG/1
4 TABLET, FILM COATED ORAL EVERY 6 HOURS
Refills: 0 | Status: DISCONTINUED | OUTPATIENT
Start: 2024-01-18 | End: 2024-01-20

## 2024-01-18 RX ORDER — SODIUM CHLORIDE 9 MG/ML
1750 INJECTION INTRAMUSCULAR; INTRAVENOUS; SUBCUTANEOUS ONCE
Refills: 0 | Status: COMPLETED | OUTPATIENT
Start: 2024-01-18 | End: 2024-01-18

## 2024-01-18 RX ORDER — CLONAZEPAM 1 MG
0.5 TABLET ORAL ONCE
Refills: 0 | Status: DISCONTINUED | OUTPATIENT
Start: 2024-01-18 | End: 2024-01-18

## 2024-01-18 RX ORDER — MORPHINE SULFATE 50 MG/1
2 CAPSULE, EXTENDED RELEASE ORAL EVERY 4 HOURS
Refills: 0 | Status: DISCONTINUED | OUTPATIENT
Start: 2024-01-18 | End: 2024-01-20

## 2024-01-18 RX ORDER — CEFTRIAXONE 500 MG/1
1000 INJECTION, POWDER, FOR SOLUTION INTRAMUSCULAR; INTRAVENOUS ONCE
Refills: 0 | Status: COMPLETED | OUTPATIENT
Start: 2024-01-18 | End: 2024-01-18

## 2024-01-18 RX ADMIN — CEFTRIAXONE 1000 MILLIGRAM(S): 500 INJECTION, POWDER, FOR SOLUTION INTRAMUSCULAR; INTRAVENOUS at 18:42

## 2024-01-18 RX ADMIN — SODIUM CHLORIDE 1750 MILLILITER(S): 9 INJECTION INTRAMUSCULAR; INTRAVENOUS; SUBCUTANEOUS at 18:42

## 2024-01-18 RX ADMIN — MYCOPHENOLATE MOFETIL 250 MILLIGRAM(S): 250 CAPSULE ORAL at 23:39

## 2024-01-18 NOTE — ED PROVIDER NOTE - PROGRESS NOTE DETAILS
workup shows +Pyelo. Spoke with hospitalist dr. Ramirez, will admit patient. ~Garrett Parker PA-C Patient seen and evaluated. Will evaluate for sepsis. ~Garrett Parker PA-C

## 2024-01-18 NOTE — ED PROVIDER NOTE - OBJECTIVE STATEMENT
PA: Patient is a 63 year old female with PMHx of kidney transplant, breast CA, DDD, GERD, gout, HLD, schizophrenia, who presents to ED c/o dysuria, fever, Tmax 104. ~Garrett Parker PA-C Patient is a 63 year old female with PMHx of kidney transplant, breast CA, DDD, GERD, gout, HLD, schizophrenia, who presents to ED c/o dysuria, fever, Tmax 104. No neck pain or stiffness. No cp, sob or palpitation. No visual or focal neurological complaints. No recent trauma. No saddle anesthesia.

## 2024-01-18 NOTE — ED PROVIDER NOTE - ATTENDING APP SHARED VISIT CONTRIBUTION OF CARE
I Owen Rai MD saw and examined the patient. MLP saw and examined the patient under my supervision. I discussed the care of the patient with MLP and agree with MLP's plan, assessment and care of the patient while in the ED.

## 2024-01-18 NOTE — ED ADULT NURSE NOTE - NS ED NURSE LEVEL OF CONSCIOUSNESS SPEECH
"Past Medical History:   Diagnosis Date    Diabetes mellitus     Disorder of kidney and ureter     Hyperlipemia     Hypertension     Iron deficiency anemia     Migraine headache     PTSD (post-traumatic stress disorder)     Stage 3a chronic kidney disease        History reviewed. No pertinent surgical history.    Review of patient's allergies indicates:  No Known Allergies    No current facility-administered medications on file prior to encounter.     Current Outpatient Medications on File Prior to Encounter   Medication Sig    blood sugar diagnostic (TRUE METRIX GLUCOSE TEST STRIP) Strp TEST 4 TIMES A DAY    blood-glucose meter (TRUE METRIX GLUCOSE METER) Misc 1 Device by Misc.(Non-Drug; Combo Route) route 4 (four) times daily.    blood-glucose sensor (DEXCOM G6 SENSOR) Philomena 3 each by Misc.(Non-Drug; Combo Route) route continuous.    blood-glucose transmitter (DEXCOM G6 TRANSMITTER) Philomena 1 each by Misc.(Non-Drug; Combo Route) route continuous.    clotrimazole (LOTRIMIN) 1 % cream Apply topically.    furosemide (LASIX) 40 MG tablet Take 1 tablet (40 mg total) by mouth daily as needed (swelling).    insulin aspart U-100 (NOVOLOG) 100 unit/mL (3 mL) InPn pen Inject 4 Units into the skin 3 (three) times daily.    insulin detemir U-100 (LEVEMIR FLEXTOUCH) 100 unit/mL (3 mL) SubQ InPn pen Inject 15 Units into the skin once daily.    lancets 33 gauge Misc USE 4 TIMES A DAY    metoprolol tartrate (LOPRESSOR) 50 MG tablet Take 50 mg by mouth 2 (two) times daily.    pen needle, diabetic 31 gauge x 5/16" Ndle use 4 times a day    tamsulosin (FLOMAX) 0.4 mg Cap Take 1 capsule (0.4 mg total) by mouth once daily.    glucose 4 GM chewable tablet Take 4 tablets (16 g total) by mouth as needed for Low blood sugar (If having symptoms of blurry vision, palpitations, confusion, shakiness.  Please check sugars and if sugar below 70 please take 4 tablets and re-check sugar everry 15 minutes until sugars are above 70 and symptoms "
resolve.).    sodium bicarbonate 650 MG tablet Take 1 tablet (650 mg total) by mouth 2 (two) times daily.     Family History       Problem Relation (Age of Onset)    Diabetes Mother          Tobacco Use    Smoking status: Never    Smokeless tobacco: Never   Substance and Sexual Activity    Alcohol use: No    Drug use: No    Sexual activity: Not on file     Review of Systems   Constitutional:  Negative for chills, diaphoresis and fever.   HENT:  Negative for hearing loss and sore throat.    Eyes:  Negative for visual disturbance.   Respiratory:  Negative for cough and shortness of breath.    Cardiovascular:  Negative for chest pain and leg swelling.   Gastrointestinal:  Negative for abdominal pain, diarrhea, nausea and vomiting.   Genitourinary:  Negative for difficulty urinating and flank pain.   Musculoskeletal:  Negative for back pain.   Skin:  Negative for rash and wound.   Neurological:  Negative for dizziness, weakness and headaches.   Psychiatric/Behavioral:  Negative for agitation and confusion.    Objective:     Vital Signs (Most Recent):  Temp: 97.7 °F (36.5 °C) (08/29/22 1055)  Pulse: (!) 42 (08/29/22 1110)  Resp: 18 (08/29/22 1110)  BP: (!) 137/59 (08/29/22 1110)  SpO2: 100 % (08/29/22 1110)   Vital Signs (24h Range):  Temp:  [97.7 °F (36.5 °C)-98.2 °F (36.8 °C)] 97.7 °F (36.5 °C)  Pulse:  [42-89] 42  Resp:  [17-18] 18  SpO2:  [100 %] 100 %  BP: (100-185)/(55-86) 137/59     Weight: 76.7 kg (169 lb)  Body mass index is 26.47 kg/m².    Physical Exam  Vitals and nursing note reviewed.   Constitutional:       General: He is not in acute distress.     Appearance: Normal appearance. He is not ill-appearing.   HENT:      Head: Normocephalic and atraumatic.      Mouth/Throat:      Mouth: Mucous membranes are moist.   Eyes:      Extraocular Movements: Extraocular movements intact.      Pupils: Pupils are equal, round, and reactive to light.   Cardiovascular:      Rate and Rhythm: Normal rate and regular rhythm.    
  Pulses: Normal pulses.      Heart sounds: Normal heart sounds. No murmur heard.    No friction rub. No gallop.   Pulmonary:      Effort: Pulmonary effort is normal. No respiratory distress.      Breath sounds: No wheezing or rhonchi.   Abdominal:      General: Bowel sounds are normal. There is no distension.      Palpations: Abdomen is soft.      Tenderness: There is no abdominal tenderness. There is no guarding.   Genitourinary:     Comments: Tavera catheter to gravity  Musculoskeletal:         General: No swelling.      Cervical back: Normal range of motion and neck supple.      Right lower leg: No edema.      Left lower leg: No edema.   Skin:     General: Skin is warm and dry.      Capillary Refill: Capillary refill takes less than 2 seconds.      Findings: No bruising, erythema or rash.   Neurological:      General: No focal deficit present.      Mental Status: He is alert and oriented to person, place, and time.      GCS: GCS eye subscore is 4. GCS verbal subscore is 5. GCS motor subscore is 6.   Psychiatric:         Mood and Affect: Mood normal.         Speech: Speech normal.         Behavior: Behavior normal. Behavior is cooperative.       Significant Labs: A1C:   Recent Labs   Lab 07/06/22  0838 07/11/22  0808 08/15/22  1006   HGBA1C 7.1* 6.8* 4.9       TSH:   Recent Labs   Lab 04/29/22  0209   TSH 0.536     Recent Results (from the past 24 hour(s))   POCT glucose    Collection Time: 08/29/22  6:25 AM   Result Value Ref Range    POCT Glucose 131 (H) 70 - 110 mg/dL   Type & Screen    Collection Time: 08/29/22  6:34 AM   Result Value Ref Range    Group & Rh O POS     Indirect Lexy NEG    POCT glucose    Collection Time: 08/29/22  1:44 PM   Result Value Ref Range    POCT Glucose 117 (H) 70 - 110 mg/dL   POCT glucose    Collection Time: 08/29/22  3:40 PM   Result Value Ref Range    POCT Glucose 115 (H) 70 - 110 mg/dL   CBC Auto Differential    Collection Time: 08/29/22  4:10 PM   Result Value Ref Range    WBC 
10.88 3.90 - 12.70 K/uL    RBC 3.85 (L) 4.60 - 6.20 M/uL    Hemoglobin 11.6 (L) 14.0 - 18.0 g/dL    Hematocrit 34.4 (L) 40.0 - 54.0 %    MCV 89 82 - 98 fL    MCH 30.1 27.0 - 31.0 pg    MCHC 33.7 32.0 - 36.0 g/dL    RDW 15.8 (H) 11.5 - 14.5 %    Platelets 205 150 - 450 K/uL    MPV 9.0 (L) 9.2 - 12.9 fL    Immature Granulocytes 0.7 (H) 0.0 - 0.5 %    Gran # (ANC) 7.9 (H) 1.8 - 7.7 K/uL    Immature Grans (Abs) 0.08 (H) 0.00 - 0.04 K/uL    Lymph # 1.9 1.0 - 4.8 K/uL    Mono # 1.0 0.3 - 1.0 K/uL    Eos # 0.0 0.0 - 0.5 K/uL    Baso # 0.03 0.00 - 0.20 K/uL    nRBC 0 0 /100 WBC    Gran % 72.2 38.0 - 73.0 %    Lymph % 17.5 (L) 18.0 - 48.0 %    Mono % 9.1 4.0 - 15.0 %    Eosinophil % 0.2 0.0 - 8.0 %    Basophil % 0.3 0.0 - 1.9 %    Differential Method Automated    Comprehensive metabolic panel    Collection Time: 08/29/22  4:10 PM   Result Value Ref Range    Sodium 142 136 - 145 mmol/L    Potassium 4.5 3.5 - 5.1 mmol/L    Chloride 108 95 - 110 mmol/L    CO2 23 23 - 29 mmol/L    Glucose 106 70 - 110 mg/dL    BUN 24 (H) 8 - 23 mg/dL    Creatinine 1.7 (H) 0.5 - 1.4 mg/dL    Calcium 8.9 8.7 - 10.5 mg/dL    Total Protein 6.2 6.0 - 8.4 g/dL    Albumin 3.5 3.5 - 5.2 g/dL    Total Bilirubin 0.3 0.1 - 1.0 mg/dL    Alkaline Phosphatase 64 55 - 135 U/L    AST 15 10 - 40 U/L    ALT 13 10 - 44 U/L    Anion Gap 11 8 - 16 mmol/L    eGFR 45 (A) >60 mL/min/1.73 m^2   Magnesium    Collection Time: 08/29/22  4:10 PM   Result Value Ref Range    Magnesium 2.1 1.6 - 2.6 mg/dL   POCT glucose    Collection Time: 08/29/22  7:26 PM   Result Value Ref Range    POCT Glucose 89 70 - 110 mg/dL         Significant Imaging: I have reviewed all pertinent imaging results/findings within the past 24 hours.  
denies pain/discomfort
Speaking Coherently

## 2024-01-18 NOTE — ED PROVIDER NOTE - CLINICAL SUMMARY MEDICAL DECISION MAKING FREE TEXT BOX
workup shows +Pyelo. Spoke with hospitalist dr. Ramirez, will admit patient. ~Garrett Parker PA-C workup shows +Pyelo. Spoke with hospitalist dr. Ramirez, will admit patient. ~Garrett Parker PA-C    Patient with kidney transplant, fever, dysuria, high risk for bacteremia and sepsis or pyelo. Labs, imaging done. worsening renal fx with concern for transplant ARF, plus concern and evidence for pyelo. abx, admission. Updated pt about results prior to admission. Pt agreeable.

## 2024-01-18 NOTE — H&P ADULT - HISTORY OF PRESENT ILLNESS
61 yo Female with a PMH of schizoaffective disorder (bipolar type), renal failure (s/p R renal transplant, due to prior lithium use), breast cancer s/p lumpectomy, fibroid s/p hysterectomy, hyperparathyroidism, DVT (on Eliquis), MGUS, HLD, and GERD who presented with fever. She had fever at home for 1 day.  Fevers up to 103 F for , She also has chills with weakness. She has some pain in the lower abdominal area. She had some dysuria yesterday. Went to urgent care. Was started on Cefuroxime orally for UTI. But no hematuria. She denies pain elsewhere. She is getting this frequent fever episodes, occurring since her renal transplant about 2 years ago. She is also getting frequent UTIs.

## 2024-01-18 NOTE — ED ADULT TRIAGE NOTE - GLASGOW COMA SCALE: BEST MOTOR RESPONSE, MLM
(M6) obeys commands
no abdominal pain, no bloating, no constipation, no diarrhea, no nausea and no vomiting.

## 2024-01-18 NOTE — ED ADULT NURSE NOTE - OBJECTIVE STATEMENT
Alert, febrile, endorse temp 100.6 at home, last took Tylenol at 1400h. Hx renal transplant, and urosepsis. Presents with fever, chills, warm to touch, septic work up completed, denies pain, emesis x 1.

## 2024-01-18 NOTE — ED ADULT TRIAGE NOTE - CHIEF COMPLAINT QUOTE
pt ambulatory to ED c/o fevers, Tmax 104.0, urinary frequency at home. pt seen in UC yesterday and started on cefuroxime. pt reports fevers have worsened. pt with pmhx uro sepsis in past and reports similar symptoms in past. pt with kidney transplant 1.5 years ago.

## 2024-01-18 NOTE — ED PROVIDER NOTE - DIFFERENTIAL DIAGNOSIS
Differential Diagnosis Patient with kidney transplant, fever, dysuria, high risk for bacteremia and sepsis or pyelo. Labs, imaging done. worsening renal fx with concern for transplant ARF, plus concern and evidence for pyelo. abx, admission. Updated pt about results prior to admission. Pt agreeable.

## 2024-01-18 NOTE — ED PROVIDER NOTE - CARE PLAN
Principal Discharge DX:	Pyelonephritis  Secondary Diagnosis:	Acute renal failure (ARF)  Secondary Diagnosis:	History of renal transplant   1

## 2024-01-18 NOTE — H&P ADULT - NSHPPHYSICALEXAM_GEN_ALL_CORE
T(C): 37.1 (01-19-24 @ 00:05), Max: 39 (01-18-24 @ 16:48)  HR: 81 (01-19-24 @ 00:05) (81 - 120)  BP: 116/70 (01-19-24 @ 00:05) (116/70 - 132/65)  RR: 18 (01-19-24 @ 00:05) (18 - 18)  SpO2: 99% (01-19-24 @ 00:05) (98% - 99%)    CONSTITUTIONAL: Well groomed, no apparent distress  EYES: PERRLA and symmetric, EOMI, No conjunctival or scleral injection, non-icteric  ENMT: Oral mucosa with moist membranes. Normal dentition; no pharyngeal injection or exudates             NECK: Supple, symmetric and without tracheal deviation   RESP: No respiratory distress, no use of accessory muscles; CTA b/l, no WRR  CV: RRR, +S1S2, no MRG; no JVD; no peripheral edema  GI: Soft, NT, ND, no rebound, no guarding; no palpable masses;   LYMPH: No cervical LAD or tenderness;   MSK: Normal ROM without pain, normal muscle strength/tone  SKIN: No rashes or ulcers noted;   NEURO: CN II-XII intact; normal reflexes in upper and lower extremities, sensation intact in upper and lower extremities b/l to light touch   PSYCH: Appropriate insight/judgment; A+O x 3, mood and affect appropriate, recent/remote memory intact

## 2024-01-18 NOTE — H&P ADULT - ASSESSMENT
A/P:    1.  Pyelonephritis  Sepsis  -Started on IV cefepime  -Follow cultures  -Getting IV fluid  -Follow ID consult    2.  VALERIE on CKD  History of renal transplant  -Giving IV fluids  -follow repeat Cr  -Continue immunosuppressive  -Follow nephrology consult    3.  History of DVT  -Continue Xarelto    4.  Xarelto for DVT prophylaxis    5.  CODE STATUS  -Full code    6.  Transaminitis  -chronic   -CT liver-normal  -all the work up for this -hepatitis panel, KEITH, smooth muscle Ab were neg on Dec 2, 2023  -can follow up in Clinic after DC

## 2024-01-18 NOTE — ED PROVIDER NOTE - CONSTITUTIONAL, MLM
Patient is a 73y old  Female who presents with a chief complaint of Shortness of breath with exertion x3 days (25 Feb 2018 20:38)      SUBJECTIVE / OVERNIGHT EVENTS: Feels slightly improved from admission with improved SOB and RHODES. Also endorses better control of her joint pains with PRN Oxycodone.     MEDICATIONS  (STANDING):  enoxaparin Injectable 100 milliGRAM(s) SubCutaneous every 12 hours  furosemide    Tablet 40 milliGRAM(s) Oral <User Schedule>  sertraline 50 milliGRAM(s) Oral daily  simvastatin 40 milliGRAM(s) Oral at bedtime    MEDICATIONS  (PRN):  oxyCODONE    IR 5 milliGRAM(s) Oral every 6 hours PRN Severe Pain (7 - 10)      Vital Signs Last 24 Hrs  T(C): 36.6 (27 Feb 2018 11:21), Max: 36.8 (26 Feb 2018 15:56)  T(F): 97.9 (27 Feb 2018 11:21), Max: 98.2 (26 Feb 2018 15:56)  HR: 104 (27 Feb 2018 11:21) (90 - 104)  BP: 128/75 (27 Feb 2018 11:21) (109/65 - 138/80)  BP(mean): --  RR: 18 (27 Feb 2018 11:21) (18 - 20)  SpO2: 95% (27 Feb 2018 11:21) (93% - 97%)  CAPILLARY BLOOD GLUCOSE        I&O's Summary    26 Feb 2018 07:01  -  27 Feb 2018 07:00  --------------------------------------------------------  IN: 205 mL / OUT: 200 mL / NET: 5 mL    PHYSICAL EXAM:  GENERAL: NAD  HEAD:  Atraumatic, Normocephalic  EYES: EOMI, PERRLA, conjunctiva and sclera clear  NECK: Supple, No JVD  CHEST/LUNG: No respiratory distress, Clear to auscultation bilaterally; No wheeze  HEART: Regular rate and rhythm; No murmurs, rubs, or gallops  ABDOMEN: Soft, Nontender, Nondistended; Bowel sounds present  EXTREMITIES:  Chronic lymphedema with swelling up to b/l knees. Compression stockings noted.  PSYCH: AAOx3  NEUROLOGY: non-focal  SKIN: No rashes or lesions, dry flaky skin beneath compression stockings.    LABS:                        13.2   5.74  )-----------( 206      ( 26 Feb 2018 07:03 )             40.6     02-26    145  |  108  |  12  ----------------------------<  108<H>  3.6   |  22  |  0.66    Ca    9.0      26 Feb 2018 07:01    TPro  8.0  /  Alb  3.9  /  TBili  0.9  /  DBili  x   /  AST  19  /  ALT  10  /  AlkPhos  114  02-25    PT/INR - ( 25 Feb 2018 16:47 )   PT: 12.1 sec;   INR: 1.12 ratio         PTT - ( 26 Feb 2018 11:56 )  PTT:53.4 sec  CARDIAC MARKERS ( 26 Feb 2018 07:01 )  x     / x     / 41 U/L / x     / x      CARDIAC MARKERS ( 26 Feb 2018 05:07 )  x     / <0.01 ng/mL / x     / x     / 1.8 ng/mL  CARDIAC MARKERS ( 25 Feb 2018 16:47 )  x     / <0.01 ng/mL / 56 U/L / x     / 1.9 ng/mL          RADIOLOGY & ADDITIONAL TESTS:    Imaging Personally Reviewed:    Consultant(s) Notes Reviewed:      Care Discussed with Consultants/Other Providers: normal... Well appearing, awake, alert, oriented to person, place, time/situation and in no apparent distress.

## 2024-01-18 NOTE — ED ADULT NURSE NOTE - NSFALLHARMRISKINTERV_ED_ALL_ED

## 2024-01-18 NOTE — ED PROVIDER NOTE - PHYSICAL EXAMINATION
PA NOTE: GEN: AOX3, NAD. HEENT: Throat clear. Airway is patent. EYES: PERRLA. EOMI. Head: NC/AT. NECK: Supple, No JVD. FROM. C-spine non-tender. CV:S1S2, RRR, LUNGS: Non-labored breathing, no tachypnea. O2sat 100% RA. CTA b/l. No w/r/r. CHEST: Equal chest expansion and rise. No deformity. ABD: Soft, NT/ND, no rebound, no guarding. No CVAT. EXT: No e/c/c. 2+ distal pulses. SKIN: No rashes. NEURO: No focal deficits. CN II-XII intact. FROM. 5/5 motor and sensory. ~Garrett Parker PA-C

## 2024-01-18 NOTE — H&P ADULT - NSHPPOAURINARYCATHETER_GEN_ALL_CORE
Last appt 12/18/19 and next appt 6/23/20. Last diclofenac refill 3/4/19 1 tube with 2 refills. Request routed to MD.  
no

## 2024-01-19 LAB
ALBUMIN SERPL ELPH-MCNC: 2.8 G/DL — LOW (ref 3.3–5)
ALP SERPL-CCNC: 81 U/L — SIGNIFICANT CHANGE UP (ref 40–120)
ALT FLD-CCNC: 106 U/L — HIGH (ref 12–78)
ANION GAP SERPL CALC-SCNC: 2 MMOL/L — LOW (ref 5–17)
AST SERPL-CCNC: 74 U/L — HIGH (ref 15–37)
BILIRUB SERPL-MCNC: 0.5 MG/DL — SIGNIFICANT CHANGE UP (ref 0.2–1.2)
BUN SERPL-MCNC: 19 MG/DL — SIGNIFICANT CHANGE UP (ref 7–23)
CALCIUM SERPL-MCNC: 8.8 MG/DL — SIGNIFICANT CHANGE UP (ref 8.5–10.1)
CHLORIDE SERPL-SCNC: 112 MMOL/L — HIGH (ref 96–108)
CO2 SERPL-SCNC: 26 MMOL/L — SIGNIFICANT CHANGE UP (ref 22–31)
CREAT SERPL-MCNC: 1.22 MG/DL — SIGNIFICANT CHANGE UP (ref 0.5–1.3)
CULTURE RESULTS: SIGNIFICANT CHANGE UP
EGFR: 50 ML/MIN/1.73M2 — LOW
GLUCOSE SERPL-MCNC: 117 MG/DL — HIGH (ref 70–99)
HCT VFR BLD CALC: 33.7 % — LOW (ref 34.5–45)
HGB BLD-MCNC: 11.1 G/DL — LOW (ref 11.5–15.5)
MCHC RBC-ENTMCNC: 30.8 PG — SIGNIFICANT CHANGE UP (ref 27–34)
MCHC RBC-ENTMCNC: 32.9 GM/DL — SIGNIFICANT CHANGE UP (ref 32–36)
MCV RBC AUTO: 93.6 FL — SIGNIFICANT CHANGE UP (ref 80–100)
PLATELET # BLD AUTO: 117 K/UL — LOW (ref 150–400)
POTASSIUM SERPL-MCNC: 3.8 MMOL/L — SIGNIFICANT CHANGE UP (ref 3.5–5.3)
POTASSIUM SERPL-SCNC: 3.8 MMOL/L — SIGNIFICANT CHANGE UP (ref 3.5–5.3)
PROT SERPL-MCNC: 6 GM/DL — SIGNIFICANT CHANGE UP (ref 6–8.3)
RBC # BLD: 3.6 M/UL — LOW (ref 3.8–5.2)
RBC # FLD: 13.4 % — SIGNIFICANT CHANGE UP (ref 10.3–14.5)
SODIUM SERPL-SCNC: 140 MMOL/L — SIGNIFICANT CHANGE UP (ref 135–145)
SPECIMEN SOURCE: SIGNIFICANT CHANGE UP
WBC # BLD: 2.92 K/UL — LOW (ref 3.8–10.5)
WBC # FLD AUTO: 2.92 K/UL — LOW (ref 3.8–10.5)

## 2024-01-19 PROCEDURE — 99232 SBSQ HOSP IP/OBS MODERATE 35: CPT

## 2024-01-19 RX ORDER — CEFEPIME 1 G/1
INJECTION, POWDER, FOR SOLUTION INTRAMUSCULAR; INTRAVENOUS
Refills: 0 | Status: DISCONTINUED | OUTPATIENT
Start: 2024-01-19 | End: 2024-01-20

## 2024-01-19 RX ORDER — HALOPERIDOL DECANOATE 100 MG/ML
1 INJECTION INTRAMUSCULAR DAILY
Refills: 0 | Status: DISCONTINUED | OUTPATIENT
Start: 2024-01-19 | End: 2024-01-20

## 2024-01-19 RX ORDER — APIXABAN 2.5 MG/1
5 TABLET, FILM COATED ORAL EVERY 12 HOURS
Refills: 0 | Status: DISCONTINUED | OUTPATIENT
Start: 2024-01-19 | End: 2024-01-20

## 2024-01-19 RX ORDER — CHOLECALCIFEROL (VITAMIN D3) 125 MCG
1000 CAPSULE ORAL DAILY
Refills: 0 | Status: DISCONTINUED | OUTPATIENT
Start: 2024-01-19 | End: 2024-01-20

## 2024-01-19 RX ORDER — ACETAMINOPHEN 500 MG
650 TABLET ORAL EVERY 6 HOURS
Refills: 0 | Status: DISCONTINUED | OUTPATIENT
Start: 2024-01-19 | End: 2024-01-20

## 2024-01-19 RX ORDER — SODIUM CHLORIDE 9 MG/ML
1000 INJECTION INTRAMUSCULAR; INTRAVENOUS; SUBCUTANEOUS
Refills: 0 | Status: DISCONTINUED | OUTPATIENT
Start: 2024-01-19 | End: 2024-01-19

## 2024-01-19 RX ORDER — CEFEPIME 1 G/1
2000 INJECTION, POWDER, FOR SOLUTION INTRAMUSCULAR; INTRAVENOUS EVERY 12 HOURS
Refills: 0 | Status: DISCONTINUED | OUTPATIENT
Start: 2024-01-19 | End: 2024-01-20

## 2024-01-19 RX ORDER — CLONAZEPAM 1 MG
0.5 TABLET ORAL DAILY
Refills: 0 | Status: DISCONTINUED | OUTPATIENT
Start: 2024-01-19 | End: 2024-01-20

## 2024-01-19 RX ORDER — MYCOPHENOLATE MOFETIL 250 MG/1
250 CAPSULE ORAL
Refills: 0 | Status: DISCONTINUED | OUTPATIENT
Start: 2024-01-19 | End: 2024-01-20

## 2024-01-19 RX ORDER — CEFEPIME 1 G/1
INJECTION, POWDER, FOR SOLUTION INTRAMUSCULAR; INTRAVENOUS
Refills: 0 | Status: DISCONTINUED | OUTPATIENT
Start: 2024-01-19 | End: 2024-01-19

## 2024-01-19 RX ORDER — MAGNESIUM OXIDE 400 MG ORAL TABLET 241.3 MG
400 TABLET ORAL
Refills: 0 | Status: DISCONTINUED | OUTPATIENT
Start: 2024-01-19 | End: 2024-01-20

## 2024-01-19 RX ORDER — CEFEPIME 1 G/1
2000 INJECTION, POWDER, FOR SOLUTION INTRAMUSCULAR; INTRAVENOUS ONCE
Refills: 0 | Status: COMPLETED | OUTPATIENT
Start: 2024-01-19 | End: 2024-01-19

## 2024-01-19 RX ORDER — LANOLIN ALCOHOL/MO/W.PET/CERES
3 CREAM (GRAM) TOPICAL AT BEDTIME
Refills: 0 | Status: DISCONTINUED | OUTPATIENT
Start: 2024-01-19 | End: 2024-01-20

## 2024-01-19 RX ADMIN — Medication 650 MILLIGRAM(S): at 02:40

## 2024-01-19 RX ADMIN — MORPHINE SULFATE 2 MILLIGRAM(S): 50 CAPSULE, EXTENDED RELEASE ORAL at 01:48

## 2024-01-19 RX ADMIN — CEFEPIME 2000 MILLIGRAM(S): 1 INJECTION, POWDER, FOR SOLUTION INTRAMUSCULAR; INTRAVENOUS at 02:17

## 2024-01-19 RX ADMIN — Medication 1 TABLET(S): at 09:42

## 2024-01-19 RX ADMIN — SODIUM CHLORIDE 100 MILLILITER(S): 9 INJECTION INTRAMUSCULAR; INTRAVENOUS; SUBCUTANEOUS at 03:19

## 2024-01-19 RX ADMIN — Medication 3 MILLIGRAM(S): at 22:03

## 2024-01-19 RX ADMIN — ONDANSETRON 4 MILLIGRAM(S): 8 TABLET, FILM COATED ORAL at 01:48

## 2024-01-19 RX ADMIN — Medication 1000 UNIT(S): at 09:41

## 2024-01-19 RX ADMIN — MAGNESIUM OXIDE 400 MG ORAL TABLET 400 MILLIGRAM(S): 241.3 TABLET ORAL at 17:18

## 2024-01-19 RX ADMIN — APIXABAN 5 MILLIGRAM(S): 2.5 TABLET, FILM COATED ORAL at 22:03

## 2024-01-19 RX ADMIN — MYCOPHENOLATE MOFETIL 250 MILLIGRAM(S): 250 CAPSULE ORAL at 22:03

## 2024-01-19 RX ADMIN — Medication 650 MILLIGRAM(S): at 03:45

## 2024-01-19 RX ADMIN — APIXABAN 5 MILLIGRAM(S): 2.5 TABLET, FILM COATED ORAL at 09:42

## 2024-01-19 RX ADMIN — MYCOPHENOLATE MOFETIL 250 MILLIGRAM(S): 250 CAPSULE ORAL at 09:42

## 2024-01-19 RX ADMIN — MAGNESIUM OXIDE 400 MG ORAL TABLET 400 MILLIGRAM(S): 241.3 TABLET ORAL at 08:20

## 2024-01-19 RX ADMIN — Medication 5 MILLIGRAM(S): at 09:41

## 2024-01-19 RX ADMIN — HALOPERIDOL DECANOATE 1 MILLIGRAM(S): 100 INJECTION INTRAMUSCULAR at 09:42

## 2024-01-19 RX ADMIN — CEFEPIME 2000 MILLIGRAM(S): 1 INJECTION, POWDER, FOR SOLUTION INTRAMUSCULAR; INTRAVENOUS at 17:59

## 2024-01-19 NOTE — PROGRESS NOTE ADULT - SUBJECTIVE AND OBJECTIVE BOX
incomplete note    HOSPITALIST PROGRESS NOTE  HPI: 63 yo Female with a PMH of schizoaffective disorder (bipolar type), renal failure (s/p R renal transplant, due to prior lithium use), breast cancer s/p lumpectomy, fibroid s/p hysterectomy, hyperparathyroidism, DVT (on Eliquis), MGUS, HLD, and GERD who presented with fever. She had fever at home for 1 day.  Fevers up to 103 F for , She also has chills with weakness. She has some pain in the lower abdominal area. She had some dysuria yesterday. Went to urgent care. Was started on Cefuroxime orally for UTI. But no hematuria. She denies pain elsewhere. She is getting this frequent fever episodes, occurring since her renal transplant about 2 years ago. She is also getting frequent UTIs.     SUBJECTIVE / INTERVAL HPI: Patient seen and examined at bedside.     VITAL SIGNS:  Vital Signs Last 24 Hrs  T(C): 36.6 (19 Jan 2024 07:16), Max: 39 (18 Jan 2024 16:48)  T(F): 97.9 (19 Jan 2024 07:16), Max: 102.2 (18 Jan 2024 16:48)  HR: 61 (19 Jan 2024 07:16) (61 - 120)  BP: 106/74 (19 Jan 2024 07:16) (106/74 - 132/65)  BP(mean): 79 (18 Jan 2024 15:17) (79 - 79)  RR: 17 (19 Jan 2024 07:16) (17 - 18)  SpO2: 98% (19 Jan 2024 07:16) (96% - 99%)    Parameters below as of 19 Jan 2024 07:16  Patient On (Oxygen Delivery Method): room air        PHYSICAL EXAM:    General: No acute distress  HEENT: NC/AT; PERRL, anicteric sclera; MMM  Neck: Supple  Cardiovascular: +S1/S2, RRR, no murmurs, rubs, gallops  Respiratory: CTA B/L; no W/R/R  Gastrointestinal: soft, NT/ND; +BSx4  Extremities: WWP; no edema, clubbing or cyanosis  Vascular: 2+ radial, DP/PT pulses B/L  Neurological: AAOx3; no focal deficits    MEDICATIONS:  MEDICATIONS  (STANDING):  apixaban 5 milliGRAM(s) Oral every 12 hours  cefepime  Injectable.      cefepime  Injectable. 2000 milliGRAM(s) IV Push every 12 hours  cholecalciferol 1000 Unit(s) Oral daily  haloperidol     Tablet 1 milliGRAM(s) Oral daily  magnesium oxide 400 milliGRAM(s) Oral two times a day with meals  multivitamin 1 Tablet(s) Oral daily  mycophenolate mofetil 250 milliGRAM(s) Oral two times a day  predniSONE   Tablet 5 milliGRAM(s) Oral daily  sodium chloride 0.9%. 1000 milliLiter(s) (100 mL/Hr) IV Continuous <Continuous>    MEDICATIONS  (PRN):  acetaminophen     Tablet .. 650 milliGRAM(s) Oral every 6 hours PRN Temp greater or equal to 38C (100.4F), Mild Pain (1 - 3)  aluminum hydroxide/magnesium hydroxide/simethicone Suspension 30 milliLiter(s) Oral every 4 hours PRN Dyspepsia  clonazePAM  Tablet 0.5 milliGRAM(s) Oral daily PRN Anxiety  melatonin 3 milliGRAM(s) Oral at bedtime PRN Insomnia  morphine  - Injectable 2 milliGRAM(s) IV Push every 4 hours PRN Severe Pain (7 - 10)  ondansetron Injectable 4 milliGRAM(s) IV Push every 6 hours PRN Nausea and/or Vomiting      ALLERGIES:  Allergies    Levaquin (Anaphylaxis)    Intolerances        LABS:                        11.1   2.92  )-----------( 117      ( 19 Jan 2024 07:04 )             33.7     01-19    140  |  112<H>  |  19  ----------------------------<  117<H>  3.8   |  26  |  1.22    Ca    8.8      19 Jan 2024 07:04    TPro  6.0  /  Alb  2.8<L>  /  TBili  0.5  /  DBili  x   /  AST  74<H>  /  ALT  106<H>  /  AlkPhos  81  01-19    PT/INR - ( 18 Jan 2024 16:21 )   PT: 16.1 sec;   INR: 1.44 ratio         PTT - ( 18 Jan 2024 16:21 )  PTT:32.2 sec  Urinalysis Basic - ( 19 Jan 2024 07:04 )    Color: x / Appearance: x / SG: x / pH: x  Gluc: 117 mg/dL / Ketone: x  / Bili: x / Urobili: x   Blood: x / Protein: x / Nitrite: x   Leuk Esterase: x / RBC: x / WBC x   Sq Epi: x / Non Sq Epi: x / Bacteria: x      CAPILLARY BLOOD GLUCOSE        Blood, Urine: Moderate (01-18 @ 16:21)      RADIOLOGY & ADDITIONAL TESTS: Reviewed. HOSPITALIST PROGRESS NOTE  HPI: 63 yo Female with a PMH of schizoaffective disorder (bipolar type), renal failure (s/p R renal transplant, due to prior lithium use), breast cancer s/p lumpectomy, fibroid s/p hysterectomy, hyperparathyroidism, DVT (on Eliquis), MGUS, HLD, and GERD who presented with fever. She had fever at home for 1 day.  Fevers up to 103 F for , She also has chills with weakness. She has some pain in the lower abdominal area. She had some dysuria yesterday. Went to urgent care. Was started on Cefuroxime orally for UTI. But no hematuria. She denies pain elsewhere. She is getting this frequent fever episodes, occurring since her renal transplant about 2 years ago. She is also getting frequent UTIs.     SUBJECTIVE / INTERVAL HPI: Patient seen and examined at bedside. Overall just with complaints of soreness around her kidney transplant. No further plan.   Patient frustrated regarding multiple UTIs.  at bedside also updated. I contacted patient's outpatient transplant team, but office closed. Will reattempt tomorrow.     VITAL SIGNS:  Vital Signs Last 24 Hrs  T(C): 36.6 (19 Jan 2024 07:16), Max: 39 (18 Jan 2024 16:48)  T(F): 97.9 (19 Jan 2024 07:16), Max: 102.2 (18 Jan 2024 16:48)  HR: 61 (19 Jan 2024 07:16) (61 - 120)  BP: 106/74 (19 Jan 2024 07:16) (106/74 - 132/65)  BP(mean): 79 (18 Jan 2024 15:17) (79 - 79)  RR: 17 (19 Jan 2024 07:16) (17 - 18)  SpO2: 98% (19 Jan 2024 07:16) (96% - 99%)    Parameters below as of 19 Jan 2024 07:16  Patient On (Oxygen Delivery Method): room air        PHYSICAL EXAM:  General: No acute distress  HEENT: NC/AT; PERRL, anicteric sclera; MMM  Neck: Supple  Cardiovascular: +S1/S2, RRR, no murmurs, rubs, gallops  Respiratory: CTA B/L; no W/R/R  Gastrointestinal: soft, NT/ND; +BSx4. Mild soreness in lower right quadrant   Extremities: WWP; no edema, clubbing or cyanosis  Vascular: 2+ radial, DP/PT pulses B/L  Neurological: AAOx3; no focal deficits    MEDICATIONS:  MEDICATIONS  (STANDING):  apixaban 5 milliGRAM(s) Oral every 12 hours  cefepime  Injectable.      cefepime  Injectable. 2000 milliGRAM(s) IV Push every 12 hours  cholecalciferol 1000 Unit(s) Oral daily  haloperidol     Tablet 1 milliGRAM(s) Oral daily  magnesium oxide 400 milliGRAM(s) Oral two times a day with meals  multivitamin 1 Tablet(s) Oral daily  mycophenolate mofetil 250 milliGRAM(s) Oral two times a day  predniSONE   Tablet 5 milliGRAM(s) Oral daily  sodium chloride 0.9%. 1000 milliLiter(s) (100 mL/Hr) IV Continuous <Continuous>    MEDICATIONS  (PRN):  acetaminophen     Tablet .. 650 milliGRAM(s) Oral every 6 hours PRN Temp greater or equal to 38C (100.4F), Mild Pain (1 - 3)  aluminum hydroxide/magnesium hydroxide/simethicone Suspension 30 milliLiter(s) Oral every 4 hours PRN Dyspepsia  clonazePAM  Tablet 0.5 milliGRAM(s) Oral daily PRN Anxiety  melatonin 3 milliGRAM(s) Oral at bedtime PRN Insomnia  morphine  - Injectable 2 milliGRAM(s) IV Push every 4 hours PRN Severe Pain (7 - 10)  ondansetron Injectable 4 milliGRAM(s) IV Push every 6 hours PRN Nausea and/or Vomiting      ALLERGIES:  Allergies    Levaquin (Anaphylaxis)    Intolerances        LABS:                        11.1   2.92  )-----------( 117      ( 19 Jan 2024 07:04 )             33.7     01-19    140  |  112<H>  |  19  ----------------------------<  117<H>  3.8   |  26  |  1.22    Ca    8.8      19 Jan 2024 07:04    TPro  6.0  /  Alb  2.8<L>  /  TBili  0.5  /  DBili  x   /  AST  74<H>  /  ALT  106<H>  /  AlkPhos  81  01-19    PT/INR - ( 18 Jan 2024 16:21 )   PT: 16.1 sec;   INR: 1.44 ratio         PTT - ( 18 Jan 2024 16:21 )  PTT:32.2 sec  Urinalysis Basic - ( 19 Jan 2024 07:04 )    Color: x / Appearance: x / SG: x / pH: x  Gluc: 117 mg/dL / Ketone: x  / Bili: x / Urobili: x   Blood: x / Protein: x / Nitrite: x   Leuk Esterase: x / RBC: x / WBC x   Sq Epi: x / Non Sq Epi: x / Bacteria: x      CAPILLARY BLOOD GLUCOSE        Blood, Urine: Moderate (01-18 @ 16:21)      RADIOLOGY & ADDITIONAL TESTS: Reviewed. HOSPITALIST PROGRESS NOTE  HPI: 63 yo Female with a PMH of schizoaffective disorder (bipolar type), renal failure (s/p R renal transplant, due to prior lithium use), breast cancer s/p lumpectomy, fibroid s/p hysterectomy, hyperparathyroidism, DVT (on Eliquis), MGUS, HLD, and GERD who presented with fever. She had fever at home for 1 day.  Fevers up to 103 F for , She also has chills with weakness. She has some pain in the lower abdominal area. She had some dysuria yesterday. Went to urgent care. Was started on Cefuroxime orally for UTI. But no hematuria. She denies pain elsewhere. She is getting this frequent fever episodes, occurring since her renal transplant about 2 years ago. She is also getting frequent UTIs.     SUBJECTIVE / INTERVAL HPI: Patient seen and examined at bedside. Overall just with complaints of soreness around her kidney transplant. No further plan.   Patient frustrated regarding multiple UTIs.  at bedside also updated. I contacted patient's outpatient transplant team, but office closed. Will reattempt tomorrow.     VITAL SIGNS:  Vital Signs Last 24 Hrs  T(C): 36.6 (19 Jan 2024 07:16), Max: 39 (18 Jan 2024 16:48)  T(F): 97.9 (19 Jan 2024 07:16), Max: 102.2 (18 Jan 2024 16:48)  HR: 61 (19 Jan 2024 07:16) (61 - 120)  BP: 106/74 (19 Jan 2024 07:16) (106/74 - 132/65)  BP(mean): 79 (18 Jan 2024 15:17) (79 - 79)  RR: 17 (19 Jan 2024 07:16) (17 - 18)  SpO2: 98% (19 Jan 2024 07:16) (96% - 99%)    Parameters below as of 19 Jan 2024 07:16  Patient On (Oxygen Delivery Method): room air        PHYSICAL EXAM:  General: No acute distress  HEENT: NC/AT; PERRL, anicteric sclera; MMM  Neck: Supple  Cardiovascular: +S1/S2, RRR, no murmurs, rubs, gallops  Respiratory: CTA B/L; no W/R/R  Gastrointestinal: soft, NT/ND; +BSx4. Mild soreness in lower right quadrant   Extremities: WWP; no edema, clubbing or cyanosis  Vascular: 2+ radial, DP/PT pulses B/L  Neurological: AAOx3; no focal deficits    MEDICATIONS:  MEDICATIONS  (STANDING):  apixaban 5 milliGRAM(s) Oral every 12 hours  cefepime  Injectable.      cefepime  Injectable. 2000 milliGRAM(s) IV Push every 12 hours  cholecalciferol 1000 Unit(s) Oral daily  haloperidol     Tablet 1 milliGRAM(s) Oral daily  magnesium oxide 400 milliGRAM(s) Oral two times a day with meals  multivitamin 1 Tablet(s) Oral daily  mycophenolate mofetil 250 milliGRAM(s) Oral two times a day  predniSONE   Tablet 5 milliGRAM(s) Oral daily  sodium chloride 0.9%. 1000 milliLiter(s) (100 mL/Hr) IV Continuous <Continuous>    MEDICATIONS  (PRN):  acetaminophen     Tablet .. 650 milliGRAM(s) Oral every 6 hours PRN Temp greater or equal to 38C (100.4F), Mild Pain (1 - 3)  aluminum hydroxide/magnesium hydroxide/simethicone Suspension 30 milliLiter(s) Oral every 4 hours PRN Dyspepsia  clonazePAM  Tablet 0.5 milliGRAM(s) Oral daily PRN Anxiety  melatonin 3 milliGRAM(s) Oral at bedtime PRN Insomnia  morphine  - Injectable 2 milliGRAM(s) IV Push every 4 hours PRN Severe Pain (7 - 10)  ondansetron Injectable 4 milliGRAM(s) IV Push every 6 hours PRN Nausea and/or Vomiting      ALLERGIES:  Allergies    Levaquin (Anaphylaxis)    Intolerances        LABS:                        11.1   2.92  )-----------( 117      ( 19 Jan 2024 07:04 )             33.7     01-19    140  |  112<H>  |  19  ----------------------------<  117<H>  3.8   |  26  |  1.22    Ca    8.8      19 Jan 2024 07:04    TPro  6.0  /  Alb  2.8<L>  /  TBili  0.5  /  DBili  x   /  AST  74<H>  /  ALT  106<H>  /  AlkPhos  81  01-19    PT/INR - ( 18 Jan 2024 16:21 )   PT: 16.1 sec;   INR: 1.44 ratio         PTT - ( 18 Jan 2024 16:21 )  PTT:32.2 sec  Urinalysis Basic - ( 19 Jan 2024 07:04 )    Color: x / Appearance: x / SG: x / pH: x  Gluc: 117 mg/dL / Ketone: x  / Bili: x / Urobili: x   Blood: x / Protein: x / Nitrite: x   Leuk Esterase: x / RBC: x / WBC x   Sq Epi: x / Non Sq Epi: x / Bacteria: x      CAPILLARY BLOOD GLUCOSE      Blood, Urine: Moderate (01-18 @ 16:21)      RADIOLOGY & ADDITIONAL TESTS: Reviewed. HOSPITALIST PROGRESS NOTE  HPI: 61 yo Female with a PMH of schizoaffective disorder (bipolar type), renal failure (s/p R renal transplant, due to prior lithium use), breast cancer s/p lumpectomy, fibroid s/p hysterectomy, hyperparathyroidism, DVT (on Eliquis), MGUS, HLD, and GERD who presented with fever. She had fever at home for 1 day.  Fevers up to 103 F for , She also has chills with weakness. She has some pain in the lower abdominal area. She had some dysuria yesterday. Went to urgent care. Was started on Cefuroxime orally for UTI. But no hematuria. She denies pain elsewhere. She is getting this frequent fever episodes, occurring since her renal transplant about 2 years ago. She is also getting frequent UTIs.     SUBJECTIVE / INTERVAL HPI: Patient seen and examined at bedside. Overall just with complaints of soreness around her kidney transplant. No further plan.   Patient frustrated regarding multiple UTIs.  at bedside also updated. I contacted patient's outpatient transplant team, but office closed. Will reattempt tomorrow.     VITAL SIGNS:  Vital Signs Last 24 Hrs  T(C): 36.6 (19 Jan 2024 07:16), Max: 39 (18 Jan 2024 16:48)  T(F): 97.9 (19 Jan 2024 07:16), Max: 102.2 (18 Jan 2024 16:48)  HR: 61 (19 Jan 2024 07:16) (61 - 120)  BP: 106/74 (19 Jan 2024 07:16) (106/74 - 132/65)  BP(mean): 79 (18 Jan 2024 15:17) (79 - 79)  RR: 17 (19 Jan 2024 07:16) (17 - 18)  SpO2: 98% (19 Jan 2024 07:16) (96% - 99%)    Parameters below as of 19 Jan 2024 07:16  Patient On (Oxygen Delivery Method): room air      PHYSICAL EXAM:  General: No acute distress  HEENT: NC/AT; PERRL, anicteric sclera; MMM  Neck: Supple  Cardiovascular: +S1/S2, RRR, no murmurs, rubs, gallops  Respiratory: CTA B/L; no W/R/R  Gastrointestinal: soft, NT/ND; +BSx4. Mild soreness in lower right quadrant   Extremities: WWP; no edema, clubbing or cyanosis  Vascular: 2+ radial, DP/PT pulses B/L  Neurological: AAOx3; no focal deficits    MEDICATIONS:  MEDICATIONS  (STANDING):  apixaban 5 milliGRAM(s) Oral every 12 hours  cefepime  Injectable.      cefepime  Injectable. 2000 milliGRAM(s) IV Push every 12 hours  cholecalciferol 1000 Unit(s) Oral daily  haloperidol     Tablet 1 milliGRAM(s) Oral daily  magnesium oxide 400 milliGRAM(s) Oral two times a day with meals  multivitamin 1 Tablet(s) Oral daily  mycophenolate mofetil 250 milliGRAM(s) Oral two times a day  predniSONE   Tablet 5 milliGRAM(s) Oral daily  sodium chloride 0.9%. 1000 milliLiter(s) (100 mL/Hr) IV Continuous <Continuous>    MEDICATIONS  (PRN):  acetaminophen     Tablet .. 650 milliGRAM(s) Oral every 6 hours PRN Temp greater or equal to 38C (100.4F), Mild Pain (1 - 3)  aluminum hydroxide/magnesium hydroxide/simethicone Suspension 30 milliLiter(s) Oral every 4 hours PRN Dyspepsia  clonazePAM  Tablet 0.5 milliGRAM(s) Oral daily PRN Anxiety  melatonin 3 milliGRAM(s) Oral at bedtime PRN Insomnia  morphine  - Injectable 2 milliGRAM(s) IV Push every 4 hours PRN Severe Pain (7 - 10)  ondansetron Injectable 4 milliGRAM(s) IV Push every 6 hours PRN Nausea and/or Vomiting      ALLERGIES:  Allergies    Levaquin (Anaphylaxis)    Intolerances        LABS:                        11.1   2.92  )-----------( 117      ( 19 Jan 2024 07:04 )             33.7     01-19    140  |  112<H>  |  19  ----------------------------<  117<H>  3.8   |  26  |  1.22    Ca    8.8      19 Jan 2024 07:04    TPro  6.0  /  Alb  2.8<L>  /  TBili  0.5  /  DBili  x   /  AST  74<H>  /  ALT  106<H>  /  AlkPhos  81  01-19    PT/INR - ( 18 Jan 2024 16:21 )   PT: 16.1 sec;   INR: 1.44 ratio         PTT - ( 18 Jan 2024 16:21 )  PTT:32.2 sec  Urinalysis Basic - ( 19 Jan 2024 07:04 )    Color: x / Appearance: x / SG: x / pH: x  Gluc: 117 mg/dL / Ketone: x  / Bili: x / Urobili: x   Blood: x / Protein: x / Nitrite: x   Leuk Esterase: x / RBC: x / WBC x   Sq Epi: x / Non Sq Epi: x / Bacteria: x      CAPILLARY BLOOD GLUCOSE      Blood, Urine: Moderate (01-18 @ 16:21)      RADIOLOGY & ADDITIONAL TESTS: Reviewed. HOSPITALIST PROGRESS NOTE    HPI: 61 yo Female with a PMH of schizoaffective disorder (bipolar type), renal failure (s/p R renal transplant, due to prior lithium use), breast cancer s/p lumpectomy, fibroid s/p hysterectomy, hyperparathyroidism, DVT (on Eliquis), MGUS, HLD, and GERD who presented with fever. She had fever at home for 1 day.  Fevers up to 103 F for , She also has chills with weakness. She has some pain in the lower abdominal area. She had some dysuria yesterday. Went to urgent care. Was started on Cefuroxime orally for UTI. But no hematuria. She denies pain elsewhere. She is getting this frequent fever episodes, occurring since her renal transplant about 2 years ago. She is also getting frequent UTIs.     SUBJECTIVE / INTERVAL HPI: Patient seen and examined at bedside. Overall just with complaints of soreness around her kidney transplant. No further plan.   Patient frustrated regarding multiple UTIs.  at bedside also updated. I contacted patient's outpatient transplant team, but office closed. Will reattempt tomorrow.     VITAL SIGNS:  Vital Signs Last 24 Hrs  T(C): 36.6 (19 Jan 2024 07:16), Max: 39 (18 Jan 2024 16:48)  T(F): 97.9 (19 Jan 2024 07:16), Max: 102.2 (18 Jan 2024 16:48)  HR: 61 (19 Jan 2024 07:16) (61 - 120)  BP: 106/74 (19 Jan 2024 07:16) (106/74 - 132/65)  BP(mean): 79 (18 Jan 2024 15:17) (79 - 79)  RR: 17 (19 Jan 2024 07:16) (17 - 18)  SpO2: 98% (19 Jan 2024 07:16) (96% - 99%)    Parameters below as of 19 Jan 2024 07:16  Patient On (Oxygen Delivery Method): room air      PHYSICAL EXAM:  General: No acute distress  HEENT: NC/AT; PERRL, anicteric sclera; MMM  Neck: Supple  Cardiovascular: +S1/S2, RRR, no murmurs, rubs, gallops  Respiratory: CTA B/L; no W/R/R  Gastrointestinal: soft, NT/ND; +BSx4. Mild soreness in lower right quadrant   Extremities: WWP; no edema, clubbing or cyanosis  Vascular: 2+ radial, DP/PT pulses B/L  Neurological: AAOx3; no focal deficits    MEDICATIONS:  MEDICATIONS  (STANDING):  apixaban 5 milliGRAM(s) Oral every 12 hours  cefepime  Injectable.      cefepime  Injectable. 2000 milliGRAM(s) IV Push every 12 hours  cholecalciferol 1000 Unit(s) Oral daily  haloperidol     Tablet 1 milliGRAM(s) Oral daily  magnesium oxide 400 milliGRAM(s) Oral two times a day with meals  multivitamin 1 Tablet(s) Oral daily  mycophenolate mofetil 250 milliGRAM(s) Oral two times a day  predniSONE   Tablet 5 milliGRAM(s) Oral daily  sodium chloride 0.9%. 1000 milliLiter(s) (100 mL/Hr) IV Continuous <Continuous>    MEDICATIONS  (PRN):  acetaminophen     Tablet .. 650 milliGRAM(s) Oral every 6 hours PRN Temp greater or equal to 38C (100.4F), Mild Pain (1 - 3)  aluminum hydroxide/magnesium hydroxide/simethicone Suspension 30 milliLiter(s) Oral every 4 hours PRN Dyspepsia  clonazePAM  Tablet 0.5 milliGRAM(s) Oral daily PRN Anxiety  melatonin 3 milliGRAM(s) Oral at bedtime PRN Insomnia  morphine  - Injectable 2 milliGRAM(s) IV Push every 4 hours PRN Severe Pain (7 - 10)  ondansetron Injectable 4 milliGRAM(s) IV Push every 6 hours PRN Nausea and/or Vomiting      ALLERGIES:  Allergies    Levaquin (Anaphylaxis)    Intolerances        LABS:                        11.1   2.92  )-----------( 117      ( 19 Jan 2024 07:04 )             33.7     01-19    140  |  112<H>  |  19  ----------------------------<  117<H>  3.8   |  26  |  1.22    Ca    8.8      19 Jan 2024 07:04    TPro  6.0  /  Alb  2.8<L>  /  TBili  0.5  /  DBili  x   /  AST  74<H>  /  ALT  106<H>  /  AlkPhos  81  01-19    PT/INR - ( 18 Jan 2024 16:21 )   PT: 16.1 sec;   INR: 1.44 ratio         PTT - ( 18 Jan 2024 16:21 )  PTT:32.2 sec  Urinalysis Basic - ( 19 Jan 2024 07:04 )    Color: x / Appearance: x / SG: x / pH: x  Gluc: 117 mg/dL / Ketone: x  / Bili: x / Urobili: x   Blood: x / Protein: x / Nitrite: x   Leuk Esterase: x / RBC: x / WBC x   Sq Epi: x / Non Sq Epi: x / Bacteria: x      CAPILLARY BLOOD GLUCOSE      Blood, Urine: Moderate (01-18 @ 16:21)      RADIOLOGY & ADDITIONAL TESTS: Reviewed.

## 2024-01-19 NOTE — PATIENT PROFILE ADULT - FALL HARM RISK - HARM RISK INTERVENTIONS
Assistance with ambulation/Assistance OOB with selected safe patient handling equipment/Communicate Risk of Fall with Harm to all staff/Monitor for mental status changes/Monitor gait and stability/Provide patient with walking aids - walker, cane, crutches/Reinforce activity limits and safety measures with patient and family/Reorient to person, place and time as needed/Review medications for side effects contributing to fall risk/Sit up slowly, dangle for a short time, stand at bedside before walking/Tailored Fall Risk Interventions/Use of alarms - bed, chair and/or voice tab/Visual Cue: Yellow wristband and red socks/Bed in lowest position, wheels locked, appropriate side rails in place/Call bell, personal items and telephone in reach/Instruct patient to call for assistance before getting out of bed or chair/Non-slip footwear when patient is out of bed/[place] to call system/Physically safe environment - no spills, clutter or unnecessary equipment/Purposeful Proactive Rounding/Room/bathroom lighting operational, light cord in reach Assistance with ambulation/Communicate Risk of Fall with Harm to all staff/Monitor for mental status changes/Monitor gait and stability/Provide patient with walking aids - walker, cane, crutches/Reinforce activity limits and safety measures with patient and family/Reorient to person, place and time as needed/Review medications for side effects contributing to fall risk/Sit up slowly, dangle for a short time, stand at bedside before walking/Tailored Fall Risk Interventions/Use of alarms - bed, chair and/or voice tab/Visual Cue: Yellow wristband and red socks/Bed in lowest position, wheels locked, appropriate side rails in place/Call bell, personal items and telephone in reach/Instruct patient to call for assistance before getting out of bed or chair/Non-slip footwear when patient is out of bed/[place] to call system/Physically safe environment - no spills, clutter or unnecessary equipment/Purposeful Proactive Rounding/Room/bathroom lighting operational, light cord in reach

## 2024-01-19 NOTE — PATIENT PROFILE ADULT - FUNCTIONAL ASSESSMENT - BASIC MOBILITY 6.
4-calculated by average/Not able to assess (calculate score using [facility] averaging method)

## 2024-01-19 NOTE — PROGRESS NOTE ADULT - ASSESSMENT
#Pyelonephritis  #Sepsis  -Started on IV cefepime  -Follow cultures  -Getting IV fluid  -Follow ID consult    #VALERIE on CKD  #History of renal transplant  -Giving IV fluids  -follow repeat Cr  -Continue immunosuppressive  -Follow nephrology consult    #History of DVT  -Continue Xarelto    #Transaminitis  -chronic   -CT liver-normal  -all the work up for this -hepatitis panel, KEITH, smooth muscle Ab were neg on Dec 2, 2023  -can follow up in Clinic after DC    DVT prophylaxis: Xarelto   CODE STATUS: Full code   63 yo Female with a PMH of schizoaffective disorder (bipolar type), renal failure (s/p R renal transplant, due to prior lithium use), breast cancer s/p lumpectomy, fibroid s/p hysterectomy, hyperparathyroidism, DVT (on Eliquis), MGUS, HLD, and GERD who presented with fever. She had fever at home for 1 day.  Fevers up to 103 F for , She also has chills with weakness. She has some pain in the lower abdominal area. She had some dysuria yesterday. Went to urgent care. Was started on Cefuroxime orally for UTI. But no hematuria. She denies pain elsewhere. She is getting this frequent fever episodes, occurring since her renal transplant about 2 years ago. She is also getting frequent UTIs.     #Pyelonephritis  #Sepsis POA, resolved   -Multiple UTIs since transplant  -CT a/p findings noted   -Started on IV cefepime  -Follow cultures, pending    #VALERIE on CKD  #History of renal transplant  -Cr improving   -follow repeat Cr  -Continue immunosuppressive tx  -Follow nephrology consult  -I contacted outpatient Transplant Team - no answer due to closed office hours    #History of DVT  -Continue Xarelto    #Transaminitis  -chronic   -CT liver-normal  -all the work up for this -hepatitis panel, KEITH, smooth muscle Ab were neg on Dec 2, 2023  -can follow up in Clinic after DC    DVT prophylaxis: Xarelto   CODE STATUS: Full code   63 yo Female with a PMH of schizoaffective disorder (bipolar type), renal failure (s/p R renal transplant, due to prior lithium use), breast cancer s/p lumpectomy, fibroid s/p hysterectomy, hyperparathyroidism, DVT (on Eliquis), MGUS, HLD, and GERD who presented with fever. She had fever at home for 1 day.  Fevers up to 103 F for , She also has chills with weakness. She has some pain in the lower abdominal area. She had some dysuria yesterday. Went to urgent care. Was started on Cefuroxime orally for UTI. But no hematuria. She denies pain elsewhere. She is getting this frequent fever episodes, occurring since her renal transplant about 2 years ago. She is also getting frequent UTIs.     #Pyelonephritis  #Sepsis POA, resolved   -Multiple UTIs since transplant  -CT a/p findings noted   -Started on IV cefepime  -Follow cultures, pending    #VALERIE on CKD  #History of renal transplant  -Cr improving   -follow repeat Cr  -Continue immunosuppressive tx  -Follow nephrology consult  -I contacted outpatient Transplant Team - no answer due to closed office hours    #History of DVT  -Continue Xarelto    #Transaminitis  -chronic   -CT liver-normal  -all the work up for this -hepatitis panel, KEITH, smooth muscle Ab were neg on Dec 2, 2023  -can follow up in Clinic after DC    DVT prophylaxis: Xarelto   CODE STATUS: Full code

## 2024-01-20 ENCOUNTER — TRANSCRIPTION ENCOUNTER (OUTPATIENT)
Age: 64
End: 2024-01-20

## 2024-01-20 VITALS
RESPIRATION RATE: 18 BRPM | HEART RATE: 72 BPM | DIASTOLIC BLOOD PRESSURE: 74 MMHG | SYSTOLIC BLOOD PRESSURE: 126 MMHG | OXYGEN SATURATION: 98 % | TEMPERATURE: 98 F

## 2024-01-20 LAB
ALBUMIN SERPL ELPH-MCNC: 2.9 G/DL — LOW (ref 3.3–5)
ALP SERPL-CCNC: 116 U/L — SIGNIFICANT CHANGE UP (ref 40–120)
ALT FLD-CCNC: 115 U/L — HIGH (ref 12–78)
ANION GAP SERPL CALC-SCNC: 5 MMOL/L — SIGNIFICANT CHANGE UP (ref 5–17)
AST SERPL-CCNC: 67 U/L — HIGH (ref 15–37)
BILIRUB SERPL-MCNC: 0.4 MG/DL — SIGNIFICANT CHANGE UP (ref 0.2–1.2)
BUN SERPL-MCNC: 24 MG/DL — HIGH (ref 7–23)
CALCIUM SERPL-MCNC: 9.3 MG/DL — SIGNIFICANT CHANGE UP (ref 8.5–10.1)
CHLORIDE SERPL-SCNC: 114 MMOL/L — HIGH (ref 96–108)
CO2 SERPL-SCNC: 24 MMOL/L — SIGNIFICANT CHANGE UP (ref 22–31)
CREAT SERPL-MCNC: 1.21 MG/DL — SIGNIFICANT CHANGE UP (ref 0.5–1.3)
EGFR: 50 ML/MIN/1.73M2 — LOW
GLUCOSE SERPL-MCNC: 102 MG/DL — HIGH (ref 70–99)
HCT VFR BLD CALC: 34.3 % — LOW (ref 34.5–45)
HGB BLD-MCNC: 11.2 G/DL — LOW (ref 11.5–15.5)
MCHC RBC-ENTMCNC: 30.1 PG — SIGNIFICANT CHANGE UP (ref 27–34)
MCHC RBC-ENTMCNC: 32.7 GM/DL — SIGNIFICANT CHANGE UP (ref 32–36)
MCV RBC AUTO: 92.2 FL — SIGNIFICANT CHANGE UP (ref 80–100)
PLATELET # BLD AUTO: 131 K/UL — LOW (ref 150–400)
POTASSIUM SERPL-MCNC: 3.8 MMOL/L — SIGNIFICANT CHANGE UP (ref 3.5–5.3)
POTASSIUM SERPL-SCNC: 3.8 MMOL/L — SIGNIFICANT CHANGE UP (ref 3.5–5.3)
PROT SERPL-MCNC: 6.5 GM/DL — SIGNIFICANT CHANGE UP (ref 6–8.3)
RBC # BLD: 3.72 M/UL — LOW (ref 3.8–5.2)
RBC # FLD: 13.1 % — SIGNIFICANT CHANGE UP (ref 10.3–14.5)
SODIUM SERPL-SCNC: 143 MMOL/L — SIGNIFICANT CHANGE UP (ref 135–145)
WBC # BLD: 2.91 K/UL — LOW (ref 3.8–10.5)
WBC # FLD AUTO: 2.91 K/UL — LOW (ref 3.8–10.5)

## 2024-01-20 PROCEDURE — 99239 HOSP IP/OBS DSCHRG MGMT >30: CPT

## 2024-01-20 RX ADMIN — CEFEPIME 2000 MILLIGRAM(S): 1 INJECTION, POWDER, FOR SOLUTION INTRAMUSCULAR; INTRAVENOUS at 05:26

## 2024-01-20 RX ADMIN — MAGNESIUM OXIDE 400 MG ORAL TABLET 400 MILLIGRAM(S): 241.3 TABLET ORAL at 09:30

## 2024-01-20 RX ADMIN — Medication 5 MILLIGRAM(S): at 09:30

## 2024-01-20 RX ADMIN — Medication 1000 UNIT(S): at 09:30

## 2024-01-20 RX ADMIN — APIXABAN 5 MILLIGRAM(S): 2.5 TABLET, FILM COATED ORAL at 10:37

## 2024-01-20 RX ADMIN — MYCOPHENOLATE MOFETIL 250 MILLIGRAM(S): 250 CAPSULE ORAL at 10:38

## 2024-01-20 RX ADMIN — HALOPERIDOL DECANOATE 1 MILLIGRAM(S): 100 INJECTION INTRAMUSCULAR at 09:30

## 2024-01-20 RX ADMIN — Medication 1 TABLET(S): at 09:30

## 2024-01-20 NOTE — DISCHARGE NOTE PROVIDER - HOSPITAL COURSE
63 yo Female with a PMH of schizoaffective disorder (bipolar type), renal failure (s/p R renal transplant, due to prior lithium use), breast cancer s/p lumpectomy, fibroid s/p hysterectomy, hyperparathyroidism, DVT (on Eliquis), MGUS, HLD, and GERD who presented with fever. She had fever at home for 1 day.  Fevers up to 103 F for , She also has chills with weakness. She has some pain in the lower abdominal area. She had some dysuria yesterday. Went to urgent care. Was started on Cefuroxime orally for UTI. But no hematuria. She denies pain elsewhere. She is getting this frequent fever episodes, occurring since her renal transplant about 2 years ago. She is also getting frequent UTIs.     #Pyelonephritis  #Sepsis POA, resolved   -Multiple UTIs since transplant  -CT a/p findings noted   -Started on IV cefepime. Cultures resulted with no growth - could of been because she took PO antibiotics for 1 day prior to ED. Given hx and sx will treat with Cefuroxime 500 mg every 12 hours for 7 additional days (has a script already from urgent care)   -Discussed plan with ID    #VALERIE on CKD  #History of renal transplant  -Cr improving   -Continue immunosuppressive tx  -Follow nephrology consult  -I contacted outpatient Transplant Team - no answer due to closed office hours. Patient left them a message through her portal and she will schedule follow up with them next week     #History of DVT  -Continue Xarelto    #Transaminitis  -chronic   -CT liver-normal  -all the work up for this -hepatitis panel, KEITH, smooth muscle Ab were neg on Dec 2, 2023  -can follow up in Clinic after DC    DVT prophylaxis: Xarelto   CODE STATUS: Full code    Patient was discharged to: Home    New medications: Cefuroxime 500 mg q12h for 7 additional days (has 7 day supply at home, no need for new script)     Items to follow up as outpatient: Urology follow up. Kidney transplant team follow up     Physical exam at the time of discharge:  LOS: 2d    VITALS:   T(C): 36.8 (01-20-24 @ 08:01), Max: 36.8 (01-19-24 @ 16:24)  HR: 62 (01-20-24 @ 08:01) (62 - 71)  BP: 120/73 (01-20-24 @ 08:01) (108/70 - 124/78)  RR: 19 (01-20-24 @ 08:01) (18 - 19)  SpO2: 99% (01-20-24 @ 08:01) (96% - 99%)    PHYSICAL EXAM:  General: No acute distress  HEENT: NC/AT; PERRL, anicteric sclera; MMM  Neck: Supple  Cardiovascular: +S1/S2, RRR, no murmurs, rubs, gallops  Respiratory: CTA B/L; no W/R/R  Gastrointestinal: soft, NT/ND; +BSx4. Mild soreness in lower right quadrant, improved  Extremities: WWP; no edema, clubbing or cyanosis  Vascular: 2+ radial, DP/PT pulses B/L  Neurological: AAOx3; no focal deficits

## 2024-01-20 NOTE — DISCHARGE NOTE PROVIDER - PROVIDER TOKENS
PROVIDER:[TOKEN:[[id number]],FOLLOWUP:[1 week],ESTABLISHEDPATIENT:[T]],FREE:[LAST:[[facility]],PHONE:[(   )    -],FAX:[(   )    -],ADDRESS:[[facility]  TRANSPLANT TEAM  [phone]],FOLLOWUP:[1 week],ESTABLISHEDPATIENT:[T]]

## 2024-01-20 NOTE — DISCHARGE NOTE PROVIDER - NSDCMRMEDTOKEN_GEN_ALL_CORE_FT
cefuroxime 500 mg oral tablet: 1 tab(s) orally every 12 hours (ALREADY HAS 7 DAY SCRIPT)  cholecalciferol 25 mcg (1000 intl units) oral tablet: 1 tab(s) orally once a day  clonazePAM 0.5 mg oral tablet: 1 tab(s) orally once a day as needed for  D-mannose: *** one tab 2 times a day***  Eliquis 5 mg oral tablet: 1 tab(s) orally 2 times a day  Haldol 1 mg oral tablet: 1 tab(s) orally once a day  magnesium oxide 400 mg oral tablet: 1 tab(s) orally 2 times a day  methenamine hippurate 1 g oral tablet: 1 tab(s) orally 2 times a day Patient has not received yet  Multiple Vitamins oral tablet: 1 tab(s) orally once a day  mycophenolate mofetil 250 mg oral capsule: 1 cap(s) orally 2 times a day  predniSONE 5 mg oral tablet: 1 tab(s) orally once a day

## 2024-01-20 NOTE — DISCHARGE NOTE PROVIDER - NSDCCPCAREPLAN_GEN_ALL_CORE_FT
PRINCIPAL DISCHARGE DIAGNOSIS  Diagnosis: Urinary tract infection  Assessment and Plan of Treatment: You were started on treatment with antibiotics. Please continue Cefuroxime 500 mg every 12 hours as precribed to finish the 7 day course.      SECONDARY DISCHARGE DIAGNOSES  Diagnosis: History of renal transplant  Assessment and Plan of Treatment: Your renal transplant office was closed at the time of discharge, however it is very important to call them on Monday 1/22 to schedule a follow up appointment. Please review your discharge summary during the visit.

## 2024-01-20 NOTE — CONSULT NOTE ADULT - ASSESSMENT
61 yo Female with a PMH of schizoaffective disorder (bipolar type), renal failure (s/p R renal transplant, due to prior lithium use), breast cancer s/p lumpectomy, fibroid s/p hysterectomy, hyperparathyroidism, DVT (on Eliquis), MGUS, HLD, and GERD who presented with fever She is getting this frequent fever episodes, occurring since her renal transplant about 2 years ago. She is also getting frequent UTIs.       Pyelonephritis  Sepsis  resolved   Multiple UTIs hx  Renal transplant - Cr near baseline - followed at [facility]       Plan   IV Cefepime. Cultures resulted with no growth - could of been because she took PO antibiotics for 1 day   change to po abx as per ID   Advised to followup with her tranpslant and Nephrology teams after discharge   Advised to see Urology for recurrent UTI workup    Dr Child to resume care Monday           
63 y/o Female with h/o schizoaffective disorder (bipolar type), renal failure, R renal transplant, due to prior lithium use, breast cancer s/p lumpectomy, fibroid s/p hysterectomy, hyperparathyroidism, DVT (on Eliquis), MGUS, HLD, and GERD was admitted on 1/18 for fever x 1 day.  Fevers up to 103 F with chills and weakness. She has lower abdominal pain, dysuria. Went to urgent care and received cefuroxime orally for UTI. She is getting this frequent fever episodes, occurring since her renal transplant about 2 years ago. She is also getting frequent UTIs. In ER she received cefepime.     1. Febrile syndrome. Possible sepsis. Pyuria. Likely UTI. Possible acute pyelonephritis in transplant kidney. Renal transplant. Immunocompromised host.   -obtain BC x 2, urine c/s  -agree with cefepime 2 gm IV q12h  -reason for abx use and side effects reviewed with patient; monitor BMP   -old chart reviewed to assess prior cultures  -monitor temps  -f/u CBC  -supportive care  2. Other issues:   -care per medicine    Clinical team may change from intravenous to oral antibiotics when the following criteria are met:   1. Patient is clinically improving/stable       a)	Improved signs and symptoms of infection from initial presentation       b)	Afebrile for 24 hours       c)	Leukocytosis trending towards normal range   2. Patient is tolerating oral intake   3. Initial/repeat blood cultures are negative  Cannot advise changing to oral antibiotic therapy until culture sensitivity is available.

## 2024-01-20 NOTE — DISCHARGE NOTE NURSING/CASE MANAGEMENT/SOCIAL WORK - NSDCPEFALRISK_GEN_ALL_CORE
For information on Fall & Injury Prevention, visit: [email or web address] OR  [email or web address] OR  https://www.cdc.gov/steadi/patient.html

## 2024-01-20 NOTE — DISCHARGE NOTE PROVIDER - ATTENDING ATTESTATION STATEMENT
I have personally seen and examined the patient. I have collaborated with and supervised the
independent

## 2024-01-20 NOTE — DISCHARGE NOTE PROVIDER - CARE PROVIDER_API CALL
Verónica Rocha [facility]  [address], [address]  [place] [address]  Phone: [phone]  Fax: [phone]  Established Patient  Follow Up Time: 1 week    [facility]  [phone]  Phone: (   )    -  Fax: (   )    -  Established Patient  Follow Up Time: 1 week

## 2024-01-20 NOTE — DISCHARGE NOTE NURSING/CASE MANAGEMENT/SOCIAL WORK - PATIENT PORTAL LINK FT
You can access the FollowMyHealth Patient Portal offered by [facility] by registering at the following website: [email or web address]. By joining [employer]’s FollowMyHealth portal, you will also be able to view your health information using other applications (apps) compatible with our system.

## 2024-01-20 NOTE — CONSULT NOTE ADULT - SUBJECTIVE AND OBJECTIVE BOX
Patient is a 63y old  Female who presents with a chief complaint of Pyelonephritis    HPI:  61 y/o Female with h/o schizoaffective disorder (bipolar type), renal failure, R renal transplant, due to prior lithium use, breast cancer s/p lumpectomy, fibroid s/p hysterectomy, hyperparathyroidism, DVT (on Eliquis), MGUS, HLD, and GERD was admitted on 1/18 for fever x 1 day.  Fevers up to 103 F with chills and weakness. She has lower abdominal pain, dysuria. Went to urgent care and received cefuroxime orally for UTI. She is getting this frequent fever episodes, occurring since her renal transplant about 2 years ago. She is also getting frequent UTIs. In ER she received cefepime.     PMH: as above  PSH: as above  Meds: per reconciliation sheet, noted below  MEDICATIONS  (STANDING):  apixaban 5 milliGRAM(s) Oral every 12 hours  cefepime  Injectable. 2000 milliGRAM(s) IV Push every 12 hours  cefepime  Injectable.      cholecalciferol 1000 Unit(s) Oral daily  haloperidol     Tablet 1 milliGRAM(s) Oral daily  magnesium oxide 400 milliGRAM(s) Oral two times a day with meals  multivitamin 1 Tablet(s) Oral daily  mycophenolate mofetil 250 milliGRAM(s) Oral two times a day  predniSONE   Tablet 5 milliGRAM(s) Oral daily    MEDICATIONS  (PRN):  acetaminophen     Tablet .. 650 milliGRAM(s) Oral every 6 hours PRN Temp greater or equal to 38C (100.4F), Mild Pain (1 - 3)  aluminum hydroxide/magnesium hydroxide/simethicone Suspension 30 milliLiter(s) Oral every 4 hours PRN Dyspepsia  clonazePAM  Tablet 0.5 milliGRAM(s) Oral daily PRN Anxiety  melatonin 3 milliGRAM(s) Oral at bedtime PRN Insomnia  morphine  - Injectable 2 milliGRAM(s) IV Push every 4 hours PRN Severe Pain (7 - 10)  ondansetron Injectable 4 milliGRAM(s) IV Push every 6 hours PRN Nausea and/or Vomiting    Allergies    Levaquin (Anaphylaxis)    Intolerances      Social: no smoking, no alcohol, no illegal drugs; no recent travel, no exposure to TB  FAMILY HISTORY:  FH: lymphoma      no history of premature cardiovascular disease in first degree relatives    ROS: the patient denies HA, no seizures, no dizziness, no sore throat, no nasal congestion, no blurry vision, no CP, no palpitations, no SOB, no cough, no abdominal pain, no diarrhea, no N/V, has dysuria, no leg pain, no claudication, no rash, no joint aches, no rectal pain or bleeding, no night sweats  All other systems reviewed and are negative    Vital Signs Last 24 Hrs  T(C): 36.8 (19 Jan 2024 16:24), Max: 38.6 (19 Jan 2024 02:40)  T(F): 98.2 (19 Jan 2024 16:24), Max: 101.5 (19 Jan 2024 02:40)  HR: 71 (19 Jan 2024 16:24) (61 - 94)  BP: 108/70 (19 Jan 2024 16:24) (106/74 - 116/70)  BP(mean): --  RR: 18 (19 Jan 2024 16:24) (17 - 18)  SpO2: 96% (19 Jan 2024 16:24) (96% - 99%)    Parameters below as of 19 Jan 2024 16:24  Patient On (Oxygen Delivery Method): room air    PE:    Constitutional:  No acute distress  HEENT: NC/AT, EOMI, PERRLA, conjunctivae clear; ears and nose atraumatic; pharynx benign  Neck: supple; thyroid not palpable  Back: no tenderness  Respiratory: respiratory effort normal; clear to auscultation  Cardiovascular: S1S2 regular, no murmurs  Abdomen: soft, not tender, not distended, positive BS; no liver or spleen organomegaly  Genitourinary: no suprapubic tenderness  Lymphatic: no LN palpable  Musculoskeletal: no muscle tenderness, no joint swelling or tenderness  Extremities: no pedal edema  Neurological/ Psychiatric: AxOx3, judgement and insight normal; moving all extremities  Skin: no rashes; no palpable lesions    Labs: all available labs reviewed                        11.1   2.92  )-----------( 117      ( 19 Jan 2024 07:04 )             33.7     01-19    140  |  112<H>  |  19  ----------------------------<  117<H>  3.8   |  26  |  1.22    Ca    8.8      19 Jan 2024 07:04    TPro  6.0  /  Alb  2.8<L>  /  TBili  0.5  /  DBili  x   /  AST  74<H>  /  ALT  106<H>  /  AlkPhos  81  01-19     LIVER FUNCTIONS - ( 19 Jan 2024 07:04 )  Alb: 2.8 g/dL / Pro: 6.0 gm/dL / ALK PHOS: 81 U/L / ALT: 106 U/L / AST: 74 U/L / GGT: x           Radiology: all available radiological tests reviewed    < from: CT Abdomen and Pelvis No Cont (01.18.24 @ 18:25) >  Right lower quadrant renal transplant with surrounding fat infiltration   and urothelial thickening of the collecting system and ureter. These   findings may be seen with an ascending urinary tract infection.   Evaluation for acute pyelonephritis is limited without intravenous   contrast and is not excluded. Mild transplant pelviectasis.    < end of copied text >      Advanced directives addressed: full resuscitation
COVERING FOR DR LAUREN      61 yo Female with a PMH of schizoaffective disorder (bipolar type), renal failure (s/p R renal transplant, due to prior lithium use), breast cancer s/p lumpectomy, fibroid s/p hysterectomy, hyperparathyroidism, DVT (on Eliquis), MGUS, HLD, and GERD who presented with fever. She had fever at home for 1 day.  Fevers up to 103 F for , She also has chills with weakness. She has some pain in the lower abdominal area. She had some dysuria yesterday. Went to urgent care. Was started on Cefuroxime orally for UTI. But no hematuria. She denies pain elsewhere. She is getting this frequent fever episodes, occurring since her renal transplant about 2 years ago. She is also getting frequent UTIs.   Sees her transplant team at [facility] - Dr Buck   She recieves Betalycept IV infusions monthly as part of IS regimen  pt c/o frequent UTI since being on this  - pt has not seen Urology for this       Home Medications:  cefuroxime 500 mg oral tablet: 1 tab(s) orally every 12 hours (ALREADY HAS 7 DAY SCRIPT) (20 Jan 2024 14:23)  cholecalciferol 25 mcg (1000 intl units) oral tablet: 1 tab(s) orally once a day (18 Jan 2024 21:49)  clonazePAM 0.5 mg oral tablet: 1 tab(s) orally once a day as needed for (18 Jan 2024 21:49)  D-mannose: *** one tab 2 times a day*** (18 Jan 2024 21:49)  Eliquis 5 mg oral tablet: 1 tab(s) orally 2 times a day (18 Jan 2024 21:49)  Haldol 1 mg oral tablet: 1 tab(s) orally once a day (18 Jan 2024 21:49)  magnesium oxide 400 mg oral tablet: 1 tab(s) orally 2 times a day (18 Jan 2024 21:49)  methenamine hippurate 1 g oral tablet: 1 tab(s) orally 2 times a day Patient has not received yet (18 Jan 2024 21:49)  Multiple Vitamins oral tablet: 1 tab(s) orally once a day (18 Jan 2024 21:49)  mycophenolate mofetil 250 mg oral capsule: 1 cap(s) orally 2 times a day (18 Jan 2024 21:49)  predniSONE 5 mg oral tablet: 1 tab(s) orally once a day (18 Jan 2024 21:49)      Allergies    Levaquin (Anaphylaxis)    Intolerances        SOCIAL HISTORY:  Denies ETOh,Smoking,     FAMILY HISTORY:  FH: lymphoma        REVIEW OF SYSTEMS:    CONSTITUTIONAL: No weakness, fevers or chills  EYES/ENT: No visual changes;  No vertigo or throat pain   NECK: No pain or stiffness  RESPIRATORY: No cough, wheezing, hemoptysis; No shortness of breath  CARDIOVASCULAR: No chest pain or palpitations  GASTROINTESTINAL: No abdominal or epigastric pain. No nausea, vomiting, or hematemesis; No diarrhea or constipation. No melena or hematochezia.  GENITOURINARY: No dysuria, frequency or hematuria  NEUROLOGICAL: No numbness or weakness  SKIN: No itching, burning, rashes, or lesions   All other review of systems is negative unless indicated above.    VITAL:  T(C): 36.9 (01-20-24), Max: 36.9 (01-20-24)  T(F): 98.4 (01-20-24), Max: 98.4 (01-20-24)  HR: 72 (01-20-24) (72 - 72)  BP: 126/74 (01-20-24) (126/74 - 126/74)  RR: 18 (01-20-24)  SpO2: 98% (01-20-24) (98% - 98%)    I and O's:      PHYSICAL EXAM:    Constitutional:   HEENT:  EOMI,  MM  Neck: No LAD, No JVD  Respiratory: CTAB  Cardiovascular: S1 and S2  Gastrointestinal: BS+, soft, NT/ND  Extremities: No peripheral edema  Neurological: A/O x 3, no focal deficits  : No Ware  Skin: No rashes  Access: Not applicable    LABS:                          11.2   2.91  )-----------( 131      ( 20 Jan 2024 07:04 )             34.3       143    |  114    |  24     ----------------------------<  102       20 Jan 2024 07:04  3.8     |  24     |  1.21     140    |  112    |  19     ----------------------------<  117       19 Jan 2024 07:04  3.8     |  26     |  1.22     134    |  107    |  24     ----------------------------<  146       18 Jan 2024 16:21  3.7     |  24     |  1.42     Ca    9.3        20 Jan 2024 07:04  Ca    8.8        19 Jan 2024 07:04        TPro  6.5    /  Alb  2.9    /  TBili  0.4    /        20 Jan 2024 07:04  DBili  x      /  AST  67     /  ALT  115    /  AlkPhos  116      TPro  6.0    /  Alb  2.8    /  TBili  0.5    /        19 Jan 2024 07:04  DBili  x      /  AST  74     /  ALT  106    /  AlkPhos  81             Urine Studies:  Urinalysis Basic - ( 20 Jan 2024 07:04 )    Color: x / Appearance: x / SG: x / pH: x  Gluc: 102 mg/dL / Ketone: x  / Bili: x / Urobili: x   Blood: x / Protein: x / Nitrite: x   Leuk Esterase: x / RBC: x / WBC x   Sq Epi: x / Non Sq Epi: x / Bacteria: x          RADIOLOGY & ADDITIONAL STUDIES:

## 2024-01-20 NOTE — DISCHARGE NOTE PROVIDER - NSDCCPTREATMENT_GEN_ALL_CORE_FT
PRINCIPAL PROCEDURE  Procedure: CT scan abdomen  Findings and Treatment: FINDINGS:  LOWER CHEST: Within normal limits.  LIVER: Within normal limits.  BILE DUCTS: Normal caliber.  GALLBLADDER: Within normal limits.  SPLEEN: Within normal limits.  PANCREAS: Within normal limits.  ADRENALS: Within normal limits.  KIDNEYS/URETERS: Bilateral atrophic native kidneys and stable renal   cysts. No hydronephrosis. Right lower quadrant transplant kidney with   nonspecific pelviectasis and trace perinephric infiltration.  BLADDER: Within normal limits.  REPRODUCTIVE ORGANS: Hysterectomy.  BOWEL: Diverticulosis coli. Moderate fecal load. No bowel obstruction.   Appendix is normal.  PERITONEUM: No ascites.  VESSELS: Within normal limits.  RETROPERITONEUM/LYMPH NODES: No lymphadenopathy.  ABDOMINAL WALL: Within normal limits.  BONES: Degenerative changes.  IMPRESSION:  Pelviectasis of the right lower quadrant transplant kidney as noted on earlier ultrasound. Nonspecific right perinephric infiltration.   Urinalysis correlation is recommended to exclude infection.  No acute bowel pathology.

## 2024-01-23 LAB
CULTURE RESULTS: SIGNIFICANT CHANGE UP
SPECIMEN SOURCE: SIGNIFICANT CHANGE UP

## 2024-01-24 LAB
CULTURE RESULTS: SIGNIFICANT CHANGE UP
SPECIMEN SOURCE: SIGNIFICANT CHANGE UP
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
## 2024-01-26 DIAGNOSIS — N18.4 CHRONIC KIDNEY DISEASE, STAGE 4 (SEVERE): ICD-10-CM

## 2024-01-26 DIAGNOSIS — T86.13 KIDNEY TRANSPLANT INFECTION: ICD-10-CM

## 2024-01-26 DIAGNOSIS — D47.2 MONOCLONAL GAMMOPATHY: ICD-10-CM

## 2024-01-26 DIAGNOSIS — M10.9 GOUT, UNSPECIFIED: ICD-10-CM

## 2024-01-26 DIAGNOSIS — Z88.1 ALLERGY STATUS TO OTHER ANTIBIOTIC AGENTS STATUS: ICD-10-CM
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
## 2024-01-26 DIAGNOSIS — D84.9 IMMUNODEFICIENCY, UNSPECIFIED: ICD-10-CM

## 2024-01-26 DIAGNOSIS — F25.0 SCHIZOAFFECTIVE DISORDER, BIPOLAR TYPE: ICD-10-CM

## 2024-01-26 DIAGNOSIS — Z86.718 PERSONAL HISTORY OF OTHER VENOUS THROMBOSIS AND EMBOLISM: ICD-10-CM

## 2024-01-26 DIAGNOSIS — Z79.52 LONG TERM (CURRENT) USE OF SYSTEMIC STEROIDS: ICD-10-CM

## 2024-01-26 DIAGNOSIS — T86.19 OTHER COMPLICATION OF KIDNEY TRANSPLANT: ICD-10-CM

## 2024-01-26 DIAGNOSIS — E78.5 HYPERLIPIDEMIA, UNSPECIFIED: ICD-10-CM

## 2024-01-26 DIAGNOSIS — A41.9 SEPSIS, UNSPECIFIED ORGANISM: ICD-10-CM

## 2024-01-26 DIAGNOSIS — K21.9 GASTRO-ESOPHAGEAL REFLUX DISEASE WITHOUT ESOPHAGITIS: ICD-10-CM

## 2024-01-26 DIAGNOSIS — Z85.3 PERSONAL HISTORY OF MALIGNANT NEOPLASM OF BREAST: ICD-10-CM

## 2024-01-26 DIAGNOSIS — N17.9 ACUTE KIDNEY FAILURE, UNSPECIFIED: ICD-10-CM

## 2024-01-26 DIAGNOSIS — Z90.710 ACQUIRED ABSENCE OF BOTH CERVIX AND UTERUS: ICD-10-CM

## 2024-01-26 DIAGNOSIS — N25.81 SECONDARY HYPERPARATHYROIDISM OF RENAL ORIGIN: ICD-10-CM

## 2024-01-26 DIAGNOSIS — Z79.01 LONG TERM (CURRENT) USE OF ANTICOAGULANTS: ICD-10-CM

## 2024-01-26 DIAGNOSIS — Z92.3 PERSONAL HISTORY OF IRRADIATION: ICD-10-CM

## 2024-01-26 DIAGNOSIS — N10 ACUTE PYELONEPHRITIS: ICD-10-CM

## 2024-02-07 ENCOUNTER — EMERGENCY (EMERGENCY)
Facility: HOSPITAL | Age: 64
LOS: 0 days | Discharge: HOME | End: 2024-02-07
Attending: STUDENT IN AN ORGANIZED HEALTH CARE EDUCATION/TRAINING PROGRAM
Payer: COMMERCIAL

## 2024-02-07 VITALS
RESPIRATION RATE: 18 BRPM | TEMPERATURE: 98 F | SYSTOLIC BLOOD PRESSURE: 111 MMHG | DIASTOLIC BLOOD PRESSURE: 76 MMHG | OXYGEN SATURATION: 99 % | HEART RATE: 77 BPM

## 2024-02-07 VITALS — WEIGHT: 182.1 LBS | HEIGHT: 65 IN

## 2024-02-07 DIAGNOSIS — Z79.01 LONG TERM (CURRENT) USE OF ANTICOAGULANTS: ICD-10-CM

## 2024-02-07 DIAGNOSIS — Z77.098 CONTACT WITH AND (SUSPECTED) EXPOSURE TO OTHER HAZARDOUS, CHIEFLY NONMEDICINAL, CHEMICALS: ICD-10-CM

## 2024-02-07 DIAGNOSIS — Z90.710 ACQUIRED ABSENCE OF BOTH CERVIX AND UTERUS: Chronic | ICD-10-CM

## 2024-02-07 DIAGNOSIS — F25.0 SCHIZOAFFECTIVE DISORDER, BIPOLAR TYPE: ICD-10-CM

## 2024-02-07 DIAGNOSIS — Z01.89 ENCOUNTER FOR OTHER SPECIFIED SPECIAL EXAMINATIONS: ICD-10-CM

## 2024-02-07 DIAGNOSIS — Z88.1 ALLERGY STATUS TO OTHER ANTIBIOTIC AGENTS STATUS: ICD-10-CM

## 2024-02-07 DIAGNOSIS — E21.3 HYPERPARATHYROIDISM, UNSPECIFIED: ICD-10-CM

## 2024-02-07 DIAGNOSIS — K21.9 GASTRO-ESOPHAGEAL REFLUX DISEASE WITHOUT ESOPHAGITIS: ICD-10-CM

## 2024-02-07 DIAGNOSIS — Z86.718 PERSONAL HISTORY OF OTHER VENOUS THROMBOSIS AND EMBOLISM: ICD-10-CM

## 2024-02-07 DIAGNOSIS — Z94.0 KIDNEY TRANSPLANT STATUS: Chronic | ICD-10-CM

## 2024-02-07 DIAGNOSIS — E78.5 HYPERLIPIDEMIA, UNSPECIFIED: ICD-10-CM

## 2024-02-07 DIAGNOSIS — R89.7 ABNORMAL HISTOLOGICAL FINDINGS IN SPECIMENS FROM OTHER ORGANS, SYSTEMS AND TISSUES: Chronic | ICD-10-CM

## 2024-02-07 LAB
ALBUMIN SERPL ELPH-MCNC: 3.8 G/DL — SIGNIFICANT CHANGE UP (ref 3.3–5)
ALP SERPL-CCNC: 105 U/L — SIGNIFICANT CHANGE UP (ref 40–120)
ALT FLD-CCNC: 60 U/L — SIGNIFICANT CHANGE UP (ref 12–78)
ANION GAP SERPL CALC-SCNC: 1 MMOL/L — LOW (ref 5–17)
AST SERPL-CCNC: 43 U/L — HIGH (ref 15–37)
BASOPHILS # BLD AUTO: 0.03 K/UL — SIGNIFICANT CHANGE UP (ref 0–0.2)
BASOPHILS NFR BLD AUTO: 1 % — SIGNIFICANT CHANGE UP (ref 0–2)
BILIRUB SERPL-MCNC: 0.4 MG/DL — SIGNIFICANT CHANGE UP (ref 0.2–1.2)
BUN SERPL-MCNC: 36 MG/DL — HIGH (ref 7–23)
CALCIUM SERPL-MCNC: 9.8 MG/DL — SIGNIFICANT CHANGE UP (ref 8.5–10.1)
CHLORIDE SERPL-SCNC: 108 MMOL/L — SIGNIFICANT CHANGE UP (ref 96–108)
CO2 SERPL-SCNC: 29 MMOL/L — SIGNIFICANT CHANGE UP (ref 22–31)
COHGB MFR BLDV: 2.1 % — SIGNIFICANT CHANGE UP
CREAT SERPL-MCNC: 1.27 MG/DL — SIGNIFICANT CHANGE UP (ref 0.5–1.3)
D DIMER BLD IA.RAPID-MCNC: <150 NG/ML DDU — SIGNIFICANT CHANGE UP
EGFR: 47 ML/MIN/1.73M2 — LOW
EOSINOPHIL # BLD AUTO: 0.03 K/UL — SIGNIFICANT CHANGE UP (ref 0–0.5)
EOSINOPHIL NFR BLD AUTO: 1 % — SIGNIFICANT CHANGE UP (ref 0–6)
GLUCOSE SERPL-MCNC: 106 MG/DL — HIGH (ref 70–99)
HCT VFR BLD CALC: 41 % — SIGNIFICANT CHANGE UP (ref 34.5–45)
HGB BLD-MCNC: 13.6 G/DL — SIGNIFICANT CHANGE UP (ref 11.5–15.5)
LYMPHOCYTES # BLD AUTO: 0.58 K/UL — LOW (ref 1–3.3)
LYMPHOCYTES # BLD AUTO: 19 % — SIGNIFICANT CHANGE UP (ref 13–44)
MAGNESIUM SERPL-MCNC: 2.2 MG/DL — SIGNIFICANT CHANGE UP (ref 1.6–2.6)
MCHC RBC-ENTMCNC: 30.2 PG — SIGNIFICANT CHANGE UP (ref 27–34)
MCHC RBC-ENTMCNC: 33.2 GM/DL — SIGNIFICANT CHANGE UP (ref 32–36)
MCV RBC AUTO: 91.1 FL — SIGNIFICANT CHANGE UP (ref 80–100)
MONOCYTES # BLD AUTO: 0.15 K/UL — SIGNIFICANT CHANGE UP (ref 0–0.9)
MONOCYTES NFR BLD AUTO: 5 % — SIGNIFICANT CHANGE UP (ref 2–14)
NEUTROPHILS # BLD AUTO: 2.2 K/UL — SIGNIFICANT CHANGE UP (ref 1.8–7.4)
NEUTROPHILS NFR BLD AUTO: 70 % — SIGNIFICANT CHANGE UP (ref 43–77)
NRBC # BLD: SIGNIFICANT CHANGE UP /100 WBCS (ref 0–0)
PLATELET # BLD AUTO: 201 K/UL — SIGNIFICANT CHANGE UP (ref 150–400)
POTASSIUM SERPL-MCNC: 4.4 MMOL/L — SIGNIFICANT CHANGE UP (ref 3.5–5.3)
POTASSIUM SERPL-SCNC: 4.4 MMOL/L — SIGNIFICANT CHANGE UP (ref 3.5–5.3)
PROT SERPL-MCNC: 7.5 GM/DL — SIGNIFICANT CHANGE UP (ref 6–8.3)
RBC # BLD: 4.5 M/UL — SIGNIFICANT CHANGE UP (ref 3.8–5.2)
RBC # FLD: 13.3 % — SIGNIFICANT CHANGE UP (ref 10.3–14.5)
SODIUM SERPL-SCNC: 138 MMOL/L — SIGNIFICANT CHANGE UP (ref 135–145)
TROPONIN I, HIGH SENSITIVITY RESULT: 5.03 NG/L — SIGNIFICANT CHANGE UP
TROPONIN I, HIGH SENSITIVITY RESULT: 5.47 NG/L — SIGNIFICANT CHANGE UP
WBC # BLD: 3.05 K/UL — LOW (ref 3.8–10.5)
WBC # FLD AUTO: 3.05 K/UL — LOW (ref 3.8–10.5)

## 2024-02-07 PROCEDURE — 80053 COMPREHEN METABOLIC PANEL: CPT

## 2024-02-07 PROCEDURE — 36415 COLL VENOUS BLD VENIPUNCTURE: CPT

## 2024-02-07 PROCEDURE — 71045 X-RAY EXAM CHEST 1 VIEW: CPT | Mod: 26

## 2024-02-07 PROCEDURE — 99285 EMERGENCY DEPT VISIT HI MDM: CPT

## 2024-02-07 PROCEDURE — 93005 ELECTROCARDIOGRAM TRACING: CPT

## 2024-02-07 PROCEDURE — 99285 EMERGENCY DEPT VISIT HI MDM: CPT | Mod: 25

## 2024-02-07 PROCEDURE — 84484 ASSAY OF TROPONIN QUANT: CPT

## 2024-02-07 PROCEDURE — 85379 FIBRIN DEGRADATION QUANT: CPT

## 2024-02-07 PROCEDURE — 82375 ASSAY CARBOXYHB QUANT: CPT

## 2024-02-07 PROCEDURE — 85025 COMPLETE CBC W/AUTO DIFF WBC: CPT

## 2024-02-07 PROCEDURE — 93010 ELECTROCARDIOGRAM REPORT: CPT

## 2024-02-07 PROCEDURE — 71045 X-RAY EXAM CHEST 1 VIEW: CPT

## 2024-02-07 PROCEDURE — 83735 ASSAY OF MAGNESIUM: CPT

## 2024-02-07 NOTE — ED STATDOCS - PROGRESS NOTE DETAILS
Kiara PAC: pts labs are not actionable. carboxyhemoglobin from exposure over 3 days is negative. discussed with pt. Discussed with patient need to return to ED if symptoms don't continue to improve or recur or develops any new or worsening symptoms that are of concern.

## 2024-02-07 NOTE — ED ADULT TRIAGE NOTE - CHIEF COMPLAINT QUOTE
pt c/o carbon monoxide exposure and gas leak from office for the past 3 days. Pt endorses difficulty breathing. Pt A&ox4, ambulatory, anxious at triage. Allergy to Levaquin. Pt breathing even and unlabored in triage.

## 2024-02-07 NOTE — ED STATDOCS - ATTENDING APP SHARED VISIT CONTRIBUTION OF CARE
I, Maria Del Carmen Cline DO,  performed the initial face to face bedside interview with this patient regarding history of present illness, review of symptoms and relevant past medical, social and family history.  I completed an independent physical examination.  I was the initial provider who evaluated this patient.   I personally saw the patient and performed a substantive portion of the visit including all aspects of the medical decision making.  I have signed out the follow up of any pending tests (i.e. labs, radiological studies) to the QUY[facility].  I have communicated the patient’s plan of care and disposition with the QUYEN.  The history, relevant review of systems, past medical and surgical history, medical decision making, and physical examination was documented by the scribe in my presence and I attest to the accuracy of the documentation.

## 2024-02-07 NOTE — ED STATDOCS - PATIENT PORTAL LINK FT
You can access the FollowMyHealth Patient Portal offered by [facility] by registering at the following website: [email or web address]. By joining [employer]’s FollowMyHealth portal, you will also be able to view your health information using other applications (apps) compatible with our system.

## 2024-02-07 NOTE — ED ADULT NURSE NOTE - OBJECTIVE STATEMENT
exposure to Carbon Monoxide  and gas while at work  hx kidney transpland exposure to Carbon Monoxide  and gas while at work  hx kidney transplant, Anxiety

## 2024-02-07 NOTE — ED ADULT NURSE NOTE - NSFALLRISKINTERV_ED_ALL_ED

## 2024-02-07 NOTE — ED STATDOCS - NSFOLLOWUPINSTRUCTIONS_ED_ALL_ED_FT
Please follow-up with your doctor(s) within the next 3 days, but see medical sooner if your symptoms persist or worsen.  Please call tomorrow for an appointment.    You were given a copy of your labs and/or imaging.  Please go-over these with your doctor(s).     If you have any worsening of symptoms or any other concerns please see your doctor or return to the ED immediately.    Please continue taking your home medications as directed    ANY PENDING RESULTS: You can access the [email or web address] Patient Portal offered by [facility] by registering at the following website: [email or web address]. By joining [facility]’s FollowMy[facility] portal, you will also be able to view your health information using other applications (apps) compatible with our system.

## 2024-02-07 NOTE — ED STATDOCS - OBJECTIVE STATEMENT
64 y/o female with a PMHx of schizoaffective disorder (bipolar type), renal failure (s/p R renal transplant, due to prior lithium use), breast cancer s/p lumpectomy, fibroid s/p hysterectomy, hyperparathyroidism, DVT (on Eliquis), MGUS, HLD, and GERD presents to the ED s/p carbon monoxide exposure. Pt reports she was exposed to gas leak and carbon monoxide in her office x2 days. Pt started not to feel well last night. Pt c/o SOB. Denies HA, CP, fevers. No other complaints at this time. 62 y/o female with a PMHx of schizoaffective disorder (bipolar type), breast cancer s/p lumpectomy, fibroid s/p hysterectomy, hyperparathyroidism, DVT (on Eliquis), MGUS, HLD, and GERD presents to the ED s/p carbon monoxide exposure. Pt reports she was exposed to gas leak and carbon monoxide in her office x2 days. Pt started not to feel well last night- states feels tired and occasionally feels SOB. Denies HA, vision changes, numbness tingling weakness, CP, fevers. No other complaints at this time.

## 2024-02-07 NOTE — ED STATDOCS - PHYSICAL EXAMINATION
Constitutional: well, NAD AAOx3  Eyes: EOMI, PERRL  Head: Normocephalic atraumatic  Mouth: no airway obstruction  Cardiac: regular rate and rhythm  Resp: Lungs CTAB  GI: Abd s/nt/nd  Neuro: CN2-12 intact, no focal deficit  Skin: No rashes

## 2024-02-15 NOTE — ED PROVIDER NOTE - NS ED MD DISPO ISOLATION TYPES
None Azithromycin Pregnancy And Lactation Text: This medication is considered safe during pregnancy and is also secreted in breast milk. Fluconazole Pregnancy And Lactation Text: This medication is Pregnancy Category C and it isn't know if it is safe during pregnancy. It is also excreted in breast milk. Detail Level: Simple Tremfya Pregnancy And Lactation Text: The risk during pregnancy and breastfeeding is uncertain with this medication. Oxybutynin Pregnancy And Lactation Text: This medication is Pregnancy Category B and is considered safe during pregnancy. It is unknown if it is excreted in breast milk. Griseofulvin Pregnancy And Lactation Text: This medication is Pregnancy Category X and is known to cause serious birth defects. It is unknown if this medication is excreted in breast milk but breast feeding should be avoided. Opzelura Counseling:  I discussed with the patient the risks of Opzelura including but not limited to nasopharngitis, bronchitis, ear infection, eosinophila, hives, diarrhea, folliculitis, tonsillitis, and rhinorrhea.  Taken orally, this medication has been linked to serious infections; higher rate of mortality; malignancy and lymphoproliferative disorders; major adverse cardiovascular events; thrombosis; thrombocytopenia, anemia, and neutropenia; and lipid elevations. Bexarotene Counseling:  I discussed with the patient the risks of bexarotene including but not limited to hair loss, dry lips/skin/eyes, liver abnormalities, hyperlipidemia, pancreatitis, depression/suicidal ideation, photosensitivity, drug rash/allergic reactions, hypothyroidism, anemia, leukopenia, infection, cataracts, and teratogenicity.  Patient understands that they will need regular blood tests to check lipid profile, liver function tests, white blood cell count, thyroid function tests and pregnancy test if applicable. Nsaids Pregnancy And Lactation Text: These medications are considered safe up to 30 weeks gestation. It is excreted in breast milk. Colchicine Counseling:  Patient counseled regarding adverse effects including but not limited to stomach upset (nausea, vomiting, stomach pain, or diarrhea).  Patient instructed to limit alcohol consumption while taking this medication.  Colchicine may reduce blood counts especially with prolonged use.  The patient understands that monitoring of kidney function and blood counts may be required, especially at baseline. The patient verbalized understanding of the proper use and possible adverse effects of colchicine.  All of the patient's questions and concerns were addressed. Cantharidin Counseling:  I discussed with the patient the risks of Cantharidin including but not limited to pain, redness, burning, itching, and blistering. Wartpeel Counseling:  I discussed with the patient the risks of Wartpeel including but not limited to erythema, scaling, itching, weeping, crusting, and pain. Prednisone Pregnancy And Lactation Text: This medication is Pregnancy Category C and it isn't know if it is safe during pregnancy. This medication is excreted in breast milk. Odomzo Pregnancy And Lactation Text: This medication is Pregnancy Category X and is absolutely contraindicated during pregnancy. It is unknown if it is excreted in breast milk. Solaraze Pregnancy And Lactation Text: This medication is Pregnancy Category B and is considered safe. There is some data to suggest avoiding during the third trimester. It is unknown if this medication is excreted in breast milk. Cimetidine Pregnancy And Lactation Text: This medication is Pregnancy Category B and is considered safe during pregnancy. It is also excreted in breast milk and breast feeding isn't recommended. Valtrex Counseling: I discussed with the patient the risks of valacyclovir including but not limited to kidney damage, nausea, vomiting and severe allergy.  The patient understands that if the infection seems to be worsening or is not improving, they are to call. Finasteride Pregnancy And Lactation Text: This medication is absolutely contraindicated during pregnancy. It is unknown if it is excreted in breast milk. Rinvoq Pregnancy And Lactation Text: Based on animal studies, Rinvoq may cause embryo-fetal harm when administered to pregnant women.  The medication should not be used in pregnancy.  Breastfeeding is not recommended during treatment and for 6 days after the last dose. Zyclara Pregnancy And Lactation Text: This medication is Pregnancy Category C. It is unknown if this medication is excreted in breast milk. Ivermectin Pregnancy And Lactation Text: This medication is Pregnancy Category C and it isn't known if it is safe during pregnancy. It is also excreted in breast milk. Cellcept Pregnancy And Lactation Text: This medication is Pregnancy Category D and isn't considered safe during pregnancy. It is unknown if this medication is excreted in breast milk. Cosentyx Pregnancy And Lactation Text: This medication is Pregnancy Category B and is considered safe during pregnancy. It is unknown if this medication is excreted in breast milk. Hydroquinone Counseling:  Patient advised that medication may result in skin irritation, lightening (hypopigmentation), dryness, and burning.  In the event of skin irritation, the patient was advised to reduce the amount of the drug applied or use it less frequently.  Rarely, spots that are treated with hydroquinone can become darker (pseudoochronosis).  Should this occur, patient instructed to stop medication and call the office. The patient verbalized understanding of the proper use and possible adverse effects of hydroquinone.  All of the patient's questions and concerns were addressed. Aklief Pregnancy And Lactation Text: It is unknown if this medication is safe to use during pregnancy.  It is unknown if this medication is excreted in breast milk.  Breastfeeding women should use the topical cream on the smallest area of the skin for the shortest time needed while breastfeeding.  Do not apply to nipple and areola. Cantharidin Pregnancy And Lactation Text: This medication has not been proven safe during pregnancy. It is unknown if this medication is excreted in breast milk. Erythromycin Counseling:  I discussed with the patient the risks of erythromycin including but not limited to GI upset, allergic reaction, drug rash, diarrhea, increase in liver enzymes, and yeast infections. Rifampin Counseling: I discussed with the patient the risks of rifampin including but not limited to liver damage, kidney damage, red-orange body fluids, nausea/vomiting and severe allergy. Hydroxychloroquine Pregnancy And Lactation Text: This medication has been shown to cause fetal harm but it isn't assigned a Pregnancy Risk Category. There are small amounts excreted in breast milk. Topical Ketoconazole Counseling: Patient counseled that this medication may cause skin irritation or allergic reactions.  In the event of skin irritation, the patient was advised to reduce the amount of the drug applied or use it less frequently.   The patient verbalized understanding of the proper use and possible adverse effects of ketoconazole.  All of the patient's questions and concerns were addressed. Xolair Counseling:  Patient informed of potential adverse effects including but not limited to fever, muscle aches, rash and allergic reactions.  The patient verbalized understanding of the proper use and possible adverse effects of Xolair.  All of the patient's questions and concerns were addressed. Olanzapine Counseling- I discussed with the patient the common side effects of olanzapine including but are not limited to: lack of energy, dry mouth, increased appetite, sleepiness, tremor, constipation, dizziness, changes in behavior, or restlessness.  Explained that teenagers are more likely to experience headaches, abdominal pain, pain in the arms or legs, tiredness, and sleepiness.  Serious side effects include but are not limited: increased risk of death in elderly patients who are confused, have memory loss, or dementia-related psychosis; hyperglycemia; increased cholesterol and triglycerides; and weight gain. Bactrim Counseling:  I discussed with the patient the risks of sulfa antibiotics including but not limited to GI upset, allergic reaction, drug rash, diarrhea, dizziness, photosensitivity, and yeast infections.  Rarely, more serious reactions can occur including but not limited to aplastic anemia, agranulocytosis, methemoglobinemia, blood dyscrasias, liver or kidney failure, lung infiltrates or desquamative/blistering drug rashes. Griseofulvin Counseling:  I discussed with the patient the risks of griseofulvin including but not limited to photosensitivity, cytopenia, liver damage, nausea/vomiting and severe allergy.  The patient understands that this medication is best absorbed when taken with a fatty meal (e.g., ice cream or french fries). Itraconazole Counseling:  I discussed with the patient the risks of itraconazole including but not limited to liver damage, nausea/vomiting, neuropathy, and severe allergy.  The patient understands that this medication is best absorbed when taken with acidic beverages such as non-diet cola or ginger ale.  The patient understands that monitoring is required including baseline LFTs and repeat LFTs at intervals.  The patient understands that they are to contact us or the primary physician if concerning signs are noted. Propranolol Counseling:  I discussed with the patient the risks of propranolol including but not limited to low heart rate, low blood pressure, low blood sugar, restlessness and increased cold sensitivity. They should call the office if they experience any of these side effects. Opzelura Pregnancy And Lactation Text: There is insufficient data to evaluate drug-associated risk for major birth defects, miscarriage, or other adverse maternal or fetal outcomes.  There is a pregnancy registry that monitors pregnancy outcomes in pregnant persons exposed to the medication during pregnancy.  It is unknown if this medication is excreted in breast milk.  Do not breastfeed during treatment and for about 4 weeks after the last dose. Bexarotene Pregnancy And Lactation Text: This medication is Pregnancy Category X and should not be given to women who are pregnant or may become pregnant. This medication should not be used if you are breast feeding. Colchicine Pregnancy And Lactation Text: This medication is Pregnancy Category C and isn't considered safe during pregnancy. It is excreted in breast milk. Wartpeel Pregnancy And Lactation Text: This medication is Pregnancy Category X and contraindicated in pregnancy and in women who may become pregnant. It is unknown if this medication is excreted in breast milk. Valtrex Pregnancy And Lactation Text: this medication is Pregnancy Category B and is considered safe during pregnancy. This medication is not directly found in breast milk but it's metabolite acyclovir is present. Skyrizi Counseling: I discussed with the patient the risks of risankizumab-rzaa including but not limited to immunosuppression, and serious infections.  The patient understands that monitoring is required including a PPD at baseline and must alert us or the primary physician if symptoms of infection or other concerning signs are noted. Soolantra Counseling: I discussed with the patients the risks of topial Soolantra. This is a medicine which decreases the number of mites and inflammation in the skin. You experience burning, stinging, eye irritation or allergic reactions.  Please call our office if you develop any problems from using this medication. Doxepin Counseling:  Patient advised that the medication is sedating and not to drive a car after taking this medication. Patient informed of potential adverse effects including but not limited to dry mouth, urinary retention, and blurry vision.  The patient verbalized understanding of the proper use and possible adverse effects of doxepin.  All of the patient's questions and concerns were addressed. Infliximab Counseling:  I discussed with the patient the risks of infliximab including but not limited to myelosuppression, immunosuppression, autoimmune hepatitis, demyelinating diseases, lymphoma, and serious infections.  The patient understands that monitoring is required including a PPD at baseline and must alert us or the primary physician if symptoms of infection or other concerning signs are noted. Dutasteride Male Counseling: Dustasteride Counseling:  I discussed with the patient the risks of use of dutasteride including but not limited to decreased libido, decreased ejaculate volume, and gynecomastia. Women who can become pregnant should not handle medication.  All of the patient's questions and concerns were addressed. Hydroquinone Pregnancy And Lactation Text: This medication has not been assigned a Pregnancy Risk Category but animal studies failed to show danger with the topical medication. It is unknown if the medication is excreted in breast milk. Cyclophosphamide Counseling:  I discussed with the patient the risks of cyclophosphamide including but not limited to hair loss, hormonal abnormalities, decreased fertility, abdominal pain, diarrhea, nausea and vomiting, bone marrow suppression and infection. The patient understands that monitoring is required while taking this medication. Sotyktu Counseling:  I discussed the most common side effects of Sotyktu including: common cold, sore throat, sinus infections, cold sores, canker sores, folliculitis, and acne.? I also discussed more serious side effects of Sotyktu including but not limited to: serious allergic reactions; increased risk for infections such as TB; cancers such as lymphomas; rhabdomyolysis and elevated CPK; and elevated triglycerides and liver enzymes.? Azelaic Acid Counseling: Patient counseled that medicine may cause skin irritation and to avoid applying near the eyes.  In the event of skin irritation, the patient was advised to reduce the amount of the drug applied or use it less frequently.   The patient verbalized understanding of the proper use and possible adverse effects of azelaic acid.  All of the patient's questions and concerns were addressed. Birth Control Pills Counseling: Birth Control Pill Counseling: I discussed with the patient the potential side effects of OCPs including but not limited to increased risk of stroke, heart attack, thrombophlebitis, deep venous thrombosis, hepatic adenomas, breast changes, GI upset, headaches, and depression.  The patient verbalized understanding of the proper use and possible adverse effects of OCPs. All of the patient's questions and concerns were addressed. Include Pregnancy/Lactation Warning?: No 5-Fu Counseling: 5-Fluorouracil Counseling:  I discussed with the patient the risks of 5-fluorouracil including but not limited to erythema, scaling, itching, weeping, crusting, and pain. Low Dose Naltrexone Counseling- I discussed with the patient the potential risks and side effects of low dose naltrexone including but not limited to: more vivid dreams, headaches, nausea, vomiting, abdominal pain, fatigue, dizziness, and anxiety. Dupixent Counseling: I discussed with the patient the risks of dupilumab including but not limited to eye infection and irritation, cold sores, injection site reactions, worsening of asthma, allergic reactions and increased risk of parasitic infection.  Live vaccines should be avoided while taking dupilumab. Dupilumab will also interact with certain medications such as warfarin and cyclosporine. The patient understands that monitoring is required and they must alert us or the primary physician if symptoms of infection or other concerning signs are noted. Cibinqo Counseling: I discussed with the patient the risks of Cibinqo therapy including but not limited to common cold, nausea, headache, cold sores, increased blood CPK levels, dizziness, UTIs, fatigue, acne, and vomitting. Live vaccines should be avoided.  This medication has been linked to serious infections; higher rate of mortality; malignancy and lymphoproliferative disorders; major adverse cardiovascular events; thrombosis; thrombocytopenia and lymphopenia; lipid elevations; and retinal detachment. Isotretinoin Counseling: Patient should get monthly blood tests, not donate blood, not drive at night if vision affected, not share medication, and not undergo elective surgery for 6 months after tx completed. Side effects reviewed, pt to contact office should one occur. Erythromycin Pregnancy And Lactation Text: This medication is Pregnancy Category B and is considered safe during pregnancy. It is also excreted in breast milk. Xolair Pregnancy And Lactation Text: This medication is Pregnancy Category B and is considered safe during pregnancy. This medication is excreted in breast milk. Adbry Counseling: I discussed with the patient the risks of tralokinumab including but not limited to eye infection and irritation, cold sores, injection site reactions, worsening of asthma, allergic reactions and increased risk of parasitic infection.  Live vaccines should be avoided while taking tralokinumab. The patient understands that monitoring is required and they must alert us or the primary physician if symptoms of infection or other concerning signs are noted. Picato Counseling:  I discussed with the patient the risks of Picato including but not limited to erythema, scaling, itching, weeping, crusting, and pain. Bactrim Pregnancy And Lactation Text: This medication is Pregnancy Category D and is known to cause fetal risk.  It is also excreted in breast milk. Propranolol Pregnancy And Lactation Text: This medication is Pregnancy Category C and it isn't known if it is safe during pregnancy. It is excreted in breast milk. Rifampin Pregnancy And Lactation Text: This medication is Pregnancy Category C and it isn't know if it is safe during pregnancy. It is also excreted in breast milk and should not be used if you are breast feeding. Dapsone Counseling: I discussed with the patient the risks of dapsone including but not limited to hemolytic anemia, agranulocytosis, rashes, methemoglobinemia, kidney failure, peripheral neuropathy, headaches, GI upset, and liver toxicity.  Patients who start dapsone require monitoring including baseline LFTs and weekly CBCs for the first month, then every month thereafter.  The patient verbalized understanding of the proper use and possible adverse effects of dapsone.  All of the patient's questions and concerns were addressed. Opioid Counseling: I discussed with the patient the potential side effects of opioids including but not limited to addiction, altered mental status, and depression. I stressed avoiding alcohol, benzodiazepines, muscle relaxants and sleep aids unless specifically okayed by a physician. The patient verbalized understanding of the proper use and possible adverse effects of opioids. All of the patient's questions and concerns were addressed. They were instructed to flush the remaining pills down the toilet if they did not need them for pain. Winlevi Counseling:  I discussed with the patient the risks of topical clascoterone including but not limited to erythema, scaling, itching, and stinging. Patient voiced their understanding. Doxepin Pregnancy And Lactation Text: This medication is Pregnancy Category C and it isn't known if it is safe during pregnancy. It is also excreted in breast milk and breast feeding isn't recommended. Soolantra Pregnancy And Lactation Text: This medication is Pregnancy Category C. This medication is considered safe during breast feeding. Olanzapine Pregnancy And Lactation Text: This medication is pregnancy category C.   There are no adequate and well controlled trials with olanzapine in pregnant females.  Olanzapine should be used during pregnancy only if the potential benefit justifies the potential risk to the fetus.   In a study in lactating healthy women, olanzapine was excreted in breast milk.  It is recommended that women taking olanzapine should not breast feed. Dutasteride Female Counseling: Dutasteride Counseling:  I discussed with the patient the risks of use of dutasteride including but not limited to decreased libido and sexual dysfunction. Explained the teratogenic nature of the medication and stressed the importance of not getting pregnant during treatment. All of the patient's questions and concerns were addressed. Azelaic Acid Pregnancy And Lactation Text: This medication is considered safe during pregnancy and breast feeding. Cyclophosphamide Pregnancy And Lactation Text: This medication is Pregnancy Category D and it isn't considered safe during pregnancy. This medication is excreted in breast milk. Topical Metronidazole Counseling: Metronidazole is a topical antibiotic medication. You may experience burning, stinging, redness, or allergic reactions.  Please call our office if you develop any problems from using this medication. Dupixent Pregnancy And Lactation Text: This medication likely crosses the placenta but the risk for the fetus is uncertain. This medication is excreted in breast milk. Birth Control Pills Pregnancy And Lactation Text: This medication should be avoided if pregnant and for the first 30 days post-partum. Low Dose Naltrexone Pregnancy And Lactation Text: Naltrexone is pregnancy category C.  There have been no adequate and well-controlled studies in pregnant women.  It should be used in pregnancy only if the potential benefit justifies the potential risk to the fetus.   Limited data indicates that naltrexone is minimally excreted into breastmilk. SSKI Counseling:  I discussed with the patient the risks of SSKI including but not limited to thyroid abnormalities, metallic taste, GI upset, fever, headache, acne, arthralgias, paraesthesias, lymphadenopathy, easy bleeding, arrhythmias, and allergic reaction. Cibinqo Pregnancy And Lactation Text: It is unknown if this medication will adversely affect pregnancy or breast feeding.  You should not take this medication if you are currently pregnant or planning a pregnancy or while breastfeeding. Isotretinoin Pregnancy And Lactation Text: This medication is Pregnancy Category X and is considered extremely dangerous during pregnancy. It is unknown if it is excreted in breast milk. Sotyktu Pregnancy And Lactation Text: There is insufficient data to evaluate whether or not Sotyktu is safe to use during pregnancy.? ?It is not known if Sotyktu passes into breast milk and whether or not it is safe to use when breastfeeding.?? Imiquimod Counseling:  I discussed with the patient the risks of imiquimod including but not limited to erythema, scaling, itching, weeping, crusting, and pain.  Patient understands that the inflammatory response to imiquimod is variable from person to person and was educated regarded proper titration schedule.  If flu-like symptoms develop, patient knows to discontinue the medication and contact us. Sarecycline Counseling: Patient advised regarding possible photosensitivity and discoloration of the teeth, skin, lips, tongue and gums.  Patient instructed to avoid sunlight, if possible.  When exposed to sunlight, patients should wear protective clothing, sunglasses, and sunscreen.  The patient was instructed to call the office immediately if the following severe adverse effects occur:  hearing changes, easy bruising/bleeding, severe headache, or vision changes.  The patient verbalized understanding of the proper use and possible adverse effects of sarecycline.  All of the patient's questions and concerns were addressed. Metronidazole Counseling:  I discussed with the patient the risks of metronidazole including but not limited to seizures, nausea/vomiting, a metallic taste in the mouth, nausea/vomiting and severe allergy. Ketoconazole Counseling:   Patient counseled regarding improving absorption with orange juice.  Adverse effects include but are not limited to breast enlargement, headache, diarrhea, nausea, upset stomach, liver function test abnormalities, taste disturbance, and stomach pain.  There is a rare possibility of liver failure that can occur when taking ketoconazole. The patient understands that monitoring of LFTs may be required, especially at baseline. The patient verbalized understanding of the proper use and possible adverse effects of ketoconazole.  All of the patient's questions and concerns were addressed. Adbry Pregnancy And Lactation Text: It is unknown if this medication will adversely affect pregnancy or breast feeding. Stelara Counseling:  I discussed with the patient the risks of ustekinumab including but not limited to immunosuppression, malignancy, posterior leukoencephalopathy syndrome, and serious infections.  The patient understands that monitoring is required including a PPD at baseline and must alert us or the primary physician if symptoms of infection or other concerning signs are noted. Dapsone Pregnancy And Lactation Text: This medication is Pregnancy Category C and is not considered safe during pregnancy or breast feeding. Cephalexin Counseling: I counseled the patient regarding use of cephalexin as an antibiotic for prophylactic and/or therapeutic purposes. Cephalexin (commonly prescribed under brand name Keflex) is a cephalosporin antibiotic which is active against numerous classes of bacteria, including most skin bacteria. Side effects may include nausea, diarrhea, gastrointestinal upset, rash, hives, yeast infections, and in rare cases, hepatitis, kidney disease, seizures, fever, confusion, neurologic symptoms, and others. Patients with severe allergies to penicillin medications are cautioned that there is about a 10% incidence of cross-reactivity with cephalosporins. When possible, patients with penicillin allergies should use alternatives to cephalosporins for antibiotic therapy. Opioid Pregnancy And Lactation Text: These medications can lead to premature delivery and should be avoided during pregnancy. These medications are also present in breast milk in small amounts. Winlevi Pregnancy And Lactation Text: This medication is considered safe during pregnancy and breastfeeding. Erivedge Counseling- I discussed with the patient the risks of Erivedge including but not limited to nausea, vomiting, diarrhea, constipation, weight loss, changes in the sense of taste, decreased appetite, muscle spasms, and hair loss.  The patient verbalized understanding of the proper use and possible adverse effects of Erivedge.  All of the patient's questions and concerns were addressed. Topical Retinoid counseling:  Patient advised to apply a pea-sized amount only at bedtime and wait 30 minutes after washing their face before applying.  If too drying, patient may add a non-comedogenic moisturizer. The patient verbalized understanding of the proper use and possible adverse effects of retinoids.  All of the patient's questions and concerns were addressed. Niacinamide Counseling: I recommended taking niacin or niacinamide, also know as vitamin B3, twice daily. Recent evidence suggests that taking vitamin B3 (500 mg twice daily) can reduce the risk of actinic keratoses and non-melanoma skin cancers. Side effects of vitamin B3 include flushing and headache. Hydroxyzine Counseling: Patient advised that the medication is sedating and not to drive a car after taking this medication.  Patient informed of potential adverse effects including but not limited to dry mouth, urinary retention, and blurry vision.  The patient verbalized understanding of the proper use and possible adverse effects of hydroxyzine.  All of the patient's questions and concerns were addressed. Oral Minoxidil Counseling- I discussed with the patient the risks of oral minoxidil including but not limited to shortness of breath, swelling of the feet or ankles, dizziness, lightheadedness, unwanted hair growth and allergic reaction.  The patient verbalized understanding of the proper use and possible adverse effects of oral minoxidil.  All of the patient's questions and concerns were addressed. Drysol Counseling:  I discussed with the patient the risks of drysol/aluminum chloride including but not limited to skin rash, itching, irritation, burning. Rituxan Counseling:  I discussed with the patient the risks of Rituxan infusions. Side effects can include infusion reactions, severe drug rashes including mucocutaneous reactions, reactivation of latent hepatitis and other infections and rarely progressive multifocal leukoencephalopathy.  All of the patient's questions and concerns were addressed. Dutasteride Pregnancy And Lactation Text: This medication is absolutely contraindicated in women, especially during pregnancy and breast feeding. Feminization of male fetuses is possible if taking while pregnant. Enbrel Counseling:  I discussed with the patient the risks of etanercept including but not limited to myelosuppression, immunosuppression, autoimmune hepatitis, demyelinating diseases, lymphoma, and infections.  The patient understands that monitoring is required including a PPD at baseline and must alert us or the primary physician if symptoms of infection or other concerning signs are noted. Spironolactone Counseling: Patient advised regarding risks of diarrhea, abdominal pain, hyperkalemia, birth defects (for female patients), liver toxicity and renal toxicity. The patient may need blood work to monitor liver and kidney function and potassium levels while on therapy. The patient verbalized understanding of the proper use and possible adverse effects of spironolactone.  All of the patient's questions and concerns were addressed. Klisyri Pregnancy And Lactation Text: It is unknown if this medication can harm a developing fetus or if it is excreted in breast milk. Sski Pregnancy And Lactation Text: This medication is Pregnancy Category D and isn't considered safe during pregnancy. It is excreted in breast milk. High Dose Vitamin A Counseling: Side effects reviewed, pt to contact office should one occur. Topical Metronidazole Pregnancy And Lactation Text: This medication is Pregnancy Category B and considered safe during pregnancy.  It is also considered safe to use while breastfeeding. Litfulo Counseling: I discussed with the patient the risks of Litfulo therapy including but not limited to upper respiratory tract infections, shingles, cold sores, and nausea. Live vaccines should be avoided.  This medication has been linked to serious infections; higher rate of mortality; malignancy and lymphoproliferative disorders; major adverse cardiovascular events; thrombosis; gastrointestinal perforations; neutropenia; lymphopenia; anemia; liver enzyme elevations; and lipid elevations. Cyclosporine Counseling:  I discussed with the patient the risks of cyclosporine including but not limited to hypertension, gingival hyperplasia,myelosuppression, immunosuppression, liver damage, kidney damage, neurotoxicity, lymphoma, and serious infections. The patient understands that monitoring is required including baseline blood pressure, CBC, CMP, lipid panel and uric acid, and then 1-2 times monthly CMP and blood pressure. Xeljanz Counseling: I discussed with the patient the risks of Xeljanz therapy including increased risk of infection, liver issues, headache, diarrhea, or cold symptoms. Live vaccines should be avoided. They were instructed to call if they have any problems. VTAMA Counseling: I discussed with the patient that VTAMA is not for use in the eyes, mouth or mouth. They should call the office if they develop any signs of allergic reactions to VTAMA. The patient verbalized understanding of the proper use and possible adverse effects of VTAMA.  All of the patient's questions and concerns were addressed. Benzoyl Peroxide Counseling: Patient counseled that medicine may cause skin irritation and bleach clothing.  In the event of skin irritation, the patient was advised to reduce the amount of the drug applied or use it less frequently.   The patient verbalized understanding of the proper use and possible adverse effects of benzoyl peroxide.  All of the patient's questions and concerns were addressed. Metronidazole Pregnancy And Lactation Text: This medication is Pregnancy Category B and considered safe during pregnancy.  It is also excreted in breast milk. Sarecycline Pregnancy And Lactation Text: This medication is Pregnancy Category D and not consider safe during pregnancy. It is also excreted in breast milk. Protopic Counseling: Patient may experience a mild burning sensation during topical application. Protopic is not approved in children less than 2 years of age. There have been case reports of hematologic and skin malignancies in patients using topical calcineurin inhibitors although causality is questionable. Bimzelx Counseling:  I discussed with the patient the risks of Bimzelx including but not limited to depression, immunosuppression, allergic reactions and infections.  The patient understands that monitoring is required including a PPD at baseline and must alert us or the primary physician if symptoms of infection or other concerning signs are noted. Ketoconazole Pregnancy And Lactation Text: This medication is Pregnancy Category C and it isn't know if it is safe during pregnancy. It is also excreted in breast milk and breast feeding isn't recommended. Gabapentin Counseling: I discussed with the patient the risks of gabapentin including but not limited to dizziness, somnolence, fatigue and ataxia. Cephalexin Pregnancy And Lactation Text: This medication is Pregnancy Category B and considered safe during pregnancy.  It is also excreted in breast milk but can be used safely for shorter doses. Spironolactone Pregnancy And Lactation Text: This medication can cause feminization of the male fetus and should be avoided during pregnancy. The active metabolite is also found in breast milk. Hydroxyzine Pregnancy And Lactation Text: This medication is not safe during pregnancy and should not be taken. It is also excreted in breast milk and breast feeding isn't recommended. Niacinamide Pregnancy And Lactation Text: These medications are considered safe during pregnancy. Oral Minoxidil Pregnancy And Lactation Text: This medication should only be used when clearly needed if you are pregnant, attempting to become pregnant or breast feeding. Minoxidil Counseling: Minoxidil is a topical medication which can increase blood flow where it is applied. It is uncertain how this medication increases hair growth. Side effects are uncommon and include stinging and allergic reactions. Arava Counseling:  Patient counseled regarding adverse effects of Arava including but not limited to nausea, vomiting, abnormalities in liver function tests. Patients may develop mouth sores, rash, diarrhea, and abnormalities in blood counts. The patient understands that monitoring is required including LFTs and blood counts.  There is a rare possibility of scarring of the liver and lung problems that can occur when taking methotrexate. Persistent nausea, loss of appetite, pale stools, dark urine, cough, and shortness of breath should be reported immediately. Patient advised to discontinue Arava treatment and consult with a physician prior to attempting conception. The patient will have to undergo a treatment to eliminate Arava from the body prior to conception. Topical Steroids Counseling: I discussed with the patient that prolonged use of topical steroids can result in the increased appearance of superficial blood vessels (telangiectasias), lightening (hypopigmentation) and thinning of the skin (atrophy).  Patient understands to avoid using high potency steroids in skin folds, the groin or the face.  The patient verbalized understanding of the proper use and possible adverse effects of topical steroids.  All of the patient's questions and concerns were addressed. Rituxan Pregnancy And Lactation Text: This medication is Pregnancy Category C and it isn't know if it is safe during pregnancy. It is unknown if this medication is excreted in breast milk but similar antibodies are known to be excreted. Thalidomide Counseling: I discussed with the patient the risks of thalidomide including but not limited to birth defects, anxiety, weakness, chest pain, dizziness, cough and severe allergy. Finasteride Male Counseling: Finasteride Counseling:  I discussed with the patient the risks of use of finasteride including but not limited to decreased libido, decreased ejaculate volume, gynecomastia, and depression. Women should not handle medication.  All of the patient's questions and concerns were addressed. Klisyri Counseling:  I discussed with the patient the risks of Klisyri including but not limited to erythema, scaling, itching, weeping, crusting, and pain. Xelchnez Pregnancy And Lactation Text: This medication is Pregnancy Category D and is not considered safe during pregnancy.  The risk during breast feeding is also uncertain. Benzoyl Peroxide Pregnancy And Lactation Text: This medication is Pregnancy Category C. It is unknown if benzoyl peroxide is excreted in breast milk. Minocycline Counseling: Patient advised regarding possible photosensitivity and discoloration of the teeth, skin, lips, tongue and gums.  Patient instructed to avoid sunlight, if possible.  When exposed to sunlight, patients should wear protective clothing, sunglasses, and sunscreen.  The patient was instructed to call the office immediately if the following severe adverse effects occur:  hearing changes, easy bruising/bleeding, severe headache, or vision changes.  The patient verbalized understanding of the proper use and possible adverse effects of minocycline.  All of the patient's questions and concerns were addressed. Tetracycline Counseling: Patient counseled regarding possible photosensitivity and increased risk for sunburn.  Patient instructed to avoid sunlight, if possible.  When exposed to sunlight, patients should wear protective clothing, sunglasses, and sunscreen.  The patient was instructed to call the office immediately if the following severe adverse effects occur:  hearing changes, easy bruising/bleeding, severe headache, or vision changes.  The patient verbalized understanding of the proper use and possible adverse effects of tetracycline.  All of the patient's questions and concerns were addressed. Patient understands to avoid pregnancy while on therapy due to potential birth defects. Terbinafine Counseling: Patient counseling regarding adverse effects of terbinafine including but not limited to headache, diarrhea, rash, upset stomach, liver function test abnormalities, itching, taste/smell disturbance, nausea, abdominal pain, and flatulence.  There is a rare possibility of liver failure that can occur when taking terbinafine.  The patient understands that a baseline LFT and kidney function test may be required. The patient verbalized understanding of the proper use and possible adverse effects of terbinafine.  All of the patient's questions and concerns were addressed. Bimzelx Pregnancy And Lactation Text: This medication crosses the placenta and the safety is uncertain during pregnancy. It is unknown if this medication is present in breast milk. High Dose Vitamin A Pregnancy And Lactation Text: High dose vitamin A therapy is contraindicated during pregnancy and breast feeding. Protopic Pregnancy And Lactation Text: This medication is Pregnancy Category C. It is unknown if this medication is excreted in breast milk when applied topically. Vtama Pregnancy And Lactation Text: It is unknown if this medication can cause problems during pregnancy and breastfeeding. Elidel Counseling: Patient may experience a mild burning sensation during topical application. Elidel is not approved in children less than 2 years of age. There have been case reports of hematologic and skin malignancies in patients using topical calcineurin inhibitors although causality is questionable. Litfulo Pregnancy And Lactation Text: Based on animal studies, Lifulo may cause embryo-fetal harm when administered to pregnant women.  The medication should not be used in pregnancy.  Breastfeeding is not recommended during treatment. Taltz Counseling: I discussed with the patient the risks of ixekizumab including but not limited to immunosuppression, serious infections, worsening of inflammatory bowel disease and drug reactions.  The patient understands that monitoring is required including a PPD at baseline and must alert us or the primary physician if symptoms of infection or other concerning signs are noted. Tazorac Counseling:  Patient advised that medication is irritating and drying.  Patient may need to apply sparingly and wash off after an hour before eventually leaving it on overnight.  The patient verbalized understanding of the proper use and possible adverse effects of tazorac.  All of the patient's questions and concerns were addressed. Clindamycin Counseling: I counseled the patient regarding use of clindamycin as an antibiotic for prophylactic and/or therapeutic purposes. Clindamycin is active against numerous classes of bacteria, including skin bacteria. Side effects may include nausea, diarrhea, gastrointestinal upset, rash, hives, yeast infections, and in rare cases, colitis. Libtayo Counseling- I discussed with the patient the risks of Libtayo including but not limited to nausea, vomiting, diarrhea, and bone or muscle pain.  The patient verbalized understanding of the proper use and possible adverse effects of Libtayo.  All of the patient's questions and concerns were addressed. Siliq Counseling:  I discussed with the patient the risks of Siliq including but not limited to new or worsening depression, suicidal thoughts and behavior, immunosuppression, malignancy, posterior leukoencephalopathy syndrome, and serious infections.  The patient understands that monitoring is required including a PPD at baseline and must alert us or the primary physician if symptoms of infection or other concerning signs are noted. There is also a special program designed to monitor depression which is required with Siliq. Otezla Counseling: The side effects of Otezla were discussed with the patient, including but not limited to worsening or new depression, weight loss, diarrhea, nausea, upper respiratory tract infection, and headache. Patient instructed to call the office should any adverse effect occur.  The patient verbalized understanding of the proper use and possible adverse effects of Otezla.  All the patient's questions and concerns were addressed. Topical Steroids Applications Pregnancy And Lactation Text: Most topical steroids are considered safe to use during pregnancy and lactation.  Any topical steroid applied to the breast or nipple should be washed off before breastfeeding. Qbrexza Counseling:  I discussed with the patient the risks of Qbrexza including but not limited to headache, mydriasis, blurred vision, dry eyes, nasal dryness, dry mouth, dry throat, dry skin, urinary hesitation, and constipation.  Local skin reactions including erythema, burning, stinging, and itching can also occur. Carac Counseling:  I discussed with the patient the risks of Carac including but not limited to erythema, scaling, itching, weeping, crusting, and pain. Methotrexate Counseling:  Patient counseled regarding adverse effects of methotrexate including but not limited to nausea, vomiting, abnormalities in liver function tests. Patients may develop mouth sores, rash, diarrhea, and abnormalities in blood counts. The patient understands that monitoring is required including LFT's and blood counts.  There is a rare possibility of scarring of the liver and lung problems that can occur when taking methotrexate. Persistent nausea, loss of appetite, pale stools, dark urine, cough, and shortness of breath should be reported immediately. Patient advised to discontinue methotrexate treatment at least three months before attempting to become pregnant.  I discussed the need for folate supplements while taking methotrexate.  These supplements can decrease side effects during methotrexate treatment. The patient verbalized understanding of the proper use and possible adverse effects of methotrexate.  All of the patient's questions and concerns were addressed. Glycopyrrolate Counseling:  I discussed with the patient the risks of glycopyrrolate including but not limited to skin rash, drowsiness, dry mouth, difficulty urinating, and blurred vision. Albendazole Counseling:  I discussed with the patient the risks of albendazole including but not limited to cytopenia, kidney damage, nausea/vomiting and severe allergy.  The patient understands that this medication is being used in an off-label manner. Humira Counseling:  I discussed with the patient the risks of adalimumab including but not limited to myelosuppression, immunosuppression, autoimmune hepatitis, demyelinating diseases, lymphoma, and serious infections.  The patient understands that monitoring is required including a PPD at baseline and must alert us or the primary physician if symptoms of infection or other concerning signs are noted. Rhofade Counseling: Rhofade is a topical medication which can decrease superficial blood flow where applied. Side effects are uncommon and include stinging, redness and allergic reactions. Cimzia Counseling:  I discussed with the patient the risks of Cimzia including but not limited to immunosuppression, allergic reactions and infections.  The patient understands that monitoring is required including a PPD at baseline and must alert us or the primary physician if symptoms of infection or other concerning signs are noted. Azathioprine Counseling:  I discussed with the patient the risks of azathioprine including but not limited to myelosuppression, immunosuppression, hepatotoxicity, lymphoma, and infections.  The patient understands that monitoring is required including baseline LFTs, Creatinine, possible TPMP genotyping and weekly CBCs for the first month and then every 2 weeks thereafter.  The patient verbalized understanding of the proper use and possible adverse effects of azathioprine.  All of the patient's questions and concerns were addressed. Olumiant Counseling: I discussed with the patient the risks of Olumiant therapy including but not limited to upper respiratory tract infections, shingles, cold sores, and nausea. Live vaccines should be avoided.  This medication has been linked to serious infections; higher rate of mortality; malignancy and lymphoproliferative disorders; major adverse cardiovascular events; thrombosis; gastrointestinal perforations; neutropenia; lymphopenia; anemia; liver enzyme elevations; and lipid elevations. Libtayo Pregnancy And Lactation Text: This medication is contraindicated in pregnancy and when breast feeding. Zoryve Counseling:  I discussed with the patient that Zoryve is not for use in the eyes, mouth or vagina. The most commonly reported side effects include diarrhea, headache, insomnia, application site pain, upper respiratory tract infections, and urinary tract infections.  All of the patient's questions and concerns were addressed. Tazorac Pregnancy And Lactation Text: This medication is not safe during pregnancy. It is unknown if this medication is excreted in breast milk. Acitretin Counseling:  I discussed with the patient the risks of acitretin including but not limited to hair loss, dry lips/skin/eyes, liver damage, hyperlipidemia, depression/suicidal ideation, photosensitivity.  Serious rare side effects can include but are not limited to pancreatitis, pseudotumor cerebri, bony changes, clot formation/stroke/heart attack.  Patient understands that alcohol is contraindicated since it can result in liver toxicity and significantly prolong the elimination of the drug by many years. Clindamycin Pregnancy And Lactation Text: This medication can be used in pregnancy if certain situations. Clindamycin is also present in breast milk. Mirvaso Counseling: Mirvaso is a topical medication which can decrease superficial blood flow where applied. Side effects are uncommon and include stinging, redness and allergic reactions. Clofazimine Counseling:  I discussed with the patient the risks of clofazimine including but not limited to skin and eye pigmentation, liver damage, nausea/vomiting, gastrointestinal bleeding and allergy. Topical Sulfur Applications Counseling: Topical Sulfur Counseling: Patient counseled that this medication may cause skin irritation or allergic reactions.  In the event of skin irritation, the patient was advised to reduce the amount of the drug applied or use it less frequently.   The patient verbalized understanding of the proper use and possible adverse effects of topical sulfur application.  All of the patient's questions and concerns were addressed. Otezla Pregnancy And Lactation Text: This medication is Pregnancy Category C and it isn't known if it is safe during pregnancy. It is unknown if it is excreted in breast milk. Methotrexate Pregnancy And Lactation Text: This medication is Pregnancy Category X and is known to cause fetal harm. This medication is excreted in breast milk. Rhofade Pregnancy And Lactation Text: This medication has not been assigned a Pregnancy Risk Category. It is unknown if the medication is excreted in breast milk. Glycopyrrolate Pregnancy And Lactation Text: This medication is Pregnancy Category B and is considered safe during pregnancy. It is unknown if it is excreted breast milk. Quinolones Counseling:  I discussed with the patient the risks of fluoroquinolones including but not limited to GI upset, allergic reaction, drug rash, diarrhea, dizziness, photosensitivity, yeast infections, liver function test abnormalities, tendonitis/tendon rupture. Qbrexza Pregnancy And Lactation Text: There is no available data on Qbrexza use in pregnant women.  There is no available data on Qbrexza use in lactation. Doxycycline Counseling:  Patient counseled regarding possible photosensitivity and increased risk for sunburn.  Patient instructed to avoid sunlight, if possible.  When exposed to sunlight, patients should wear protective clothing, sunglasses, and sunscreen.  The patient was instructed to call the office immediately if the following severe adverse effects occur:  hearing changes, easy bruising/bleeding, severe headache, or vision changes.  The patient verbalized understanding of the proper use and possible adverse effects of doxycycline.  All of the patient's questions and concerns were addressed. Tranexamic Acid Counseling:  Patient advised of the small risk of bleeding problems with tranexamic acid. They were also instructed to call if they developed any nausea, vomiting or diarrhea. All of the patient's questions and concerns were addressed. Cimzia Pregnancy And Lactation Text: This medication crosses the placenta but can be considered safe in certain situations. Cimzia may be excreted in breast milk. Olumiant Pregnancy And Lactation Text: Based on animal studies, Olumiant may cause embryo-fetal harm when administered to pregnant women.  The medication should not be used in pregnancy.  Breastfeeding is not recommended during treatment. Oxybutynin Counseling:  I discussed with the patient the risks of oxybutynin including but not limited to skin rash, drowsiness, dry mouth, difficulty urinating, and blurred vision. Azithromycin Counseling:  I discussed with the patient the risks of azithromycin including but not limited to GI upset, allergic reaction, drug rash, diarrhea, and yeast infections. Topical Clindamycin Counseling: Patient counseled that this medication may cause skin irritation or allergic reactions.  In the event of skin irritation, the patient was advised to reduce the amount of the drug applied or use it less frequently.   The patient verbalized understanding of the proper use and possible adverse effects of clindamycin.  All of the patient's questions and concerns were addressed. Tremfya Counseling: I discussed with the patient the risks of guselkumab including but not limited to immunosuppression, serious infections, and drug reactions.  The patient understands that monitoring is required including a PPD at baseline and must alert us or the primary physician if symptoms of infection or other concerning signs are noted. Odomzo Counseling- I discussed with the patient the risks of Odomzo including but not limited to nausea, vomiting, diarrhea, constipation, weight loss, changes in the sense of taste, decreased appetite, muscle spasms, and hair loss.  The patient verbalized understanding of the proper use and possible adverse effects of Odomzo.  All of the patient's questions and concerns were addressed. Acitretin Pregnancy And Lactation Text: This medication is Pregnancy Category X and should not be given to women who are pregnant or may become pregnant in the future. This medication is excreted in breast milk. Eucrisa Counseling: Patient may experience a mild burning sensation during topical application. Eucrisa is not approved in children less than 3 months of age. Calcipotriene Counseling:  I discussed with the patient the risks of calcipotriene including but not limited to erythema, scaling, itching, and irritation. Topical Sulfur Applications Pregnancy And Lactation Text: This medication is considered safe during pregnancy and breast feeding secondary to limited systemic absorption. Cimetidine Counseling:  I discussed with the patient the risks of Cimetidine including but not limited to gynecomastia, headache, diarrhea, nausea, drowsiness, arrhythmias, pancreatitis, skin rashes, psychosis, bone marrow suppression and kidney toxicity. Simponi Counseling:  I discussed with the patient the risks of golimumab including but not limited to myelosuppression, immunosuppression, autoimmune hepatitis, demyelinating diseases, lymphoma, and serious infections.  The patient understands that monitoring is required including a PPD at baseline and must alert us or the primary physician if symptoms of infection or other concerning signs are noted. Nsaids Counseling: NSAID Counseling: I discussed with the patient that NSAIDs should be taken with food. Prolonged use of NSAIDs can result in the development of stomach ulcers.  Patient advised to stop taking NSAIDs if abdominal pain occurs.  The patient verbalized understanding of the proper use and possible adverse effects of NSAIDs.  All of the patient's questions and concerns were addressed. Fluconazole Counseling:  Patient counseled regarding adverse effects of fluconazole including but not limited to headache, diarrhea, nausea, upset stomach, liver function test abnormalities, taste disturbance, and stomach pain.  There is a rare possibility of liver failure that can occur when taking fluconazole.  The patient understands that monitoring of LFTs and kidney function test may be required, especially at baseline. The patient verbalized understanding of the proper use and possible adverse effects of fluconazole.  All of the patient's questions and concerns were addressed. Ivermectin Counseling:  Patient instructed to take medication on an empty stomach with a full glass of water.  Patient informed of potential adverse effects including but not limited to nausea, diarrhea, dizziness, itching, and swelling of the extremities or lymph nodes.  The patient verbalized understanding of the proper use and possible adverse effects of ivermectin.  All of the patient's questions and concerns were addressed. Ilumya Counseling: I discussed with the patient the risks of tildrakizumab including but not limited to immunosuppression, malignancy, posterior leukoencephalopathy syndrome, and serious infections.  The patient understands that monitoring is required including a PPD at baseline and must alert us or the primary physician if symptoms of infection or other concerning signs are noted. Finasteride Female Counseling: Finasteride Counseling:  I discussed with the patient the risks of use of finasteride including but not limited to decreased libido and sexual dysfunction. Explained the teratogenic nature of the medication and stressed the importance of not getting pregnant during treatment. All of the patient's questions and concerns were addressed. Prednisone Counseling:  I discussed with the patient the risks of prolonged use of prednisone including but not limited to weight gain, insomnia, osteoporosis, mood changes, diabetes, susceptibility to infection, glaucoma and high blood pressure.  In cases where prednisone use is prolonged, patients should be monitored with blood pressure checks, serum glucose levels and an eye exam.  Additionally, the patient may need to be placed on GI prophylaxis, PCP prophylaxis, and calcium and vitamin D supplementation and/or a bisphosphonate.  The patient verbalized understanding of the proper use and the possible adverse effects of prednisone.  All of the patient's questions and concerns were addressed. Hydroxychloroquine Counseling:  I discussed with the patient that a baseline ophthalmologic exam is needed at the start of therapy and every year thereafter while on therapy. A CBC may also be warranted for monitoring.  The side effects of this medication were discussed with the patient, including but not limited to agranulocytosis, aplastic anemia, seizures, rashes, retinopathy, and liver toxicity. Patient instructed to call the office should any adverse effect occur.  The patient verbalized understanding of the proper use and possible adverse effects of Plaquenil.  All the patient's questions and concerns were addressed. Solaraze Counseling:  I discussed with the patient the risks of Solaraze including but not limited to erythema, scaling, itching, weeping, crusting, and pain. Tranexamic Acid Pregnancy And Lactation Text: It is unknown if this medication is safe during pregnancy or breast feeding. Rinvoq Counseling: I discussed with the patient the risks of Rinvoq therapy including but not limited to upper respiratory tract infections, shingles, cold sores, bronchitis, nausea, cough, fever, acne, and headache. Live vaccines should be avoided.  This medication has been linked to serious infections; higher rate of mortality; malignancy and lymphoproliferative disorders; major adverse cardiovascular events; thrombosis; thrombocytopenia, anemia, and neutropenia; lipid elevations; liver enzyme elevations; and gastrointestinal perforations. Cosentyx Counseling:  I discussed with the patient the risks of Cosentyx including but not limited to worsening of Crohn's disease, immunosuppression, allergic reactions and infections.  The patient understands that monitoring is required including a PPD at baseline and must alert us or the primary physician if symptoms of infection or other concerning signs are noted. Calcipotriene Pregnancy And Lactation Text: The use of this medication during pregnancy or lactation is not recommended as there is insufficient data. Doxycycline Pregnancy And Lactation Text: This medication is Pregnancy Category D and not consider safe during pregnancy. It is also excreted in breast milk but is considered safe for shorter treatment courses. Aklief counseling:  Patient advised to apply a pea-sized amount only at bedtime and wait 30 minutes after washing their face before applying.  If too drying, patient may add a non-comedogenic moisturizer.  The most commonly reported side effects including irritation, redness, scaling, dryness, stinging, burning, itching, and increased risk of sunburn.  The patient verbalized understanding of the proper use and possible adverse effects of retinoids.  All of the patient's questions and concerns were addressed. Zyclara Counseling:  I discussed with the patient the risks of imiquimod including but not limited to erythema, scaling, itching, weeping, crusting, and pain.  Patient understands that the inflammatory response to imiquimod is variable from person to person and was educated regarded proper titration schedule.  If flu-like symptoms develop, patient knows to discontinue the medication and contact us. Cellcept Counseling:  I discussed with the patient the risks of mycophenolate mofetil including but not limited to infection/immunosuppression, GI upset, hypokalemia, hypercholesterolemia, bone marrow suppression, lymphoproliferative disorders, malignancy, GI ulceration/bleed/perforation, colitis, interstitial lung disease, kidney failure, progressive multifocal leukoencephalopathy, and birth defects.  The patient understands that monitoring is required including a baseline creatinine and regular CBC testing. In addition, patient must alert us immediately if symptoms of infection or other concerning signs are noted.

## 2024-02-24 NOTE — ED ADULT NURSE NOTE - CAS DISCH TRANSFER METHOD
Private car
1. Follow-up with your Primary Medical doctor.  Call Monday for prompt follow-up.  2. Return to Emergency room for any worsening or persistent pain, weakness, fever, vomiting, diarrhea, unable to eat / drink, weak or dizzy, or any other concerning symptoms.  3. See attached instruction sheets for additional information, including information regarding signs and symptoms to look out for, reasons to seek immediate care and other important instructions.  4. Follow-up with urology, call Monday for prompt follow-up  5.  Ceftin twice daily for 7 days

## 2024-03-09 ENCOUNTER — EMERGENCY (EMERGENCY)
Facility: HOSPITAL | Age: 64
LOS: 0 days | Discharge: HOME | End: 2024-03-10
Attending: EMERGENCY MEDICINE
Payer: COMMERCIAL

## 2024-03-09 VITALS
HEIGHT: 65 IN | OXYGEN SATURATION: 99 % | DIASTOLIC BLOOD PRESSURE: 111 MMHG | SYSTOLIC BLOOD PRESSURE: 168 MMHG | HEART RATE: 104 BPM | TEMPERATURE: 98 F | RESPIRATION RATE: 18 BRPM | WEIGHT: 160.06 LBS

## 2024-03-09 DIAGNOSIS — Z94.0 KIDNEY TRANSPLANT STATUS: Chronic | ICD-10-CM

## 2024-03-09 DIAGNOSIS — F29 UNSPECIFIED PSYCHOSIS NOT DUE TO A SUBSTANCE OR KNOWN PHYSIOLOGICAL CONDITION: ICD-10-CM

## 2024-03-09 DIAGNOSIS — F31.10 BIPOLAR DISORDER, CURRENT EPISODE MANIC WITHOUT PSYCHOTIC FEATURES, UNSPECIFIED: ICD-10-CM
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
## 2024-03-09 DIAGNOSIS — N17.9 ACUTE KIDNEY FAILURE, UNSPECIFIED: ICD-10-CM
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
## 2024-03-09 DIAGNOSIS — S90.422A BLISTER (NONTHERMAL), LEFT GREAT TOE, INITIAL ENCOUNTER: ICD-10-CM

## 2024-03-09 DIAGNOSIS — M10.9 GOUT, UNSPECIFIED: ICD-10-CM

## 2024-03-09 DIAGNOSIS — R89.7 ABNORMAL HISTOLOGICAL FINDINGS IN SPECIMENS FROM OTHER ORGANS, SYSTEMS AND TISSUES: Chronic | ICD-10-CM

## 2024-03-09 DIAGNOSIS — Z90.710 ACQUIRED ABSENCE OF BOTH CERVIX AND UTERUS: Chronic | ICD-10-CM

## 2024-03-09 DIAGNOSIS — E21.3 HYPERPARATHYROIDISM, UNSPECIFIED: ICD-10-CM

## 2024-03-09 DIAGNOSIS — K21.9 GASTRO-ESOPHAGEAL REFLUX DISEASE WITHOUT ESOPHAGITIS: ICD-10-CM

## 2024-03-09 DIAGNOSIS — Z88.1 ALLERGY STATUS TO OTHER ANTIBIOTIC AGENTS STATUS: ICD-10-CM

## 2024-03-09 DIAGNOSIS — F25.9 SCHIZOAFFECTIVE DISORDER, UNSPECIFIED: ICD-10-CM

## 2024-03-09 DIAGNOSIS — E78.5 HYPERLIPIDEMIA, UNSPECIFIED: ICD-10-CM

## 2024-03-09 DIAGNOSIS — Z20.822 CONTACT WITH AND (SUSPECTED) EXPOSURE TO COVID-19: ICD-10-CM

## 2024-03-09 LAB
ADD ON TEST-SPECIMEN IN LAB: SIGNIFICANT CHANGE UP
ALBUMIN SERPL ELPH-MCNC: 4.1 G/DL — SIGNIFICANT CHANGE UP (ref 3.3–5)
ALP SERPL-CCNC: 94 U/L — SIGNIFICANT CHANGE UP (ref 40–120)
ALT FLD-CCNC: 55 U/L — SIGNIFICANT CHANGE UP (ref 12–78)
AMPHET UR-MCNC: NEGATIVE — SIGNIFICANT CHANGE UP
ANION GAP SERPL CALC-SCNC: 9 MMOL/L — SIGNIFICANT CHANGE UP (ref 5–17)
APAP SERPL-MCNC: < 2 UG/ML (ref 10–30)
APPEARANCE UR: CLEAR — SIGNIFICANT CHANGE UP
APTT BLD: 31 SEC — SIGNIFICANT CHANGE UP (ref 24.5–35.6)
AST SERPL-CCNC: 39 U/L — HIGH (ref 15–37)
BARBITURATES UR SCN-MCNC: NEGATIVE — SIGNIFICANT CHANGE UP
BASOPHILS # BLD AUTO: 0 K/UL — SIGNIFICANT CHANGE UP (ref 0–0.2)
BASOPHILS NFR BLD AUTO: 0 % — SIGNIFICANT CHANGE UP (ref 0–2)
BENZODIAZ UR-MCNC: NEGATIVE — SIGNIFICANT CHANGE UP
BILIRUB SERPL-MCNC: 0.4 MG/DL — SIGNIFICANT CHANGE UP (ref 0.2–1.2)
BILIRUB UR-MCNC: NEGATIVE — SIGNIFICANT CHANGE UP
BUN SERPL-MCNC: 25 MG/DL — HIGH (ref 7–23)
CALCIUM SERPL-MCNC: 10.4 MG/DL — HIGH (ref 8.5–10.1)
CHLORIDE SERPL-SCNC: 109 MMOL/L — HIGH (ref 96–108)
CO2 SERPL-SCNC: 21 MMOL/L — LOW (ref 22–31)
COCAINE METAB.OTHER UR-MCNC: NEGATIVE — SIGNIFICANT CHANGE UP
COLOR SPEC: YELLOW — SIGNIFICANT CHANGE UP
CREAT SERPL-MCNC: 1.31 MG/DL — HIGH (ref 0.5–1.3)
DIFF PNL FLD: NEGATIVE — SIGNIFICANT CHANGE UP
EGFR: 46 ML/MIN/1.73M2 — LOW
EOSINOPHIL # BLD AUTO: 0.08 K/UL — SIGNIFICANT CHANGE UP (ref 0–0.5)
EOSINOPHIL NFR BLD AUTO: 2 % — SIGNIFICANT CHANGE UP (ref 0–6)
ETHANOL SERPL-MCNC: <10 MG/DL — SIGNIFICANT CHANGE UP (ref 0–10)
GLUCOSE SERPL-MCNC: 157 MG/DL — HIGH (ref 70–99)
GLUCOSE UR QL: NEGATIVE MG/DL — SIGNIFICANT CHANGE UP
HCT VFR BLD CALC: 42.4 % — SIGNIFICANT CHANGE UP (ref 34.5–45)
HGB BLD-MCNC: 14.4 G/DL — SIGNIFICANT CHANGE UP (ref 11.5–15.5)
INR BLD: 1.07 RATIO — SIGNIFICANT CHANGE UP (ref 0.85–1.18)
KETONES UR-MCNC: NEGATIVE MG/DL — SIGNIFICANT CHANGE UP
LACTATE SERPL-SCNC: 1.4 MMOL/L — SIGNIFICANT CHANGE UP (ref 0.7–2)
LACTATE SERPL-SCNC: 2.6 MMOL/L — HIGH (ref 0.7–2)
LEUKOCYTE ESTERASE UR-ACNC: NEGATIVE — SIGNIFICANT CHANGE UP
LYMPHOCYTES # BLD AUTO: 0.99 K/UL — LOW (ref 1–3.3)
LYMPHOCYTES # BLD AUTO: 24 % — SIGNIFICANT CHANGE UP (ref 13–44)
MANUAL SMEAR VERIFICATION: SIGNIFICANT CHANGE UP
MCHC RBC-ENTMCNC: 30.6 PG — SIGNIFICANT CHANGE UP (ref 27–34)
MCHC RBC-ENTMCNC: 34 GM/DL — SIGNIFICANT CHANGE UP (ref 32–36)
MCV RBC AUTO: 90.2 FL — SIGNIFICANT CHANGE UP (ref 80–100)
METHADONE UR-MCNC: NEGATIVE — SIGNIFICANT CHANGE UP
MONOCYTES # BLD AUTO: 0.29 K/UL — SIGNIFICANT CHANGE UP (ref 0–0.9)
MONOCYTES NFR BLD AUTO: 7 % — SIGNIFICANT CHANGE UP (ref 2–14)
MYELOCYTES NFR BLD: 2 % — HIGH (ref 0–0)
NEUTROPHILS # BLD AUTO: 2.67 K/UL — SIGNIFICANT CHANGE UP (ref 1.8–7.4)
NEUTROPHILS NFR BLD AUTO: 65 % — SIGNIFICANT CHANGE UP (ref 43–77)
NITRITE UR-MCNC: NEGATIVE — SIGNIFICANT CHANGE UP
NRBC # BLD: 0 /100 WBCS — SIGNIFICANT CHANGE UP (ref 0–0)
NRBC # BLD: SIGNIFICANT CHANGE UP /100 WBCS (ref 0–0)
OPIATES UR-MCNC: NEGATIVE — SIGNIFICANT CHANGE UP
PCP SPEC-MCNC: SIGNIFICANT CHANGE UP
PCP UR-MCNC: NEGATIVE — SIGNIFICANT CHANGE UP
PH UR: 5.5 — SIGNIFICANT CHANGE UP (ref 5–8)
PLAT MORPH BLD: NORMAL — SIGNIFICANT CHANGE UP
PLATELET # BLD AUTO: 209 K/UL — SIGNIFICANT CHANGE UP (ref 150–400)
POTASSIUM SERPL-MCNC: 4.1 MMOL/L — SIGNIFICANT CHANGE UP (ref 3.5–5.3)
POTASSIUM SERPL-SCNC: 4.1 MMOL/L — SIGNIFICANT CHANGE UP (ref 3.5–5.3)
PROT SERPL-MCNC: 8 GM/DL — SIGNIFICANT CHANGE UP (ref 6–8.3)
PROT UR-MCNC: NEGATIVE MG/DL — SIGNIFICANT CHANGE UP
PROTHROM AB SERPL-ACNC: 12.1 SEC — SIGNIFICANT CHANGE UP (ref 9.5–13)
RBC # BLD: 4.7 M/UL — SIGNIFICANT CHANGE UP (ref 3.8–5.2)
RBC # FLD: 13.3 % — SIGNIFICANT CHANGE UP (ref 10.3–14.5)
RBC BLD AUTO: NORMAL — SIGNIFICANT CHANGE UP
SALICYLATES SERPL-MCNC: <1.7 MG/DL — LOW (ref 2.8–20)
SARS-COV-2 RNA SPEC QL NAA+PROBE: SIGNIFICANT CHANGE UP
SODIUM SERPL-SCNC: 139 MMOL/L — SIGNIFICANT CHANGE UP (ref 135–145)
SP GR SPEC: 1.01 — SIGNIFICANT CHANGE UP (ref 1–1.03)
THC UR QL: NEGATIVE — SIGNIFICANT CHANGE UP
UROBILINOGEN FLD QL: 0.2 MG/DL — SIGNIFICANT CHANGE UP (ref 0.2–1)
WBC # BLD: 4.11 K/UL — SIGNIFICANT CHANGE UP (ref 3.8–10.5)
WBC # FLD AUTO: 4.11 K/UL — SIGNIFICANT CHANGE UP (ref 3.8–10.5)

## 2024-03-09 PROCEDURE — 80053 COMPREHEN METABOLIC PANEL: CPT

## 2024-03-09 PROCEDURE — 36415 COLL VENOUS BLD VENIPUNCTURE: CPT

## 2024-03-09 PROCEDURE — 85610 PROTHROMBIN TIME: CPT

## 2024-03-09 PROCEDURE — 87040 BLOOD CULTURE FOR BACTERIA: CPT

## 2024-03-09 PROCEDURE — 90792 PSYCH DIAG EVAL W/MED SRVCS: CPT | Mod: 95

## 2024-03-09 PROCEDURE — 96372 THER/PROPH/DIAG INJ SC/IM: CPT

## 2024-03-09 PROCEDURE — 87635 SARS-COV-2 COVID-19 AMP PRB: CPT

## 2024-03-09 PROCEDURE — 87086 URINE CULTURE/COLONY COUNT: CPT

## 2024-03-09 PROCEDURE — 80307 DRUG TEST PRSMV CHEM ANLYZR: CPT

## 2024-03-09 PROCEDURE — 93005 ELECTROCARDIOGRAM TRACING: CPT

## 2024-03-09 PROCEDURE — 93010 ELECTROCARDIOGRAM REPORT: CPT

## 2024-03-09 PROCEDURE — 85025 COMPLETE CBC W/AUTO DIFF WBC: CPT

## 2024-03-09 PROCEDURE — 81003 URINALYSIS AUTO W/O SCOPE: CPT

## 2024-03-09 PROCEDURE — 85730 THROMBOPLASTIN TIME PARTIAL: CPT

## 2024-03-09 PROCEDURE — 83605 ASSAY OF LACTIC ACID: CPT

## 2024-03-09 PROCEDURE — 99284 EMERGENCY DEPT VISIT MOD MDM: CPT | Mod: 25

## 2024-03-09 PROCEDURE — 99285 EMERGENCY DEPT VISIT HI MDM: CPT

## 2024-03-09 RX ORDER — APIXABAN 2.5 MG/1
5 TABLET, FILM COATED ORAL
Refills: 0 | Status: DISCONTINUED | OUTPATIENT
Start: 2024-03-09 | End: 2024-03-10

## 2024-03-09 RX ORDER — MYCOPHENOLATE MOFETIL 250 MG/1
250 CAPSULE ORAL
Refills: 0 | Status: DISCONTINUED | OUTPATIENT
Start: 2024-03-09 | End: 2024-03-10

## 2024-03-09 RX ORDER — HALOPERIDOL DECANOATE 100 MG/ML
5 INJECTION INTRAMUSCULAR ONCE
Refills: 0 | Status: COMPLETED | OUTPATIENT
Start: 2024-03-09 | End: 2024-03-09

## 2024-03-09 RX ORDER — SODIUM CHLORIDE 9 MG/ML
2000 INJECTION INTRAMUSCULAR; INTRAVENOUS; SUBCUTANEOUS ONCE
Refills: 0 | Status: COMPLETED | OUTPATIENT
Start: 2024-03-09 | End: 2024-03-09

## 2024-03-09 RX ADMIN — APIXABAN 5 MILLIGRAM(S): 2.5 TABLET, FILM COATED ORAL at 20:32

## 2024-03-09 RX ADMIN — Medication 2 MILLIGRAM(S): at 15:28

## 2024-03-09 RX ADMIN — SODIUM CHLORIDE 2000 MILLILITER(S): 9 INJECTION INTRAMUSCULAR; INTRAVENOUS; SUBCUTANEOUS at 16:27

## 2024-03-09 RX ADMIN — MYCOPHENOLATE MOFETIL 250 MILLIGRAM(S): 250 CAPSULE ORAL at 20:32

## 2024-03-09 RX ADMIN — HALOPERIDOL DECANOATE 5 MILLIGRAM(S): 100 INJECTION INTRAMUSCULAR at 15:28

## 2024-03-09 NOTE — ED ADULT TRIAGE NOTE - CHIEF COMPLAINT QUOTE
Pt presents to the ED c/o left big toe injury. Pt reports stubbing her toe on a nail. Pt reports PMHX, kidney transplant on immunosuppressant meds. At triage, pt speech disorganized, not making sense, poor attention. Hx of bipolar, non compliant on meds. Denies SI/HI. Pt was brought in by PD badge #[id number], report  was concerned and requesting psych eval.

## 2024-03-09 NOTE — ED BEHAVIORAL HEALTH ASSESSMENT NOTE - DESCRIPTION
Patient erratic, rambling, disorganized, belligerent with nursing on arrival, code gray called, patient medicated for psychosis, Haldol 5 mg IM, Ativan 2 mg IM. see HPI lives with , no children, owns insurance company

## 2024-03-09 NOTE — ED BEHAVIORAL HEALTH ASSESSMENT NOTE - SUMMARY
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    The patient presents in manic episode in context of not taking her medications (due to tardive dyskinesia). She has since resumed her medications 2 days ago, though presents irritable, aggressive, requiring IM medication. Collateral from  concerning for decompensation. The patient has no prior SA or violence history and when on her medications presents quite stable. Given that she has resumed her medications, she would benefit from re-evaluation with 's input to determine if she is still in a manic state. She is therefore not psychiatrically cleared at this time.    Plan:  -- hold for reassess and in person collateral from patient's . She has resumed her medications and if remains euthymic, may likely be able to released  -- continue home medications (Haldol 1mg daily, Klonopin 0.5mg daily)

## 2024-03-09 NOTE — ED PROVIDER NOTE - CLINICAL SUMMARY MEDICAL DECISION MAKING FREE TEXT BOX
EKG was performed and independently interpreted by myself revealed normal sinus rhythm, LVH, rate 72, , QRS 76, QTc 420. EKG was performed and independently interpreted by myself revealed normal sinus rhythm, LVH, rate 72, , QRS 76, QTc 420.    Labs reveal mild lactate elevation, cleared with IVF; and VALERIE.      Evaluated by telepsychiatry, plan for hold until AM pending reevaluation.  Patient's immunosuppressive medications and Eliquis were ordered.  Signout to Dr. Bowling.

## 2024-03-09 NOTE — ED ADULT NURSE REASSESSMENT NOTE - NS ED NURSE REASSESS COMMENT FT1
Pt placed in hospital gown. Belongings and valuables brought to security. 1:1 continues to be at bedside. In NAD. Pt placed in hospital gown. Belongings and valuables brought to security. Unable to remove ring on R 4th digit. PREMA Amaro aware. 1:1 continues to be at bedside. In NAD.

## 2024-03-09 NOTE — ED BEHAVIORAL HEALTH NOTE - BEHAVIORAL HEALTH NOTE
“Collateral (Josh, ) has requested that the information provided remain confidential: Yes [  ] No [X]     Collateral (Josh, ) has provided information that patient is/may be unaware of: Yes [  ] No [X]”          “Patient gives permission to obtain collateral from _____:     (X) Yes     (  )  No          ========================     FOR EACH COLLATERAL     ========================     NAME: Josh Eaton     NUMBER: [phone]     RELATIONSHIP:      RELIABILITY:     COMMENTS: Feels comfortable if she were to be released.      ========================     PATIENT DEMOGRAPHICS:     ========================     HPI     BASELINE FUNCTIONING: Pt is a 63f domiciled in private residence with , not a caregiver. Pt is followed by her kidney transplant team at [facility], hx of BP, current med compliant with kidney medication. Non-compliant with psych meds. Hx of inpatient psych admissions of ED visits for psych, no known allergies to food or mediations, no hx of SIB.SA.AVH.HI. At baseline functioning, per collateral pt engages well with others and is an active member of their community.     DATE HPI STARTED: Per collateral, pt is medication compliant, however stopped taking her medication 2 months ago due to Tardive Dyskinesia. Collateral reported pt “has been upset and concerned about her constant lip movement so she stopped taking her meds without telling me...” Collateral stated that they saw pt’s kidney transplant team, including psychiatrist last week to address medicine concerns. Collateral stated that pt’s sx became worse over the past 3 days. Collateral reported that he was with pt, when she called EMS due to her worsening symptoms.     DECOMPENSATION: Increased irritability and anger towards others, manic symptoms     SUICIDALITY: No hx of SI.SIB.SA      VIOLENCE: nasty, angry, blaming everyone for her problems      SUBSTANCE: None endorsed     GROOM/APPEARANCE: Good     ========================     PAST PSYCHIATRIC HISTORY     ========================     DATE PAST PSYCHIATRIC HISTORY STARTED:     MAIN PSYCHIATRIC DIAGNOSIS: BP (dx 18years old)      PSYCHIATRIC HOSPITALIZATIONS: Numerous times. Last was 3-4 years ago ([facility])      PRIOR ILLNESS: No outpatient care other than psych through Kidney team that provides med     SUICIDALITY: None endorsed     VIOLENCE: None endorsed     SUBSTANCE USE: None endorsed     ==============     OTHER HISTORY     ==============     CURRENT MEDICATION: Kidney meds     MEDICAL HISTORY: Kidney transplant April 2021     ALLERGIES: None endorsed     LEGAL ISSUES: None endorsed     FIREARM ACCESS: None endorsed     SOCIAL HISTORY: None endorsed     FAMILY HISTORY: None endorsed     DEVELOPMENTAL HISTORY: Denies

## 2024-03-09 NOTE — ED ADULT NURSE REASSESSMENT NOTE - NS ED NURSE REASSESS COMMENT FT1
Pt awake and alert. Calm and cooperative at this time. Pt eating a sandwich and drinking water. Pt ambulated steadily to the bathroom with 1:1. Resp. even and unlabored. In NAD.

## 2024-03-09 NOTE — ED PROVIDER NOTE - PROGRESS NOTE DETAILS
Patient erratic, rambling, disorganized, belligerent with nursing on arrival, code gray called, patient to be medicated for psychosis, Haldol 5 mg IM, Ativan 2 mg IM.  Will obtain medical workup in light patient recent admission for UTI, is immunosuppressed.  Will discuss with patient's .  And will obtain psychiatric consultation after patient is medically cleared. Attempted to call patient's spouse, did not answer phone, will try again later. I evaluated patient's foot as was complaining of foot pain on arrival.  Patient has a deroofed blister on the pad of her left great toe.  No foreign body, no infection.  She has no bony tenderness or deformity to bilateral feet. Spoke with telepsych, will consult. Sivakumar Carlos D.O. pt signed out to me pending psych. Seen by [facility] attending Dr. Moeller - states Pt ok to be dc. resources given. will dc with follow up and strict return precautions. Josh Bloom M.D., Attending Physician

## 2024-03-09 NOTE — ED ADULT NURSE REASSESSMENT NOTE - NS ED NURSE REASSESS COMMENT FT1
Pt wanded by security. Pt calm and cooperative at this time. Resp. even and unlabored. Offers no complaints. Pending urine sample. In NAD.

## 2024-03-09 NOTE — ED PROVIDER NOTE - CARE PLAN
1 Principal Discharge DX:	Bipolar I disorder with sharlene  Secondary Diagnosis:	VALERIE (acute kidney injury)

## 2024-03-09 NOTE — ED ADULT NURSE REASSESSMENT NOTE - NS ED NURSE REASSESS COMMENT FT1
Pt resting comfortably in stretcher. Resp. even and unlabored. Offers no complaints at this time. Updated on plan of care and verbalizes understanding. Calm and cooperative at this time. VS as noted. 1:1 at bedside. In NAD.

## 2024-03-09 NOTE — ED ADULT NURSE NOTE - NSFALLHARMRISKINTERV_ED_ALL_ED

## 2024-03-09 NOTE — ED ADULT NURSE NOTE - OBJECTIVE STATEMENT
Pt is a 63yr old female, A&OX3, coming from home, arriving to ED with [employer], for "stepping on a nail". As per , pt has been off her psych meds and has been acting bizarre for a few weeks. HX of bipolar. Pt presents to the ED psychotic, manic, and rambling. Noted to have disorganized thoughts. Pt stating "the blood line stops here. I am sacred and my name is 'money honey'". Dr. Carlos at bedside. Denies SI/HI, drug/alcohol use, and auditory/visual/tactile hallucinations. Medicated as per order. 1:1 at bedside. Pt is a 63yr old female, A&OX3, coming from home, arriving to ED with [employer], for "stepping on a nail". As per , pt has been off her psych meds and has been acting bizarre for a few weeks. HX of bipolar. Pt presents to the ED psychotic, manic, and rambling. Noted to have disorganized thoughts. Pt stating "the blood line stops here. I am sacred and my name is 'money honey'". Dr. Carlos at bedside. Denies SI/HI, drug/alcohol use, and auditory/visual/tactile hallucinations. Security at bedside. Pt medicated as per order for psychosis. 1:1 at bedside.

## 2024-03-09 NOTE — ED PROVIDER NOTE - OBJECTIVE STATEMENT
63-year-old female with a past medical history of bipolar 1, schizoaffective, gout, MGUS, secondary hyperparathyroidism, hyperlipidemia, GERD, uterine leiomyoma, CKD, kidney transplant, who presents with chief complaint of psychosis.  Patient's  called PD for erratic behavior.   is currently not available unable to give HPI.  Patient is currently erratic, rambling, disorganized, unable to give HPI review of systems.  Patient with recent admission to the hospital discharged on January 20 for pyelonephritis, sepsis, and was discharged on 7-day course of oral antibiotics after completing course of IV antibiotics.

## 2024-03-09 NOTE — ED BEHAVIORAL HEALTH ASSESSMENT NOTE - HPI (INCLUDE ILLNESS QUALITY, SEVERITY, DURATION, TIMING, CONTEXT, MODIFYING FACTORS, ASSOCIATED SIGNS AND SYMPTOMS)
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    The patient is irritable, somnolent, though able to answer questions.    The patient states that she was at home and stepped on a nail (she suspects) and that there was blood everywhere and she was worried due to her immunosuppression. Her  wouldn't take her to the hospital and therefore she called EMS. She says that at the time she was putting up signs for [employer]. When asked, the patient admitted to stop taking her psychiatric medications (Haldol and Klonopin) 2 months ago because she was experiencing tardive dyskinesia. She was taking half her dose of Haldol and eventually stopped entirely. She states that when she stops her medications she can sometimes get manic and becomes agitated. She went back on her medication 2 days prior and thinks that she is now doing better. She admits that when she's manic, she will stay up later and do more things. Lately she has been consumed with work and managing her finances. The patient denies having any AVH or PI. She denies any SI or HI.    Collateral from patient's , Josh Angelito ([phone]):  He states that the patient stopped her meds 2 months ago and that since then she has become more erratic. She would drive off doing random things and would overspend her money. He insisted that she resume her medications 3 days ago when he realized she had stopped her medications. On the day of presentation, he was building the mailbox and she had broken it and in the process cut herself on her foot. He says that she can keep it together for short periods but has a tendency to unravel. She has never had a SA or NSSI. She doesn't get violent. He is worried that she might need admission if she doesn't get better.

## 2024-03-09 NOTE — ED PROVIDER NOTE - NSFOLLOWUPINSTRUCTIONS_ED_ALL_ED_FT
1. return for worsening symptoms or anything concerning to you  2. take all home meds as prescribed  3. follow up with your pmd call to make an appointment  4. follow up with psych resources

## 2024-03-09 NOTE — ED PROVIDER NOTE - PATIENT PORTAL LINK FT
You can access the FollowMyHealth Patient Portal offered by [facility] by registering at the following website: [email or web address]. By joining [facility]’s FollowMyHealth portal, you will also be able to view your health information using other applications (apps) compatible with our system.

## 2024-03-09 NOTE — ED ADULT NURSE NOTE - CHIEF COMPLAINT QUOTE
Pt presents to the ED c/o left big toe injury. Pt reports stubbing her toe on a nail. Pt reports PMHX, kidney transplant on immunosuppressant meds. At triage, pt speech disorganized, not making sense, poor attention. Hx of bipolar, non compliant on meds. Denies SI/HI. Pt was brought in by PD badge #[id number], report  was concerned and requesting psych eval.

## 2024-03-09 NOTE — ED BEHAVIORAL HEALTH ASSESSMENT NOTE - RISK ASSESSMENT
Risk: prior hospitalizations, poor adherence to treatment  Protective: family support, no prior SA or NSSI, good response when compliant with treatment

## 2024-03-10 VITALS
TEMPERATURE: 98 F | DIASTOLIC BLOOD PRESSURE: 91 MMHG | OXYGEN SATURATION: 98 % | SYSTOLIC BLOOD PRESSURE: 112 MMHG | HEART RATE: 88 BPM | RESPIRATION RATE: 18 BRPM

## 2024-03-10 DIAGNOSIS — F31.10 BIPOLAR DISORDER, CURRENT EPISODE MANIC WITHOUT PSYCHOTIC FEATURES, UNSPECIFIED: ICD-10-CM

## 2024-03-10 LAB
CULTURE RESULTS: SIGNIFICANT CHANGE UP
SPECIMEN SOURCE: SIGNIFICANT CHANGE UP

## 2024-03-10 PROCEDURE — 99215 OFFICE O/P EST HI 40 MIN: CPT | Mod: 95

## 2024-03-10 RX ORDER — HALOPERIDOL 10 MG/1
1 TABLET ORAL
Qty: 30 | Refills: 0 | DISCHARGE
Start: 2024-03-10 | End: 2024-04-08

## 2024-03-10 RX ORDER — HALOPERIDOL DECANOATE 100 MG/ML
1 INJECTION INTRAMUSCULAR
Qty: 30 | Refills: 0
Start: 2024-03-10 | End: 2024-04-08

## 2024-03-10 RX ORDER — BACITRACIN ZINC 500 UNIT/G
1 OINTMENT IN PACKET (EA) TOPICAL ONCE
Refills: 0 | Status: COMPLETED | OUTPATIENT
Start: 2024-03-10 | End: 2024-03-10

## 2024-03-10 RX ORDER — CLONAZEPAM 1 MG
1 TABLET ORAL
Qty: 30 | Refills: 0
Start: 2024-03-10 | End: 2024-04-08

## 2024-03-10 RX ADMIN — APIXABAN 5 MILLIGRAM(S): 2.5 TABLET, FILM COATED ORAL at 09:49

## 2024-03-10 RX ADMIN — MYCOPHENOLATE MOFETIL 250 MILLIGRAM(S): 250 CAPSULE ORAL at 09:50

## 2024-03-10 RX ADMIN — Medication 5 MILLIGRAM(S): at 09:50

## 2024-03-10 RX ADMIN — Medication 1 APPLICATION(S): at 01:46

## 2024-03-10 NOTE — ED BEHAVIORAL HEALTH NOTE - BEHAVIORAL HEALTH NOTE
Pt cleared by psych MD and pt and  interested in outpt tx. ADAM met with pt and  at bedside, SW explained option of [facility] referral and scheduling intake appt for next week. Pt and  reported that pt cannot go to [facility] or mental health treatment in [place] due to conflict of interests. Pt reports she is a  and "deals with a lot of business in [place]". Pt and  inquired into [facility] outpt clinic as that has been previously recommended. SW explained that due to it being Sunday, SW unable to obtain appt today but can send clinicals for referral or pt can go to walk in crisis clinic. Pt agreeable for referral to be sent and signed TALIA and wants tx @ [facility]. Pt explained that pt was in a kidney transplant program @ [facility] but program has ended. Pt and  confirmed that pt has enough medication at home and that [facility] psych MD will continue to treat pt until connected to another psych MD. ADAM provided pt and  with [facility] crisis walk in and oupt clinic as well as DASH flyer. Clinicals sent to [facility] outpt clinic.

## 2024-03-10 NOTE — ED BEHAVIORAL HEALTH PROGRESS NOTE - NS ED BHA PLAN
[facility] [phone]    [facility] VIDEO VISIT PROGRESS NOTE    CHIEF COMPLAINT  Chief Complaint   Patient presents with   • Office Visit     C/o dry cough since last week Thursday 11/16/2023, low energy, cough is worse in morning and at night. No OTC attempted.    • Cough       SUBJECTIVE  HISTORY OF PRESENT ILLNESS:   Long Lambert is a 9 year old male who consents to a two-way Video Visit (V-Visit) evaluation for upper respiratory illness concerns.    Long presents with his mom who is historian who reports dry cough for the past 6 days.  Mom reports cough is dry and persistent throughout the day, worse at night and 1st thing in the morning.  No fevers.  No other acute symptoms.  Cough does not seem to disrupt his activity level.  Eating and drinking.  Mom describes cough is coming in spasms.  Child has not been taking over-the-counter medications.          Denies chest pain, shortness of breath, palpitations, uncontrolled fever, inability to tolerate fluids, abdominal pain.    ALLERGIES  ALLERGIES:   Allergen Reactions   • Penicillins Angioedema and RASH     Full body urticaria multiforme type reaction          MEDICATIONS  No current outpatient medications on file.     No current facility-administered medications for this visit.        OBJECTIVE  PHYSICAL EXAM:   OBJECTIVE  PHYSICAL EXAM:   Information acquired with both clinical support staff and patient assistance using Tyto device, demonstration, and feedback due to two-way video visit method of visit. Portions of assessment may be difficult to visualize precisely given nature of technology and limitations of assessment with virtual platform.  Vitals:   Visit Vitals  BP 96/58 (BP [facility]: RUE - Right upper extremity, Patient Position: Sitting, Cuff Size: Pediatric)   Pulse 76   Temp 98.2 °F (36.8 °C) (Oral)   Resp (!) 18   Ht 5' 1.02\" (1.55 m)   Wt 43.8 kg (96 lb 7.2 oz)   SpO2 97%   BMI 18.21 kg/m²      GENERAL: Awake, alert,  oriented and in no acute distress.  SKIN: Appears pink and dry.  HEENT: Normocephalic, atraumatic.   Eyes: Right eye: conjunctiva normal, lid and lashes are normal and no tearing or drainage. Left eye: conjunctiva normal, lid and lashes are normal and no tearing or drainage.   Ears: external ears normal to inspection and palpation, turgor normal, canals clear, tympanic membranes clear, normal light reflex.   Nose/Sinus: nasal mucosa moist, pink, without rhinorrhea or sinus tenderness  Mouth/Throat: Lips appear moist. uvula midline, no trismus, no \"hot potato voice\" and no drooling or inability to control secretions .     CARDIAC: Normal rate, normal rhythm, no murmurs, no gallops, no rubs.  RESPIRATORY: Lungs are clear to auscultation throughout, no crackles, rales, rhonchi, or wheezing. Able to speak in complete sentences without evidence of dyspnea. Breathing effort is normal.    PSYCHIATRIC: The patient was able to demonstrate good judgement and reason without abnormal affect or abnormal behaviors during the examination.    Office Visit on 11/22/2023   Component Date Value Ref Range Status   • POCT SARS-COV-2 ANTIGEN 11/22/2023 Not Detected  Not Detected Final   • Rapid Influenza A Ag 11/22/2023 Negative  Negative, Indeterminate, Invalid Results - please disregard Final   • Rapid Influenza B Ag 11/22/2023 Negative  Negative, Indeterminate, Invalid Results - please disregard Final       ASSESSMENT/PLAN   Acute cough  - POCT SARS-COV-2 Antigen/Flu Antigen Panel via Helga    Viral etiology as discussed.  Supportive measures advised.  Symptomatic management recommended and included in patient instructions.       FOLLOW-UP   No follow-ups on file.  If symptoms are no better after 10 days, they can return to [facility] for a follow up visit and discussion for further treatment. If symptoms worsen, they should be seen in Urgent Care, Immediate Care, or the Emergency Department.      PATIENT  INSTRUCTIONS  Attached in After Visit Summary  The patient verbalizes understanding of the diagnosis and plan of care. There were no further questions or concerns.   They were advised to contact the [facility] RN with any questions at [phone].    CONSENT:  Patient consent was obtained for this Video Visit using the [employer] sumanth.    Clinical [facility]: [facility] Telehealth Visit - Home office  Patient is located at the urgent care in [facility]    JOSEFINA Trujillo  11/22/2023  3:53 PM   Treat and Release seen by Dr. Oconnell

## 2024-03-10 NOTE — ED BEHAVIORAL HEALTH PROGRESS NOTE - SUMMARY
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 The patient presents in manic episode in context of not taking her medications (due to tardive dyskinesia). She has since resumed her medications 2 days ago, initially presented to the ED irritable and required PRNs x 1. Today she reports she slept well last night, and reported circumstances leading to admission in an overinclusive fashion though reports she is 'feeling better because I am away from the situation'. She reports she has been taking clonazepam consistently to help her sleep, though she needs outpt referrals to establish care for medication management ongoing. We discussed options, and CM discussed them with  (who has no safety concerns regarding taking pt home). She is psychiatrically cleared to leave the ED.

## 2024-03-10 NOTE — ED ADULT NURSE REASSESSMENT NOTE - NS ED NURSE REASSESS COMMENT FT1
Care taken over from BRIGIDA Guzman, patient sleeping at this time, 1: observation in place. Comfort and safety measures maintained.

## 2024-03-10 NOTE — ED BEHAVIORAL HEALTH PROGRESS NOTE - RISK ASSESSMENT
Risk: prior hospitalizations, poor adherence to treatment  Protective: family support, no prior SA or NSSI, good response when compliant with treatment    overall low acute risk of harm to self

## 2024-03-10 NOTE — ED BEHAVIORAL HEALTH NOTE - BEHAVIORAL HEALTH NOTE
Collateral (Josh, ) has requested that the information provided remain confidential: Yes [X] No []  Collateral (Josh, ) has provided information that patient is/may be unaware of: Yes [X] No []    Patient gives permission to obtain collateral from _____:  (  ) Yes  (X)  No    Rationale for overriding objection            (  ) Lack of capacity. Details: ________            (X) Assessing risk of danger to self/others. Details: ________     Rationale for selecting specific collateral source            (X) Potential to impact risk of danger to self/others and no alternative equivalent. Details: _____”    ========================  FOR EACH COLLATERAL:  ========================  NAME: Josh  NUMBER: [phone]  RELATIONSHIP:    =================  PATIENT DEMOGRAPHICS:  =============    Per patient's , Josh as of 3/10/24 at 9AM, he feels comfortable taking patient home to their shared home. Josh reports that they have been having trouble finding an outpatient provider that takes [facility]. Writer provides pt's  with information for [facility] which does take [facility]. Josh reports he would like to take patient home because he feels like an inpatient hospitalization would make pt's "mental health worse. "Josh reports pt has been in and out of hospital for her kidney problems recently, so he does not want to keep her in the hospital. This information is relayed to Dr. Arrieta.

## 2024-03-13 NOTE — CHART NOTE - NSCHARTNOTEFT_GEN_A_CORE
ADAM spoke with Michelle Guzman @ [facility], case was  reviewed @ [facility]'s [place] and [place] is not appropriate for [place]. ADAM informed that adult oupt clinic is at capacity at this time and not accepting referrals. SW left  for pt requesting a callback. ADAM spoke with pt's , Josh [phone], and informed him of the above. ADAM advised  that pt should go to [facility] crisis walk in clinic as they need to see pts that come in. SW also offered to send referral to [facility] in [place] but that pt will need to call SW to give verbal consent to send referral to clinic.  said he will speak with pt and have her call SW back, number provided to .

## 2024-03-15 NOTE — CHART NOTE - NSCHARTNOTEFT_GEN_A_CORE
SW did not hear back from pt or . ADAM contacted pt, phone went to  and mailbox was full. SW also left  for pt's  inquiring on update and asking for pt to return call if interested in outpt referral to [facility]. Letter with ADAM contact and crisis resources mailed to address in chart.

## 2024-04-04 NOTE — ED ADULT NURSE NOTE - PRO INTERPRETER NEED 2
REASON FOR VISIT: Discuss episodes of chest burning, abdominal bloating, alternating diarrhea and constipation    HPI:  Ara Mena is a 23 y.o. female with a past medical history of IBS treated in the past with Xifaxan being evaluated in the office for alternating diarrhea and constipation, abdominal bloating, intermittent episodes of chest burning.  Reports since about December has been having occasional episodes of burning in her left chest.  These are nonexertional.  Usually after eating.  No dysphagia.  Initially was happening frequently now about once a week.  No nausea or vomiting.  Has had alternating diarrhea and constipation episodes and some abdominal bloating.  No melena.          PRIOR ENDOSCOPY    PAST MEDICAL HISTORY  Past Medical History:   Diagnosis Date    Other specified health status     No pertinent past medical history       PAST SURGICAL HISTORY  No past surgical history on file.    FAMILY HISTORY  No family history on file.    SOCIAL HISTORY  Social History     Tobacco Use    Smoking status: Never    Smokeless tobacco: Never   Substance Use Topics    Alcohol use: Never       REVIEW OF SYSTEMS  CONSTITUTIONAL: negative for fever, chills, fatigue, or unintentional weight loss,   HEENT: negative for icteric sclera, eye pain/redness, or changes in vision/hearing  RESPIRATORY: negative for cough, hemoptysis, wheezing, orthopnea, or dyspnea on exertion  CARDIOVASCULAR: negative for chest pain, palpitations, or syncope   GASTROINTESTINAL: as noted per HPI  GENITOURINARY: negative for dysuria, polyuria, incontinence, or hematuria  MUSCULOSKELETAL: negative for arthralgia, myalgia, or joint swelling/stiffness   INTEGUMENTARY/SKIN: negative jaundice, rash, or skin lesion  HEMATOLOGIC/LYMPHATIC: negative for prolonged bleeding, easy bruising, or swollen lymph nodes  ENDOCRINE: negative for cold/heat intolerance, polydipsia, polyuria, or goiter  NEUROLOGIC: negative for headaches, dizziness,  "tremor, or gait abnormality  PSYCHIATRIC: negative for anxiety, depression, personality changes, or sleep disturbances      A 10 point review of systems was completed and was otherwise negative.    ALLERGIES  No Known Allergies    MEDICATIONS  Current Outpatient Medications   Medication Sig Dispense Refill    citalopram (CeleXA) 10 mg tablet Take 1 tablet (10 mg) by mouth once daily.      famotidine (Pepcid) 20 mg tablet Take 1 tablet (20 mg) by mouth once daily. 30 tablet 1    rifAXIMin (Xifaxan) 550 mg tablet Take 1 tablet (550 mg) by mouth 3 times a day for 28 days. 42 tablet 1     No current facility-administered medications for this visit.       VITALS  Pulse 56   Ht 1.651 m (5' 5\")   Wt 54 kg (119 lb)   SpO2 97%   BMI 19.80 kg/m²      PHYSICAL EXAM  Alert and oriented in no acute distress    ASSESSMENT/ PLAN  Patient with symptoms of IBS with associated bloating.  Also concern for possible GERD contributing to her burning sensation postprandially.  Recommended trial of famotidine 20 mg daily.  Will also place back on Xifaxan 550 mg 3 times a day for 14 days to see if that helps with her IBS symptoms as well as her bloating.  She will attempt to be more aggressive with general low FODMAP diet for now.  Celiac serologies were negative in the past.  She is in agreement with the plan otherwise will see me back in about 2 months in the office.        Signature: Lazarus Canseco MD    Date: 4/4/2024  Time: 3:12 PM    " English

## 2024-04-29 ENCOUNTER — EMERGENCY (EMERGENCY)
Facility: HOSPITAL | Age: 64
LOS: 0 days | Discharge: ANOTHER HOSPITAL | End: 2024-04-29
Attending: STUDENT IN AN ORGANIZED HEALTH CARE EDUCATION/TRAINING PROGRAM
Payer: COMMERCIAL

## 2024-04-29 VITALS
DIASTOLIC BLOOD PRESSURE: 65 MMHG | OXYGEN SATURATION: 97 % | RESPIRATION RATE: 18 BRPM | HEART RATE: 97 BPM | TEMPERATURE: 103 F | SYSTOLIC BLOOD PRESSURE: 103 MMHG

## 2024-04-29 VITALS — HEIGHT: 65 IN

## 2024-04-29 DIAGNOSIS — R00.0 TACHYCARDIA, UNSPECIFIED: ICD-10-CM

## 2024-04-29 DIAGNOSIS — K21.9 GASTRO-ESOPHAGEAL REFLUX DISEASE WITHOUT ESOPHAGITIS: ICD-10-CM

## 2024-04-29 DIAGNOSIS — F25.0 SCHIZOAFFECTIVE DISORDER, BIPOLAR TYPE: ICD-10-CM

## 2024-04-29 DIAGNOSIS — N39.0 URINARY TRACT INFECTION, SITE NOT SPECIFIED: ICD-10-CM

## 2024-04-29 DIAGNOSIS — Z88.1 ALLERGY STATUS TO OTHER ANTIBIOTIC AGENTS STATUS: ICD-10-CM

## 2024-04-29 DIAGNOSIS — Z20.822 CONTACT WITH AND (SUSPECTED) EXPOSURE TO COVID-19: ICD-10-CM

## 2024-04-29 DIAGNOSIS — R89.7 ABNORMAL HISTOLOGICAL FINDINGS IN SPECIMENS FROM OTHER ORGANS, SYSTEMS AND TISSUES: Chronic | ICD-10-CM

## 2024-04-29 DIAGNOSIS — E21.3 HYPERPARATHYROIDISM, UNSPECIFIED: ICD-10-CM

## 2024-04-29 DIAGNOSIS — Z90.710 ACQUIRED ABSENCE OF BOTH CERVIX AND UTERUS: Chronic | ICD-10-CM

## 2024-04-29 DIAGNOSIS — Z94.0 KIDNEY TRANSPLANT STATUS: Chronic | ICD-10-CM

## 2024-04-29 DIAGNOSIS — E78.5 HYPERLIPIDEMIA, UNSPECIFIED: ICD-10-CM

## 2024-04-29 LAB
ALBUMIN SERPL ELPH-MCNC: 3.5 G/DL — SIGNIFICANT CHANGE UP (ref 3.3–5)
ALP SERPL-CCNC: 125 U/L — HIGH (ref 40–120)
ALT FLD-CCNC: 118 U/L — HIGH (ref 12–78)
ANION GAP SERPL CALC-SCNC: 7 MMOL/L — SIGNIFICANT CHANGE UP (ref 5–17)
APPEARANCE UR: ABNORMAL
APTT BLD: 34.8 SEC — SIGNIFICANT CHANGE UP (ref 24.5–35.6)
AST SERPL-CCNC: 109 U/L — HIGH (ref 15–37)
BACTERIA # UR AUTO: ABNORMAL /HPF
BASOPHILS # BLD AUTO: 0.02 K/UL — SIGNIFICANT CHANGE UP (ref 0–0.2)
BASOPHILS NFR BLD AUTO: 0.6 % — SIGNIFICANT CHANGE UP (ref 0–2)
BILIRUB SERPL-MCNC: 1.2 MG/DL — SIGNIFICANT CHANGE UP (ref 0.2–1.2)
BILIRUB UR-MCNC: NEGATIVE — SIGNIFICANT CHANGE UP
BUN SERPL-MCNC: 24 MG/DL — HIGH (ref 7–23)
CALCIUM SERPL-MCNC: 10 MG/DL — SIGNIFICANT CHANGE UP (ref 8.5–10.1)
CAST: 1 /LPF — SIGNIFICANT CHANGE UP (ref 0–4)
CHLORIDE SERPL-SCNC: 105 MMOL/L — SIGNIFICANT CHANGE UP (ref 96–108)
CO2 SERPL-SCNC: 24 MMOL/L — SIGNIFICANT CHANGE UP (ref 22–31)
COLOR SPEC: YELLOW — SIGNIFICANT CHANGE UP
CREAT SERPL-MCNC: 1.67 MG/DL — HIGH (ref 0.5–1.3)
DIFF PNL FLD: ABNORMAL
EGFR: 34 ML/MIN/1.73M2 — LOW
EOSINOPHIL # BLD AUTO: 0 K/UL — SIGNIFICANT CHANGE UP (ref 0–0.5)
EOSINOPHIL NFR BLD AUTO: 0 % — SIGNIFICANT CHANGE UP (ref 0–6)
FLUAV AG NPH QL: SIGNIFICANT CHANGE UP
FLUBV AG NPH QL: SIGNIFICANT CHANGE UP
GLUCOSE SERPL-MCNC: 167 MG/DL — HIGH (ref 70–99)
GLUCOSE UR QL: NEGATIVE MG/DL — SIGNIFICANT CHANGE UP
HCT VFR BLD CALC: 40.2 % — SIGNIFICANT CHANGE UP (ref 34.5–45)
HGB BLD-MCNC: 12.9 G/DL — SIGNIFICANT CHANGE UP (ref 11.5–15.5)
IMM GRANULOCYTES NFR BLD AUTO: 10.2 % — HIGH (ref 0–0.9)
INR BLD: 1.38 RATIO — HIGH (ref 0.85–1.18)
KETONES UR-MCNC: NEGATIVE MG/DL — SIGNIFICANT CHANGE UP
LACTATE SERPL-SCNC: 1.2 MMOL/L — SIGNIFICANT CHANGE UP (ref 0.7–2)
LEUKOCYTE ESTERASE UR-ACNC: ABNORMAL
LYMPHOCYTES # BLD AUTO: 0.37 K/UL — LOW (ref 1–3.3)
LYMPHOCYTES # BLD AUTO: 11.1 % — LOW (ref 13–44)
MCHC RBC-ENTMCNC: 29.5 PG — SIGNIFICANT CHANGE UP (ref 27–34)
MCHC RBC-ENTMCNC: 32.1 GM/DL — SIGNIFICANT CHANGE UP (ref 32–36)
MCV RBC AUTO: 92 FL — SIGNIFICANT CHANGE UP (ref 80–100)
MONOCYTES # BLD AUTO: 0.31 K/UL — SIGNIFICANT CHANGE UP (ref 0–0.9)
MONOCYTES NFR BLD AUTO: 9.3 % — SIGNIFICANT CHANGE UP (ref 2–14)
NEUTROPHILS # BLD AUTO: 2.3 K/UL — SIGNIFICANT CHANGE UP (ref 1.8–7.4)
NEUTROPHILS NFR BLD AUTO: 68.8 % — SIGNIFICANT CHANGE UP (ref 43–77)
NITRITE UR-MCNC: POSITIVE
PH UR: 5.5 — SIGNIFICANT CHANGE UP (ref 5–8)
PLATELET # BLD AUTO: 136 K/UL — LOW (ref 150–400)
POTASSIUM SERPL-MCNC: 3.8 MMOL/L — SIGNIFICANT CHANGE UP (ref 3.5–5.3)
POTASSIUM SERPL-SCNC: 3.8 MMOL/L — SIGNIFICANT CHANGE UP (ref 3.5–5.3)
PROT SERPL-MCNC: 7.5 GM/DL — SIGNIFICANT CHANGE UP (ref 6–8.3)
PROT UR-MCNC: 100 MG/DL
PROTHROM AB SERPL-ACNC: 15.4 SEC — HIGH (ref 9.5–13)
RBC # BLD: 4.37 M/UL — SIGNIFICANT CHANGE UP (ref 3.8–5.2)
RBC # FLD: 13.3 % — SIGNIFICANT CHANGE UP (ref 10.3–14.5)
RBC CASTS # UR COMP ASSIST: 1 /HPF — SIGNIFICANT CHANGE UP (ref 0–4)
RSV RNA NPH QL NAA+NON-PROBE: SIGNIFICANT CHANGE UP
SARS-COV-2 RNA SPEC QL NAA+PROBE: SIGNIFICANT CHANGE UP
SODIUM SERPL-SCNC: 136 MMOL/L — SIGNIFICANT CHANGE UP (ref 135–145)
SP GR SPEC: 1.01 — SIGNIFICANT CHANGE UP (ref 1–1.03)
SQUAMOUS # UR AUTO: 1 /HPF — SIGNIFICANT CHANGE UP (ref 0–5)
TROPONIN I, HIGH SENSITIVITY RESULT: 12.56 NG/L — SIGNIFICANT CHANGE UP
UROBILINOGEN FLD QL: 0.2 MG/DL — SIGNIFICANT CHANGE UP (ref 0.2–1)
WBC # BLD: 3.34 K/UL — LOW (ref 3.8–10.5)
WBC # FLD AUTO: 3.34 K/UL — LOW (ref 3.8–10.5)
WBC UR QL: 405 /HPF — HIGH (ref 0–5)

## 2024-04-29 PROCEDURE — 87150 DNA/RNA AMPLIFIED PROBE: CPT

## 2024-04-29 PROCEDURE — 81001 URINALYSIS AUTO W/SCOPE: CPT

## 2024-04-29 PROCEDURE — 0241U: CPT

## 2024-04-29 PROCEDURE — 96375 TX/PRO/DX INJ NEW DRUG ADDON: CPT

## 2024-04-29 PROCEDURE — 93010 ELECTROCARDIOGRAM REPORT: CPT

## 2024-04-29 PROCEDURE — 99291 CRITICAL CARE FIRST HOUR: CPT

## 2024-04-29 PROCEDURE — 93005 ELECTROCARDIOGRAM TRACING: CPT

## 2024-04-29 PROCEDURE — 87086 URINE CULTURE/COLONY COUNT: CPT

## 2024-04-29 PROCEDURE — 71045 X-RAY EXAM CHEST 1 VIEW: CPT | Mod: 26

## 2024-04-29 PROCEDURE — 85730 THROMBOPLASTIN TIME PARTIAL: CPT

## 2024-04-29 PROCEDURE — 83605 ASSAY OF LACTIC ACID: CPT

## 2024-04-29 PROCEDURE — 87077 CULTURE AEROBIC IDENTIFY: CPT

## 2024-04-29 PROCEDURE — 80053 COMPREHEN METABOLIC PANEL: CPT

## 2024-04-29 PROCEDURE — 36415 COLL VENOUS BLD VENIPUNCTURE: CPT

## 2024-04-29 PROCEDURE — 99285 EMERGENCY DEPT VISIT HI MDM: CPT | Mod: 25

## 2024-04-29 PROCEDURE — 96374 THER/PROPH/DIAG INJ IV PUSH: CPT

## 2024-04-29 PROCEDURE — 85025 COMPLETE CBC W/AUTO DIFF WBC: CPT

## 2024-04-29 PROCEDURE — 87040 BLOOD CULTURE FOR BACTERIA: CPT

## 2024-04-29 PROCEDURE — 71045 X-RAY EXAM CHEST 1 VIEW: CPT

## 2024-04-29 PROCEDURE — 85610 PROTHROMBIN TIME: CPT

## 2024-04-29 PROCEDURE — 84484 ASSAY OF TROPONIN QUANT: CPT

## 2024-04-29 PROCEDURE — 87186 SC STD MICRODIL/AGAR DIL: CPT

## 2024-04-29 RX ORDER — CEFEPIME 1 G/1
1000 INJECTION, POWDER, FOR SOLUTION INTRAMUSCULAR; INTRAVENOUS ONCE
Refills: 0 | Status: COMPLETED | OUTPATIENT
Start: 2024-04-29 | End: 2024-04-29

## 2024-04-29 RX ORDER — ACETAMINOPHEN 500 MG
500 TABLET ORAL ONCE
Refills: 0 | Status: COMPLETED | OUTPATIENT
Start: 2024-04-29 | End: 2024-04-29

## 2024-04-29 RX ORDER — ACETAMINOPHEN 500 MG
1000 TABLET ORAL ONCE
Refills: 0 | Status: COMPLETED | OUTPATIENT
Start: 2024-04-29 | End: 2024-04-29

## 2024-04-29 RX ORDER — ONDANSETRON 8 MG/1
4 TABLET, FILM COATED ORAL ONCE
Refills: 0 | Status: COMPLETED | OUTPATIENT
Start: 2024-04-29 | End: 2024-04-29

## 2024-04-29 RX ORDER — ACETAMINOPHEN 500 MG
650 TABLET ORAL ONCE
Refills: 0 | Status: COMPLETED | OUTPATIENT
Start: 2024-04-29 | End: 2024-04-29

## 2024-04-29 RX ORDER — CEFEPIME 1 G/1
1000 INJECTION, POWDER, FOR SOLUTION INTRAMUSCULAR; INTRAVENOUS ONCE
Refills: 0 | Status: DISCONTINUED | OUTPATIENT
Start: 2024-04-29 | End: 2024-04-29

## 2024-04-29 RX ORDER — VANCOMYCIN HCL 1 G
1250 VIAL (EA) INTRAVENOUS ONCE
Refills: 0 | Status: COMPLETED | OUTPATIENT
Start: 2024-04-29 | End: 2024-04-29

## 2024-04-29 RX ORDER — SODIUM CHLORIDE 9 MG/ML
2600 INJECTION INTRAMUSCULAR; INTRAVENOUS; SUBCUTANEOUS ONCE
Refills: 0 | Status: COMPLETED | OUTPATIENT
Start: 2024-04-29 | End: 2024-04-29

## 2024-04-29 RX ADMIN — Medication 400 MILLIGRAM(S): at 14:12

## 2024-04-29 RX ADMIN — Medication 250 MILLIGRAM(S): at 15:42

## 2024-04-29 RX ADMIN — Medication 500 MILLIGRAM(S): at 20:39

## 2024-04-29 RX ADMIN — ONDANSETRON 4 MILLIGRAM(S): 8 TABLET, FILM COATED ORAL at 19:55

## 2024-04-29 RX ADMIN — Medication 650 MILLIGRAM(S): at 19:13

## 2024-04-29 RX ADMIN — CEFEPIME 1000 MILLIGRAM(S): 1 INJECTION, POWDER, FOR SOLUTION INTRAMUSCULAR; INTRAVENOUS at 15:19

## 2024-04-29 RX ADMIN — SODIUM CHLORIDE 2600 MILLILITER(S): 9 INJECTION INTRAMUSCULAR; INTRAVENOUS; SUBCUTANEOUS at 14:12

## 2024-04-29 NOTE — ED PROVIDER NOTE - OBJECTIVE STATEMENT
64 y/o female with PMHx of left breast CA DCIS s/p RT, cardiac murmur, GERD, gout, HLD, hyperparathyroidism, schizoaffective disorder (bipolar type), uterine leiomyoma, renal transplant 2 years ago in the setting of lithium toxicity presents to the ED c/o fever. Patient reports she receives immunotherapy treatment every 5 weeks after renal transplant 2 years ago - last treatment received Friday. She began feeling unwell yesterday, took temperature today with Tmax 103. Did not take Tylenol PTA. Patient reports that for the past few months every time she receives her treatment she develops a UTI but has not had fever, UTI for the past 2-3 treatments.  Nephrologist: Dr. Parker at [facility]

## 2024-04-29 NOTE — ED PROVIDER NOTE - CLINICAL SUMMARY MEDICAL DECISION MAKING FREE TEXT BOX
64 y/o female with fever presents with fever. Sepsis protocol initiated. Will get screening EKG, CXR, labs, urine, and admit for evaluation by patient's private nephrologist. 64 y/o female with renal transplant presents with fever. Sepsis protocol initiated. Will get screening EKG, CXR, labs, urine, and admit; will contact private nephrologist for further recs.    Aga DO: patient with urinary tract infection; IV antibiotics had been ordered; pending transfer to [facility]- transplant team.

## 2024-04-29 NOTE — ED PROVIDER NOTE - NS_ATTENDINGSCRIBE_ED_ALL_ED
Outpatient Physical Therapy Ortho Treatment Note  [facility]     Patient Name: Kaylee Elliott  : 1955  MRN: [id number]  Today's Date: 2022      Visit Date: 2022     Subjective Improvement: 0%  Attendance:   (medicare-no maintenance)  Next MD Visit : 2022  Recert Date:  2022        Therapy Diagnosis:  L Knee Pain     Visit Dx:    ICD-10-CM ICD-9-CM   1. Pain in lateral portion of left knee  M25.562 719.46       Patient Active Problem List   Diagnosis   • Cataract   • Diabetes mellitus without complication (HCC)   • Macular degeneration        Past Medical History:   Diagnosis Date   • Abnormal Pap smear of cervix    • Acute maxillary sinusitis    • Arthritis    • Atrial fibrillation (HCC)    • Degeneration of macula and posterior pole of retina    • Diabetic neuropathy (HCC)    • Nuclear senile cataract    • Pain in lower limb    • Type 2 diabetes mellitus (HCC)     no bdr        Past Surgical History:   Procedure Laterality Date   • CHOLECYSTECTOMY     • INJECTION OF MEDICATION  2015    Kenalog1)   • TUBAL ABDOMINAL LIGATION          PT Ortho     Row Name 22 0800       Precautions and Contraindications    Precautions h/o afib  -KH       Posture/Observations    Posture/Observations Comments wearing hinged knee brace on L knee (lateral off loading) genu varus noted B  -KH          User Key  (r) = Recorded By, (t) = Taken By, (c) = Cosigned By    Initials Name Provider Type    Janna Rodriguez PTA Physical Therapy Assistant                             PT Assessment/Plan     Row Name 22 0800          PT Assessment    Functional Limitations Performance in leisure activities; Performance in work activities; Limitation in home management  -GRETTA     Impairments Balance; Impaired flexibility; Gait; Pain; Range of motion; Sensation  -     Assessment Comments all exercises performed with brace on this date secondary to increased pain;Balance noted to be good  "with tandom and split stance with the use of 1 UE; SLS still challengening for patient;  -KH     Rehab Potential Good  -KH     Patient/caregiver participated in establishment of treatment plan and goals Yes  -KH     Patient would benefit from skilled therapy intervention Yes  -KH            PT Plan    PT Frequency 2x/week  -KH     Predicted Duration of Therapy Intervention (PT) 3 weeks  -KH     PT Plan Comments Assess goals at next visit; Recheck at that time; Continue as advised;  -KH           User Key  (r) = Recorded By, (t) = Taken By, (c) = Cosigned By    Initials Name Provider Type    Janna Rodriguez PTA Physical Therapy Assistant                   OP Exercises     Row Name 01/13/22 0800             Subjective Comments    Subjective Comments Mrs. Sanchez reports that her knee is really hurting today secondary to being up and on it more yesterday cleaning house trying to get ready to finally have their alin this weekend; Has missed the last visits secondary to having to baby sit her grandkids due to the weather;  -KH              Subjective Pain    Able to rate subjective pain? yes  -KH      Pre-Treatment Pain Level 8  -KH      Post-Treatment Pain Level 5  -KH              Exercise 1    Exercise Name 1 pro ll LE strength  -KH      Time 1 10 mins  -KH      Additional Comments level 3; seat 10  -KH              Exercise 2    Exercise Name 2 incline stretch for gastroc  -KH      Sets 2 3  -KH      Time 2 30\" holds  -KH              Exercise 3    Exercise Name 3 standing hamstring stretch L only  -KH      Sets 3 3  -KH      Time 3 30\" holds  -KH              Exercise 4    Exercise Name 4 step ups fwd & lat  -KH      Sets 4 2  -KH      Reps 4 10 each  -KH      Additional Comments 4\" step  -KH              Exercise 5    Exercise Name 5 Airex SLS w/ finger hold  -KH      Sets 5 5  -KH      Time 5 15\" each  -KH              Exercise 6    Exercise Name 6 Airex Split stance  No UE assist  -KH      Sets 6 3  -KH      " "Time 6 15\" holds  -KH      Additional Comments R/L lead  -KH              Exercise 7    Exercise Name 7 Airex Tandom Stance  Did use 1 UE assist  -KH      Sets 7 3  -KH      Time 7 15\" holds  -KH      Additional Comments R/L lead  -KH              Exercise 8    Exercise Name 8 cybex leg press DL  -KH      Sets 8 2  -KH      Reps 8 10  -KH      Additional Comments 55#  -KH              Exercise 9    Exercise Name 9 cybex hip abd/add  -KH      Sets 9 2  -KH      Reps 9 10 each  -KH      Additional Comments 30#  -KH            User Key  (r) = Recorded By, (t) = Taken By, (c) = Cosigned By    Initials Name Provider Type    Janna Rodriguez PTA Physical Therapy Assistant                              PT OP Goals     Row Name 01/13/22 0800          PT Short Term Goals    STG Date to Achieve 01/19/22  -     STG 1 Patient independent with HEP  -     STG 1 Progress Ongoing  -     STG 2 Improve L knee flexion AROM to 120°  -     STG 2 Progress Ongoing  -     STG 3 Improve L quadriceps/L hamstrings flexibility to WFL  -     STG 3 Progress Ongoing  -     STG 4 Improve L SLS to >/=10 sec consistently  -     STG 4 Progress Ongoing  -     STG 5 Able to go up/down 5 stairs in the stairwell with use of handrail with minimal to no L knee pain  -     STG 5 Progress Ongoing  -     STG 6 Improve 30 sec sit to stand test to 13 reps without UE A  -KH     STG 6 Progress Ongoing  -            Time Calculation    PT Goal Re-Cert Due Date 01/19/22  -           User Key  (r) = Recorded By, (t) = Taken By, (c) = Cosigned By    Initials Name Provider Type    Janna Rodriguez PTA Physical Therapy Assistant                               Time Calculation:   Start Time: 0801  Stop Time: 0839  Time Calculation (min): 38 min  Therapy Charges for Today     Code Description Service Date Service Provider Modifiers Qty    [id number]  PT THER PROC EA 15 MIN 1/13/2022 Janna Khan PTA GP, CQ 3                    Janna Khan, " PTA  1/13/2022      I personally performed the service described in the documentation recorded by the scribe in my presence, and it accurately and completely records my words and actions.

## 2024-04-29 NOTE — ED ADULT NURSE NOTE - OBJECTIVE STATEMENT
Pt presented to the ED with complaint of fevers. Pt says the fevers started yesterday, but did not take her temperature. Pt has a history of a kidney transplant two years ago. Pt denies sick contacts. Pt has been complaint with all medications.

## 2024-04-29 NOTE — ED ADULT TRIAGE NOTE - CHIEF COMPLAINT QUOTE
Pt coming into the ED with c/o fever and weakness x2 days. pt is febrile in triage with a temp of 103.1. Pt has hx of kidney transplant and UTIs. Pt states "I think I have a UTI". Pt also endorses pain when she urinates. Pt denies any other complaints.

## 2024-04-29 NOTE — ED PROVIDER NOTE - PROGRESS NOTE DETAILS
Lorraine Yung: Spoke to transfer center at [facility]. Would like patient transferred at this time to their facility. Aga FOREMAN: accepted to Health system under Dr. Canchola- hospitalist with ID and transplant consults.

## 2024-04-29 NOTE — ED STATDOCS - PROGRESS NOTE DETAILS
Lorraine Badillo for attending Dr. Collier: 64 y/o female with a PMHx of breast cancer, cardiac murmur, DDD, GERD, gout, HLD, kidney stones, kidney transplant, lyme disease, MGUS, schizoaffective disorder, secondary hyperparathyroidism, uterine leiomyoma, UTI presents to the ED c/o fever, Tmax 103F and generalized weakness x2 days. No other complaints at this time. Will send pt to main ED for further evaluation.

## 2024-04-30 LAB
E COLI DNA BLD POS QL NAA+NON-PROBE: SIGNIFICANT CHANGE UP
GRAM STN FLD: ABNORMAL
GRAM STN FLD: ABNORMAL
METHOD TYPE: SIGNIFICANT CHANGE UP
SPECIMEN SOURCE: SIGNIFICANT CHANGE UP
SPECIMEN SOURCE: SIGNIFICANT CHANGE UP

## 2024-04-30 NOTE — ED POST DISCHARGE NOTE - RESULT SUMMARY
+blood culture, GNR both bottles.  I spoke with Mariel RN at [facility] and patient is admitted.  She is aware of results and will callback tomorrow for sensitivities. All questions answered.   Linda TAYLOR

## 2024-05-02 LAB
-  AMPICILLIN/SULBACTAM: SIGNIFICANT CHANGE UP
-  AMPICILLIN: SIGNIFICANT CHANGE UP
-  AZTREONAM: SIGNIFICANT CHANGE UP
-  CEFAZOLIN: SIGNIFICANT CHANGE UP
-  CEFEPIME: SIGNIFICANT CHANGE UP
-  CEFOXITIN: SIGNIFICANT CHANGE UP
-  CEFTRIAXONE: SIGNIFICANT CHANGE UP
-  CIPROFLOXACIN: SIGNIFICANT CHANGE UP
-  ERTAPENEM: SIGNIFICANT CHANGE UP
-  GENTAMICIN: SIGNIFICANT CHANGE UP
-  IMIPENEM: SIGNIFICANT CHANGE UP
-  LEVOFLOXACIN: SIGNIFICANT CHANGE UP
-  MEROPENEM: SIGNIFICANT CHANGE UP
-  PIPERACILLIN/TAZOBACTAM: SIGNIFICANT CHANGE UP
-  TOBRAMYCIN: SIGNIFICANT CHANGE UP
-  TRIMETHOPRIM/SULFAMETHOXAZOLE: SIGNIFICANT CHANGE UP
CULTURE RESULTS: ABNORMAL
CULTURE RESULTS: ABNORMAL
METHOD TYPE: SIGNIFICANT CHANGE UP
ORGANISM # SPEC MICROSCOPIC CNT: ABNORMAL
ORGANISM # SPEC MICROSCOPIC CNT: ABNORMAL
ORGANISM # SPEC MICROSCOPIC CNT: SIGNIFICANT CHANGE UP
SPECIMEN SOURCE: SIGNIFICANT CHANGE UP
SPECIMEN SOURCE: SIGNIFICANT CHANGE UP

## 2024-05-27 ENCOUNTER — NON-APPOINTMENT (OUTPATIENT)
Age: 64
End: 2024-05-27

## 2024-06-03 ENCOUNTER — INPATIENT (INPATIENT)
Facility: HOSPITAL | Age: 64
LOS: 4 days | Discharge: HOME | DRG: 720 | End: 2024-06-08
Attending: INTERNAL MEDICINE | Admitting: INTERNAL MEDICINE
Payer: COMMERCIAL

## 2024-06-03 VITALS — HEIGHT: 65 IN

## 2024-06-03 DIAGNOSIS — Z90.710 ACQUIRED ABSENCE OF BOTH CERVIX AND UTERUS: Chronic | ICD-10-CM

## 2024-06-03 DIAGNOSIS — Z94.0 KIDNEY TRANSPLANT STATUS: Chronic | ICD-10-CM

## 2024-06-03 DIAGNOSIS — R89.7 ABNORMAL HISTOLOGICAL FINDINGS IN SPECIMENS FROM OTHER ORGANS, SYSTEMS AND TISSUES: Chronic | ICD-10-CM

## 2024-06-03 LAB
ALBUMIN SERPL ELPH-MCNC: 3.5 G/DL — SIGNIFICANT CHANGE UP (ref 3.3–5)
ALP SERPL-CCNC: 103 U/L — SIGNIFICANT CHANGE UP (ref 40–120)
ALT FLD-CCNC: 40 U/L — SIGNIFICANT CHANGE UP (ref 12–78)
ANION GAP SERPL CALC-SCNC: 6 MMOL/L — SIGNIFICANT CHANGE UP (ref 5–17)
APTT BLD: 30.4 SEC — SIGNIFICANT CHANGE UP (ref 24.5–35.6)
AST SERPL-CCNC: 32 U/L — SIGNIFICANT CHANGE UP (ref 15–37)
BILIRUB SERPL-MCNC: 0.6 MG/DL — SIGNIFICANT CHANGE UP (ref 0.2–1.2)
BUN SERPL-MCNC: 25 MG/DL — HIGH (ref 7–23)
CALCIUM SERPL-MCNC: 9.8 MG/DL — SIGNIFICANT CHANGE UP (ref 8.5–10.1)
CHLORIDE SERPL-SCNC: 110 MMOL/L — HIGH (ref 96–108)
CO2 SERPL-SCNC: 23 MMOL/L — SIGNIFICANT CHANGE UP (ref 22–31)
CREAT SERPL-MCNC: 1.33 MG/DL — HIGH (ref 0.5–1.3)
EGFR: 45 ML/MIN/1.73M2 — LOW
GLUCOSE SERPL-MCNC: 122 MG/DL — HIGH (ref 70–99)
HCT VFR BLD CALC: 35.7 % — SIGNIFICANT CHANGE UP (ref 34.5–45)
HGB BLD-MCNC: 11.9 G/DL — SIGNIFICANT CHANGE UP (ref 11.5–15.5)
INR BLD: 1.38 RATIO — HIGH (ref 0.85–1.18)
LACTATE SERPL-SCNC: 0.9 MMOL/L — SIGNIFICANT CHANGE UP (ref 0.7–2)
MCHC RBC-ENTMCNC: 29.7 PG — SIGNIFICANT CHANGE UP (ref 27–34)
MCHC RBC-ENTMCNC: 33.3 GM/DL — SIGNIFICANT CHANGE UP (ref 32–36)
MCV RBC AUTO: 89 FL — SIGNIFICANT CHANGE UP (ref 80–100)
PLATELET # BLD AUTO: 139 K/UL — LOW (ref 150–400)
POTASSIUM SERPL-MCNC: 3.6 MMOL/L — SIGNIFICANT CHANGE UP (ref 3.5–5.3)
POTASSIUM SERPL-SCNC: 3.6 MMOL/L — SIGNIFICANT CHANGE UP (ref 3.5–5.3)
PROT SERPL-MCNC: 7.2 GM/DL — SIGNIFICANT CHANGE UP (ref 6–8.3)
PROTHROM AB SERPL-ACNC: 15.5 SEC — HIGH (ref 9.5–13)
RBC # BLD: 4.01 M/UL — SIGNIFICANT CHANGE UP (ref 3.8–5.2)
RBC # FLD: 13.4 % — SIGNIFICANT CHANGE UP (ref 10.3–14.5)
SODIUM SERPL-SCNC: 139 MMOL/L — SIGNIFICANT CHANGE UP (ref 135–145)
TROPONIN I, HIGH SENSITIVITY RESULT: 8.89 NG/L — SIGNIFICANT CHANGE UP
WBC # BLD: 2.56 K/UL — LOW (ref 3.8–10.5)
WBC # FLD AUTO: 2.56 K/UL — LOW (ref 3.8–10.5)

## 2024-06-03 PROCEDURE — 99285 EMERGENCY DEPT VISIT HI MDM: CPT

## 2024-06-03 PROCEDURE — 93010 ELECTROCARDIOGRAM REPORT: CPT

## 2024-06-03 RX ORDER — SODIUM CHLORIDE 9 MG/ML
1000 INJECTION INTRAMUSCULAR; INTRAVENOUS; SUBCUTANEOUS ONCE
Refills: 0 | Status: COMPLETED | OUTPATIENT
Start: 2024-06-03 | End: 2024-06-03

## 2024-06-03 RX ORDER — VANCOMYCIN HCL 1 G
1250 VIAL (EA) INTRAVENOUS ONCE
Refills: 0 | Status: COMPLETED | OUTPATIENT
Start: 2024-06-03 | End: 2024-06-03

## 2024-06-03 RX ORDER — PIPERACILLIN AND TAZOBACTAM 4; .5 G/20ML; G/20ML
3.38 INJECTION, POWDER, LYOPHILIZED, FOR SOLUTION INTRAVENOUS ONCE
Refills: 0 | Status: COMPLETED | OUTPATIENT
Start: 2024-06-03 | End: 2024-06-03

## 2024-06-03 RX ORDER — ACETAMINOPHEN 500 MG
650 TABLET ORAL ONCE
Refills: 0 | Status: COMPLETED | OUTPATIENT
Start: 2024-06-03 | End: 2024-06-03

## 2024-06-03 RX ORDER — VANCOMYCIN HCL 1 G
1000 VIAL (EA) INTRAVENOUS ONCE
Refills: 0 | Status: DISCONTINUED | OUTPATIENT
Start: 2024-06-03 | End: 2024-06-03

## 2024-06-03 RX ADMIN — Medication 650 MILLIGRAM(S): at 22:36

## 2024-06-03 RX ADMIN — Medication 650 MILLIGRAM(S): at 23:30

## 2024-06-03 RX ADMIN — PIPERACILLIN AND TAZOBACTAM 3.38 GRAM(S): 4; .5 INJECTION, POWDER, LYOPHILIZED, FOR SOLUTION INTRAVENOUS at 23:55

## 2024-06-03 RX ADMIN — PIPERACILLIN AND TAZOBACTAM 200 GRAM(S): 4; .5 INJECTION, POWDER, LYOPHILIZED, FOR SOLUTION INTRAVENOUS at 23:30

## 2024-06-03 RX ADMIN — Medication 125 MILLIGRAM(S): at 23:30

## 2024-06-03 RX ADMIN — SODIUM CHLORIDE 1000 MILLILITER(S): 9 INJECTION INTRAMUSCULAR; INTRAVENOUS; SUBCUTANEOUS at 22:56

## 2024-06-03 RX ADMIN — SODIUM CHLORIDE 1000 MILLILITER(S): 9 INJECTION INTRAMUSCULAR; INTRAVENOUS; SUBCUTANEOUS at 23:56

## 2024-06-03 RX ADMIN — Medication 166.67 MILLIGRAM(S): at 23:56

## 2024-06-03 RX ADMIN — SODIUM CHLORIDE 1000 MILLILITER(S): 9 INJECTION INTRAMUSCULAR; INTRAVENOUS; SUBCUTANEOUS at 23:31

## 2024-06-03 NOTE — ED PROVIDER NOTE - CLINICAL SUMMARY MEDICAL DECISION MAKING FREE TEXT BOX
Kiah Miner MD, Attending  ddx includes, but is not limited to the following: UTI, pyelo, transplant rejection, COVID, other viral illness, PNA.   plan: sepsis workup, empiric abx, CXR, US transplant, CT abdomen pelvis, admit.   update: see progress notes.   ----

## 2024-06-03 NOTE — ED ADULT TRIAGE NOTE - CHIEF COMPLAINT QUOTE
pt presenting w/reports of fever, nausea and vomiting. states she was DX w/COV 5 days ago and feels very unwell, and is afraid she has sepsis

## 2024-06-03 NOTE — ED PROVIDER NOTE - PROGRESS NOTE DETAILS
Kiah Miner MD, Attending  Patient is renal transplant patient with suspected UTI sepsis, with mild hydronephrosis of transplant.  Discussed with hospitalist recommending transfer to transplanting hospital, which is [facility] in [place]. Kiah Miner MD, Attending  [facility], rejected transfer. Will admit to Dr. Serrano.

## 2024-06-03 NOTE — ED PROVIDER NOTE - CARE PLAN
Principal Discharge DX:	Sepsis  Secondary Diagnosis:	Acute UTI  Secondary Diagnosis:	History of renal transplant   1

## 2024-06-03 NOTE — ED PROVIDER NOTE - PHYSICAL EXAMINATION
Kiah Miner MD Attending  GEN: Patient awake alert NAD.   HEENT: normocephalic, atraumatic, EOMI, no scleral icterus  CARDIAC: + tachycardic, S1, S2, no murmur.   PULM: CTA B/L no wheeze, rhonchi, rales.   ABD: soft NT, ND, no rebound no guarding  MSK: Moving all extremities, no edema. 5/5 strength and full ROM in all extremities.    NEURO: A&Ox3, no focal neurological deficits, CN 2-12 grossly intact  SKIN: anxious

## 2024-06-03 NOTE — ED ADULT TRIAGE NOTE - MODE OF ARRIVAL
Patient has an appointment 2/22/18 and is requesting labs prior to her appointment. Please advise on lab orders for patient. Thank you! Walk in

## 2024-06-03 NOTE — ED STATDOCS - PROGRESS NOTE DETAILS
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 Recent COVID  x6 days ago getting over infection, sick contact . Hx of sepsis secondary to UTI in the past, sometimes requiring IV ABX. Sending to the MAIN for further evaluation.    Nephro Dr Parker  Kidney transplant at [facility] Kena Mccabe for attending Dr. Bowling

## 2024-06-03 NOTE — ED PROVIDER NOTE - OBJECTIVE STATEMENT
64 y/o female with PMHx of left breast CA DCIS s/p RT, cardiac murmur, GERD, gout, HLD, hyperparathyroidism, schizoaffective disorder (bipolar type), uterine leiomyoma, renal transplant 2 years ago (on prednisone and prograft) in the setting of lithium toxicity presents to the ED c/o fever. Patient reports her baseline urinary symptoms got worse today, more dribbling, and she noted fever of 103. Of note, pt tested positive for COVID 6 days ago.    Nephrologist: Dr. Parker at [facility]

## 2024-06-03 NOTE — ED ADULT NURSE NOTE - OBJECTIVE STATEMENT
64 y/o female presents to ED c/o urinary frequency, fever. Pt state she tested positive for COVID x6 days ago, reports taking tylenol for fevers. Pt states that she "gets sepsis secondary to UTIs and usually needs IV antibiotics. PMHx kidney transplant a few years ago. Pt endorses vomiting, nausea. Denies chest pain, SOB.

## 2024-06-04 DIAGNOSIS — A41.9 SEPSIS, UNSPECIFIED ORGANISM: ICD-10-CM

## 2024-06-04 LAB
-  CTX-M RESISTANCE MARKER: SIGNIFICANT CHANGE UP
-  ESBL: SIGNIFICANT CHANGE UP
ALBUMIN SERPL ELPH-MCNC: 2.9 G/DL — LOW (ref 3.3–5)
ALP SERPL-CCNC: 97 U/L — SIGNIFICANT CHANGE UP (ref 40–120)
ALT FLD-CCNC: 45 U/L — SIGNIFICANT CHANGE UP (ref 12–78)
ANION GAP SERPL CALC-SCNC: 5 MMOL/L — SIGNIFICANT CHANGE UP (ref 5–17)
APPEARANCE UR: CLEAR — SIGNIFICANT CHANGE UP
APTT BLD: 33.5 SEC — SIGNIFICANT CHANGE UP (ref 24.5–35.6)
AST SERPL-CCNC: 34 U/L — SIGNIFICANT CHANGE UP (ref 15–37)
BACTERIA # UR AUTO: ABNORMAL /HPF
BASOPHILS # BLD AUTO: 0 K/UL — SIGNIFICANT CHANGE UP (ref 0–0.2)
BASOPHILS # BLD AUTO: 0.01 K/UL — SIGNIFICANT CHANGE UP (ref 0–0.2)
BASOPHILS NFR BLD AUTO: 0 % — SIGNIFICANT CHANGE UP (ref 0–2)
BASOPHILS NFR BLD AUTO: 0.4 % — SIGNIFICANT CHANGE UP (ref 0–2)
BILIRUB SERPL-MCNC: 0.4 MG/DL — SIGNIFICANT CHANGE UP (ref 0.2–1.2)
BILIRUB UR-MCNC: NEGATIVE — SIGNIFICANT CHANGE UP
BUN SERPL-MCNC: 21 MG/DL — SIGNIFICANT CHANGE UP (ref 7–23)
CALCIUM SERPL-MCNC: 8.9 MG/DL — SIGNIFICANT CHANGE UP (ref 8.5–10.1)
CAST: 0 /LPF — SIGNIFICANT CHANGE UP (ref 0–4)
CHLORIDE SERPL-SCNC: 116 MMOL/L — HIGH (ref 96–108)
CO2 SERPL-SCNC: 22 MMOL/L — SIGNIFICANT CHANGE UP (ref 22–31)
COLOR SPEC: YELLOW — SIGNIFICANT CHANGE UP
CREAT SERPL-MCNC: 1.02 MG/DL — SIGNIFICANT CHANGE UP (ref 0.5–1.3)
DIFF PNL FLD: ABNORMAL
E COLI DNA BLD POS QL NAA+NON-PROBE: SIGNIFICANT CHANGE UP
EGFR: 61 ML/MIN/1.73M2 — SIGNIFICANT CHANGE UP
EOSINOPHIL # BLD AUTO: 0 K/UL — SIGNIFICANT CHANGE UP (ref 0–0.5)
EOSINOPHIL # BLD AUTO: 0 K/UL — SIGNIFICANT CHANGE UP (ref 0–0.5)
EOSINOPHIL NFR BLD AUTO: 0 % — SIGNIFICANT CHANGE UP (ref 0–6)
EOSINOPHIL NFR BLD AUTO: 0 % — SIGNIFICANT CHANGE UP (ref 0–6)
FLUAV AG NPH QL: SIGNIFICANT CHANGE UP
FLUBV AG NPH QL: SIGNIFICANT CHANGE UP
GLUCOSE SERPL-MCNC: 226 MG/DL — HIGH (ref 70–99)
GLUCOSE UR QL: NEGATIVE MG/DL — SIGNIFICANT CHANGE UP
GRAM STN FLD: ABNORMAL
HCT VFR BLD CALC: 34.2 % — LOW (ref 34.5–45)
HGB BLD-MCNC: 11.4 G/DL — LOW (ref 11.5–15.5)
IMM GRANULOCYTES NFR BLD AUTO: 8.3 % — HIGH (ref 0–0.9)
INR BLD: 1.5 RATIO — HIGH (ref 0.85–1.18)
KETONES UR-MCNC: ABNORMAL MG/DL
LEUKOCYTE ESTERASE UR-ACNC: ABNORMAL
LYMPHOCYTES # BLD AUTO: 0.28 K/UL — LOW (ref 1–3.3)
LYMPHOCYTES # BLD AUTO: 0.51 K/UL — LOW (ref 1–3.3)
LYMPHOCYTES # BLD AUTO: 10.1 % — LOW (ref 13–44)
LYMPHOCYTES # BLD AUTO: 20 % — SIGNIFICANT CHANGE UP (ref 13–44)
MANUAL SMEAR VERIFICATION: SIGNIFICANT CHANGE UP
MCHC RBC-ENTMCNC: 30.2 PG — SIGNIFICANT CHANGE UP (ref 27–34)
MCHC RBC-ENTMCNC: 33.3 GM/DL — SIGNIFICANT CHANGE UP (ref 32–36)
MCV RBC AUTO: 90.7 FL — SIGNIFICANT CHANGE UP (ref 80–100)
METHOD TYPE: SIGNIFICANT CHANGE UP
MONOCYTES # BLD AUTO: 0.11 K/UL — SIGNIFICANT CHANGE UP (ref 0–0.9)
MONOCYTES # BLD AUTO: 0.15 K/UL — SIGNIFICANT CHANGE UP (ref 0–0.9)
MONOCYTES NFR BLD AUTO: 4 % — SIGNIFICANT CHANGE UP (ref 2–14)
MONOCYTES NFR BLD AUTO: 6 % — SIGNIFICANT CHANGE UP (ref 2–14)
NEUTROPHILS # BLD AUTO: 1.89 K/UL — SIGNIFICANT CHANGE UP (ref 1.8–7.4)
NEUTROPHILS # BLD AUTO: 2.14 K/UL — SIGNIFICANT CHANGE UP (ref 1.8–7.4)
NEUTROPHILS NFR BLD AUTO: 73 % — SIGNIFICANT CHANGE UP (ref 43–77)
NEUTROPHILS NFR BLD AUTO: 77.2 % — HIGH (ref 43–77)
NEUTS BAND # BLD: 1 % — SIGNIFICANT CHANGE UP (ref 0–8)
NITRITE UR-MCNC: NEGATIVE — SIGNIFICANT CHANGE UP
NRBC # BLD: 0 /100 WBCS — SIGNIFICANT CHANGE UP (ref 0–0)
NRBC # BLD: SIGNIFICANT CHANGE UP /100 WBCS (ref 0–0)
PH UR: 6 — SIGNIFICANT CHANGE UP (ref 5–8)
PLAT MORPH BLD: NORMAL — SIGNIFICANT CHANGE UP
PLATELET # BLD AUTO: 141 K/UL — LOW (ref 150–400)
POTASSIUM SERPL-MCNC: 3.5 MMOL/L — SIGNIFICANT CHANGE UP (ref 3.5–5.3)
POTASSIUM SERPL-SCNC: 3.5 MMOL/L — SIGNIFICANT CHANGE UP (ref 3.5–5.3)
PROT SERPL-MCNC: 6.5 GM/DL — SIGNIFICANT CHANGE UP (ref 6–8.3)
PROT UR-MCNC: SIGNIFICANT CHANGE UP MG/DL
PROTHROM AB SERPL-ACNC: 16.8 SEC — HIGH (ref 9.5–13)
RBC # BLD: 3.77 M/UL — LOW (ref 3.8–5.2)
RBC # FLD: 13.7 % — SIGNIFICANT CHANGE UP (ref 10.3–14.5)
RBC BLD AUTO: NORMAL — SIGNIFICANT CHANGE UP
RBC CASTS # UR COMP ASSIST: 8 /HPF — HIGH (ref 0–4)
RSV RNA NPH QL NAA+NON-PROBE: SIGNIFICANT CHANGE UP
SARS-COV-2 RNA SPEC QL NAA+PROBE: DETECTED
SODIUM SERPL-SCNC: 143 MMOL/L — SIGNIFICANT CHANGE UP (ref 135–145)
SP GR SPEC: 1.01 — SIGNIFICANT CHANGE UP (ref 1–1.03)
SPECIMEN SOURCE: SIGNIFICANT CHANGE UP
SQUAMOUS # UR AUTO: 2 /HPF — SIGNIFICANT CHANGE UP (ref 0–5)
UROBILINOGEN FLD QL: 0.2 MG/DL — SIGNIFICANT CHANGE UP (ref 0.2–1)
WBC # BLD: 2.77 K/UL — LOW (ref 3.8–10.5)
WBC # FLD AUTO: 2.77 K/UL — LOW (ref 3.8–10.5)
WBC UR QL: 122 /HPF — HIGH (ref 0–5)

## 2024-06-04 PROCEDURE — 99223 1ST HOSP IP/OBS HIGH 75: CPT

## 2024-06-04 PROCEDURE — C1751: CPT

## 2024-06-04 PROCEDURE — 36415 COLL VENOUS BLD VENIPUNCTURE: CPT

## 2024-06-04 PROCEDURE — 85610 PROTHROMBIN TIME: CPT

## 2024-06-04 PROCEDURE — 85027 COMPLETE CBC AUTOMATED: CPT

## 2024-06-04 PROCEDURE — 87040 BLOOD CULTURE FOR BACTERIA: CPT

## 2024-06-04 PROCEDURE — 85730 THROMBOPLASTIN TIME PARTIAL: CPT

## 2024-06-04 PROCEDURE — 80048 BASIC METABOLIC PNL TOTAL CA: CPT

## 2024-06-04 PROCEDURE — 80053 COMPREHEN METABOLIC PANEL: CPT

## 2024-06-04 PROCEDURE — 85025 COMPLETE CBC W/AUTO DIFF WBC: CPT

## 2024-06-04 PROCEDURE — 99221 1ST HOSP IP/OBS SF/LOW 40: CPT

## 2024-06-04 PROCEDURE — 76776 US EXAM K TRANSPL W/DOPPLER: CPT | Mod: 26

## 2024-06-04 PROCEDURE — 71046 X-RAY EXAM CHEST 2 VIEWS: CPT | Mod: 26

## 2024-06-04 RX ORDER — CHLORHEXIDINE GLUCONATE 213 G/1000ML
1 SOLUTION TOPICAL
Refills: 0 | Status: DISCONTINUED | OUTPATIENT
Start: 2024-06-04 | End: 2024-06-08

## 2024-06-04 RX ORDER — MYCOPHENOLATE MOFETIL 250 MG/1
250 CAPSULE ORAL ONCE
Refills: 0 | Status: COMPLETED | OUTPATIENT
Start: 2024-06-04 | End: 2024-06-04

## 2024-06-04 RX ORDER — HALOPERIDOL DECANOATE 100 MG/ML
1 INJECTION INTRAMUSCULAR
Qty: 0 | Refills: 0 | DISCHARGE

## 2024-06-04 RX ORDER — CHOLECALCIFEROL (VITAMIN D3) 125 MCG
1 CAPSULE ORAL
Refills: 0 | DISCHARGE

## 2024-06-04 RX ORDER — MYCOPHENOLATE MOFETIL 250 MG/1
1 CAPSULE ORAL
Refills: 0 | DISCHARGE

## 2024-06-04 RX ORDER — CHOLECALCIFEROL (VITAMIN D3) 125 MCG
1000 CAPSULE ORAL DAILY
Refills: 0 | Status: DISCONTINUED | OUTPATIENT
Start: 2024-06-04 | End: 2024-06-08

## 2024-06-04 RX ORDER — APIXABAN 2.5 MG/1
1 TABLET, FILM COATED ORAL
Refills: 0 | DISCHARGE

## 2024-06-04 RX ORDER — CEFUROXIME AXETIL 250 MG
1 TABLET ORAL
Qty: 0 | Refills: 0 | DISCHARGE

## 2024-06-04 RX ORDER — MEROPENEM 1 G/30ML
1000 INJECTION INTRAVENOUS EVERY 8 HOURS
Refills: 0 | Status: DISCONTINUED | OUTPATIENT
Start: 2024-06-05 | End: 2024-06-07

## 2024-06-04 RX ORDER — HALOPERIDOL DECANOATE 100 MG/ML
1 INJECTION INTRAMUSCULAR DAILY
Refills: 0 | Status: DISCONTINUED | OUTPATIENT
Start: 2024-06-04 | End: 2024-06-08

## 2024-06-04 RX ORDER — CLONAZEPAM 1 MG
1 TABLET ORAL
Refills: 0 | DISCHARGE

## 2024-06-04 RX ORDER — METHENAMINE MANDELATE 1 G
1 TABLET ORAL
Refills: 0 | DISCHARGE

## 2024-06-04 RX ORDER — MYCOPHENOLATE MOFETIL 250 MG/1
250 CAPSULE ORAL
Refills: 0 | Status: DISCONTINUED | OUTPATIENT
Start: 2024-06-04 | End: 2024-06-08

## 2024-06-04 RX ORDER — MEROPENEM 1 G/30ML
1000 INJECTION INTRAVENOUS ONCE
Refills: 0 | Status: COMPLETED | OUTPATIENT
Start: 2024-06-04 | End: 2024-06-04

## 2024-06-04 RX ORDER — APIXABAN 2.5 MG/1
5 TABLET, FILM COATED ORAL ONCE
Refills: 0 | Status: COMPLETED | OUTPATIENT
Start: 2024-06-04 | End: 2024-06-04

## 2024-06-04 RX ORDER — APIXABAN 2.5 MG/1
5 TABLET, FILM COATED ORAL EVERY 12 HOURS
Refills: 0 | Status: DISCONTINUED | OUTPATIENT
Start: 2024-06-04 | End: 2024-06-08

## 2024-06-04 RX ORDER — MAGNESIUM OXIDE 400 MG ORAL TABLET 241.3 MG
1 TABLET ORAL
Refills: 0 | DISCHARGE

## 2024-06-04 RX ORDER — CLONAZEPAM 1 MG
0.5 TABLET ORAL DAILY
Refills: 0 | Status: DISCONTINUED | OUTPATIENT
Start: 2024-06-04 | End: 2024-06-08

## 2024-06-04 RX ORDER — PIPERACILLIN AND TAZOBACTAM 4; .5 G/20ML; G/20ML
3.38 INJECTION, POWDER, LYOPHILIZED, FOR SOLUTION INTRAVENOUS EVERY 8 HOURS
Refills: 0 | Status: DISCONTINUED | OUTPATIENT
Start: 2024-06-04 | End: 2024-06-04

## 2024-06-04 RX ORDER — CEFTRIAXONE 500 MG/1
2000 INJECTION, POWDER, FOR SOLUTION INTRAMUSCULAR; INTRAVENOUS EVERY 24 HOURS
Refills: 0 | Status: DISCONTINUED | OUTPATIENT
Start: 2024-06-04 | End: 2024-06-04

## 2024-06-04 RX ORDER — MEROPENEM 1 G/30ML
INJECTION INTRAVENOUS
Refills: 0 | Status: DISCONTINUED | OUTPATIENT
Start: 2024-06-04 | End: 2024-06-07

## 2024-06-04 RX ORDER — ACETAMINOPHEN 500 MG
650 TABLET ORAL EVERY 6 HOURS
Refills: 0 | Status: DISCONTINUED | OUTPATIENT
Start: 2024-06-04 | End: 2024-06-08

## 2024-06-04 RX ADMIN — MYCOPHENOLATE MOFETIL 250 MILLIGRAM(S): 250 CAPSULE ORAL at 01:19

## 2024-06-04 RX ADMIN — Medication 0.5 MILLIGRAM(S): at 11:52

## 2024-06-04 RX ADMIN — CEFTRIAXONE 2000 MILLIGRAM(S): 500 INJECTION, POWDER, FOR SOLUTION INTRAMUSCULAR; INTRAVENOUS at 14:51

## 2024-06-04 RX ADMIN — MEROPENEM 1000 MILLIGRAM(S): 1 INJECTION INTRAVENOUS at 21:42

## 2024-06-04 RX ADMIN — APIXABAN 5 MILLIGRAM(S): 2.5 TABLET, FILM COATED ORAL at 11:53

## 2024-06-04 RX ADMIN — SODIUM CHLORIDE 1000 MILLILITER(S): 9 INJECTION INTRAMUSCULAR; INTRAVENOUS; SUBCUTANEOUS at 00:30

## 2024-06-04 RX ADMIN — Medication 1 TABLET(S): at 11:53

## 2024-06-04 RX ADMIN — Medication 5 MILLIGRAM(S): at 11:53

## 2024-06-04 RX ADMIN — MYCOPHENOLATE MOFETIL 250 MILLIGRAM(S): 250 CAPSULE ORAL at 11:55

## 2024-06-04 RX ADMIN — HALOPERIDOL DECANOATE 1 MILLIGRAM(S): 100 INJECTION INTRAMUSCULAR at 11:52

## 2024-06-04 RX ADMIN — APIXABAN 5 MILLIGRAM(S): 2.5 TABLET, FILM COATED ORAL at 21:42

## 2024-06-04 RX ADMIN — APIXABAN 5 MILLIGRAM(S): 2.5 TABLET, FILM COATED ORAL at 01:11

## 2024-06-04 RX ADMIN — MYCOPHENOLATE MOFETIL 250 MILLIGRAM(S): 250 CAPSULE ORAL at 21:43

## 2024-06-04 RX ADMIN — CHLORHEXIDINE GLUCONATE 1 APPLICATION(S): 213 SOLUTION TOPICAL at 11:54

## 2024-06-04 RX ADMIN — Medication 1000 UNIT(S): at 11:52

## 2024-06-04 RX ADMIN — Medication 1250 MILLIGRAM(S): at 00:56

## 2024-06-04 NOTE — CONSULT NOTE ADULT - ASSESSMENT
62 yo Female with a PMH of schizoaffective disorder (bipolar type), renal failure (s/p R renal transplant, due to prior lithium use), breast cancer s/p lumpectomy, fibroid s/p hysterectomy, hyperparathyroidism, DVT (on Eliquis), MGUS, HLD, and GERD who presented with fever/recent covid with renal evaluation of VALERIE.    VALERIE   -Renal stabilizing, likely pre renal state  -Avoid nsaids/contrast  -IVF if limited intake    Renal Tx  -Follows at [facility] with Keith/Lloyd  -Maintain home IS  -FK levels if renal does not stabilize    ID  -FU urine cx, renally dose meds  -Covid iso    d/c with RN staff, team  Dr Child et al to resume care in AM (known to them from prior admits)          
65 y/o F w/ PMH of breast cancer s/p lumpectomy/  RT, cardiac murmur, gout, dyslipidemia, hyperparathyroidism, schizoaffctive disorder, uterine leiomyoma s/p hysterectomy, MGUS, dyslipidemia, GERD, renal transplant 2/2 lithium toxicity at [facility] 2 years ago, DVT (on Eliquis) was admitted on 6/3 for fever. Patient states she had fever of 102 at home. She tested positive for COVID 7 days ago, and still continues to have a runny nose. States she also began to develop UTI symptoms, such as increased urinary frequency. In ER she received zosyn.     1. Febrile syndrome. Possible sepsis. Pyuria. Likely UTI. Renal transplantation. Immunocompromised host. COVID-19 viral syndrome resolving. Allergy to levaquin.   -no respiratory distress, no active pulmonary disease, no evidence of COVID related pneumonia  -obtain BC x 2, urine c/s  -start ceftriaxone 2 gm IV qd  -reason for abx use and side effects reviewed with patient; monitor BMP   -no need for COVID antiviral therapy   -old chart reviewed to assess prior cultures  -droplet isolation  -monitor temps  -f/u CBC  -supportive care  2. Other issues:   -care per medicine    Clinical team may change from intravenous to oral antibiotics when the following criteria are met:   1. Patient is clinically improving/stable       a)	Improved signs and symptoms of infection from initial presentation       b)	Afebrile for 24 hours       c)	Leukocytosis trending towards normal range   2. Patient is tolerating oral intake   3. Initial/repeat blood cultures are negative     When above criteria met may change iv antibiotics to an oral agent  Cannot advise changing to oral antibiotic therapy until culture sensitivity is available.  
67 yo Female with PMH as above admitted for Fevers 2/2 UTI and COVID.   Pt with a transplanted kidney with mild hydronephrosis.   Imaging reviewed by Dr Wynne, no emergent acute surgical intervention at this time.  Creat trending down  Recommend  - Continue antibiotics, adjust as per cultures/ sensitivities    - Trend creat    - Pt can follow up with Urologists in [place] (Dr. Hall/Jackson/Katie) or Follow up with Dr. Comfort Wynne (located in [facility] [phone]) for further management    Case discussed with Dr. Wynne

## 2024-06-04 NOTE — CONSULT NOTE ADULT - SUBJECTIVE AND OBJECTIVE BOX
Patient is a 63y old  Female who presents with a chief complaint of fever (04 Jun 2024 08:20)    HPI:  65 y/o F w/ PMH of breast cancer s/p lumpectomy/  RT, cardiac murmur, gout, dyslipidemia, hyperparathyroidism, schizoaffctive disorder, uterine leiomyoma s/p hysterectomy, MGUS, dyslipidemia, GERD, renal transplant 2/2 lithium toxicity at [facility] 2 years ago, DVT (on Eliquis) was admitted on 6/3 for fever. Patient states she had fever of 102 at home. She tested positive for COVID 7 days ago, and still continues to have a runny nose. States she also began to develop UTI symptoms, such as increased urinary frequency. In ER she received zosyn.     PSH: breast lumpectomy, hysterectomy, renal transplant   PMH: as above    Meds: per reconciliation sheet, noted below  MEDICATIONS  (STANDING):  apixaban 5 milliGRAM(s) Oral every 12 hours  chlorhexidine 4% Liquid 1 Application(s) Topical <User Schedule>  cholecalciferol 1000 Unit(s) Oral daily  haloperidol     Tablet 1 milliGRAM(s) Oral daily  multivitamin 1 Tablet(s) Oral daily  mycophenolate mofetil 250 milliGRAM(s) Oral two times a day  piperacillin/tazobactam IVPB.. 3.375 Gram(s) IV Intermittent every 8 hours  predniSONE   Tablet 5 milliGRAM(s) Oral daily    MEDICATIONS  (PRN):  acetaminophen     Tablet .. 650 milliGRAM(s) Oral every 6 hours PRN Mild Pain (1 - 3)  clonazePAM  Tablet 0.5 milliGRAM(s) Oral daily PRN anxiety    Allergies    Levaquin (Anaphylaxis)    Intolerances      Social: no smoking, no alcohol, no illegal drugs; no recent travel, no exposure to TB  FAMILY HISTORY:  FH: lymphoma      no history of premature cardiovascular disease in first degree relatives    ROS: the patient denies HA, no seizures, no dizziness, no sore throat, no nasal congestion, no blurry vision, no CP, no palpitations, no SOB, no cough, no abdominal pain, no diarrhea, no N/V, has dysuria, no leg pain, no claudication, no rash, no joint aches, no rectal pain or bleeding, no night sweats  All other systems reviewed and are negative    Vital Signs Last 24 Hrs  T(C): 36.4 (04 Jun 2024 07:42), Max: 39.8 (03 Jun 2024 20:14)  T(F): 97.5 (04 Jun 2024 07:42), Max: 103.6 (03 Jun 2024 20:14)  HR: 51 (04 Jun 2024 07:42) (51 - 113)  BP: 101/66 (04 Jun 2024 07:42) (97/67 - 129/70)  BP(mean): 78 (04 Jun 2024 07:42) (73 - 87)  RR: 17 (04 Jun 2024 07:42) (16 - 22)  SpO2: 97% (04 Jun 2024 07:42) (94% - 100%)    Parameters below as of 04 Jun 2024 07:42  Patient On (Oxygen Delivery Method): room air      Daily Height in cm: 165.1 (03 Jun 2024 20:12)    Daily     PE:    Constitutional:  No acute distress  HEENT: NC/AT, EOMI, PERRLA, conjunctivae clear; ears and nose atraumatic; pharynx benign  Neck: supple; thyroid not palpable  Back: no tenderness  Respiratory: respiratory effort normal; clear to auscultation  Cardiovascular: S1S2 regular, no murmurs  Abdomen: soft, not tender, not distended, positive BS; no liver or spleen organomegaly  Genitourinary: no suprapubic tenderness  Lymphatic: no LN palpable  Musculoskeletal: no muscle tenderness, no joint swelling or tenderness  Extremities: no pedal edema  Neurological/ Psychiatric: AxOx3, judgement and insight normal; moving all extremities  Skin: no rashes; no palpable lesions    Labs: all available labs reviewed                        11.4   2.77  )-----------( 141      ( 04 Jun 2024 09:32 )             34.2     06-04    143  |  116<H>  |  21  ----------------------------<  226<H>  3.5   |  22  |  1.02    Ca    8.9      04 Jun 2024 09:32    TPro  6.5  /  Alb  2.9<L>  /  TBili  0.4  /  DBili  x   /  AST  34  /  ALT  45  /  AlkPhos  97  06-04     LIVER FUNCTIONS - ( 04 Jun 2024 09:32 )  Alb: 2.9 g/dL / Pro: 6.5 gm/dL / ALK PHOS: 97 U/L / ALT: 45 U/L / AST: 34 U/L / GGT: x           Urinalysis (06-03 @ 23:54)  Urine Appearance: Clear  Protein, Urine: Trace mg/dL    Urine Microscopic-Add On (NC) (06-03 @ 23:54)  White Blood Cell - Urine: 122 /HPF  Red Blood Cell - Urine: 8 /HPF    Radiology: all available radiological tests reviewed    < from: US Trans Kidney w/ Doppler, Right (06.04.24 @ 00:42) >  Mild hydronephrosis.  Perfused right lower quadrant transplant with and without evidence of   renal artery stenosis.  < end of copied text >    < from: Xray Chest 2 Views PA/Lat (06.04.24 @ 00:16) >  IMPRESSION:  No evidence of active chest disease.  < end of copied text >    Advanced directives addressed: full resuscitation

## 2024-06-04 NOTE — CONSULT NOTE ADULT - SUBJECTIVE AND OBJECTIVE BOX
HPI:  "65 y/o F w/ PMH of breast cancer s/p lumpectomy/  RT, cardiac murmur, gout, dyslipidemia, hyperparathyroidism, schizoaffctive disorder, uterine leiomyoma s/p hysterectomy, MGUS, dyslipidemia, GERD, renal transplant 2/2 lithium toxicity at [facility] 2 years ago, DVT (on Eliquis), p/w fever. Patient states she had fever of 102 at home. She tested positive for COVID 7 days ago, and still continues to have a runny nose. States she also began to develop UTI symptoms, such as increased urinary frequency. Denies nausea, vomiting, abdominal pain, CP, SOB, cough.       PSH: breast lumpectomy, hysterectomy, renal transplant     Social Hx: Denies tobacco / etoh / drugs     Family Hx: Denies pertinent hx    (04 Jun 2024 05:52)"    67 yo Female with PMH as above admitted for Fevers 2/2 UTI and COVID. Pt seen at bedside she was diagnosed with COVID > 5 days ago and then developed fevers, chills, dysuria, UTI. Patient reports increased urinary frequency and urgency denies hematuria, retention, abd/flank pain.     PAST MEDICAL & SURGICAL HISTORY:  Cardiac murmur      Kidney disease  "stage 4" as per patient secondary to lithium treatment many years ago      Breast CA  DCIS, left breast, s/p RT      Uterine leiomyoma      GERD (gastroesophageal reflux disease)      Lyme disease      Degenerative disc disease, lumbar      HLD (hyperlipidemia)      History of secondary hyperparathyroidism      MGUS (monoclonal gammopathy of unknown significance)      Gout      Schizoaffective disorder, bipolar type      S/P lumpectomy of breast  2011, left breast, no lymph nodes removed      S/P tonsillectomy      S/P dilation and curettage  uterine polyps      S/P coronary angiogram  10 yrs ago, no intervention      H/O: hysterectomy      Abnormal biopsy of kidney      Kidney transplanted          REVIEW OF SYSTEMS   All other review of systems neg, except as noted in HPI    MEDICATIONS  (STANDING):  apixaban 5 milliGRAM(s) Oral every 12 hours  cefTRIAXone Injectable. 2000 milliGRAM(s) IV Push every 24 hours  chlorhexidine 4% Liquid 1 Application(s) Topical <User Schedule>  cholecalciferol 1000 Unit(s) Oral daily  haloperidol     Tablet 1 milliGRAM(s) Oral daily  multivitamin 1 Tablet(s) Oral daily  mycophenolate mofetil 250 milliGRAM(s) Oral two times a day  predniSONE   Tablet 5 milliGRAM(s) Oral daily    MEDICATIONS  (PRN):  acetaminophen     Tablet .. 650 milliGRAM(s) Oral every 6 hours PRN Mild Pain (1 - 3)  clonazePAM  Tablet 0.5 milliGRAM(s) Oral daily PRN anxiety      Allergies    Levaquin (Anaphylaxis)    Intolerances        SOCIAL HISTORY:    FAMILY HISTORY:  FH: lymphoma        Vital Signs Last 24 Hrs  T(C): 36.4 (04 Jun 2024 07:42), Max: 39.8 (03 Jun 2024 20:14)  T(F): 97.5 (04 Jun 2024 07:42), Max: 103.6 (03 Jun 2024 20:14)  HR: 51 (04 Jun 2024 07:42) (51 - 113)  BP: 101/66 (04 Jun 2024 07:42) (97/67 - 129/70)  BP(mean): 78 (04 Jun 2024 07:42) (73 - 87)  RR: 17 (04 Jun 2024 07:42) (16 - 22)  SpO2: 97% (04 Jun 2024 07:42) (94% - 100%)    Parameters below as of 04 Jun 2024 07:42  Patient On (Oxygen Delivery Method): room air        PHYSICAL EXAM:     General: No distress, No anxiety  VITALS  T(C): 36.4 (06-04-24 @ 07:42), Max: 39.8 (06-03-24 @ 20:14)  HR: 51 (06-04-24 @ 07:42) (51 - 113)  BP: 101/66 (06-04-24 @ 07:42) (97/67 - 129/70)  RR: 17 (06-04-24 @ 07:42) (16 - 22)  SpO2: 97% (06-04-24 @ 07:42) (94% - 100%)            Skin     : No jaundice  Lung    : No resp distress  Abdo:   : Soft, Non tender, No guarding, No distension   Back    : No CVAT b/l_    Genitalia Female: No Ware  Neuro   : A&Ox3        LABS:                        11.4   2.77  )-----------( 141      ( 04 Jun 2024 09:32 )             34.2     06-04    143  |  116<H>  |  21  ----------------------------<  226<H>  3.5   |  22  |  1.02    Ca    8.9      04 Jun 2024 09:32    TPro  6.5  /  Alb  2.9<L>  /  TBili  0.4  /  DBili  x   /  AST  34  /  ALT  45  /  AlkPhos  97  06-04    PT/INR - ( 04 Jun 2024 09:32 )   PT: 16.8 sec;   INR: 1.50 ratio         PTT - ( 04 Jun 2024 09:32 )  PTT:33.5 sec  Urinalysis Basic - ( 04 Jun 2024 09:32 )    Color: x / Appearance: x / SG: x / pH: x  Gluc: 226 mg/dL / Ketone: x  / Bili: x / Urobili: x   Blood: x / Protein: x / Nitrite: x   Leuk Esterase: x / RBC: x / WBC x   Sq Epi: x / Non Sq Epi: x / Bacteria: x        RADIOLOGY & ADDITIONAL STUDIES:< from: US Trans Kidney w/ Doppler, Right (06.04.24 @ 00:42) >    ACC: [id number] EXAM:  US KIDNEY TRANSPLANT W DOPP RT   ORDERED BY: ADRIANA SMALL     PROCEDURE DATE:  06/04/2024          INTERPRETATION:  CLINICAL INFORMATION: Fever, urinary symptoms. Evaluate   for hydronephrosis and transplant perfusion.    COMPARISON: None available.    TECHNIQUE: Grayscale, Color and spectral Doppler evaluation of a RIGHT   renal transplant.    FINDINGS:    Renal Transplant: 12.2 cm. No calculi. Mild hydronephrosis. No   peritransplant collection.  Urinary bladder: Within normal limits.    Color and spectral Doppler reveals homogeneous flow throughout the   transplant.    Peak iliac artery velocity is 138 cm/sec pre-anastomosis, 162 cm/sec at   the anastomosis, and 181 cm/sec post anastomosis.    Transplant Renal Artery:  Peak systolic velocity is 78 cm/sec anastomosis, 122 cm/sec proximal, 120   cm/sec mid, 115 cm/sec distal and 99 cm/sec hilum.  Resistive Indices Range: 0.66-0.76    Transplant Renal Vein: Patent.    IMPRESSION:    Mild hydronephrosis.  Perfused right lower quadrant transplant with and without evidence of   renal artery stenosis.        --- End of Report ---            ALEX DIEGO MD; Attending Radiologist  This document has been electronically signed. Jun 4 2024  1:13AM    < end of copied text >

## 2024-06-04 NOTE — ED ADULT NURSE REASSESSMENT NOTE - NS ED NURSE REASSESS COMMENT FT1
Patient resting comfortably, denies pain, states that she just wants to sleep. Patient in no acute distress. Comfort and Safety maintained.

## 2024-06-04 NOTE — H&P ADULT - HISTORY OF PRESENT ILLNESS
65 y/o F w/ PMH of breast cancer s/p lumpectomy/  RT, cardiac murmur, gout, dyslipidemia, hyperparathyroidism, schizoaffctive disorder, uterine leiomyoma s/p hysterectomy, MGUS, dyslipidemia, GERD, renal transplant 2/2 lithium toxicity at [facility] 2 years ago, DVT (on Eliquis), p/w fever. Patient states she had fever of 102 at home. She tested positive for COVID 7 days ago, and still continues to have a runny nose. States she also began to develop UTI symptoms, such as increased urinary frequency. Denies nausea, vomiting, abdominal pain, CP, SOB, cough.       PSH: breast lumpectomy, hysterectomy, renal transplant     Social Hx: Denies tobacco / etoh / drugs     Family Hx: Denies pertinent hx

## 2024-06-04 NOTE — H&P ADULT - ASSESSMENT
67 y/o F w/ PMH of breast cancer s/p lumpectomy/  RT, cardiac murmur, gout, dyslipidemia, hyperparathyroidism, schizoaffctive disorder, uterine leiomyoma s/p hysterectomy, MGUS, dyslipidemia, GERD, renal transplant 2/2 lithium toxicity at [facility] 2 years ago, DVT (on Eliquis), p/w fever    *Sepsis 2/2 UTI vs COVID   -Zosyn   -F/u Urine / Blood cx  -ID consult   -U/S: Mild hydronephrosis -> NPO until  evaluation  -Pulse ox monitoring  -Isolation precautions     *CKD? w/ h/o kidney transplant   -Unclear baseline creatinine  -Nephro consult   -I/Os + Daily weights   -Avoid nephrotoxic agents     *Thrombocytopenia  -F/u outpatient Heme if remains stable     *H/o breast cancer /  gout / dyslipidemia / hyperparathyroidism / schizoaffective disorder / MGUS / GERD   -C/w home medications, and f/u outpatient for further management     *DVT ppx   -On Eliquis     >75 mins required for admission    67 y/o F w/ PMH of breast cancer s/p lumpectomy/  RT, cardiac murmur, gout, dyslipidemia, hyperparathyroidism, schizoaffctive disorder, uterine leiomyoma s/p hysterectomy, MGUS, dyslipidemia, GERD, renal transplant 2/2 lithium toxicity at [facility] 2 years ago, DVT (on Eliquis), p/w fever    *Sepsis 2/2 UTI vs COVID   -Zosyn   -F/u Urine / Blood cx  -ID consult   -U/S: Mild hydronephrosis -> NPO until  evaluation  -Pulse ox monitoring  -Isolation precautions     *CKD? w/ h/o kidney transplant   -Unclear baseline creatinine  -Nephro consult   -I/Os + Daily weights   -Avoid nephrotoxic agents     *Thrombocytopenia  -F/u outpatient Heme if remains stable     *H/o breast cancer /  gout / dyslipidemia / hyperparathyroidism / schizoaffective disorder / MGUS / GERD   -C/w home medications, and f/u outpatient for further management     *DVT ppx   -On Eliquis     >75 mins required for admission

## 2024-06-04 NOTE — CONSULT NOTE ADULT - SUBJECTIVE AND OBJECTIVE BOX
63 PMH of breast cancer s/p lumpectomy/  RT, cardiac murmur, gout, dyslipidemia, hyperparathyroidism, schizoaffctive disorder, uterine leiomyoma s/p hysterectomy, MGUS, dyslipidemia, GERD, DDRT at [facility] followed by Keith/Lloyd  DVT (on Eliquis), p/w fever. Patient states she had fever of 102 at home. She tested positive for COVID 7 days ago,. Reports getting recurrent UTIs and states she is in a hospital or on abx atleast once monthly.    PAST MEDICAL & SURGICAL HISTORY:  Cardiac murmur      Kidney disease  "stage 4" as per patient secondary to lithium treatment many years ago      Breast CA  DCIS, left breast, s/p RT      Uterine leiomyoma      GERD (gastroesophageal reflux disease)      Lyme disease      Degenerative disc disease, lumbar      HLD (hyperlipidemia)      History of secondary hyperparathyroidism      MGUS (monoclonal gammopathy of unknown significance)      Gout      Schizoaffective disorder, bipolar type      S/P lumpectomy of breast  2011, left breast, no lymph nodes removed      S/P tonsillectomy      S/P dilation and curettage  uterine polyps      S/P coronary angiogram  10 yrs ago, no intervention      H/O: hysterectomy      Abnormal biopsy of kidney      Kidney transplanted          MEDICATIONS  (STANDING):  apixaban 5 milliGRAM(s) Oral every 12 hours  chlorhexidine 4% Liquid 1 Application(s) Topical <User Schedule>  cholecalciferol 1000 Unit(s) Oral daily  haloperidol     Tablet 1 milliGRAM(s) Oral daily  meropenem Injectable      multivitamin 1 Tablet(s) Oral daily  mycophenolate mofetil 250 milliGRAM(s) Oral two times a day  predniSONE   Tablet 5 milliGRAM(s) Oral daily    MEDICATIONS  (PRN):  acetaminophen     Tablet .. 650 milliGRAM(s) Oral every 6 hours PRN Mild Pain (1 - 3)  clonazePAM  Tablet 0.5 milliGRAM(s) Oral daily PRN anxiety      Allergies    Levaquin (Anaphylaxis)    Intolerances        SOCIAL HISTORY:    FAMILY HISTORY:  FH: lymphoma        REVIEW OF SYSTEMS:    CONSTITUTIONAL: stable weakness, fevers or chills  EYES/ENT: No visual changes;  No vertigo or throat pain   NECK: No pain or stiffness  RESPIRATORY: No cough, wheezing, hemoptysis; No shortness of breath  CARDIOVASCULAR: No chest pain or palpitations  GASTROINTESTINAL: No abdominal or epigastric pain. No nausea, vomiting, or hematemesis; No diarrhea or constipation. No melena or hematochezia.  GENITOURINARY: some dysuria, frequency or hematuria  NEUROLOGICAL: No numbness or weakness  SKIN: No itching, burning, rashes, or lesions   All other review of systems is negative unless indicated above.      T(C): , Max: 36.8 (06-04-24 @ 01:00)  T(F): , Max: 98.2 (06-04-24 @ 01:00)  HR: 66 (06-04-24 @ 20:51)  BP: 98/55 (06-04-24 @ 20:51)  BP(mean): 78 (06-04-24 @ 07:42)  RR: 19 (06-04-24 @ 20:51)  SpO2: 100% (06-04-24 @ 20:51)  Wt(kg): --        PHYSICAL EXAM:    Constitutional: NAD   HEENT:  MM  Neck: No LAD, No JVD  Respiratory: CTAB  Cardiovascular: S1 and S2, RRR  Gastrointestinal: BS+, soft, NT/ND  Extremities: No peripheral edema  Neurological: A/O x 3, no focal deficits  Psychiatric: odd affect        LABS:                        11.4   2.77  )-----------( 141      ( 04 Jun 2024 09:32 )             34.2     04 Jun 2024 09:32    143    |  116    |  21     ----------------------------<  226    3.5     |  22     |  1.02   03 Jun 2024 22:54    139    |  110    |  25     ----------------------------<  122    3.6     |  23     |  1.33     Ca    8.9        04 Jun 2024 09:32  Ca    9.8        03 Jun 2024 22:54    TPro  6.5    /  Alb  2.9    /  TBili  0.4    /  DBili  x      /  AST  34     /  ALT  45     /  AlkPhos  97     04 Jun 2024 09:32  TPro  7.2    /  Alb  3.5    /  TBili  0.6    /  DBili  x      /  AST  32     /  ALT  40     /  AlkPhos  103    03 Jun 2024 22:54          Urine Studies:  Urinalysis Basic - ( 04 Jun 2024 09:32 )    Color: x / Appearance: x / SG: x / pH: x  Gluc: 226 mg/dL / Ketone: x  / Bili: x / Urobili: x   Blood: x / Protein: x / Nitrite: x   Leuk Esterase: x / RBC: x / WBC x   Sq Epi: x / Non Sq Epi: x / Bacteria: x            RADIOLOGY & ADDITIONAL STUDIES:

## 2024-06-04 NOTE — PATIENT PROFILE ADULT - VISION (WITH CORRECTIVE LENSES IF THE PATIENT USUALLY WEARS THEM):
Partially impaired: cannot see medication labels or newsprint, but can see obstacles in path, and the surrounding layout; can count fingers at arm's length 4

## 2024-06-04 NOTE — PATIENT PROFILE ADULT - FALL HARM RISK - UNIVERSAL INTERVENTIONS
Bed in lowest position, wheels locked, appropriate side rails in place/Call bell, personal items and telephone in reach/Instruct patient to call for assistance before getting out of bed or chair/Non-slip footwear when patient is out of bed/[place] to call system/Physically safe environment - no spills, clutter or unnecessary equipment/Purposeful Proactive Rounding/Room/bathroom lighting operational, light cord in reach

## 2024-06-04 NOTE — PHARMACOTHERAPY INTERVENTION NOTE - COMMENTS
Vancomycin dose adjusted as per first-time weight-based dose in ED policy.
Recommended to change ceftriaxone to meropenem 1g IV q8h since the 6/3 blood culture grew ESBL E. coli.    Cecilia Santiago, PharmD, BCIDP  Clinical Pharmacy Specialist, Infectious Diseases  Tele-Antimicrobial Stewardship Program (Tele-ASP)  Tele-ASP Phone: [phone]

## 2024-06-04 NOTE — ED ADULT NURSE REASSESSMENT NOTE - NS ED NURSE REASSESS COMMENT FT1
Spoke with Dr. Horn regarding pt testing +COVID x6 days ago and positive result today. Pt still symptomatic with cough. Per MD Colmenares, pt remains on airborne isolation for admission.

## 2024-06-05 LAB
ANION GAP SERPL CALC-SCNC: 6 MMOL/L — SIGNIFICANT CHANGE UP (ref 5–17)
BUN SERPL-MCNC: 28 MG/DL — HIGH (ref 7–23)
CALCIUM SERPL-MCNC: 9.2 MG/DL — SIGNIFICANT CHANGE UP (ref 8.5–10.1)
CHLORIDE SERPL-SCNC: 113 MMOL/L — HIGH (ref 96–108)
CO2 SERPL-SCNC: 21 MMOL/L — LOW (ref 22–31)
CREAT SERPL-MCNC: 0.96 MG/DL — SIGNIFICANT CHANGE UP (ref 0.5–1.3)
EGFR: 66 ML/MIN/1.73M2 — SIGNIFICANT CHANGE UP
GLUCOSE SERPL-MCNC: 150 MG/DL — HIGH (ref 70–99)
HCT VFR BLD CALC: 32.6 % — LOW (ref 34.5–45)
HGB BLD-MCNC: 10.8 G/DL — LOW (ref 11.5–15.5)
MCHC RBC-ENTMCNC: 29.8 PG — SIGNIFICANT CHANGE UP (ref 27–34)
MCHC RBC-ENTMCNC: 33.1 GM/DL — SIGNIFICANT CHANGE UP (ref 32–36)
MCV RBC AUTO: 89.8 FL — SIGNIFICANT CHANGE UP (ref 80–100)
PLATELET # BLD AUTO: 155 K/UL — SIGNIFICANT CHANGE UP (ref 150–400)
POTASSIUM SERPL-MCNC: 3.6 MMOL/L — SIGNIFICANT CHANGE UP (ref 3.5–5.3)
POTASSIUM SERPL-SCNC: 3.6 MMOL/L — SIGNIFICANT CHANGE UP (ref 3.5–5.3)
RBC # BLD: 3.63 M/UL — LOW (ref 3.8–5.2)
RBC # FLD: 13.6 % — SIGNIFICANT CHANGE UP (ref 10.3–14.5)
SODIUM SERPL-SCNC: 140 MMOL/L — SIGNIFICANT CHANGE UP (ref 135–145)
WBC # BLD: 3.93 K/UL — SIGNIFICANT CHANGE UP (ref 3.8–10.5)
WBC # FLD AUTO: 3.93 K/UL — SIGNIFICANT CHANGE UP (ref 3.8–10.5)

## 2024-06-05 PROCEDURE — 99232 SBSQ HOSP IP/OBS MODERATE 35: CPT

## 2024-06-05 RX ADMIN — MEROPENEM 1000 MILLIGRAM(S): 1 INJECTION INTRAVENOUS at 05:54

## 2024-06-05 RX ADMIN — APIXABAN 5 MILLIGRAM(S): 2.5 TABLET, FILM COATED ORAL at 11:12

## 2024-06-05 RX ADMIN — MYCOPHENOLATE MOFETIL 250 MILLIGRAM(S): 250 CAPSULE ORAL at 11:14

## 2024-06-05 RX ADMIN — MYCOPHENOLATE MOFETIL 250 MILLIGRAM(S): 250 CAPSULE ORAL at 23:10

## 2024-06-05 RX ADMIN — Medication 1000 UNIT(S): at 11:13

## 2024-06-05 RX ADMIN — MEROPENEM 1000 MILLIGRAM(S): 1 INJECTION INTRAVENOUS at 13:42

## 2024-06-05 RX ADMIN — Medication 5 MILLIGRAM(S): at 11:13

## 2024-06-05 RX ADMIN — Medication 0.5 MILLIGRAM(S): at 11:13

## 2024-06-05 RX ADMIN — APIXABAN 5 MILLIGRAM(S): 2.5 TABLET, FILM COATED ORAL at 23:10

## 2024-06-05 RX ADMIN — HALOPERIDOL DECANOATE 1 MILLIGRAM(S): 100 INJECTION INTRAMUSCULAR at 11:13

## 2024-06-05 RX ADMIN — Medication 1 TABLET(S): at 11:13

## 2024-06-06 ENCOUNTER — TRANSCRIPTION ENCOUNTER (OUTPATIENT)
Age: 64
End: 2024-06-06

## 2024-06-06 LAB
-  AMPICILLIN/SULBACTAM: SIGNIFICANT CHANGE UP
-  AMPICILLIN/SULBACTAM: SIGNIFICANT CHANGE UP
-  AMPICILLIN: SIGNIFICANT CHANGE UP
-  AMPICILLIN: SIGNIFICANT CHANGE UP
-  AZTREONAM: SIGNIFICANT CHANGE UP
-  AZTREONAM: SIGNIFICANT CHANGE UP
-  CEFAZOLIN: SIGNIFICANT CHANGE UP
-  CEFAZOLIN: SIGNIFICANT CHANGE UP
-  CEFEPIME: SIGNIFICANT CHANGE UP
-  CEFEPIME: SIGNIFICANT CHANGE UP
-  CEFTRIAXONE: SIGNIFICANT CHANGE UP
-  CEFTRIAXONE: SIGNIFICANT CHANGE UP
-  CEFUROXIME: SIGNIFICANT CHANGE UP
-  CIPROFLOXACIN: SIGNIFICANT CHANGE UP
-  CIPROFLOXACIN: SIGNIFICANT CHANGE UP
-  ERTAPENEM: SIGNIFICANT CHANGE UP
-  ERTAPENEM: SIGNIFICANT CHANGE UP
-  GENTAMICIN: SIGNIFICANT CHANGE UP
-  GENTAMICIN: SIGNIFICANT CHANGE UP
-  IMIPENEM: SIGNIFICANT CHANGE UP
-  IMIPENEM: SIGNIFICANT CHANGE UP
-  LEVOFLOXACIN: SIGNIFICANT CHANGE UP
-  LEVOFLOXACIN: SIGNIFICANT CHANGE UP
-  MEROPENEM: SIGNIFICANT CHANGE UP
-  MEROPENEM: SIGNIFICANT CHANGE UP
-  NITROFURANTOIN: SIGNIFICANT CHANGE UP
-  PIPERACILLIN/TAZOBACTAM: SIGNIFICANT CHANGE UP
-  PIPERACILLIN/TAZOBACTAM: SIGNIFICANT CHANGE UP
-  TOBRAMYCIN: SIGNIFICANT CHANGE UP
-  TOBRAMYCIN: SIGNIFICANT CHANGE UP
-  TRIMETHOPRIM/SULFAMETHOXAZOLE: SIGNIFICANT CHANGE UP
-  TRIMETHOPRIM/SULFAMETHOXAZOLE: SIGNIFICANT CHANGE UP
CULTURE RESULTS: ABNORMAL
CULTURE RESULTS: ABNORMAL
METHOD TYPE: SIGNIFICANT CHANGE UP
METHOD TYPE: SIGNIFICANT CHANGE UP
ORGANISM # SPEC MICROSCOPIC CNT: ABNORMAL
ORGANISM # SPEC MICROSCOPIC CNT: SIGNIFICANT CHANGE UP
ORGANISM # SPEC MICROSCOPIC CNT: SIGNIFICANT CHANGE UP
SPECIMEN SOURCE: SIGNIFICANT CHANGE UP
SPECIMEN SOURCE: SIGNIFICANT CHANGE UP

## 2024-06-06 PROCEDURE — 99233 SBSQ HOSP IP/OBS HIGH 50: CPT

## 2024-06-06 PROCEDURE — 99232 SBSQ HOSP IP/OBS MODERATE 35: CPT

## 2024-06-06 RX ADMIN — MYCOPHENOLATE MOFETIL 250 MILLIGRAM(S): 250 CAPSULE ORAL at 10:43

## 2024-06-06 RX ADMIN — Medication 600 MILLIGRAM(S): at 21:56

## 2024-06-06 RX ADMIN — Medication 1000 UNIT(S): at 10:37

## 2024-06-06 RX ADMIN — APIXABAN 5 MILLIGRAM(S): 2.5 TABLET, FILM COATED ORAL at 10:36

## 2024-06-06 RX ADMIN — MYCOPHENOLATE MOFETIL 250 MILLIGRAM(S): 250 CAPSULE ORAL at 21:53

## 2024-06-06 RX ADMIN — APIXABAN 5 MILLIGRAM(S): 2.5 TABLET, FILM COATED ORAL at 21:54

## 2024-06-06 RX ADMIN — HALOPERIDOL DECANOATE 1 MILLIGRAM(S): 100 INJECTION INTRAMUSCULAR at 10:41

## 2024-06-06 RX ADMIN — Medication 1 TABLET(S): at 10:36

## 2024-06-06 RX ADMIN — MEROPENEM 1000 MILLIGRAM(S): 1 INJECTION INTRAVENOUS at 06:01

## 2024-06-06 RX ADMIN — MEROPENEM 1000 MILLIGRAM(S): 1 INJECTION INTRAVENOUS at 14:59

## 2024-06-06 RX ADMIN — Medication 600 MILLIGRAM(S): at 17:38

## 2024-06-06 RX ADMIN — Medication 5 MILLIGRAM(S): at 10:36

## 2024-06-06 RX ADMIN — Medication 0.5 MILLIGRAM(S): at 00:37

## 2024-06-06 RX ADMIN — MEROPENEM 1000 MILLIGRAM(S): 1 INJECTION INTRAVENOUS at 21:56

## 2024-06-06 RX ADMIN — MEROPENEM 1000 MILLIGRAM(S): 1 INJECTION INTRAVENOUS at 00:35

## 2024-06-06 RX ADMIN — CHLORHEXIDINE GLUCONATE 1 APPLICATION(S): 213 SOLUTION TOPICAL at 06:01

## 2024-06-06 NOTE — PROGRESS NOTE BEHAVIORAL HEALTH - PERCEPTIONS
SW spoke with pt at bedside and he is refusing HETAL. SW f/u with spouse and she states that she doesn't feel that she can take care of him. Spouse to come to hospital and discuss with pt. SW will follow for d/c planning and SW to remain available for any needs. No abnormalities/Other

## 2024-06-06 NOTE — DISCHARGE NOTE NURSING/CASE MANAGEMENT/SOCIAL WORK - PATIENT PORTAL LINK FT
You can access the FollowMyHealth Patient Portal offered by [facility] [place] by registering at the following website: [email or web address]. By joining [facility]’s FollowMyHealth portal, you will also be able to view your health information using other applications (apps) compatible with our system.

## 2024-06-06 NOTE — PROVIDER CONTACT NOTE (CRITICAL VALUE NOTIFICATION) - TEST AND RESULT REPORTED:
blood culture from [facility] show growth in aerobic bottle gram negative nicole
6/3 blood cultures - E Coli ESBL  6/3 urine Cx >100,000 E. Coli ESBL

## 2024-06-07 VITALS
HEART RATE: 95 BPM | SYSTOLIC BLOOD PRESSURE: 102 MMHG | RESPIRATION RATE: 17 BRPM | OXYGEN SATURATION: 95 % | TEMPERATURE: 98 F | DIASTOLIC BLOOD PRESSURE: 61 MMHG

## 2024-06-07 LAB
ANION GAP SERPL CALC-SCNC: 6 MMOL/L — SIGNIFICANT CHANGE UP (ref 5–17)
BUN SERPL-MCNC: 28 MG/DL — HIGH (ref 7–23)
CALCIUM SERPL-MCNC: 9.9 MG/DL — SIGNIFICANT CHANGE UP (ref 8.5–10.1)
CHLORIDE SERPL-SCNC: 108 MMOL/L — SIGNIFICANT CHANGE UP (ref 96–108)
CO2 SERPL-SCNC: 24 MMOL/L — SIGNIFICANT CHANGE UP (ref 22–31)
CREAT SERPL-MCNC: 1.06 MG/DL — SIGNIFICANT CHANGE UP (ref 0.5–1.3)
EGFR: 59 ML/MIN/1.73M2 — LOW
GLUCOSE SERPL-MCNC: 110 MG/DL — HIGH (ref 70–99)
HCT VFR BLD CALC: 37.8 % — SIGNIFICANT CHANGE UP (ref 34.5–45)
HGB BLD-MCNC: 12.5 G/DL — SIGNIFICANT CHANGE UP (ref 11.5–15.5)
MCHC RBC-ENTMCNC: 29.6 PG — SIGNIFICANT CHANGE UP (ref 27–34)
MCHC RBC-ENTMCNC: 33.1 GM/DL — SIGNIFICANT CHANGE UP (ref 32–36)
MCV RBC AUTO: 89.4 FL — SIGNIFICANT CHANGE UP (ref 80–100)
PLATELET # BLD AUTO: 197 K/UL — SIGNIFICANT CHANGE UP (ref 150–400)
POTASSIUM SERPL-MCNC: 3.9 MMOL/L — SIGNIFICANT CHANGE UP (ref 3.5–5.3)
POTASSIUM SERPL-SCNC: 3.9 MMOL/L — SIGNIFICANT CHANGE UP (ref 3.5–5.3)
RBC # BLD: 4.23 M/UL — SIGNIFICANT CHANGE UP (ref 3.8–5.2)
RBC # FLD: 13.4 % — SIGNIFICANT CHANGE UP (ref 10.3–14.5)
SODIUM SERPL-SCNC: 138 MMOL/L — SIGNIFICANT CHANGE UP (ref 135–145)
WBC # BLD: 2.6 K/UL — LOW (ref 3.8–10.5)
WBC # FLD AUTO: 2.6 K/UL — LOW (ref 3.8–10.5)

## 2024-06-07 PROCEDURE — 99232 SBSQ HOSP IP/OBS MODERATE 35: CPT

## 2024-06-07 RX ORDER — MEROPENEM 1 G/30ML
1000 INJECTION INTRAVENOUS EVERY 8 HOURS
Refills: 0 | Status: DISCONTINUED | OUTPATIENT
Start: 2024-06-07 | End: 2024-06-07

## 2024-06-07 RX ORDER — MEROPENEM 1 G/30ML
1000 INJECTION INTRAVENOUS EVERY 8 HOURS
Refills: 0 | Status: COMPLETED | OUTPATIENT
Start: 2024-06-07 | End: 2024-06-07

## 2024-06-07 RX ORDER — ERTAPENEM SODIUM 1 G/1
1000 INJECTION, POWDER, LYOPHILIZED, FOR SOLUTION INTRAMUSCULAR; INTRAVENOUS
Refills: 0 | Status: DISCONTINUED | OUTPATIENT
Start: 2024-06-08 | End: 2024-06-08

## 2024-06-07 RX ADMIN — APIXABAN 5 MILLIGRAM(S): 2.5 TABLET, FILM COATED ORAL at 21:05

## 2024-06-07 RX ADMIN — MEROPENEM 1000 MILLIGRAM(S): 1 INJECTION INTRAVENOUS at 05:13

## 2024-06-07 RX ADMIN — Medication 1000 UNIT(S): at 09:58

## 2024-06-07 RX ADMIN — MEROPENEM 1000 MILLIGRAM(S): 1 INJECTION INTRAVENOUS at 21:04

## 2024-06-07 RX ADMIN — APIXABAN 5 MILLIGRAM(S): 2.5 TABLET, FILM COATED ORAL at 09:58

## 2024-06-07 RX ADMIN — Medication 5 MILLIGRAM(S): at 09:59

## 2024-06-07 RX ADMIN — MEROPENEM 1000 MILLIGRAM(S): 1 INJECTION INTRAVENOUS at 16:17

## 2024-06-07 RX ADMIN — CHLORHEXIDINE GLUCONATE 1 APPLICATION(S): 213 SOLUTION TOPICAL at 09:59

## 2024-06-07 RX ADMIN — HALOPERIDOL DECANOATE 1 MILLIGRAM(S): 100 INJECTION INTRAMUSCULAR at 09:58

## 2024-06-07 RX ADMIN — MYCOPHENOLATE MOFETIL 250 MILLIGRAM(S): 250 CAPSULE ORAL at 09:58

## 2024-06-07 RX ADMIN — MYCOPHENOLATE MOFETIL 250 MILLIGRAM(S): 250 CAPSULE ORAL at 21:04

## 2024-06-07 RX ADMIN — Medication 600 MILLIGRAM(S): at 21:05

## 2024-06-07 RX ADMIN — Medication 0.5 MILLIGRAM(S): at 21:04

## 2024-06-07 RX ADMIN — Medication 1 TABLET(S): at 09:58

## 2024-06-07 RX ADMIN — Medication 600 MILLIGRAM(S): at 09:58

## 2024-06-07 NOTE — PROGRESS NOTE ADULT - ASSESSMENT
64 yo Female with a PMH of schizoaffective disorder (bipolar type), renal failure (s/p R renal transplant, due to prior lithium use), breast cancer s/p lumpectomy, fibroid s/p hysterectomy, hyperparathyroidism, DVT (on Eliquis), MGUS, HLD, and GERD who presented with fever/recent covid with renal evaluation of VALERIE.    VALERIE   -Renal stabilizing, likely pre renal state  -Avoid nsaids/contrast  -IVF if limited intake    Renal Tx  -Follows at [facility] with Ali/Desai  -Maintain home IS  -FK levels if renal does not stabilize    ID  -FU urine cx, renally dose meds  -Covid iso    6/6 SY  --VALERIE : resolved and stable.  --Renal tp : continue MMF /Prednisone.  Follow up with TP team for Belatacept after d/c.  --COVID : stable.  --Urosepsis : recurrent : D/w pt at length for need to follow up with her TP team, ID at [facility] and GYN evaluation.  --D/c planning with continued IV abtx.    6/7  VALERIE resolved, h/o freq infections  Pt will d/w Transplant unit re prophylaxis / antirejection meds  Will get bladder scan to r/o PVR  Dr Theodore covering weekend if needed          
65 y/o F w/ PMH of breast cancer s/p lumpectomy/  RT, cardiac murmur, gout, dyslipidemia, hyperparathyroidism, schizoaffctive disorder, uterine leiomyoma s/p hysterectomy, MGUS, dyslipidemia, GERD, renal transplant 2/2 lithium toxicity at [facility] 2 years ago, DVT (on Eliquis) was admitted on 6/3 for fever. Patient states she had fever of 102 at home. She tested positive for COVID 7 days ago, and still continues to have a runny nose. States she also began to develop UTI symptoms, such as increased urinary frequency. In ER she received zosyn.     1. Febrile syndrome improving. Sepsis with ESBL E.coli. Pyuria. UTI with ESBL E.coli. Renal transplantation. Immunocompromised host. COVID-19 viral syndrome resolving. Allergy to levaquin.   -no respiratory distress, no active pulmonary disease, no evidence of COVID related pneumonia  -BC x 2, urine c/s noted  -s/p ceftriaxone 2 gm IV qd  -changed to meropenem 1 gm IV q8h  -reason for abx use and side effects reviewed with patient; monitor BMP   -no need for COVID antiviral therapy   -repeat BC x 2  -continue IV abx coverage  -will need outpatient IV abx therapy  -droplet isolation  -monitor temps  -f/u CBC  -supportive care  2. Other issues:   -care per medicine    IV to PO abx token dose not apply  
62 yo Female with a PMH of schizoaffective disorder (bipolar type), renal failure (s/p R renal transplant, due to prior lithium use), breast cancer s/p lumpectomy, fibroid s/p hysterectomy, hyperparathyroidism, DVT (on Eliquis), MGUS, HLD, and GERD who presented with fever/recent covid with renal evaluation of VALERIE.    VALERIE   -Renal stabilizing, likely pre renal state  -Avoid nsaids/contrast  -IVF if limited intake    Renal Tx  -Follows at [facility] with Ali/Desai  -Maintain home IS  -FK levels if renal does not stabilize    ID  -FU urine cx, renally dose meds  -Covid iso    6/6 SY  --VALERIE : resolved and stable.  --Renal tp : continue MMF /Prednisone.  Follow up with TP team for Belatacept after d/c.  --COVID : stable.  --Urosepsis : recurrent : D/w pt at length for need to follow up with her TP team, ID at [facility] and GYN evaluation.  --D/c planning with continued IV abtx.          
67 y/o F w/ PMH of breast cancer s/p lumpectomy/  RT, cardiac murmur, gout, dyslipidemia, hyperparathyroidism, schizoaffective disorder, uterine leiomyoma s/p hysterectomy, MGUS, dyslipidemia, GERD, renal transplant 2/2 lithium toxicity at [facility] 2 years ago, DVT (on Eliquis) was admitted on 6/3 for fever. Patient states she had fever of 102 at home. She tested positive for COVID 7 days ago, and still continues to have a runny nose. States she also began to develop UTI symptoms, such as increased urinary frequency. In ER she received zosyn.     1. Febrile syndrome improving. Sepsis with ESBL E.coli. Pyuria. UTI with ESBL E.coli. Renal transplantation. Immunocompromised host. COVID-19 viral syndrome resolving. Allergy to levaquin.   -no respiratory distress, no active pulmonary disease  -no evidence of COVID related pneumonia  -BC x 2, urine c/s noted  -s/p ceftriaxone 2 gm IV qd  -on meropenem 1 gm IV q8h  -reason for abx use and side effects reviewed with patient; monitor BMP   -no need for COVID antiviral therapy   -repeat BC x 2 collected  -change abx to ertapenem 1 gm IV qd for 14 more days  -midline   -continue IV abx coverage  -will need outpatient IV abx therapy  -contact isolation; d/c COVID isolation  -monitor temps  -f/u CBC  -supportive care  2. Other issues:   -care per medicine    IV to PO abx token dose not apply

## 2024-06-08 ENCOUNTER — TRANSCRIPTION ENCOUNTER (OUTPATIENT)
Age: 64
End: 2024-06-08

## 2024-06-08 LAB
ANION GAP SERPL CALC-SCNC: 7 MMOL/L — SIGNIFICANT CHANGE UP (ref 5–17)
BUN SERPL-MCNC: 29 MG/DL — HIGH (ref 7–23)
CALCIUM SERPL-MCNC: 9.6 MG/DL — SIGNIFICANT CHANGE UP (ref 8.5–10.1)
CHLORIDE SERPL-SCNC: 106 MMOL/L — SIGNIFICANT CHANGE UP (ref 96–108)
CO2 SERPL-SCNC: 24 MMOL/L — SIGNIFICANT CHANGE UP (ref 22–31)
CREAT SERPL-MCNC: 0.97 MG/DL — SIGNIFICANT CHANGE UP (ref 0.5–1.3)
EGFR: 65 ML/MIN/1.73M2 — SIGNIFICANT CHANGE UP
GLUCOSE SERPL-MCNC: 102 MG/DL — HIGH (ref 70–99)
HCT VFR BLD CALC: 36.9 % — SIGNIFICANT CHANGE UP (ref 34.5–45)
HGB BLD-MCNC: 12.1 G/DL — SIGNIFICANT CHANGE UP (ref 11.5–15.5)
MCHC RBC-ENTMCNC: 29.4 PG — SIGNIFICANT CHANGE UP (ref 27–34)
MCHC RBC-ENTMCNC: 32.8 GM/DL — SIGNIFICANT CHANGE UP (ref 32–36)
MCV RBC AUTO: 89.8 FL — SIGNIFICANT CHANGE UP (ref 80–100)
PLATELET # BLD AUTO: 212 K/UL — SIGNIFICANT CHANGE UP (ref 150–400)
POTASSIUM SERPL-MCNC: 3.9 MMOL/L — SIGNIFICANT CHANGE UP (ref 3.5–5.3)
POTASSIUM SERPL-SCNC: 3.9 MMOL/L — SIGNIFICANT CHANGE UP (ref 3.5–5.3)
RBC # BLD: 4.11 M/UL — SIGNIFICANT CHANGE UP (ref 3.8–5.2)
RBC # FLD: 13.4 % — SIGNIFICANT CHANGE UP (ref 10.3–14.5)
SODIUM SERPL-SCNC: 137 MMOL/L — SIGNIFICANT CHANGE UP (ref 135–145)
WBC # BLD: 3.1 K/UL — LOW (ref 3.8–10.5)
WBC # FLD AUTO: 3.1 K/UL — LOW (ref 3.8–10.5)

## 2024-06-08 PROCEDURE — 99239 HOSP IP/OBS DSCHRG MGMT >30: CPT

## 2024-06-08 RX ORDER — ERTAPENEM SODIUM 1 G/1
1 INJECTION, POWDER, LYOPHILIZED, FOR SOLUTION INTRAMUSCULAR; INTRAVENOUS
Qty: 0 | Refills: 0 | DISCHARGE
Start: 2024-06-08

## 2024-06-08 RX ADMIN — Medication 600 MILLIGRAM(S): at 09:10

## 2024-06-08 RX ADMIN — ERTAPENEM SODIUM 120 MILLIGRAM(S): 1 INJECTION, POWDER, LYOPHILIZED, FOR SOLUTION INTRAMUSCULAR; INTRAVENOUS at 09:15

## 2024-06-08 RX ADMIN — Medication 5 MILLIGRAM(S): at 09:11

## 2024-06-08 RX ADMIN — CHLORHEXIDINE GLUCONATE 1 APPLICATION(S): 213 SOLUTION TOPICAL at 09:07

## 2024-06-08 RX ADMIN — Medication 1000 UNIT(S): at 09:10

## 2024-06-08 RX ADMIN — MYCOPHENOLATE MOFETIL 250 MILLIGRAM(S): 250 CAPSULE ORAL at 09:11

## 2024-06-08 RX ADMIN — HALOPERIDOL DECANOATE 1 MILLIGRAM(S): 100 INJECTION INTRAMUSCULAR at 09:11

## 2024-06-08 RX ADMIN — APIXABAN 5 MILLIGRAM(S): 2.5 TABLET, FILM COATED ORAL at 09:10

## 2024-06-08 RX ADMIN — Medication 1 TABLET(S): at 09:10

## 2024-06-08 NOTE — DISCHARGE NOTE PROVIDER - HOSPITAL COURSE
67 y/o F w/ PMH of breast cancer s/p lumpectomy/  RT, cardiac murmur, gout, dyslipidemia, hyperparathyroidism, schizoaffctive disorder, uterine leiomyoma s/p hysterectomy, MGUS, dyslipidemia, GERD, renal transplant 2/2 lithium toxicity at [facility] 2 years ago, DVT (on Eliquis), p/w fever. Patient states she had fever of 102 at home. She tested positive for COVID 7 days ago, and still continues to have a runny nose. States she also began to develop UTI symptoms, such as increased urinary frequency. Denies nausea, vomiting, abdominal pain, CP, SOB, cough.     Sepsis 2/2 bacteremia with UTI: ESBL Ecoli in immunocompromised patient:   -BCx and UCx: ESBL Ecoli  bl cx neg todat; PICC and dc home  - Thrombocytopenia RESOLVED    * h/o kidney transplant   - Scr stable  - Nephro following  - needs out patient follow up with transplant team upon discharge      *H/o breast cancer /  gout / dyslipidemia / hyperparathyroidism / schizoaffective disorder / MGUS / GERD   -C/w home medications, and f/u outpatient for further management     * DVT ppx   -On Eliquis

## 2024-06-08 NOTE — DISCHARGE NOTE PROVIDER - NSDCFUADDAPPT_GEN_ALL_CORE_FT
APPTS ARE READY TO BE MADE: [X] YES    Best Family or Patient Contact (if needed):    Additional Information about above appointments (if needed):    1:  2:  3:  APPTS ARE READY TO BE MADE: [X] YES    Best Family or Patient Contact (if needed):    Additional Information about above appointments (if needed):    1:  2:  3:       Patient was outreached but did not answer. A voicemail was left for the patient to return our call.

## 2024-06-08 NOTE — PROGRESS NOTE ADULT - SUBJECTIVE AND OBJECTIVE BOX
CC: fever      · Subjective and Objective:     Patient is a 65 y/o F w/ PMH of breast cancer s/p lumpectomy/  RT, cardiac murmur, gout, dyslipidemia, hyperparathyroidism, schizoaffctive disorder, uterine leiomyoma s/p hysterectomy, MGUS, dyslipidemia, GERD, renal transplant 2/2 lithium toxicity at [facility] 2 years ago, DVT (on Eliquis), p/w fever. Patient states she had fever of 102 at home. She tested positive for COVID 7 days ago, and still continues to have a runny nose. States she also began to develop UTI symptoms, such as increased urinary frequency. Denies nausea, vomiting, abdominal pain, CP, SOB, cough.     S:  6/6: Pt sitting in chair, very anxious, asking a lot of questions. Denies fever, chills, N, V, abd pain, CP, SOB.  Discussed plan of care and addressed all questions and concerns to the best of my ability.   was listening and talking via phone during interview.      6/7: Calm today, discussed need for repeat cultures, drawn on 6/6 to result before discharge planning and midline planning.  Pt otherwise status quo, Denies fever, chills, N, V, abd pain, CP, SOB.    REVIEW OF SYSTEMS: All other review of systems is negative unless indicated above.    Vital Signs Last 24 Hrs  T(C): 36.6 (07 Jun 2024 08:29), Max: 37.2 (06 Jun 2024 16:37)  T(F): 97.8 (07 Jun 2024 08:29), Max: 98.9 (06 Jun 2024 16:37)  HR: 74 (07 Jun 2024 08:29) (71 - 74)  BP: 115/65 (07 Jun 2024 08:29) (105/70 - 115/65)  BP(mean): --  RR: 18 (07 Jun 2024 08:29) (18 - 18)  SpO2: 94% (07 Jun 2024 08:29) (94% - 94%)    Parameters below as of 07 Jun 2024 08:29  Patient On (Oxygen Delivery Method): room air      PHYSICAL EXAM:    Constitutional: NAD, awake and alert  HEENT: PERR, EOMI, Normal Hearing, MMM  Neck: Soft and supple  Respiratory: Breath sounds are clear bilaterally, No wheezing, rales or rhonchi  Cardiovascular: S1 and S2, regular rate and rhythm, no Murmurs, gallops or rubs  Gastrointestinal: Bowel Sounds present, soft, nontender, nondistended, no guarding, no rebound  Extremities: No peripheral edema  Neurological: A/O x 3, no focal deficits in my limited exam    MEDICATIONS  (STANDING):  apixaban 5 milliGRAM(s) Oral every 12 hours  chlorhexidine 4% Liquid 1 Application(s) Topical <User Schedule>  cholecalciferol 1000 Unit(s) Oral daily  guaiFENesin  milliGRAM(s) Oral every 12 hours  haloperidol     Tablet 1 milliGRAM(s) Oral daily  meropenem Injectable 1000 milliGRAM(s) IV Push every 8 hours  multivitamin 1 Tablet(s) Oral daily  mycophenolate mofetil 250 milliGRAM(s) Oral two times a day  predniSONE   Tablet 5 milliGRAM(s) Oral daily    MEDICATIONS  (PRN):  acetaminophen     Tablet .. 650 milliGRAM(s) Oral every 6 hours PRN Mild Pain (1 - 3)  clonazePAM  Tablet 0.5 milliGRAM(s) Oral daily PRN anxiety                                12.5   2.60  )-----------( 197      ( 07 Jun 2024 09:28 )             37.8     06-07    138  |  108  |  28<H>  ----------------------------<  110<H>  3.9   |  24  |  1.06    Ca    9.9      07 Jun 2024 09:28      CAPILLARY BLOOD GLUCOSE            Urinalysis Basic - ( 07 Jun 2024 09:28 )    Color: x / Appearance: x / SG: x / pH: x  Gluc: 110 mg/dL / Ketone: x  / Bili: x / Urobili: x   Blood: x / Protein: x / Nitrite: x   Leuk Esterase: x / RBC: x / WBC x   Sq Epi: x / Non Sq Epi: x / Bacteria: x        A/P:  65 y/o F w/ PMH of breast cancer s/p lumpectomy/  RT, cardiac murmur, gout, dyslipidemia, hyperparathyroidism, schizoaffctive disorder, uterine leiomyoma s/p hysterectomy, MGUS, dyslipidemia, GERD, renal transplant 2/2 lithium toxicity at [facility] 2 years ago, DVT (on Eliquis), p/w fever.    * Sepsis 2/2 bacteremia with UTI: ESBL Ecoli in immunocompromised patient:   -BCx and UCx: ESBL Ecoli  - U/S: Mild hydronephrosis   - GI eval appreciated, no indication for intervention  - c/w meropenem per ID  - repeat BCxs from 6/6 pending   - Thrombocytopenia RESOLVED    * h/o kidney transplant   - Scr stable  - Nephro following  - needs out patient follow up with transplant team upon discharge      *H/o breast cancer /  gout / dyslipidemia / hyperparathyroidism / schizoaffective disorder / MGUS / GERD   -C/w home medications, and f/u outpatient for further management     * DVT ppx   -On Eliquis     Dispo:  -f/u repeat cultures from 6/6  -d/c planning home with IV antibiotics via a midline once cultures result.    
  Reason for Admission: fever  History of Present Illness:   67 y/o F w/ PMH of breast cancer s/p lumpectomy/  RT, cardiac murmur, gout, dyslipidemia, hyperparathyroidism, schizoaffctive disorder, uterine leiomyoma s/p hysterectomy, MGUS, dyslipidemia, GERD, renal transplant 2/2 lithium toxicity at [facility] 2 years ago, DVT (on Eliquis), p/w fever. Patient states she had fever of 102 at home. She tested positive for COVID 7 days ago, and still continues to have a runny nose. States she also began to develop UTI symptoms, such as increased urinary frequency. Denies nausea, vomiting, abdominal pain, CP, SOB, cough.     REVIEW OF SYSTEMS:  General: NAD, hemodynamically stable, (-)  fever, (-) chills, (-) weakness  HEENT:  Eyes:  No visual loss, blurred vision, double vision or yellow sclerae. Ears, Nose, Throat:  No hearing loss, sneezing, congestion, runny nose or sore throat.  SKIN:  No rash or itching.  CARDIOVASCULAR:  No chest pain, chest pressure or chest discomfort. No palpitations or edema.  RESPIRATORY:  No shortness of breath, cough or sputum.  GASTROINTESTINAL:  No anorexia, nausea, vomiting or diarrhea. No abdominal pain or blood.  NEUROLOGICAL:  No headache, dizziness, syncope, paralysis, ataxia, numbness or tingling in the extremities. No change in bowel or bladder control.  MUSCULOSKELETAL:  No muscle, back pain, joint pain or stiffness.  HEMATOLOGIC:  No anemia, bleeding or bruising.  LYMPHATICS:  No enlarged nodes. No history of splenectomy.  ENDOCRINOLOGIC:  No reports of sweating, cold or heat intolerance. No polyuria or polydipsia.  ALLERGIES:  No history of asthma, hives, eczema or rhinitis.    Physical Exam:   GENERAL APPEARANCE:  NAD, hemodynamically stable  T(C): 36.4 (24 @ 07:42), Max: 39.8 (24 @ 20:14)  HR: 51 (24 @ 07:42) (51 - 113)  BP: 101/66 (24 @ 07:42) (97/67 - 129/70)  RR: 17 (24 @ 07:42) (16 - 22)  SpO2: 97% (24 @ 07:42) (94% - 100%)  Wt(kg): --  HEENT:  Head is normocephalic    Skin:  Warm and dry without any rash   NECK:  Supple without lymphadenopathy.   HEART:  Regular rate and rhythm. normal S1 and S2, No M/R/G  LUNGS:  Good ins/exp effort, no W/R/R/C  ABDOMEN:  Soft, nontender, nondistended with good bowel sounds heard  EXTREMITIES:  Without cyanosis, clubbing or edema.   NEUROLOGICAL:  Gross nonfocal       CBC Full  -  ( 2024 22:54 )  WBC Count : 2.56 K/uL  RBC Count : 4.01 M/uL  Hemoglobin : 11.9 g/dL  Hematocrit : 35.7 %  Platelet Count - Automated : 139 K/uL  Mean Cell Volume : 89.0 fl  Mean Cell Hemoglobin : 29.7 pg  Mean Cell Hemoglobin Concentration : 33.3 gm/dL  Auto Neutrophil # : 1.89 K/uL  Auto Lymphocyte # : 0.51 K/uL  Auto Monocyte # : 0.15 K/uL  Auto Eosinophil # : 0.00 K/uL  Auto Basophil # : 0.00 K/uL  Auto Neutrophil % : 73.0 %  Auto Lymphocyte % : 20.0 %  Auto Monocyte % : 6.0 %  Auto Eosinophil % : 0.0 %  Auto Basophil % : 0.0 %    PT/INR - ( 2024 22:54 )   PT: 15.5 sec;   INR: 1.38 ratio         PTT - ( 2024 22:54 )  PTT:30.4 sec  Urinalysis Basic - ( 2024 23:54 )    Color: Yellow / Appearance: Clear / S.010 / pH: x  Gluc: x / Ketone: Trace mg/dL  / Bili: Negative / Urobili: 0.2 mg/dL   Blood: x / Protein: Trace mg/dL / Nitrite: Negative   Leuk Esterase: Moderate / RBC: 8 /HPF /  /HPF   Sq Epi: x / Non Sq Epi: 2 /HPF / Bacteria: Occasional /HPF      06-03    139  |  110<H>  |  25<H>  ----------------------------<  122<H>  3.6   |  23  |  1.33<H>    Ca    9.8      2024 22:54    TPro  7.2  /  Alb  3.5  /  TBili  0.6  /  DBili  x   /  AST  32  /  ALT  40  /  AlkPhos  103  06-03        67 y/o F w/ PMH of breast cancer s/p lumpectomy/  RT, cardiac murmur, gout, dyslipidemia, hyperparathyroidism, schizoaffctive disorder, uterine leiomyoma s/p hysterectomy, MGUS, dyslipidemia, GERD, renal transplant 2/2 lithium toxicity at [facility] 2 years ago, DVT (on Eliquis), p/w fever    * Sepsis 2/2 UTI vs COVID in an immunocompromised host /  renal tansplant  - IV Zosyn   - F/u Urine / Blood cx  - ID consult   - U/S: Mild hydronephrosis -> NPO until  evaluation  - Pulse ox monitoring  - Isolation precautions     * CKD? w/ h/o kidney transplant   - Unclear baseline creatinine  - Nephro consult   - I/Os + Daily weights   - Avoid nephrotoxic agents     *Thrombocytopenia  -F/u outpatient Heme if remains stable     *H/o breast cancer /  gout / dyslipidemia / hyperparathyroidism / schizoaffective disorder / MGUS / GERD   -C/w home medications, and f/u outpatient for further management     *DVT ppx   -On Eliquis 
NEPHROLOGY INTERVAL HPI/OVERNIGHT EVENTS:    Date of Service: 06-06-24 @ 15:07    6/6--appears comfortable but anxious and concerned,  reports frequently recurrent UTIs.    HPI :  63 PMH of breast cancer s/p lumpectomy/  RT, cardiac murmur, gout, dyslipidemia, hyperparathyroidism, schizoaffctive disorder, uterine leiomyoma s/p hysterectomy, MGUS, dyslipidemia, GERD, DDRT at [facility] followed by [facility]  DVT (on Eliquis), p/w fever. Patient states she had fever of 102 at home. She tested positive for COVID 7 days ago,. Reports getting recurrent UTIs and states she is in a hospital or on abx atleast once monthly.    MEDICATIONS  (STANDING):  apixaban 5 milliGRAM(s) Oral every 12 hours  chlorhexidine 4% Liquid 1 Application(s) Topical <User Schedule>  cholecalciferol 1000 Unit(s) Oral daily  haloperidol     Tablet 1 milliGRAM(s) Oral daily  meropenem Injectable 1000 milliGRAM(s) IV Push every 8 hours  meropenem Injectable      multivitamin 1 Tablet(s) Oral daily  mycophenolate mofetil 250 milliGRAM(s) Oral two times a day  predniSONE   Tablet 5 milliGRAM(s) Oral daily    MEDICATIONS  (PRN):  acetaminophen     Tablet .. 650 milliGRAM(s) Oral every 6 hours PRN Mild Pain (1 - 3)  clonazePAM  Tablet 0.5 milliGRAM(s) Oral daily PRN anxiety    Vital Signs Last 24 Hrs  T(C): 36.5 (06 Jun 2024 10:35), Max: 37.2 (05 Jun 2024 15:55)  T(F): 97.7 (06 Jun 2024 10:35), Max: 98.9 (05 Jun 2024 15:55)  HR: 89 (06 Jun 2024 10:35) (65 - 89)  BP: 111/71 (06 Jun 2024 10:35) (111/71 - 132/95)  BP(mean): 81 (06 Jun 2024 10:35) (81 - 81)  RR: 18 (06 Jun 2024 10:35) (18 - 18)  SpO2: 96% (06 Jun 2024 10:35) (96% - 98%)    Parameters below as of 06 Jun 2024 10:35  Patient On (Oxygen Delivery Method): room air    PHYSICAL EXAM:  GENERAL: no distress  CHEST/LUNG: clear to aus  HEART: S1S2 RRR  ABDOMEN: soft  EXTREMITIES: no edema  SKIN:     LABS:                        10.8   3.93  )-----------( 155      ( 05 Jun 2024 07:49 )             32.6     06-05    140  |  113<H>  |  28<H>  ----------------------------<  150<H>  3.6   |  21<L>  |  0.96    Ca    9.2      05 Jun 2024 07:49        Urinalysis Basic - ( 05 Jun 2024 07:49 )    Color: x / Appearance: x / SG: x / pH: x  Gluc: 150 mg/dL / Ketone: x  / Bili: x / Urobili: x   Blood: x / Protein: x / Nitrite: x   Leuk Esterase: x / RBC: x / WBC x   Sq Epi: x / Non Sq Epi: x / Bacteria: x              RADIOLOGY & ADDITIONAL TESTS:  
NEPHROLOGY INTERVAL HPI/OVERNIGHT EVENTS:    Date of Service: 06-06-24 @ 15:07  6/7- no new events pt concerned re multiple infxs in 2 yrs since her transplant  6/6--appears comfortable but anxious and concerned,  reports frequently recurrent UTIs.    HPI :  63 PMH of breast cancer s/p lumpectomy/  RT, cardiac murmur, gout, dyslipidemia, hyperparathyroidism, schizoaffctive disorder, uterine leiomyoma s/p hysterectomy, MGUS, dyslipidemia, GERD, DDRT at [facility] followed by [facility]  DVT (on Eliquis), p/w fever. Patient states she had fever of 102 at home. She tested positive for COVID 7 days ago,. Reports getting recurrent UTIs and states she is in a hospital or on abx atleast once monthly.    MEDICATIONS  (STANDING):  apixaban 5 milliGRAM(s) Oral every 12 hours  chlorhexidine 4% Liquid 1 Application(s) Topical <User Schedule>  cholecalciferol 1000 Unit(s) Oral daily  guaiFENesin  milliGRAM(s) Oral every 12 hours  haloperidol     Tablet 1 milliGRAM(s) Oral daily  meropenem Injectable 1000 milliGRAM(s) IV Push every 8 hours  multivitamin 1 Tablet(s) Oral daily  mycophenolate mofetil 250 milliGRAM(s) Oral two times a day  predniSONE   Tablet 5 milliGRAM(s) Oral daily    MEDICATIONS  (PRN):  acetaminophen     Tablet .. 650 milliGRAM(s) Oral every 6 hours PRN Mild Pain (1 - 3)  clonazePAM  Tablet 0.5 milliGRAM(s) Oral daily PRN anxiety    Vital Signs Last 24 Hrs  T(C): 37.1 (07 Jun 2024 15:29), Max: 37.2 (06 Jun 2024 16:37)  T(F): 98.7 (07 Jun 2024 15:29), Max: 98.9 (06 Jun 2024 16:37)  HR: 77 (07 Jun 2024 15:29) (71 - 77)  BP: 102/61 (07 Jun 2024 15:29) (102/61 - 115/65)  BP(mean): --  RR: 18 (07 Jun 2024 15:29) (18 - 18)  SpO2: 96% (07 Jun 2024 15:29) (94% - 96%)    Parameters below as of 07 Jun 2024 15:29  Patient On (Oxygen Delivery Method): room air        PHYSICAL EXAM:  GENERAL: no distress  CHEST/LUNG: clear to aus  HEART: S1S2 RRR  ABDOMEN: soft  EXTREMITIES: no edema  SKIN:     LABS:                          12.5   2.60  )-----------( 197      ( 07 Jun 2024 09:28 )             37.8                           10.8   3.93  )-----------( 155      ( 05 Jun 2024 07:49 )             32.6     06-07    138  |  108  |  28<H>  ----------------------------<  110<H>  3.9   |  24  |  1.06    Ca    9.9      07 Jun 2024 09:28        06-05    140  |  113<H>  |  28<H>  ----------------------------<  150<H>  3.6   |  21<L>  |  0.96    Ca    9.2      05 Jun 2024 07:49        Urinalysis Basic - ( 05 Jun 2024 07:49 )    Color: x / Appearance: x / SG: x / pH: x  Gluc: 150 mg/dL / Ketone: x  / Bili: x / Urobili: x   Blood: x / Protein: x / Nitrite: x   Leuk Esterase: x / RBC: x / WBC x   Sq Epi: x / Non Sq Epi: x / Bacteria: x              RADIOLOGY & ADDITIONAL TESTS:  
  CC: fever      · Subjective and Objective:     Patient is a 65 y/o F w/ PMH of breast cancer s/p lumpectomy/  RT, cardiac murmur, gout, dyslipidemia, hyperparathyroidism, schizoaffctive disorder, uterine leiomyoma s/p hysterectomy, MGUS, dyslipidemia, GERD, renal transplant 2/2 lithium toxicity at [facility] 2 years ago, DVT (on Eliquis), p/w fever. Patient states she had fever of 102 at home. She tested positive for COVID 7 days ago, and still continues to have a runny nose. States she also began to develop UTI symptoms, such as increased urinary frequency. Denies nausea, vomiting, abdominal pain, CP, SOB, cough.     S:  6/6: Pt sitting in chair, very anxious, asking a lot of questions. Denies fever, chills, N, V, abd pain, CP, SOB.  Discussed plan of care and addressed all questions and concerns to the best of my ability.   was listening and talking via phone during interview.      6/7: Calm today, discussed need for repeat cultures, drawn on 6/6 to result before discharge planning and midline planning.  Pt otherwise status quo, Denies fever, chills, N, V, abd pain, CP, SOB.    Vital Signs Last 24 Hrs  T(C): 36.7 (07 Jun 2024 20:56), Max: 37.1 (07 Jun 2024 15:29)  T(F): 98.1 (07 Jun 2024 20:56), Max: 98.7 (07 Jun 2024 15:29)  HR: 95 (07 Jun 2024 20:56) (77 - 95)  BP: 102/61 (07 Jun 2024 20:56) (102/61 - 102/61)  BP(mean): --  RR: 17 (07 Jun 2024 20:56) (17 - 18)  SpO2: 95% (07 Jun 2024 20:56) (95% - 96%)    Parameters below as of 07 Jun 2024 20:56  Patient On (Oxygen Delivery Method): room air          PHYSICAL EXAM:    Constitutional: NAD, awake and alert  HEENT: PERR, EOMI, Normal Hearing, MMM  Neck: Soft and supple  Respiratory: Breath sounds are clear bilaterally, No wheezing, rales or rhonchi  Cardiovascular: S1 and S2, regular rate and rhythm, no Murmurs, gallops or rubs  Gastrointestinal: Bowel Sounds present, soft, nontender, nondistended, no guarding, no rebound  Extremities: No peripheral edema  Neurological: A/O x 3, no focal deficits in my limited exam    MEDICATIONS  (STANDING):  apixaban 5 milliGRAM(s) Oral every 12 hours  chlorhexidine 4% Liquid 1 Application(s) Topical <User Schedule>  cholecalciferol 1000 Unit(s) Oral daily  guaiFENesin  milliGRAM(s) Oral every 12 hours  haloperidol     Tablet 1 milliGRAM(s) Oral daily  meropenem Injectable 1000 milliGRAM(s) IV Push every 8 hours  multivitamin 1 Tablet(s) Oral daily  mycophenolate mofetil 250 milliGRAM(s) Oral two times a day  predniSONE   Tablet 5 milliGRAM(s) Oral daily    MEDICATIONS  (PRN):  acetaminophen     Tablet .. 650 milliGRAM(s) Oral every 6 hours PRN Mild Pain (1 - 3)  clonazePAM  Tablet 0.5 milliGRAM(s) Oral daily PRN anxiety                                12.5   2.60  )-----------( 197      ( 07 Jun 2024 09:28 )             37.8     06-07    138  |  108  |  28<H>  ----------------------------<  110<H>  3.9   |  24  |  1.06    Ca    9.9      07 Jun 2024 09:28      CAPILLARY BLOOD GLUCOSE            Urinalysis Basic - ( 07 Jun 2024 09:28 )    Color: x / Appearance: x / SG: x / pH: x  Gluc: 110 mg/dL / Ketone: x  / Bili: x / Urobili: x   Blood: x / Protein: x / Nitrite: x   Leuk Esterase: x / RBC: x / WBC x   Sq Epi: x / Non Sq Epi: x / Bacteria: x        A/P:  65 y/o F w/ PMH of breast cancer s/p lumpectomy/  RT, cardiac murmur, gout, dyslipidemia, hyperparathyroidism, schizoaffctive disorder, uterine leiomyoma s/p hysterectomy, MGUS, dyslipidemia, GERD, renal transplant 2/2 lithium toxicity at [facility] 2 years ago, DVT (on Eliquis), p/w fever.    * Sepsis 2/2 bacteremia with UTI: ESBL Ecoli in immunocompromised patient:   -BCx and UCx: ESBL Ecoli  - U/S: Mild hydronephrosis   - GI eval appreciated, no indication for intervention  - c/w meropenem per ID  - repeat BCxs from 6/6 pending   - Thrombocytopenia RESOLVED    * h/o kidney transplant   - Scr stable  - Nephro following  - needs out patient follow up with transplant team upon discharge      *H/o breast cancer /  gout / dyslipidemia / hyperparathyroidism / schizoaffective disorder / MGUS / GERD   -C/w home medications, and f/u outpatient for further management     * DVT ppx   -On Eliquis     Dispo:  -f/u repeat cultures from 6/6  -d/c planning home with IV antibiotics via a midline once cultures result.    
  Patient is a 67 y/o F w/ PMH of breast cancer s/p lumpectomy/  RT, cardiac murmur, gout, dyslipidemia, hyperparathyroidism, schizoaffctive disorder, uterine leiomyoma s/p hysterectomy, MGUS, dyslipidemia, GERD, renal transplant 2/2 lithium toxicity at [facility] 2 years ago, DVT (on Eliquis), p/w fever. Patient states she had fever of 102 at home. She tested positive for COVID 7 days ago, and still continues to have a runny nose. States she also began to develop UTI symptoms, such as increased urinary frequency. Denies nausea, vomiting, abdominal pain, CP, SOB, cough.     REVIEW OF SYSTEMS:  General: NAD, hemodynamically stable   HEENT:  Eyes:  No visual loss   SKIN:  No rash or itching.  CARDIOVASCULAR:  No chest pain   RESPIRATORY:  No shortness of breath, cough or sputum.  GASTROINTESTINAL:  No anorexia, nausea, vomiting or diarrhea. No abdominal pain or blood.  NEUROLOGICAL:  No headache   MUSCULOSKELETAL:  No muscle   HEMATOLOGIC:  No anemia, bleeding or bruising.  LYMPHATICS:  No enlarged nodes. No history of splenectomy.  ENDOCRINOLOGIC:  No reports of sweating, cold or heat intolerance. No polyuria or polydipsia.  ALLERGIES:  No history of asthma, hives, eczema or rhinitis.    Physical Exam:   GENERAL APPEARANCE:  NAD, hemodynamically stable  T(C): 36.4 (24 @ 07:42), Max: 39.8 (24 @ 20:14)  HR: 51 (24 @ 07:42) (51 - 113)  BP: 101/66 (24 @ 07:42) (97/67 - 129/70)  RR: 17 (24 @ 07:42) (16 - 22)  SpO2: 97% (24 @ 07:42) (94% - 100%)  HEENT:  Head is normocephalic    Skin:  Warm and dry without any rash   NECK:  Supple without lymphadenopathy.   HEART:  Regular rate and rhythm. normal S1 and S2, No M/R/G  LUNGS:  Good ins/exp effort, no W/R/R/C  ABDOMEN:  Soft, nontender, nondistended with good bowel sounds heard  EXTREMITIES:  Without cyanosis, clubbing or edema.   NEUROLOGICAL:  Gross nonfocal       CBC Full  -  ( 2024 22:54 )  WBC Count : 2.56 K/uL  RBC Count : 4.01 M/uL  Hemoglobin : 11.9 g/dL  Hematocrit : 35.7 %  Platelet Count - Automated : 139 K/uL  Mean Cell Volume : 89.0 fl  Mean Cell Hemoglobin : 29.7 pg  Mean Cell Hemoglobin Concentration : 33.3 gm/dL  Auto Neutrophil # : 1.89 K/uL  Auto Lymphocyte # : 0.51 K/uL  Auto Monocyte # : 0.15 K/uL  Auto Eosinophil # : 0.00 K/uL  Auto Basophil # : 0.00 K/uL  Auto Neutrophil % : 73.0 %  Auto Lymphocyte % : 20.0 %  Auto Monocyte % : 6.0 %  Auto Eosinophil % : 0.0 %  Auto Basophil % : 0.0 %    PT/INR - ( 2024 22:54 )   PT: 15.5 sec;   INR: 1.38 ratio         PTT - ( 2024 22:54 )  PTT:30.4 sec  Urinalysis Basic - ( 2024 23:54 )    Color: Yellow / Appearance: Clear / S.010 / pH: x  Gluc: x / Ketone: Trace mg/dL  / Bili: Negative / Urobili: 0.2 mg/dL   Blood: x / Protein: Trace mg/dL / Nitrite: Negative   Leuk Esterase: Moderate / RBC: 8 /HPF /  /HPF   Sq Epi: x / Non Sq Epi: 2 /HPF / Bacteria: Occasional /HPF          139  |  110<H>  |  25<H>  ----------------------------<  122<H>  3.6   |  23  |  1.33<H>    Ca    9.8      2024 22:54    TPro  7.2  /  Alb  3.5  /  TBili  0.6  /  DBili  x   /  AST  32  /  ALT  40  /  AlkPhos  103  -03        67 y/o F w/ PMH of breast cancer s/p lumpectomy/  RT, cardiac murmur, gout, dyslipidemia, hyperparathyroidism, schizoaffctive disorder, uterine leiomyoma s/p hysterectomy, MGUS, dyslipidemia, GERD, renal transplant 2/2 lithium toxicity at [facility] 2 years ago, DVT (on Eliquis), p/w fever    * Sepsis 2/2 UTI vs COVID in an immunocompromised host / renal tansplant  * ESBL  - IV Meropenem  - U/S: Mild hydronephrosis -> NPO until  evaluation  - Pulse ox monitoring  - Isolation precautions     * CKD? w/ h/o kidney transplant   - Unclear baseline creatinine  - Nephro consult   - I/Os + Daily weights   - Avoid nephrotoxic agents     *Thrombocytopenia  -F/u outpatient Heme if remains stable     *H/o breast cancer /  gout / dyslipidemia / hyperparathyroidism / schizoaffective disorder / MGUS / GERD   -C/w home medications, and f/u outpatient for further management     * DVT ppx   -On Eliquis 
CC: fever      · Subjective and Objective:     Patient is a 65 y/o F w/ PMH of breast cancer s/p lumpectomy/  RT, cardiac murmur, gout, dyslipidemia, hyperparathyroidism, schizoaffctive disorder, uterine leiomyoma s/p hysterectomy, MGUS, dyslipidemia, GERD, renal transplant 2/2 lithium toxicity at [facility] 2 years ago, DVT (on Eliquis), p/w fever. Patient states she had fever of 102 at home. She tested positive for COVID 7 days ago, and still continues to have a runny nose. States she also began to develop UTI symptoms, such as increased urinary frequency. Denies nausea, vomiting, abdominal pain, CP, SOB, cough.     S:  6/6: Pt sitting in chair, very anxious, asking a lot of questions. Denies fever, chills, N, V, abd pain, CP, SOB.  Discussed plan of care and addressed all questions and concerns to the best of my ability.   was listening and talking via phone during interview.      REVIEW OF SYSTEMS: All other review of systems is negative unless indicated above.    Vital Signs Last 24 Hrs  T(C): 36.5 (06 Jun 2024 10:35), Max: 37.2 (05 Jun 2024 15:55)  T(F): 97.7 (06 Jun 2024 10:35), Max: 98.9 (05 Jun 2024 15:55)  HR: 89 (06 Jun 2024 10:35) (65 - 89)  BP: 111/71 (06 Jun 2024 10:35) (111/71 - 132/95)  BP(mean): 81 (06 Jun 2024 10:35) (81 - 81)  RR: 18 (06 Jun 2024 10:35) (18 - 18)  SpO2: 96% (06 Jun 2024 10:35) (96% - 98%)    Parameters below as of 06 Jun 2024 10:35  Patient On (Oxygen Delivery Method): room air      PHYSICAL EXAM:    Constitutional: NAD, awake and alert  HEENT: PERR, EOMI, Normal Hearing, MMM  Neck: Soft and supple  Respiratory: Breath sounds are clear bilaterally, No wheezing, rales or rhonchi  Cardiovascular: S1 and S2, regular rate and rhythm, no Murmurs, gallops or rubs  Gastrointestinal: Bowel Sounds present, soft, nontender, nondistended, no guarding, no rebound  Extremities: No peripheral edema  Neurological: A/O x 3, no focal deficits in my limited exam      MEDICATIONS  (STANDING):  apixaban 5 milliGRAM(s) Oral every 12 hours  chlorhexidine 4% Liquid 1 Application(s) Topical <User Schedule>  cholecalciferol 1000 Unit(s) Oral daily  haloperidol     Tablet 1 milliGRAM(s) Oral daily  meropenem Injectable 1000 milliGRAM(s) IV Push every 8 hours  meropenem Injectable      multivitamin 1 Tablet(s) Oral daily  mycophenolate mofetil 250 milliGRAM(s) Oral two times a day  predniSONE   Tablet 5 milliGRAM(s) Oral daily    MEDICATIONS  (PRN):  acetaminophen     Tablet .. 650 milliGRAM(s) Oral every 6 hours PRN Mild Pain (1 - 3)  clonazePAM  Tablet 0.5 milliGRAM(s) Oral daily PRN anxiety                                10.8   3.93  )-----------( 155      ( 05 Jun 2024 07:49 )             32.6     06-05    140  |  113<H>  |  28<H>  ----------------------------<  150<H>  3.6   |  21<L>  |  0.96    Ca    9.2      05 Jun 2024 07:49      CAPILLARY BLOOD GLUCOSE            Urinalysis Basic - ( 05 Jun 2024 07:49 )    Color: x / Appearance: x / SG: x / pH: x  Gluc: 150 mg/dL / Ketone: x  / Bili: x / Urobili: x   Blood: x / Protein: x / Nitrite: x   Leuk Esterase: x / RBC: x / WBC x   Sq Epi: x / Non Sq Epi: x / Bacteria: x      A/P:  65 y/o F w/ PMH of breast cancer s/p lumpectomy/  RT, cardiac murmur, gout, dyslipidemia, hyperparathyroidism, schizoaffctive disorder, uterine leiomyoma s/p hysterectomy, MGUS, dyslipidemia, GERD, renal transplant 2/2 lithium toxicity at [facility] 2 years ago, DVT (on Eliquis), p/w fever.    * Sepsis 2/2 bacteremia with UTI: ESBL Ecoli in immunocompromised patient:   -BCx and UCx: ESBL Ecoli  - U/S: Mild hydronephrosis   - GI eval appreciated, no indication for intervention  - c/w meropenem per ID  - repeat BCxs  - Thrombocytopenia RESOLVED    * h/o kidney transplant   - Scr stable  - Nephro following  - needs out patient follow up with transplant team upon discharge      *H/o breast cancer /  gout / dyslipidemia / hyperparathyroidism / schizoaffective disorder / MGUS / GERD   -C/w home medications, and f/u outpatient for further management     * DVT ppx   -On Eliquis     
Date of service: 06-06-24 @ 12:44    Lying in bed in bed  Anxious  Reported with new multiresistant bacteria in blood  Feels upset about having to take IV abx     ROS: no fever or chills; denies dizziness, no HA, no SOB or cough, no abdominal pain, no diarrhea or constipation; no dysuria, no legs pain, no rashes    MEDICATIONS  (STANDING):  apixaban 5 milliGRAM(s) Oral every 12 hours  chlorhexidine 4% Liquid 1 Application(s) Topical <User Schedule>  cholecalciferol 1000 Unit(s) Oral daily  haloperidol     Tablet 1 milliGRAM(s) Oral daily  meropenem Injectable 1000 milliGRAM(s) IV Push every 8 hours  meropenem Injectable      multivitamin 1 Tablet(s) Oral daily  mycophenolate mofetil 250 milliGRAM(s) Oral two times a day  predniSONE   Tablet 5 milliGRAM(s) Oral daily    Vital Signs Last 24 Hrs  T(C): 36.5 (06 Jun 2024 10:35), Max: 37.2 (05 Jun 2024 15:55)  T(F): 97.7 (06 Jun 2024 10:35), Max: 98.9 (05 Jun 2024 15:55)  HR: 89 (06 Jun 2024 10:35) (65 - 89)  BP: 111/71 (06 Jun 2024 10:35) (111/71 - 132/95)  BP(mean): 81 (06 Jun 2024 10:35) (81 - 81)  RR: 18 (06 Jun 2024 10:35) (18 - 18)  SpO2: 96% (06 Jun 2024 10:35) (96% - 98%)    Parameters below as of 06 Jun 2024 10:35  Patient On (Oxygen Delivery Method): room air     Physical exam:    Constitutional:  No acute distress  HEENT: NC/AT, EOMI, PERRLA, conjunctivae clear; ears and nose atraumatic  Neck: supple; thyroid not palpable  Back: no tenderness  Respiratory: respiratory effort normal; clear to auscultation  Cardiovascular: S1S2 regular, no murmurs  Abdomen: soft, not tender, not distended, positive BS; no liver or spleen organomegaly  Genitourinary: no suprapubic tenderness  Lymphatic: no LN palpable  Musculoskeletal: no muscle tenderness, no joint swelling or tenderness  Extremities: no pedal edema  Neurological/ Psychiatric: AxOx3, judgement and insight normal; moving all extremities  Skin: no rashes; no palpable lesions    Labs: reviewed                        10.8   3.93  )-----------( 155      ( 05 Jun 2024 07:49 )             32.6     06-05    140  |  113<H>  |  28<H>  ----------------------------<  150<H>  3.6   |  21<L>  |  0.96    Ca    9.2      05 Jun 2024 07:49                        11.4   2.77  )-----------( 141      ( 04 Jun 2024 09:32 )             34.2     06-04    143  |  116<H>  |  21  ----------------------------<  226<H>  3.5   |  22  |  1.02    Ca    8.9      04 Jun 2024 09:32    TPro  6.5  /  Alb  2.9<L>  /  TBili  0.4  /  DBili  x   /  AST  34  /  ALT  45  /  AlkPhos  97  06-04     LIVER FUNCTIONS - ( 04 Jun 2024 09:32 )  Alb: 2.9 g/dL / Pro: 6.5 gm/dL / ALK PHOS: 97 U/L / ALT: 45 U/L / AST: 34 U/L / GGT: x           Urinalysis (06-03 @ 23:54)  Urine Appearance: Clear  Protein, Urine: Trace mg/dL  Urine Microscopic-Add On (NC) (06-03 @ 23:54)  White Blood Cell - Urine: 122 /HPF  Red Blood Cell - Urine: 8 /HPF    Culture - Urine (collected 03 Jun 2024 23:54)  Source: Clean Catch None  Final Report (06 Jun 2024 09:10):    >100,000 CFU/ml Escherichia coli ESBL  Organism: Escherichia coli ESBL (06 Jun 2024 09:10)  Organism: Escherichia coli ESBL (06 Jun 2024 09:10)      Method Type: OMAYRA      -  Ampicillin: R >16 These ampicillin results predict results for amoxicillin      -  Ampicillin/Sulbactam: R >16/8      -  Aztreonam: R >16      -  Cefazolin: R >16 For uncomplicated UTI with K. pneumoniae, E. coli, or P. mirablis: OMAYRA <=16 is sensitive and OMAYRA >=32 is resistant. This also predicts results for oral agents cefaclor, cefdinir, cefpodoxime, cefprozil, cefuroxime axetil, cephalexin and locarbef for uncomplicated UTI. Note that some isolates may be susceptible to these agents while testing resistant to cefazolin.      -  Cefepime: R >16      -  Ceftriaxone: R >32      -  Cefuroxime: R >16      -  Ciprofloxacin: R >2      -  Ertapenem: S <=0.5      -  Gentamicin: S <=2      -  Imipenem: S <=1      -  Levofloxacin: R >4      -  Meropenem: S <=1      -  Nitrofurantoin: S <=32 Should not be used to treat pyelonephritis      -  Piperacillin/Tazobactam: R 32      -  Tobramycin: R >8      -  Trimethoprim/Sulfamethoxazole: S <=0.5/9.5    Culture - Blood (collected 03 Jun 2024 23:21)  Source: .Blood None  Preliminary Report (06 Jun 2024 06:01):    No growth at 48 Hours    Culture - Blood (collected 03 Jun 2024 22:54)  Source: .Blood None  Gram Stain (04 Jun 2024 17:00):    Growth in aerobic bottle: Gram Negative Rods  Final Report (06 Jun 2024 11:04):    Growth in aerobic bottle: Escherichia coli ESBL    Direct identification is available within approximately 3-5    hours either by Blood Panel Multiplexed PCR or Direct    MALDI-TOF. Details: [email or web address]  Organism: Blood Culture PCR  Escherichia coli ESBL (06 Jun 2024 11:04)  Organism: Escherichia coli ESBL (06 Jun 2024 11:04)      Method Type: OMAYRA      -  Ampicillin: R >16 These ampicillin results predict results for amoxicillin      -  Ampicillin/Sulbactam: R 16/8      -  Aztreonam: R >16      -  Cefazolin: R >16      -  Cefepime: R >16      -  Ceftriaxone: R >32      -  Ciprofloxacin: R >2      -  Ertapenem: S <=0.5      -  Gentamicin: S <=2      -  Imipenem: S <=1      -  Levofloxacin: R >4      -  Meropenem: S <=1      -  Piperacillin/Tazobactam: R <=8      -  Tobramycin: R >8      -  Trimethoprim/Sulfamethoxazole: S <=0.5/9.5  Organism: Blood Culture PCR (06 Jun 2024 11:04)      Method Type: PCR      -  Escherichia coli: Detec      -  ESBL: Detec      -  CTX-M Resistance Marker: Detec    Radiology: all available radiological tests reviewed    < from: US Trans Kidney w/ Doppler, Right (06.04.24 @ 00:42) >  Mild hydronephrosis.  Perfused right lower quadrant transplant with and without evidence of   renal artery stenosis.  < end of copied text >    < from: Xray Chest 2 Views PA/Lat (06.04.24 @ 00:16) >  IMPRESSION:  No evidence of active chest disease.  < end of copied text >    Advanced directives addressed: full resuscitation
Date of service: 06-07-24 @ 12:21    Lying in bed in NAD  Fever is down  Has urinary frequency    ROS: no fever or chills; denies dizziness, no HA, no SOB or cough, no abdominal pain, no diarrhea or constipation; no legs pain, no rashes    MEDICATIONS  (STANDING):  apixaban 5 milliGRAM(s) Oral every 12 hours  chlorhexidine 4% Liquid 1 Application(s) Topical <User Schedule>  cholecalciferol 1000 Unit(s) Oral daily  guaiFENesin  milliGRAM(s) Oral every 12 hours  haloperidol     Tablet 1 milliGRAM(s) Oral daily  meropenem Injectable      meropenem Injectable 1000 milliGRAM(s) IV Push every 8 hours  multivitamin 1 Tablet(s) Oral daily  mycophenolate mofetil 250 milliGRAM(s) Oral two times a day  predniSONE   Tablet 5 milliGRAM(s) Oral daily    Vital Signs Last 24 Hrs  T(C): 36.6 (07 Jun 2024 08:29), Max: 37.2 (06 Jun 2024 16:37)  T(F): 97.8 (07 Jun 2024 08:29), Max: 98.9 (06 Jun 2024 16:37)  HR: 74 (07 Jun 2024 08:29) (71 - 74)  BP: 115/65 (07 Jun 2024 08:29) (105/70 - 115/65)  BP(mean): --  RR: 18 (07 Jun 2024 08:29) (18 - 18)  SpO2: 94% (07 Jun 2024 08:29) (94% - 94%)    Parameters below as of 07 Jun 2024 08:29  Patient On (Oxygen Delivery Method): room air     Physical exam:    Constitutional:  No acute distress  HEENT: NC/AT, EOMI, PERRLA, conjunctivae clear; ears and nose atraumatic  Neck: supple; thyroid not palpable  Back: no tenderness  Respiratory: respiratory effort normal; clear to auscultation  Cardiovascular: S1S2 regular, no murmurs  Abdomen: soft, not tender, not distended, positive BS; no liver or spleen organomegaly  Genitourinary: no suprapubic tenderness  Lymphatic: no LN palpable  Musculoskeletal: no muscle tenderness, no joint swelling or tenderness  Extremities: no pedal edema  Neurological/ Psychiatric: AxOx3, judgement and insight normal; moving all extremities  Skin: no rashes; no palpable lesions    Labs: reviewed                        10.8   3.93  )-----------( 155      ( 05 Jun 2024 07:49 )             32.6     06-05    140  |  113<H>  |  28<H>  ----------------------------<  150<H>  3.6   |  21<L>  |  0.96    Ca    9.2      05 Jun 2024 07:49                        11.4   2.77  )-----------( 141      ( 04 Jun 2024 09:32 )             34.2     06-04    143  |  116<H>  |  21  ----------------------------<  226<H>  3.5   |  22  |  1.02    Ca    8.9      04 Jun 2024 09:32    TPro  6.5  /  Alb  2.9<L>  /  TBili  0.4  /  DBili  x   /  AST  34  /  ALT  45  /  AlkPhos  97  06-04     LIVER FUNCTIONS - ( 04 Jun 2024 09:32 )  Alb: 2.9 g/dL / Pro: 6.5 gm/dL / ALK PHOS: 97 U/L / ALT: 45 U/L / AST: 34 U/L / GGT: x           Urinalysis (06-03 @ 23:54)  Urine Appearance: Clear  Protein, Urine: Trace mg/dL  Urine Microscopic-Add On (NC) (06-03 @ 23:54)  White Blood Cell - Urine: 122 /HPF  Red Blood Cell - Urine: 8 /HPF    Culture - Urine (collected 03 Jun 2024 23:54)  Source: Clean Catch None  Final Report (06 Jun 2024 09:10):    >100,000 CFU/ml Escherichia coli ESBL  Organism: Escherichia coli ESBL (06 Jun 2024 09:10)  Organism: Escherichia coli ESBL (06 Jun 2024 09:10)      Method Type: OMAYRA      -  Ampicillin: R >16 These ampicillin results predict results for amoxicillin      -  Ampicillin/Sulbactam: R >16/8      -  Aztreonam: R >16      -  Cefazolin: R >16 For uncomplicated UTI with K. pneumoniae, E. coli, or P. mirablis: OMAYRA <=16 is sensitive and OMAYRA >=32 is resistant. This also predicts results for oral agents cefaclor, cefdinir, cefpodoxime, cefprozil, cefuroxime axetil, cephalexin and locarbef for uncomplicated UTI. Note that some isolates may be susceptible to these agents while testing resistant to cefazolin.      -  Cefepime: R >16      -  Ceftriaxone: R >32      -  Cefuroxime: R >16      -  Ciprofloxacin: R >2      -  Ertapenem: S <=0.5      -  Gentamicin: S <=2      -  Imipenem: S <=1      -  Levofloxacin: R >4      -  Meropenem: S <=1      -  Nitrofurantoin: S <=32 Should not be used to treat pyelonephritis      -  Piperacillin/Tazobactam: R 32      -  Tobramycin: R >8      -  Trimethoprim/Sulfamethoxazole: S <=0.5/9.5    Culture - Blood (collected 03 Jun 2024 23:21)  Source: .Blood None  Preliminary Report (06 Jun 2024 06:01):    No growth at 48 Hours    Culture - Blood (collected 03 Jun 2024 22:54)  Source: .Blood None  Gram Stain (04 Jun 2024 17:00):    Growth in aerobic bottle: Gram Negative Rods  Final Report (06 Jun 2024 11:04):    Growth in aerobic bottle: Escherichia coli ESBL    Direct identification is available within approximately 3-5    hours either by Blood Panel Multiplexed PCR or Direct    MALDI-TOF. Details: [email or web address]  Organism: Blood Culture PCR  Escherichia coli ESBL (06 Jun 2024 11:04)  Organism: Escherichia coli ESBL (06 Jun 2024 11:04)      Method Type: OMAYRA      -  Ampicillin: R >16 These ampicillin results predict results for amoxicillin      -  Ampicillin/Sulbactam: R 16/8      -  Aztreonam: R >16      -  Cefazolin: R >16      -  Cefepime: R >16      -  Ceftriaxone: R >32      -  Ciprofloxacin: R >2      -  Ertapenem: S <=0.5      -  Gentamicin: S <=2      -  Imipenem: S <=1      -  Levofloxacin: R >4      -  Meropenem: S <=1      -  Piperacillin/Tazobactam: R <=8      -  Tobramycin: R >8      -  Trimethoprim/Sulfamethoxazole: S <=0.5/9.5  Organism: Blood Culture PCR (06 Jun 2024 11:04)      Method Type: PCR      -  Escherichia coli: Detec      -  ESBL: Detec      -  CTX-M Resistance Marker: Detec    Radiology: all available radiological tests reviewed    < from: US Trans Kidney w/ Doppler, Right (06.04.24 @ 00:42) >  Mild hydronephrosis.  Perfused right lower quadrant transplant with and without evidence of   renal artery stenosis.  < end of copied text >    < from: Xray Chest 2 Views PA/Lat (06.04.24 @ 00:16) >  IMPRESSION:  No evidence of active chest disease.  < end of copied text >    Advanced directives addressed: full resuscitation

## 2024-06-08 NOTE — PROGRESS NOTE ADULT - PROVIDER SPECIALTY LIST ADULT
Elvira Davis, [facility] 4/4/2022 12:06 PM CDT      ----- Message -----  From: Yolanda Kilgore  Sent: 4/4/2022 10:02 AM CDT  To: , *  Subject: Baclofen     Hi,  On March 25th I refilled the prescription for Baclofen which was ordered: Baclofen 5mg - 2 tablets three times a day as needed for pain.  I am actually taking Baclofen 5 mg - 1 tablet three times a day. Due to persistent back and knee pain, I need this prescription to be ordered for thirty days at a time.  If you would like me to schedule an appointment to discuss this I will be happy to schedule an appointment - if not please have someone call in the order for Baclofen 5mg 90 pills refillable every thirty days to the [facility] pharmacy on [address].  Thanks,  Yolanda Kilgore    
Hospitalist
Infectious Disease
Hospitalist
Infectious Disease
Nephrology
Hospitalist
Nephrology

## 2024-06-08 NOTE — DISCHARGE NOTE PROVIDER - CARE PROVIDER_API CALL
Verónica Rocha [facility]  [address], [address]  [place] [address]  Phone: [phone]  Fax: [phone]  Follow Up Time: 1 week

## 2024-06-08 NOTE — ADVANCED PRACTICE NURSE CONSULT - ASSESSMENT
Procedure Note: Vascular Access    Procedure Name: LUE Midline Placement    Time out: Patient’s first and last name, , MRN, procedure and correct site confirmed prior to start of procedure.    Procedure Date and Time: 2024 1200    Informed Consent: Benefits, risks, and possible complications of procedure explained to patient/caregiver who verbalized understanding and gave verbal consent.    Local Anesthesia: 1% Lidocaine subdermal    Procedure findings and Details: Successful placement of LUE single lumen Midline ([employer] Software[employer] Midline Lot#[id number]) via L basilic vein inserted under ultrasound guidance. Midline line trimmed to 10cm.    Follow up: CXR not needed for placement verification. OK to use. Report given to Carmenza ALLRED.

## 2024-06-08 NOTE — DISCHARGE NOTE PROVIDER - NSDCMRMEDTOKEN_GEN_ALL_CORE_FT
cholecalciferol 25 mcg (1000 intl units) oral tablet: 1 tab(s) orally once a day  clonazePAM 0.5 mg oral tablet: 1 tab(s) orally once a day as needed for  Eliquis 5 mg oral tablet: 1 tab(s) orally 2 times a day  ertapenem 1 g injection: 1 gram(s) injectable once a day  haloperidol 1 mg oral tablet: 1 tab(s) orally once a day  methenamine hippurate 1 g oral tablet: 1 tab(s) orally 2 times a day Patient has not received yet  Multiple Vitamins oral tablet: 1 tab(s) orally once a day  mycophenolate mofetil 250 mg oral capsule: 1 cap(s) orally 2 times a day  predniSONE 5 mg oral tablet: 1 tab(s) orally once a day

## 2024-06-09 ENCOUNTER — EMERGENCY (EMERGENCY)
Facility: HOSPITAL | Age: 64
LOS: 0 days | Discharge: HOME | End: 2024-06-10
Attending: EMERGENCY MEDICINE
Payer: COMMERCIAL

## 2024-06-09 VITALS — HEIGHT: 65 IN

## 2024-06-09 DIAGNOSIS — T82.594A OTHER MECHANICAL COMPLICATION OF INFUSION CATHETER, INITIAL ENCOUNTER: ICD-10-CM

## 2024-06-09 DIAGNOSIS — R89.7 ABNORMAL HISTOLOGICAL FINDINGS IN SPECIMENS FROM OTHER ORGANS, SYSTEMS AND TISSUES: Chronic | ICD-10-CM

## 2024-06-09 DIAGNOSIS — Z94.0 KIDNEY TRANSPLANT STATUS: Chronic | ICD-10-CM

## 2024-06-09 DIAGNOSIS — Z88.1 ALLERGY STATUS TO OTHER ANTIBIOTIC AGENTS STATUS: ICD-10-CM

## 2024-06-09 DIAGNOSIS — Z90.710 ACQUIRED ABSENCE OF BOTH CERVIX AND UTERUS: Chronic | ICD-10-CM

## 2024-06-09 LAB
CULTURE RESULTS: SIGNIFICANT CHANGE UP
SPECIMEN SOURCE: SIGNIFICANT CHANGE UP

## 2024-06-09 PROCEDURE — 99284 EMERGENCY DEPT VISIT MOD MDM: CPT

## 2024-06-09 NOTE — ED ADULT NURSE NOTE - NSFALLUNIVINTERV_ED_ALL_ED
Bed/Stretcher in lowest position, wheels locked, appropriate side rails in place/Call bell, personal items and telephone in reach/Instruct patient to call for assistance before getting out of bed/chair/stretcher/Non-slip footwear applied when patient is off stretcher/Telephone to call system/Physically safe environment - no spills, clutter or unnecessary equipment/Purposeful proactive rounding/Room/bathroom lighting operational, light cord in reach

## 2024-06-09 NOTE — ED ADULT NURSE NOTE - OBJECTIVE STATEMENT
Pt is 64 yo female ambulatory from home c/o PICC line complications. Per pt, she received IV antibiotics today for recent ESBL infection, dressing was changed after infusion. Pt noticed dressing started to fill with blood and area around arm started to bruise. Does not have pain. Pt does have a hx of chronic UTI's s/p kidney transplant 2 years ago. Pt d/c from  yesterday. Pt is 62 yo female ambulatory from home c/o PICC line complications. Per pt, she received IV antibiotics today for recent ESBL infection through PICC line in left basilic vein, dressing was changed after infusion. Pt noticed dressing started to fill with blood and area around arm started to bruise. Does not have pain. Pt does have a hx of chronic UTI's s/p kidney transplant 2 years ago, on eliquis for hx of blood clots. Pt d/c from  yesterday.

## 2024-06-09 NOTE — ED ADULT NURSE NOTE - CHIEF COMPLAINT QUOTE
patient had placement of LUE single lumen Midline (BioFlo Midline Lot#[id number]) to L basilic vein inserted under ultrasound guidance, done on 6/8 at this hospital. reports worsening bleeding around insertion site since this afternoon. presents with blood surrounding insertion site and under clear bandage. denies injury to area. takes anticoagulant medications daily.

## 2024-06-09 NOTE — ED ADULT TRIAGE NOTE - CHIEF COMPLAINT QUOTE
patient had placement of LUE single lumen Midline (BioFlo Midline Lot#[id number]) to L basilic vein inserted under ultrasound guidance, done on 6/8 at this hospital. reports worsening bleeding around insertion site since this afternoon. presents with blood surrounding insertion site and under clear bandage. denies injury to area. takes anticoagulant medications daily.

## 2024-06-10 VITALS
OXYGEN SATURATION: 100 % | HEART RATE: 72 BPM | TEMPERATURE: 98 F | DIASTOLIC BLOOD PRESSURE: 71 MMHG | RESPIRATION RATE: 18 BRPM | SYSTOLIC BLOOD PRESSURE: 108 MMHG

## 2024-06-10 VITALS
DIASTOLIC BLOOD PRESSURE: 70 MMHG | TEMPERATURE: 98 F | SYSTOLIC BLOOD PRESSURE: 103 MMHG | HEART RATE: 81 BPM | OXYGEN SATURATION: 100 %

## 2024-06-10 VITALS — HEIGHT: 65 IN

## 2024-06-10 DIAGNOSIS — Z90.710 ACQUIRED ABSENCE OF BOTH CERVIX AND UTERUS: Chronic | ICD-10-CM

## 2024-06-10 DIAGNOSIS — E78.5 HYPERLIPIDEMIA, UNSPECIFIED: ICD-10-CM

## 2024-06-10 DIAGNOSIS — T82.594A OTHER MECHANICAL COMPLICATION OF INFUSION CATHETER, INITIAL ENCOUNTER: ICD-10-CM

## 2024-06-10 DIAGNOSIS — Z94.0 KIDNEY TRANSPLANT STATUS: ICD-10-CM

## 2024-06-10 DIAGNOSIS — Z94.0 KIDNEY TRANSPLANT STATUS: Chronic | ICD-10-CM

## 2024-06-10 DIAGNOSIS — K21.9 GASTRO-ESOPHAGEAL REFLUX DISEASE WITHOUT ESOPHAGITIS: ICD-10-CM

## 2024-06-10 DIAGNOSIS — Z88.1 ALLERGY STATUS TO OTHER ANTIBIOTIC AGENTS STATUS: ICD-10-CM

## 2024-06-10 DIAGNOSIS — R89.7 ABNORMAL HISTOLOGICAL FINDINGS IN SPECIMENS FROM OTHER ORGANS, SYSTEMS AND TISSUES: Chronic | ICD-10-CM

## 2024-06-10 DIAGNOSIS — Z85.3 PERSONAL HISTORY OF MALIGNANT NEOPLASM OF BREAST: ICD-10-CM

## 2024-06-10 DIAGNOSIS — Z86.16 PERSONAL HISTORY OF COVID-19: ICD-10-CM

## 2024-06-10 DIAGNOSIS — M10.9 GOUT, UNSPECIFIED: ICD-10-CM

## 2024-06-10 DIAGNOSIS — F25.9 SCHIZOAFFECTIVE DISORDER, UNSPECIFIED: ICD-10-CM

## 2024-06-10 PROCEDURE — 99282 EMERGENCY DEPT VISIT SF MDM: CPT

## 2024-06-10 PROCEDURE — 99283 EMERGENCY DEPT VISIT LOW MDM: CPT

## 2024-06-10 RX ORDER — SODIUM CHLORIDE 9 MG/ML
500 INJECTION INTRAMUSCULAR; INTRAVENOUS; SUBCUTANEOUS ONCE
Refills: 0 | Status: COMPLETED | OUTPATIENT
Start: 2024-06-10 | End: 2024-06-10

## 2024-06-10 RX ADMIN — SODIUM CHLORIDE 500 MILLILITER(S): 9 INJECTION INTRAMUSCULAR; INTRAVENOUS; SUBCUTANEOUS at 00:19

## 2024-06-10 NOTE — ED STATDOCS - OBJECTIVE STATEMENT
64 y/o female with a PMHx of breast cancer, cardiac murmur, DDD, GERD, gout, HLD, kidney stones, kidney transplant, lyme disease, MGUS, schizoaffective disorder, secondary hyperparathyroidism, uterine leiomyoma, UTI presents to the ED with midline malfunction. Per chart review, pt seen in ED this morning for similar complaints. States she was just released from  for covid and sepsis a few days ago and had a midline placed for IV medications at home. Pt reporting discomfort and bleeding around the area. No fevers.

## 2024-06-10 NOTE — ED PROVIDER NOTE - PHYSICAL EXAMINATION
Gen: Well appearing in NAD  Head: NC/AT  Neck: Trachea midline  Resp: No distress  Ext: Left upper extremity midline with blood under dressing and Biopatch no active bleeding.    Neuro: A&O appears non focal  Skin: Warm and dry as visualized  Psych: Normal affect and mood

## 2024-06-10 NOTE — ED STATDOCS - PROGRESS NOTE DETAILS
pt seen with ER attending with hx of breast CA, renal transplant presents and left antecubital mid line cath presents with bloody site and swelling that appears to be an infiltrated site. mid line team called pt seen by midline team. line flushed and they requested dermabond around site which was applied to dry site

## 2024-06-10 NOTE — ED PROVIDER NOTE - CLINICAL SUMMARY MEDICAL DECISION MAKING FREE TEXT BOX
Patient with complaint of blood under midline dressing,  Flushing and infusing well, in vessel on bedside POCUS, sterilely redressed , okay for discharge.

## 2024-06-10 NOTE — ED STATDOCS - NS_ATTENDINGSCRIBE_ED_ALL_ED
[Negative] : Breast
I personally performed the service described in the documentation recorded by the scribe in my presence, and it accurately and completely records my words and actions.

## 2024-06-10 NOTE — ED PROVIDER NOTE - PATIENT PORTAL LINK FT
You can access the FollowMyHealth Patient Portal offered by [facility] by registering at the following website: [email or web address]. By joining [employer]’s FollowMyHealth portal, you will also be able to view your health information using other applications (apps) compatible with our system.

## 2024-06-10 NOTE — ED STATDOCS - MUSCULOSKELETAL, MLM
midline site with bleeding around it, some swelling at site, appears to be compromised, some tenderness

## 2024-06-10 NOTE — ED STATDOCS - PATIENT PORTAL LINK FT
You can access the FollowMyHealth Patient Portal offered by [facility] by registering at the following website: [email or web address]. By joining [employer]’s FollowMyHealth portal, you will also be able to view your health information using other applications (apps) compatible with our system.

## 2024-06-10 NOTE — ED ADULT TRIAGE NOTE - CHIEF COMPLAINT QUOTE
Ambulatory to ER with c/o bloody midline. Patient has midline placed 06/08; seen in ER last night for same c/o.

## 2024-06-10 NOTE — ED PROVIDER NOTE - OBJECTIVE STATEMENT
63-year-old female who was discharged from this hospital today complains of midline malfunction.  Patient states bleeding under dressing.  Patient has not attempted to use the line yet.

## 2024-06-11 NOTE — CHART NOTE - NSCHARTNOTEFT_GEN_A_CORE
Patient was outreached but did not answer. A voicemail was left for the patient to return our call.
Patient was outreached but did not answer. A voicemail was left for the patient to return our call.

## 2024-06-17 ENCOUNTER — TRANSCRIPTION ENCOUNTER (OUTPATIENT)
Age: 64
End: 2024-06-17

## 2024-08-20 NOTE — PROGRESS NOTE BEHAVIORAL HEALTH - AXIS III
Stage 3 renal disease, elevated liver enzymes, GERD, MGUS
Stage 3 renal disease, elevated liver enzymes, GERD, MGUS
English
Stage 3 renal disease, elevated liver enzymes, GERD, MGUS

## 2025-02-24 NOTE — PROGRESS NOTE BEHAVIORAL HEALTH - OTHER
Detail Level: Detailed Quality 130: Documentation Of Current Medications In The Medical Record: Current Medications Documented Quality 431: Preventive Care And Screening: Unhealthy Alcohol Use - Screening: Patient not identified as an unhealthy alcohol user when screened for unhealthy alcohol use using a systematic screening method Quality 394a: Meningococcal Immunizations For Adolescents: Patient did not have one dose of meningococcal vaccine on or between the patient's 11th and 13th birthdays. Quality 226: Preventive Care And Screening: Tobacco Use: Screening And Cessation Intervention: Patient screened for tobacco use and is an ex/non-smoker improving, steady more aware eye contact better modulated in tone overall. Pt more aware of her episodic outbursts and making more concerted effort to maintain her calm rapid but not clearly pressured as before less pressured and pt better able to focus though briefly on other's comments less manifestly labile affect  slowly becoming more mood congruent though pt denies AH /VH pt at times appears distracted and possibly internally preoccupied

## 2025-03-03 NOTE — ED ADULT NURSE NOTE - BREATHING, MLM
Pee Bone. Nice madyson.  As you know, he has a terrible shoulder with significant upward humeral migration of the humeral head.  However, he describes this problem more as a \"nuisance\" that he does as a true problem affecting his quality of life.  As such, I did not think he is having enough of a problem to warrant surgical consideration at this point.  He and his wife both agree with this assessment.  For this reason, I gave him a cortisone injection and we will try some physical therapy. Thanks. [place] Spontaneous, unlabored and symmetrical

## 2025-03-13 ENCOUNTER — INPATIENT (INPATIENT)
Facility: HOSPITAL | Age: 65
LOS: 2 days | Discharge: HOME | DRG: 690 | End: 2025-03-16
Attending: FAMILY MEDICINE | Admitting: STUDENT IN AN ORGANIZED HEALTH CARE EDUCATION/TRAINING PROGRAM
Payer: MEDICARE

## 2025-03-13 VITALS — TEMPERATURE: 99 F

## 2025-03-13 DIAGNOSIS — N18.30 CHRONIC KIDNEY DISEASE, STAGE 3 UNSPECIFIED: ICD-10-CM

## 2025-03-13 DIAGNOSIS — Z90.710 ACQUIRED ABSENCE OF BOTH CERVIX AND UTERUS: Chronic | ICD-10-CM

## 2025-03-13 DIAGNOSIS — D63.1 ANEMIA IN CHRONIC KIDNEY DISEASE: ICD-10-CM

## 2025-03-13 DIAGNOSIS — R89.7 ABNORMAL HISTOLOGICAL FINDINGS IN SPECIMENS FROM OTHER ORGANS, SYSTEMS AND TISSUES: Chronic | ICD-10-CM

## 2025-03-13 DIAGNOSIS — T86.19 OTHER COMPLICATION OF KIDNEY TRANSPLANT: ICD-10-CM

## 2025-03-13 DIAGNOSIS — Z79.624 LONG TERM (CURRENT) USE OF INHIBITORS OF NUCLEOTIDE SYNTHESIS: ICD-10-CM

## 2025-03-13 DIAGNOSIS — F25.0 SCHIZOAFFECTIVE DISORDER, BIPOLAR TYPE: ICD-10-CM

## 2025-03-13 DIAGNOSIS — Z94.0 KIDNEY TRANSPLANT STATUS: Chronic | ICD-10-CM

## 2025-03-13 DIAGNOSIS — N30.90 CYSTITIS, UNSPECIFIED WITHOUT HEMATURIA: ICD-10-CM

## 2025-03-13 LAB
ALBUMIN SERPL ELPH-MCNC: 3.3 G/DL — SIGNIFICANT CHANGE UP (ref 3.3–5)
ALP SERPL-CCNC: 337 U/L — HIGH (ref 40–120)
ALT FLD-CCNC: 152 U/L — HIGH (ref 12–78)
ANION GAP SERPL CALC-SCNC: 6 MMOL/L — SIGNIFICANT CHANGE UP (ref 5–17)
APPEARANCE UR: ABNORMAL
APTT BLD: 35.7 SEC — HIGH (ref 24.5–35.6)
AST SERPL-CCNC: 76 U/L — HIGH (ref 15–37)
BASOPHILS NFR BLD AUTO: 0.3 % — SIGNIFICANT CHANGE UP (ref 0–2)
BILIRUB SERPL-MCNC: 0.9 MG/DL — SIGNIFICANT CHANGE UP (ref 0.2–1.2)
BILIRUB UR-MCNC: NEGATIVE — SIGNIFICANT CHANGE UP
BUN SERPL-MCNC: 18 MG/DL — SIGNIFICANT CHANGE UP (ref 7–23)
CALCIUM SERPL-MCNC: 9.9 MG/DL — SIGNIFICANT CHANGE UP (ref 8.5–10.1)
CHLORIDE SERPL-SCNC: 103 MMOL/L — SIGNIFICANT CHANGE UP (ref 96–108)
CO2 SERPL-SCNC: 26 MMOL/L — SIGNIFICANT CHANGE UP (ref 22–31)
CREAT SERPL-MCNC: 1.31 MG/DL — HIGH (ref 0.5–1.3)
DIFF PNL FLD: ABNORMAL
EGFR: 45 ML/MIN/1.73M2 — LOW
EGFR: 45 ML/MIN/1.73M2 — LOW
EOSINOPHIL # BLD AUTO: 0.01 K/UL — SIGNIFICANT CHANGE UP (ref 0–0.5)
EOSINOPHIL NFR BLD AUTO: 0.1 % — SIGNIFICANT CHANGE UP (ref 0–6)
EPI CELLS # UR: SIGNIFICANT CHANGE UP
FLUAV AG NPH QL: SIGNIFICANT CHANGE UP
FLUBV AG NPH QL: SIGNIFICANT CHANGE UP
GLUCOSE SERPL-MCNC: 152 MG/DL — HIGH (ref 70–99)
GLUCOSE UR QL: NEGATIVE MG/DL — SIGNIFICANT CHANGE UP
HCT VFR BLD CALC: 37.8 % — SIGNIFICANT CHANGE UP (ref 34.5–45)
HGB BLD-MCNC: 12.2 G/DL — SIGNIFICANT CHANGE UP (ref 11.5–15.5)
IMM GRANULOCYTES # BLD AUTO: 0.06 K/UL — SIGNIFICANT CHANGE UP (ref 0–0.07)
IMM GRANULOCYTES NFR BLD AUTO: 0.5 % — SIGNIFICANT CHANGE UP (ref 0–0.9)
INR BLD: 1.46 RATIO — HIGH (ref 0.85–1.16)
KETONES UR-MCNC: ABNORMAL MG/DL
LACTATE SERPL-SCNC: 0.8 MMOL/L — SIGNIFICANT CHANGE UP (ref 0.7–2)
LEUKOCYTE ESTERASE UR-ACNC: ABNORMAL
LYMPHOCYTES NFR BLD AUTO: 6.8 % — LOW (ref 13–44)
MCHC RBC-ENTMCNC: 30 PG — SIGNIFICANT CHANGE UP (ref 27–34)
MCHC RBC-ENTMCNC: 32.3 G/DL — SIGNIFICANT CHANGE UP (ref 32–36)
MCV RBC AUTO: 92.9 FL — SIGNIFICANT CHANGE UP (ref 80–100)
MONOCYTES NFR BLD AUTO: 8.7 % — SIGNIFICANT CHANGE UP (ref 2–14)
NEUTROPHILS # BLD AUTO: 9.97 K/UL — HIGH (ref 1.8–7.4)
NEUTROPHILS NFR BLD AUTO: 83.6 % — HIGH (ref 43–77)
NITRITE UR-MCNC: NEGATIVE — SIGNIFICANT CHANGE UP
NRBC # BLD AUTO: 0 K/UL — SIGNIFICANT CHANGE UP (ref 0–0)
NRBC # FLD: 0 K/UL — SIGNIFICANT CHANGE UP (ref 0–0)
NRBC BLD AUTO-RTO: 0 /100 WBCS — SIGNIFICANT CHANGE UP (ref 0–0)
PH UR: 5.5 — SIGNIFICANT CHANGE UP (ref 5–8)
PLATELET # BLD AUTO: 193 K/UL — SIGNIFICANT CHANGE UP (ref 150–400)
PMV BLD: 9.8 FL — SIGNIFICANT CHANGE UP (ref 7–13)
POTASSIUM SERPL-MCNC: 4 MMOL/L — SIGNIFICANT CHANGE UP (ref 3.5–5.3)
POTASSIUM SERPL-SCNC: 4 MMOL/L — SIGNIFICANT CHANGE UP (ref 3.5–5.3)
PROT SERPL-MCNC: 7.5 GM/DL — SIGNIFICANT CHANGE UP (ref 6–8.3)
PROT UR-MCNC: 30 MG/DL
PROTHROM AB SERPL-ACNC: 16.7 SEC — HIGH (ref 9.9–13.4)
RBC # FLD: 12.8 % — SIGNIFICANT CHANGE UP (ref 10.3–14.5)
RBC CASTS # UR COMP ASSIST: SIGNIFICANT CHANGE UP /HPF (ref 0–4)
RSV RNA NPH QL NAA+NON-PROBE: SIGNIFICANT CHANGE UP
SARS-COV-2 RNA SPEC QL NAA+PROBE: SIGNIFICANT CHANGE UP
SODIUM SERPL-SCNC: 135 MMOL/L — SIGNIFICANT CHANGE UP (ref 135–145)
SP GR SPEC: 1.02 — SIGNIFICANT CHANGE UP (ref 1–1.03)
TROPONIN I, HIGH SENSITIVITY RESULT: 5.46 NG/L — SIGNIFICANT CHANGE UP
UROBILINOGEN FLD QL: 0.2 MG/DL — SIGNIFICANT CHANGE UP (ref 0.2–1)
WBC # BLD: 11.92 K/UL — HIGH (ref 3.8–10.5)
WBC UR QL: SIGNIFICANT CHANGE UP /HPF (ref 0–5)

## 2025-03-13 PROCEDURE — 74176 CT ABD & PELVIS W/O CONTRAST: CPT | Mod: 26

## 2025-03-13 PROCEDURE — 93010 ELECTROCARDIOGRAM REPORT: CPT

## 2025-03-13 PROCEDURE — 71045 X-RAY EXAM CHEST 1 VIEW: CPT | Mod: 26

## 2025-03-13 PROCEDURE — 99285 EMERGENCY DEPT VISIT HI MDM: CPT

## 2025-03-13 RX ORDER — CEFEPIME 2 G/20ML
2000 INJECTION, POWDER, FOR SOLUTION INTRAVENOUS ONCE
Refills: 0 | Status: COMPLETED | OUTPATIENT
Start: 2025-03-13 | End: 2025-03-13

## 2025-03-13 RX ORDER — SODIUM CHLORIDE 9 G/1000ML
2700 INJECTION, SOLUTION INTRAVENOUS ONCE
Refills: 0 | Status: COMPLETED | OUTPATIENT
Start: 2025-03-13 | End: 2025-03-13

## 2025-03-13 RX ORDER — CEFEPIME 2 G/20ML
2000 INJECTION, POWDER, FOR SOLUTION INTRAVENOUS ONCE
Refills: 0 | Status: DISCONTINUED | OUTPATIENT
Start: 2025-03-13 | End: 2025-03-13

## 2025-03-13 RX ORDER — VANCOMYCIN HCL IN 5 % DEXTROSE 1.5G/250ML
1000 PLASTIC BAG, INJECTION (ML) INTRAVENOUS ONCE
Refills: 0 | Status: DISCONTINUED | OUTPATIENT
Start: 2025-03-13 | End: 2025-03-13

## 2025-03-13 RX ORDER — VANCOMYCIN HCL IN 5 % DEXTROSE 1.5G/250ML
1250 PLASTIC BAG, INJECTION (ML) INTRAVENOUS ONCE
Refills: 0 | Status: COMPLETED | OUTPATIENT
Start: 2025-03-13 | End: 2025-03-13

## 2025-03-13 RX ORDER — ACETAMINOPHEN 500 MG/5ML
1000 LIQUID (ML) ORAL ONCE
Refills: 0 | Status: COMPLETED | OUTPATIENT
Start: 2025-03-13 | End: 2025-03-13

## 2025-03-13 RX ADMIN — Medication 400 MILLIGRAM(S): at 22:24

## 2025-03-13 RX ADMIN — CEFEPIME 2000 MILLIGRAM(S): 2 INJECTION, POWDER, FOR SOLUTION INTRAVENOUS at 23:14

## 2025-03-13 RX ADMIN — Medication 166.67 MILLIGRAM(S): at 23:14

## 2025-03-13 RX ADMIN — SODIUM CHLORIDE 2700 MILLILITER(S): 9 INJECTION, SOLUTION INTRAVENOUS at 22:25

## 2025-03-13 NOTE — ED ADULT NURSE NOTE - OBJECTIVE STATEMENT
Pt is a 63 yo female who presents to the ED for fever, Pt AOX4 and ambulator. Pt endorses that for the past 36 hr she has been feeling generalized weakness, fever, chills, and constipation. Pt endorses she had a small BM today. Pt states highest temp at home was 103. Pt endorses pmh of kidney transplant x3 years ago, having frequent hospitalizations for UTI. Pt denies chest pain, sob, dizziness, blurry vision. 20g IV placed in the left AC, labs drawn and sent. 22g Iv placed in the right hand, Pt placed on the cardiac monitor ECG completed. Pending results. Pt is a 65 yo female who presents to the ED for fever, Pt AOX4 and ambulatory. Pt endorses that for the past 36 hr she has been feeling generalized weakness, fever, chills, and constipation. Pt endorses she had a small BM today. Pt states highest temp at home was 103. Pt endorses pmh of kidney transplant x3 years ago, having frequent hospitalizations for UTI but denies urinary symptoms at this time. Pt denies chest pain, sob, dizziness, blurry vision. 20g IV placed in the left AC, labs drawn and sent. 22g Iv placed in the right hand, Pt placed on the cardiac monitor ECG completed. Pending results.

## 2025-03-13 NOTE — ED ADULT TRIAGE NOTE - CHIEF COMPLAINT QUOTE
pt ambulatory to ed c/o fever and weakness x1 week. TMAX 103 at home. pt states she started feeling off last week and believes it is a UTI; symptoms worsening yesterday. no chest pain or shortness of breath. pmhx kidney transplant 3 years ago; states "I have been hospitalized for sepsis many times.

## 2025-03-14 DIAGNOSIS — N39.0 URINARY TRACT INFECTION, SITE NOT SPECIFIED: ICD-10-CM

## 2025-03-14 LAB
ADD ON TEST-SPECIMEN IN LAB: SIGNIFICANT CHANGE UP
ALBUMIN SERPL ELPH-MCNC: 3 G/DL — LOW (ref 3.3–5)
ALP SERPL-CCNC: 295 U/L — HIGH (ref 40–120)
ALT FLD-CCNC: 124 U/L — HIGH (ref 12–78)
ANION GAP SERPL CALC-SCNC: 9 MMOL/L — SIGNIFICANT CHANGE UP (ref 5–17)
AST SERPL-CCNC: 56 U/L — HIGH (ref 15–37)
BILIRUB SERPL-MCNC: 1 MG/DL — SIGNIFICANT CHANGE UP (ref 0.2–1.2)
BUN SERPL-MCNC: 15 MG/DL — SIGNIFICANT CHANGE UP (ref 7–23)
CALCIUM SERPL-MCNC: 9.9 MG/DL — SIGNIFICANT CHANGE UP (ref 8.5–10.1)
CHLORIDE SERPL-SCNC: 105 MMOL/L — SIGNIFICANT CHANGE UP (ref 96–108)
CO2 SERPL-SCNC: 25 MMOL/L — SIGNIFICANT CHANGE UP (ref 22–31)
CREAT SERPL-MCNC: 1.19 MG/DL — SIGNIFICANT CHANGE UP (ref 0.5–1.3)
EGFR: 51 ML/MIN/1.73M2 — LOW
EGFR: 51 ML/MIN/1.73M2 — LOW
FLUAV AG NPH QL: SIGNIFICANT CHANGE UP
FLUBV AG NPH QL: SIGNIFICANT CHANGE UP
GLUCOSE SERPL-MCNC: 144 MG/DL — HIGH (ref 70–99)
HCT VFR BLD CALC: 34.3 % — LOW (ref 34.5–45)
HGB BLD-MCNC: 11.2 G/DL — LOW (ref 11.5–15.5)
MCHC RBC-ENTMCNC: 29.5 PG — SIGNIFICANT CHANGE UP (ref 27–34)
MCHC RBC-ENTMCNC: 32.7 G/DL — SIGNIFICANT CHANGE UP (ref 32–36)
MCV RBC AUTO: 90.3 FL — SIGNIFICANT CHANGE UP (ref 80–100)
NRBC # BLD AUTO: 0 K/UL — SIGNIFICANT CHANGE UP (ref 0–0)
NRBC # FLD: 0 K/UL — SIGNIFICANT CHANGE UP (ref 0–0)
NRBC BLD AUTO-RTO: 0 /100 WBCS — SIGNIFICANT CHANGE UP (ref 0–0)
PLATELET # BLD AUTO: 175 K/UL — SIGNIFICANT CHANGE UP (ref 150–400)
PMV BLD: 9.6 FL — SIGNIFICANT CHANGE UP (ref 7–13)
POTASSIUM SERPL-MCNC: 3.7 MMOL/L — SIGNIFICANT CHANGE UP (ref 3.5–5.3)
POTASSIUM SERPL-SCNC: 3.7 MMOL/L — SIGNIFICANT CHANGE UP (ref 3.5–5.3)
PROT SERPL-MCNC: 6.7 GM/DL — SIGNIFICANT CHANGE UP (ref 6–8.3)
RAPID RVP RESULT: SIGNIFICANT CHANGE UP
RBC # BLD: 3.8 M/UL — SIGNIFICANT CHANGE UP (ref 3.8–5.2)
RBC # FLD: 12.9 % — SIGNIFICANT CHANGE UP (ref 10.3–14.5)
RSV RNA NPH QL NAA+NON-PROBE: SIGNIFICANT CHANGE UP
SARS-COV-2 RNA SPEC QL NAA+PROBE: SIGNIFICANT CHANGE UP
SARS-COV-2 RNA SPEC QL NAA+PROBE: SIGNIFICANT CHANGE UP
SODIUM SERPL-SCNC: 139 MMOL/L — SIGNIFICANT CHANGE UP (ref 135–145)
WBC # BLD: 10.69 K/UL — HIGH (ref 3.8–10.5)
WBC # FLD AUTO: 10.69 K/UL — HIGH (ref 3.8–10.5)

## 2025-03-14 PROCEDURE — 84100 ASSAY OF PHOSPHORUS: CPT

## 2025-03-14 PROCEDURE — 80074 ACUTE HEPATITIS PANEL: CPT

## 2025-03-14 PROCEDURE — 80048 BASIC METABOLIC PNL TOTAL CA: CPT

## 2025-03-14 PROCEDURE — 99223 1ST HOSP IP/OBS HIGH 75: CPT

## 2025-03-14 PROCEDURE — 83550 IRON BINDING TEST: CPT

## 2025-03-14 PROCEDURE — 0225U NFCT DS DNA&RNA 21 SARSCOV2: CPT

## 2025-03-14 PROCEDURE — 83880 ASSAY OF NATRIURETIC PEPTIDE: CPT

## 2025-03-14 PROCEDURE — 80053 COMPREHEN METABOLIC PANEL: CPT

## 2025-03-14 PROCEDURE — 83735 ASSAY OF MAGNESIUM: CPT

## 2025-03-14 PROCEDURE — 80076 HEPATIC FUNCTION PANEL: CPT

## 2025-03-14 PROCEDURE — 84145 PROCALCITONIN (PCT): CPT

## 2025-03-14 PROCEDURE — 36415 COLL VENOUS BLD VENIPUNCTURE: CPT

## 2025-03-14 PROCEDURE — 93306 TTE W/DOPPLER COMPLETE: CPT

## 2025-03-14 PROCEDURE — 82306 VITAMIN D 25 HYDROXY: CPT

## 2025-03-14 PROCEDURE — 82728 ASSAY OF FERRITIN: CPT

## 2025-03-14 PROCEDURE — 0241U: CPT

## 2025-03-14 PROCEDURE — 82607 VITAMIN B-12: CPT

## 2025-03-14 PROCEDURE — 82652 VIT D 1 25-DIHYDROXY: CPT

## 2025-03-14 PROCEDURE — 86140 C-REACTIVE PROTEIN: CPT

## 2025-03-14 PROCEDURE — 84484 ASSAY OF TROPONIN QUANT: CPT

## 2025-03-14 PROCEDURE — 93005 ELECTROCARDIOGRAM TRACING: CPT

## 2025-03-14 PROCEDURE — 84443 ASSAY THYROID STIM HORMONE: CPT

## 2025-03-14 PROCEDURE — 85025 COMPLETE CBC W/AUTO DIFF WBC: CPT

## 2025-03-14 PROCEDURE — 83970 ASSAY OF PARATHORMONE: CPT

## 2025-03-14 PROCEDURE — 82550 ASSAY OF CK (CPK): CPT

## 2025-03-14 PROCEDURE — 82746 ASSAY OF FOLIC ACID SERUM: CPT

## 2025-03-14 PROCEDURE — 83540 ASSAY OF IRON: CPT

## 2025-03-14 PROCEDURE — 85027 COMPLETE CBC AUTOMATED: CPT

## 2025-03-14 RX ORDER — CLONAZEPAM 0.5 MG/1
0.5 TABLET ORAL DAILY
Refills: 0 | Status: DISCONTINUED | OUTPATIENT
Start: 2025-03-14 | End: 2025-03-16

## 2025-03-14 RX ORDER — MEROPENEM 1 G/30ML
1000 INJECTION INTRAVENOUS EVERY 12 HOURS
Refills: 0 | Status: DISCONTINUED | OUTPATIENT
Start: 2025-03-14 | End: 2025-03-14

## 2025-03-14 RX ORDER — APIXABAN 2.5 MG/1
1 TABLET, FILM COATED ORAL
Refills: 0 | DISCHARGE

## 2025-03-14 RX ORDER — BENZONATATE 100 MG
200 CAPSULE ORAL ONCE
Refills: 0 | Status: COMPLETED | OUTPATIENT
Start: 2025-03-14 | End: 2025-03-14

## 2025-03-14 RX ORDER — ONDANSETRON HCL/PF 4 MG/2 ML
4 VIAL (ML) INJECTION EVERY 8 HOURS
Refills: 0 | Status: DISCONTINUED | OUTPATIENT
Start: 2025-03-14 | End: 2025-03-16

## 2025-03-14 RX ORDER — BIOTIN 10 MG
1 TABLET ORAL
Refills: 0 | DISCHARGE

## 2025-03-14 RX ORDER — MEROPENEM 1 G/30ML
1000 INJECTION INTRAVENOUS EVERY 12 HOURS
Refills: 0 | Status: DISCONTINUED | OUTPATIENT
Start: 2025-03-14 | End: 2025-03-16

## 2025-03-14 RX ORDER — ACETAMINOPHEN 500 MG/5ML
650 LIQUID (ML) ORAL EVERY 6 HOURS
Refills: 0 | Status: DISCONTINUED | OUTPATIENT
Start: 2025-03-14 | End: 2025-03-16

## 2025-03-14 RX ORDER — SENNA 187 MG
2 TABLET ORAL AT BEDTIME
Refills: 0 | Status: DISCONTINUED | OUTPATIENT
Start: 2025-03-14 | End: 2025-03-16

## 2025-03-14 RX ORDER — B1/B2/B3/B5/B6/B12/VIT C/FOLIC 500-0.5 MG
1 TABLET ORAL DAILY
Refills: 0 | Status: DISCONTINUED | OUTPATIENT
Start: 2025-03-14 | End: 2025-03-16

## 2025-03-14 RX ORDER — APIXABAN 2.5 MG/1
2.5 TABLET, FILM COATED ORAL EVERY 12 HOURS
Refills: 0 | Status: DISCONTINUED | OUTPATIENT
Start: 2025-03-14 | End: 2025-03-16

## 2025-03-14 RX ORDER — MELATONIN 5 MG
3 TABLET ORAL AT BEDTIME
Refills: 0 | Status: DISCONTINUED | OUTPATIENT
Start: 2025-03-14 | End: 2025-03-16

## 2025-03-14 RX ORDER — HALOPERIDOL 10 MG/1
1 TABLET ORAL
Refills: 0 | Status: DISCONTINUED | OUTPATIENT
Start: 2025-03-14 | End: 2025-03-16

## 2025-03-14 RX ORDER — MYCOPHENOLATE MOFETIL 500 MG/1
250 TABLET, FILM COATED ORAL
Refills: 0 | Status: DISCONTINUED | OUTPATIENT
Start: 2025-03-14 | End: 2025-03-16

## 2025-03-14 RX ORDER — POLYETHYLENE GLYCOL 3350 17 G/17G
17 POWDER, FOR SOLUTION ORAL DAILY
Refills: 0 | Status: DISCONTINUED | OUTPATIENT
Start: 2025-03-14 | End: 2025-03-16

## 2025-03-14 RX ORDER — MAGNESIUM, ALUMINUM HYDROXIDE 200-200 MG
30 TABLET,CHEWABLE ORAL EVERY 4 HOURS
Refills: 0 | Status: DISCONTINUED | OUTPATIENT
Start: 2025-03-14 | End: 2025-03-16

## 2025-03-14 RX ORDER — BELATACEPT 250 MG/1
0 INJECTION, POWDER, LYOPHILIZED, FOR SOLUTION INTRAVENOUS
Refills: 0 | DISCHARGE

## 2025-03-14 RX ORDER — PREDNISONE 20 MG/1
5 TABLET ORAL DAILY
Refills: 0 | Status: DISCONTINUED | OUTPATIENT
Start: 2025-03-14 | End: 2025-03-16

## 2025-03-14 RX ADMIN — MYCOPHENOLATE MOFETIL 250 MILLIGRAM(S): 500 TABLET, FILM COATED ORAL at 22:38

## 2025-03-14 RX ADMIN — MYCOPHENOLATE MOFETIL 250 MILLIGRAM(S): 500 TABLET, FILM COATED ORAL at 10:40

## 2025-03-14 RX ADMIN — Medication 650 MILLIGRAM(S): at 06:38

## 2025-03-14 RX ADMIN — Medication 200 MILLIGRAM(S): at 23:33

## 2025-03-14 RX ADMIN — Medication 1000 MILLIGRAM(S): at 00:26

## 2025-03-14 RX ADMIN — Medication 500 MILLIGRAM(S): at 22:37

## 2025-03-14 RX ADMIN — Medication 650 MILLIGRAM(S): at 23:33

## 2025-03-14 RX ADMIN — MEROPENEM 1000 MILLIGRAM(S): 1 INJECTION INTRAVENOUS at 22:36

## 2025-03-14 RX ADMIN — Medication 2 TABLET(S): at 22:36

## 2025-03-14 RX ADMIN — PREDNISONE 5 MILLIGRAM(S): 20 TABLET ORAL at 10:39

## 2025-03-14 RX ADMIN — MEROPENEM 1000 MILLIGRAM(S): 1 INJECTION INTRAVENOUS at 12:26

## 2025-03-14 RX ADMIN — APIXABAN 2.5 MILLIGRAM(S): 2.5 TABLET, FILM COATED ORAL at 10:39

## 2025-03-14 RX ADMIN — APIXABAN 2.5 MILLIGRAM(S): 2.5 TABLET, FILM COATED ORAL at 22:38

## 2025-03-14 NOTE — CONSULT NOTE ADULT - ASSESSMENT
Patient is a 64y old  Female who presents with a chief complaint of fever.  HPI: 64F hx renal txplt (for renal failure 2/2 lithium toxicity, [facility], 3y ago), hx ESBL UTI/bacteremia , DVT on Eliquis, breast cancer s/p lumpectomy and RT, gout, HLD, hyperparathyroidism, MGUS, schizoaffective d/o, uterine leiomyoma s/p hysterectomy, tardive dyskinesia, p/w fever. Reports feeling unwell x 1wk, fever x 1d.  Also with mild L groin pain (renal txplt is in RLQ), Has had similar sx in past with prior UTI. Here, found to be febrile, tachy, w leukocytosis, VALERIE and abnl UA. Of note, pt reports that had been on fosfomycin as outpt tx for cystitis in the past, hasn't taken in a few months. Is due for belatacept infusion for renal txplt this [place] 3/16.    Nephrology  Above from chart, previous charts reviewed  DDRT at [facility] in 2022, multiple admissions for UTI  Admitted with fatigue and dx w UTI/ Cystitis perinephric stranding  Pts transplant meds reviewed due for belatacept  this Sunday  Receives q 4 weeks  Feels well right now     A/P  DDRT [facility] renal transplant 2022, RLQ mild tenderness  UTI, not septic  CKD III  Mild anemia will follow  Mild alk phos and LFT elevation  D/w Transplant unit may cont her transplant meds

## 2025-03-14 NOTE — ED PROVIDER NOTE - CLINICAL SUMMARY MEDICAL DECISION MAKING FREE TEXT BOX
64-year-old female with fever, generalized malaise and left lower quadrant pain.  Sepsis workup initiated and patient given broad-spectrum antibiotics.  CT imaging obtained without IV contrast.  Noted to have urinary tract infection.  Patient admitted for care signout given to hospitalist for admission.

## 2025-03-14 NOTE — H&P ADULT - ASSESSMENT
64F hx renal txplt (for renal failure 2/2 lithium toxicity, [facility], 3y ago), hx ESBL UTI/bacteremia , DVT on Eliquis, breast cancer s/p lumpectomy and RT, gout, HLD, hyperparathyroidism, MGUS, schizoaffective d/o, uterine leiomyoma s/p hysterectomy, tardive dyskinesia, p/w fever, admitted for severe sepsis w VALERIE, likely 2/2 UTI.     #Severe sepis, POA (w VALERIE), in immunocompromised host (renal txplt recipient), 2/2 complicated UTI  -monitor T, HR, BP, WBC  -lactate wnl  -UA abnl  -f/u UCx, BCx (hx ESBL)  -CT a/p c/w UTI  -s/p 2.7L LR  -Meropenem course  -appreciate ID recs, discussed case    #VALERIE  -s/p 2.7L LR (sepsis fluids)  -f/u repeat BMP  -f/u renal recs     #s/p renal txplt  -awaiting med rec and then will d/w renal re: meds to continue/hold in setting of sepsis    #DVT, HLD, gout  -home meds    #DVT ppx- full a/c w Eliquis    Dispo pending resolution of sepsis, VALERIE, UCx and BCx, if ESBL, will need iv abx course 64F hx renal txplt (for renal failure 2/2 lithium toxicity, [facility], 3y ago), hx ESBL UTI/bacteremia , DVT on Eliquis, breast cancer s/p lumpectomy and RT, gout, HLD, hyperparathyroidism, MGUS, schizoaffective d/o, uterine leiomyoma s/p hysterectomy, tardive dyskinesia, p/w fever, admitted for severe sepsis w VALERIE, likely 2/2 UTI.     #Severe sepis, POA (w VALERIE), in immunocompromised host (renal txplt recipient), 2/2 complicated UTI  -monitor T, HR, BP, WBC  -lactate wnl  -UA abnl  -f/u UCx, BCx (hx ESBL)  -CT a/p c/w UTI  -s/p 2.7L LR  -Meropenem course  -appreciate ID recs, discussed case    #VALERIE  -s/p 2.7L LR (sepsis fluids)  -Cr improving  -f/u renal recs     #s/p renal txplt  -awaiting med rec and then will d/w renal re: meds to continue/hold in setting of sepsis    #transaminitis  -likely in setting of sepsis  -improving w IVF    #DVT, HLD, gout  -home meds    #DVT ppx- full a/c w Eliquis    Dispo pending resolution of sepsis, VALERIE, UCx and BCx, if ESBL, will need iv abx course 64F hx renal txplt (for renal failure 2/2 lithium toxicity, [facility], 3y ago), hx ESBL UTI/bacteremia , DVT on Eliquis, breast cancer s/p lumpectomy and RT, gout, HLD, hyperparathyroidism, MGUS, schizoaffective d/o, uterine leiomyoma s/p hysterectomy, tardive dyskinesia, p/w fever, admitted for severe sepsis w VALERIE, likely 2/2 UTI.     #Severe sepis, POA (w VALERIE), in immunocompromised host (renal txplt recipient), 2/2 complicated UTI  -monitor T, HR, BP, WBC  -lactate wnl  -UA abnl  -f/u UCx, BCx (hx ESBL)  -CT a/p c/w UTI  -s/p 2.7L LR  -Meropenem course  -appreciate ID recs, discussed case    #VALERIE  -s/p 2.7L LR (sepsis fluids)  -Cr improving  -f/u renal recs     #s/p renal txplt  -continue prednisone and MMF  - Is due for belatacept infusion for renal txplt this Monday 3/17.    #transaminitis  -likely in setting of sepsis  -improving w IVF    #DVT, HLD, gout  -home meds    #DVT ppx- full a/c w Eliquis    Dispo pending resolution of sepsis, VALERIE, UCx and BCx, if ESBL, will need iv abx course

## 2025-03-14 NOTE — ED PROVIDER NOTE - OBJECTIVE STATEMENT
64-year-old female with history of renal transplant, tardive dyskinesia presents with fever and malaise for the past 1 to 2 days.  Patient also reports mild left groin pain.  No pain over transplanted kidney in the right lower abdomen.  Reports similar symptoms in the past when she has sepsis and a urinary tract infection.  Does report mild cough for the past couple of days as well.  No shortness of breath.  No sick contacts.  No recent travel.

## 2025-03-14 NOTE — ED PROVIDER NOTE - PHYSICAL EXAMINATION
Constitutional: NAD AAOx3  Eyes: PERRLA EOMI  Head: Normocephalic atraumatic  Mouth: MMM,   Cardiac: tachycardiac, regular rhythm  Resp: Lungs CTAB  GI: Abd soft, nt/nd, + BS. no CVA tenderness  Neuro: CN2-12 intact  Skin: No visible rashes

## 2025-03-14 NOTE — CONSULT NOTE ADULT - ASSESSMENT
Assessment:  64F with Renal Transplant (3 years ago), Tardive dyskinesia presents 3/13 with fever, urinary frequency and malaise for 2 days  Reports some mild dry cough but no dyspnea, chest pain  No diarrhea or overt dysuria  No known sick contact or recent travel  Febrile 101F on admission, on RA  WBC 11  Cr 1.31  UA negative  RVP negative  CXR clear  CTAP with Right iliac fossa renal transplant. No hydroureteronephrosis of the renal   transplant. Mild perinephric and periureteral fat strandingand bladder   wall thickening with slight infiltration of the perivesicular fat which   raises question of cystitis with ascending urinary tract infection.   Correlate with urinalysis.  UCx with ESBL Ecoli 2024    Antimicrobials:  meropenem Injectable 1000 every 12 hours (3/14 --- )    Impression:   #Complicated UTI  #Fever  #Leukocytosis  #VALERIE  #Renal Transplant Recipient   #Levaquin Allergy, Anaphylaxis    Recommendations:  - continue empiric Meropenem 1G q12 (renally dosed) (Day #1)  - monitor temperature curve  - trend WBC  - reason for abx use reviewed with patient  - side effects of antibiotic discussed, tolerating abx well so far  - Prior cultures reviewed. An epidemiologic assessment was performed. There is a significant risk for resistant microorganisms to spread to family members, and/or healthcare staff. The patient will be placed on isolation according to infection control policy. Will reconsider isolation measures based on new culture results and other clinical data as appropriate Appropriate cultures collected and an appropriate broad spectrum antibiotic therapy will be considered  - follow up UCx, BCx x2  - rest per primary team    Clinical team may change from intravenous to oral antibiotics when the following criteria are met:   1. Patient is clinically improving/stable       a)	Improved signs and symptoms of infection from initial presentation       b)	Afebrile for 24 hours       c)	Leukocytosis trending towards normal range   2. Patient is tolerating oral intake   3. Initial/repeat blood cultures are negative OR do not need to wait for preliminary blood cultures to result    Cannot advise changing to oral antibiotic therapy until culture sensitivity is available.   Assessment:  64F with Renal Transplant (3 years ago), Tardive dyskinesia presents 3/13 with fever, urinary frequency and malaise for 2 days  Reports some mild dry cough but no dyspnea, chest pain  No diarrhea or overt dysuria  No known sick contact or recent travel  Febrile 101F on admission, on RA  WBC 11  Cr 1.31  UA negative  RVP negative  CXR clear  CTAP with Right iliac fossa renal transplant. No hydroureteronephrosis of the renal   transplant. Mild perinephric and periureteral fat strandingand bladder   wall thickening with slight infiltration of the perivesicular fat which   raises question of cystitis with ascending urinary tract infection.   Correlate with urinalysis.  UCx with ESBL Ecoli 2024    Antimicrobials:  meropenem Injectable 1000 every 12 hours (3/14 --- )    Impression:   #Complicated UTI  #Fever  #Leukocytosis  #VALERIE  #Renal Transplant Recipient   #Levaquin Allergy, Anaphylaxis    Recommendations:  - continue empiric Meropenem 1G q12 (renally dosed) (Day #1)  - monitor temperature curve  - trend WBC  - reason for abx use reviewed with patient  - side effects of antibiotic discussed, tolerating abx well so far  - Prior cultures reviewed. An epidemiologic assessment was performed. There is a significant risk for resistant microorganisms to spread to family members, and/or healthcare staff. The patient will be placed on isolation according to infection control policy. Will reconsider isolation measures based on new culture results and other clinical data as appropriate Appropriate cultures collected and an appropriate broad spectrum antibiotic therapy will be considered  - add full RVP  - follow up UCx, BCx x2  - rest per primary team    Clinical team may change from intravenous to oral antibiotics when the following criteria are met:   1. Patient is clinically improving/stable       a)	Improved signs and symptoms of infection from initial presentation       b)	Afebrile for 24 hours       c)	Leukocytosis trending towards normal range   2. Patient is tolerating oral intake   3. Initial/repeat blood cultures are negative OR do not need to wait for preliminary blood cultures to result    Cannot advise changing to oral antibiotic therapy until culture sensitivity is available.

## 2025-03-14 NOTE — ED ADULT NURSE REASSESSMENT NOTE - NS ED NURSE REASSESS COMMENT FT1
received pt from RN. pt resting comfortably at this time. no signs of distress noted. VSS as charted. no further complaints or discomforts reported at this time. safety and comfort maintained. pt updated on POC

## 2025-03-14 NOTE — PHARMACOTHERAPY INTERVENTION NOTE - COMMENTS
Medication reconciliation completed.  Reviewed Medication list and confirmed med allergies with patient; confirmed with Dr. First Medshannan.

## 2025-03-14 NOTE — H&P ADULT - HISTORY OF PRESENT ILLNESS
Patient is a 64y old  Female who presents with a chief complaint of fever.  HPI: 64F hx renal txplt (for renal failure 2/2 lithium toxicity, [facility], 3y ago), hx ESBL UTI/bacteremia , DVT on Eliquis, breast cancer s/p lumpectomy and RT, gout, HLD, hyperparathyroidism, MGUS, schizoaffective d/o, uterine leiomyoma s/p hysterectomy, tardive dyskinesia, p/w fever. Also with mild L groin pain (renal txplt is in RLQ), Has had similar sx in past with prior UTI. Here, found to be febrile, tachy, w leukocytosis, VALERIE and abnl UA.     PAST MEDICAL & SURGICAL HISTORY:  Cardiac murmur  Kidney disease  "stage 4" as per patient secondary to lithium treatment many years ago  Breast CA  DCIS, left breast, s/p RT  Uterine leiomyoma  GERD (gastroesophageal reflux disease)  Lyme disease  Degenerative disc disease, lumbar  HLD (hyperlipidemia)  History of secondary hyperparathyroidism  MGUS (monoclonal gammopathy of unknown significance)  Gout  Schizoaffective disorder, bipolar type  S/P lumpectomy of breast  , left breast, no lymph nodes removed  S/P tonsillectomy  S/P dilation and curettage  uterine polyps  S/P coronary angiogram  10 yrs ago, no intervention  H/O: hysterectomy  Abnormal biopsy of kidney  Kidney transplanted    FAMILY HISTORY:  FH: lymphoma      Social History:      Allergies    Levaquin (Anaphylaxis)    Intolerances        MEDICATIONS  (STANDING):  meropenem Injectable 1000 milliGRAM(s) IV Push every 12 hours    MEDICATIONS  (PRN):  acetaminophen     Tablet .. 650 milliGRAM(s) Oral every 6 hours PRN Mild Pain (1 - 3)  aluminum hydroxide/magnesium hydroxide/simethicone Suspension 30 milliLiter(s) Oral every 4 hours PRN Dyspepsia  melatonin 3 milliGRAM(s) Oral at bedtime PRN Insomnia  ondansetron Injectable 4 milliGRAM(s) IV Push every 8 hours PRN Nausea and/or Vomiting      ROS:  General:  No fevers, chills, or unexplained weight loss  Skin: No rash or bothersome skin lesions  Musculoskeletal: No arthalgias, myalgias or joint swelling  Eyes: No visual changes or eye pain  Ears: No hearing loss , otorrhea or ear pain  Nose, Mouth, Throat: No nasal congestion, rhinorrhea, oral lesions, postnasal drip or sore throat  Cardio: No chest pain or palpitations. no lower extremity edema. no syncope. no claudication.   Respiratory: No cough, shortness of breath or wheezing   GI: No diarrhea, constipation, blood in stools, abdominal pain, vomiting or heartburn  : No urinary frequency, hematuria, incontinence, or dysuria  Neurologic: No headaches, parasthesias, confusion, dysarthria or gait instability  Psychiatric:  No anxiety or depression  Lymphatic:  No easy bruising, easy bleeding or swollen glands  Allergic: No itching, sneezing , watery eyes, clear rhinorrhea or recurrent infections    PEx  T(C): 37.2 (25 @ 08:14), Max: 38.7 (25 @ 20:02)  HR: 78 (25 @ 08:14) (70 - 114)  BP: 108/68 (25 @ 08:14) (90/58 - 114/70)  RR: 17 (25 @ 08:14) (14 - 20)  SpO2: 95% (25 @ 08:14) (91% - 97%)  Wt(kg): --  General:     no acute distress, no identifying marks , scars, or tattoos.  Skin: no rash or prominent lesions  Head: normocephalic, atraumatic     Sinuses: non-tender  Nose: no external lesions, mucosa non-inflamed, septum and turbinates normal  Throat: no erythema, exudates or lesions.  Neck: Supple without lymphadenopathy. Thyroid no thyromegaly, no palpable thyroid nodules, no palpable nodules or masses, carotid arteries no bruits.   Breasts: No palpable masses or lesions.  Heart: RRR, no murmur or gallop.  Normal S1, S2.  No S3, S4.   Lungs: CTA bilaterally, no wheezes, rhonchi, rales.  Breathing unlabored.   Chest wall: Normal insp   Abdomen:  Soft, NT/ND, normal bowel sounds, no HSM, no masses.  No peritoneal signs.   Back: spine normal without deformity or tenderness.  Normal ROM   : Exam normal.  no inguinal hernias.  Extremities: No deformities, clubbing, cyanosis, or edema.  Musculoskeletal: Normal gait and station. No decreased range of motion, instability, atrophy or abnormal strength or tone in the head, neck, spine, ribs, pelvis or extremities.   Neurologic: CN 2-12 normal. Sensation to pain, touch and proprioception normal. DTRs normal in upper and lower extremities. No pathologic reflexes.  Motor normal.  Psychiatric: Oriented X3, intact recent and remote memory, judgement and insight, normal mood and affect.                          11.2   10.69 )-----------( 175      ( 14 Mar 2025 08:09 )             34.3         135  |  103  |  18  ----------------------------<  152[H]  4.0   |  26  |  1.31[H]    Ca    9.9      13 Mar 2025 22:18    TPro  7.5  /  Alb  3.3  /  TBili  0.9  /  DBili  x   /  AST  76[H]  /  ALT  152[H]  /  AlkPhos  337[H]  -    CAPILLARY BLOOD GLUCOSE        PT/INR - ( 13 Mar 2025 22:18 )   PT: 16.7 sec;   INR: 1.46 ratio         PTT - ( 13 Mar 2025 22:18 )  PTT:35.7 sec  Urinalysis Basic - ( 13 Mar 2025 22:37 )    Color: Yellow / Appearance: Cloudy / S.016 / pH: x  Gluc: x / Ketone: Trace mg/dL  / Bili: Negative / Urobili: 0.2 mg/dL   Blood: x / Protein: 30 mg/dL / Nitrite: Negative   Leuk Esterase: Moderate / RBC: 10-15 /HPF / WBC 25-50 /HPF   Sq Epi: x / Non Sq Epi: x / Bacteria: Few /HPF          Radiology/Imaging, I have personally reviewed:  CT a/p noncon 3/13/25: Right iliac fossa renal transplant. No hydroureteronephrosis of the renal transplant. Mild perinephric and periureteral fat strandingand bladder wall thickening with slight infiltration of the perivesicular fat which raises question of cystitis with ascending urinary tract infection. Correlate with urinalysis. Colonic diverticulosis without evidence of diverticulitis.    CXR 3/13/15: Low lung volume without cyntiha consolidation, effusion, or pneumothorax.   Patient is a 64y old  Female who presents with a chief complaint of fever.  HPI: 64F hx renal txplt (for renal failure 2/2 lithium toxicity, [facility], 3y ago), hx ESBL UTI/bacteremia , DVT on Eliquis, breast cancer s/p lumpectomy and RT, gout, HLD, hyperparathyroidism, MGUS, schizoaffective d/o, uterine leiomyoma s/p hysterectomy, tardive dyskinesia, p/w fever. Reports feeling unwell x 1wk, fever x 1d.  Also with mild L groin pain (renal txplt is in RLQ), Has had similar sx in past with prior UTI. Here, found to be febrile, tachy, w leukocytosis, VALERIE and abnl UA. Of note, pt reports that had been on fosfomycin as outpt tx for cystitis in the past, hasn't taken in a few months. Is due for belatacept infusion for renal txplt this [place] 3/16.    PAST MEDICAL & SURGICAL HISTORY:  Cardiac murmur  Kidney disease  "stage 4" as per patient secondary to lithium treatment many years ago  Breast CA  DCIS, left breast, s/p RT  Uterine leiomyoma  GERD (gastroesophageal reflux disease)  Lyme disease  Degenerative disc disease, lumbar  HLD (hyperlipidemia)  History of secondary hyperparathyroidism  MGUS (monoclonal gammopathy of unknown significance)  Gout  Schizoaffective disorder, bipolar type  S/P lumpectomy of breast  , left breast, no lymph nodes removed  S/P tonsillectomy  S/P dilation and curettage  uterine polyps  S/P coronary angiogram  10 yrs ago, no intervention  H/O: hysterectomy  Abnormal biopsy of kidney  Kidney transplanted    FAMILY HISTORY:  FH: lymphoma      Social History:      Allergies    Levaquin (Anaphylaxis)    Intolerances        MEDICATIONS  (STANDING):  meropenem Injectable 1000 milliGRAM(s) IV Push every 12 hours    MEDICATIONS  (PRN):  acetaminophen     Tablet .. 650 milliGRAM(s) Oral every 6 hours PRN Mild Pain (1 - 3)  aluminum hydroxide/magnesium hydroxide/simethicone Suspension 30 milliLiter(s) Oral every 4 hours PRN Dyspepsia  melatonin 3 milliGRAM(s) Oral at bedtime PRN Insomnia  ondansetron Injectable 4 milliGRAM(s) IV Push every 8 hours PRN Nausea and/or Vomiting      ROS:  General:  No fevers, chills, or unexplained weight loss  Skin: No rash or bothersome skin lesions  Musculoskeletal: No arthalgias, myalgias or joint swelling  Eyes: No visual changes or eye pain  Ears: No hearing loss , otorrhea or ear pain  Nose, Mouth, Throat: No nasal congestion, rhinorrhea, oral lesions, postnasal drip or sore throat  Cardio: No chest pain or palpitations. no lower extremity edema. no syncope. no claudication.   Respiratory: No cough, shortness of breath or wheezing   GI: No diarrhea, constipation, blood in stools, abdominal pain, vomiting or heartburn  : No urinary frequency, hematuria, incontinence, or dysuria  Neurologic: No headaches, parasthesias, confusion, dysarthria or gait instability  Psychiatric:  No anxiety or depression  Lymphatic:  No easy bruising, easy bleeding or swollen glands  Allergic: No itching, sneezing , watery eyes, clear rhinorrhea or recurrent infections    PEx  T(C): 37.2 (25 @ 08:14), Max: 38.7 (25 @ 20:02)  HR: 78 (25 @ 08:14) (70 - 114)  BP: 108/68 (25 @ 08:14) (90/58 - 114/70)  RR: 17 (25 @ 08:14) (14 - 20)  SpO2: 95% (25 @ 08:14) (91% - 97%)  Wt(kg): --  General:     no acute distress, no identifying marks , scars, or tattoos.  Skin: no rash or prominent lesions  Head: normocephalic, atraumatic     Sinuses: non-tender  Nose: no external lesions, mucosa non-inflamed, septum and turbinates normal  Throat: no erythema, exudates or lesions.  Neck: Supple without lymphadenopathy. Thyroid no thyromegaly, no palpable thyroid nodules, no palpable nodules or masses, carotid arteries no bruits.   Breasts: No palpable masses or lesions.  Heart: RRR, no murmur or gallop.  Normal S1, S2.  No S3, S4.   Lungs: CTA bilaterally, no wheezes, rhonchi, rales.  Breathing unlabored.   Chest wall: Normal insp   Abdomen:  Soft, NT/ND, normal bowel sounds, no HSM, no masses.  No peritoneal signs.   Back: spine normal without deformity or tenderness.  Normal ROM   : Exam normal.  no inguinal hernias.  Extremities: No deformities, clubbing, cyanosis, or edema.  Musculoskeletal: Normal gait and station. No decreased range of motion, instability, atrophy or abnormal strength or tone in the head, neck, spine, ribs, pelvis or extremities.   Neurologic: CN 2-12 normal. Sensation to pain, touch and proprioception normal. DTRs normal in upper and lower extremities. No pathologic reflexes.  Motor normal.  Psychiatric: Oriented X3, intact recent and remote memory, judgement and insight, normal mood and affect.                          11.2   10.69 )-----------( 175      ( 14 Mar 2025 08:09 )             34.3     03-13    135  |  103  |  18  ----------------------------<  152[H]  4.0   |  26  |  1.31[H]    Ca    9.9      13 Mar 2025 22:18    TPro  7.5  /  Alb  3.3  /  TBili  0.9  /  DBili  x   /  AST  76[H]  /  ALT  152[H]  /  AlkPhos  337[H]  03-13    CAPILLARY BLOOD GLUCOSE        PT/INR - ( 13 Mar 2025 22:18 )   PT: 16.7 sec;   INR: 1.46 ratio         PTT - ( 13 Mar 2025 22:18 )  PTT:35.7 sec  Urinalysis Basic - ( 13 Mar 2025 22:37 )    Color: Yellow / Appearance: Cloudy / S.016 / pH: x  Gluc: x / Ketone: Trace mg/dL  / Bili: Negative / Urobili: 0.2 mg/dL   Blood: x / Protein: 30 mg/dL / Nitrite: Negative   Leuk Esterase: Moderate / RBC: 10-15 /HPF / WBC 25-50 /HPF   Sq Epi: x / Non Sq Epi: x / Bacteria: Few /HPF          Radiology/Imaging, I have personally reviewed:  CT a/p noncon 3/13/25: Right iliac fossa renal transplant. No hydroureteronephrosis of the renal transplant. Mild perinephric and periureteral fat strandingand bladder wall thickening with slight infiltration of the perivesicular fat which raises question of cystitis with ascending urinary tract infection. Correlate with urinalysis. Colonic diverticulosis without evidence of diverticulitis.    CXR 3/13/15: Low lung volume without cynthia consolidation, effusion, or pneumothorax.   Patient is a 64y old  Female who presents with a chief complaint of fever.  HPI: 64F hx renal txplt (for renal failure 2/2 lithium toxicity, [facility], 3y ago), hx ESBL UTI/bacteremia , DVT on Eliquis, breast cancer s/p lumpectomy and RT, gout, HLD, hyperparathyroidism, MGUS, schizoaffective d/o, uterine leiomyoma s/p hysterectomy, tardive dyskinesia, p/w fever. Reports feeling unwell x 1wk, fever x 1d.  Also with mild L groin pain (renal txplt is in RLQ), Has had similar sx in past with prior UTI. Here, found to be febrile, tachy, w leukocytosis, VALERIE and abnl UA. Of note, pt reports that had been on fosfomycin as outpt tx for cystitis in the past, hasn't taken in a few months. Is due for belatacept infusion for renal txplt this Monday 3/17.    PAST MEDICAL & SURGICAL HISTORY:  Cardiac murmur  Kidney disease  "stage 4" as per patient secondary to lithium treatment many years ago  Breast CA  DCIS, left breast, s/p RT  Uterine leiomyoma  GERD (gastroesophageal reflux disease)  Lyme disease  Degenerative disc disease, lumbar  HLD (hyperlipidemia)  History of secondary hyperparathyroidism  MGUS (monoclonal gammopathy of unknown significance)  Gout  Schizoaffective disorder, bipolar type  S/P lumpectomy of breast  , left breast, no lymph nodes removed  S/P tonsillectomy  S/P dilation and curettage  uterine polyps  S/P coronary angiogram  10 yrs ago, no intervention  H/O: hysterectomy  Abnormal biopsy of kidney  Kidney transplanted    FAMILY HISTORY:  FH: lymphoma      Social History:      Allergies    Levaquin (Anaphylaxis)    Intolerances        MEDICATIONS  (STANDING):  meropenem Injectable 1000 milliGRAM(s) IV Push every 12 hours    MEDICATIONS  (PRN):  acetaminophen     Tablet .. 650 milliGRAM(s) Oral every 6 hours PRN Mild Pain (1 - 3)  aluminum hydroxide/magnesium hydroxide/simethicone Suspension 30 milliLiter(s) Oral every 4 hours PRN Dyspepsia  melatonin 3 milliGRAM(s) Oral at bedtime PRN Insomnia  ondansetron Injectable 4 milliGRAM(s) IV Push every 8 hours PRN Nausea and/or Vomiting      ROS:  General:  No fevers, chills, or unexplained weight loss  Skin: No rash or bothersome skin lesions  Musculoskeletal: No arthalgias, myalgias or joint swelling  Eyes: No visual changes or eye pain  Ears: No hearing loss , otorrhea or ear pain  Nose, Mouth, Throat: No nasal congestion, rhinorrhea, oral lesions, postnasal drip or sore throat  Cardio: No chest pain or palpitations. no lower extremity edema. no syncope. no claudication.   Respiratory: No cough, shortness of breath or wheezing   GI: No diarrhea, constipation, blood in stools, abdominal pain, vomiting or heartburn  : No urinary frequency, hematuria, incontinence, or dysuria  Neurologic: No headaches, parasthesias, confusion, dysarthria or gait instability  Psychiatric:  No anxiety or depression  Lymphatic:  No easy bruising, easy bleeding or swollen glands  Allergic: No itching, sneezing , watery eyes, clear rhinorrhea or recurrent infections    PEx  T(C): 37.2 (25 @ 08:14), Max: 38.7 (25 @ 20:02)  HR: 78 (25 @ 08:14) (70 - 114)  BP: 108/68 (25 @ 08:14) (90/58 - 114/70)  RR: 17 (25 @ 08:14) (14 - 20)  SpO2: 95% (25 @ 08:14) (91% - 97%)  Wt(kg): --  General:     no acute distress, no identifying marks , scars, or tattoos.  Skin: no rash or prominent lesions  Head: normocephalic, atraumatic     Sinuses: non-tender  Nose: no external lesions, mucosa non-inflamed, septum and turbinates normal  Throat: no erythema, exudates or lesions.  Neck: Supple without lymphadenopathy. Thyroid no thyromegaly, no palpable thyroid nodules, no palpable nodules or masses, carotid arteries no bruits.   Breasts: No palpable masses or lesions.  Heart: RRR, no murmur or gallop.  Normal S1, S2.  No S3, S4.   Lungs: CTA bilaterally, no wheezes, rhonchi, rales.  Breathing unlabored.   Chest wall: Normal insp   Abdomen:  Soft, NT/ND, normal bowel sounds, no HSM, no masses.  No peritoneal signs.   Back: spine normal without deformity or tenderness.  Normal ROM   : Exam normal.  no inguinal hernias.  Extremities: No deformities, clubbing, cyanosis, or edema.  Musculoskeletal: Normal gait and station. No decreased range of motion, instability, atrophy or abnormal strength or tone in the head, neck, spine, ribs, pelvis or extremities.   Neurologic: CN 2-12 normal. Sensation to pain, touch and proprioception normal. DTRs normal in upper and lower extremities. No pathologic reflexes.  Motor normal.  Psychiatric: Oriented X3, intact recent and remote memory, judgement and insight, normal mood and affect.                          11.2   10.69 )-----------( 175      ( 14 Mar 2025 08:09 )             34.3     03-13    135  |  103  |  18  ----------------------------<  152[H]  4.0   |  26  |  1.31[H]    Ca    9.9      13 Mar 2025 22:18    TPro  7.5  /  Alb  3.3  /  TBili  0.9  /  DBili  x   /  AST  76[H]  /  ALT  152[H]  /  AlkPhos  337[H]  03-13    CAPILLARY BLOOD GLUCOSE        PT/INR - ( 13 Mar 2025 22:18 )   PT: 16.7 sec;   INR: 1.46 ratio         PTT - ( 13 Mar 2025 22:18 )  PTT:35.7 sec  Urinalysis Basic - ( 13 Mar 2025 22:37 )    Color: Yellow / Appearance: Cloudy / S.016 / pH: x  Gluc: x / Ketone: Trace mg/dL  / Bili: Negative / Urobili: 0.2 mg/dL   Blood: x / Protein: 30 mg/dL / Nitrite: Negative   Leuk Esterase: Moderate / RBC: 10-15 /HPF / WBC 25-50 /HPF   Sq Epi: x / Non Sq Epi: x / Bacteria: Few /HPF          Radiology/Imaging, I have personally reviewed:  CT a/p noncon 3/13/25: Right iliac fossa renal transplant. No hydroureteronephrosis of the renal transplant. Mild perinephric and periureteral fat strandingand bladder wall thickening with slight infiltration of the perivesicular fat which raises question of cystitis with ascending urinary tract infection. Correlate with urinalysis. Colonic diverticulosis without evidence of diverticulitis.    CXR 3/13/15: Low lung volume without cynthia consolidation, effusion, or pneumothorax.

## 2025-03-14 NOTE — CONSULT NOTE ADULT - SUBJECTIVE AND OBJECTIVE BOX
Patient is a 64y old  Female who presents with a chief complaint of   HPI:  64-year-old female with history of renal transplant, tardive dyskinesia presents with fever and malaise for the past 1 to 2 days.  Patient also reports mild left groin pain.  No pain over transplanted kidney in the right lower abdomen.  Reports similar symptoms in the past when she has sepsis and a urinary tract infection.  Does report mild cough for the past couple of days as well.  No shortness of breath.  No sick contacts.  No recent travel.  Above HPI reviewed and reconciled with patient    64F with Renal Transplant (3 years ago), Tardive dyskinesia presents 3/13 with fever, urinary frequency and malaise for 2 days  Reports some mild dry cough but no dyspnea, chest pain  No diarrhea or overt dysuria  No known sick contact or recent travel  Febrile 101F on admission, on RA  WBC 11  Cr 1.31  UA negative  RVP negative  CXR clear  CTAP with Right iliac fossa renal transplant. No hydroureteronephrosis of the renal   transplant. Mild perinephric and periureteral fat strandingand bladder   wall thickening with slight infiltration of the perivesicular fat which   raises question of cystitis with ascending urinary tract infection.   Correlate with urinalysis.      PAST MEDICAL & SURGICAL HISTORY:  Cardiac murmur      Kidney disease  "stage 4" as per patient secondary to lithium treatment many years ago      Breast CA  DCIS, left breast, s/p RT      Uterine leiomyoma      GERD (gastroesophageal reflux disease)      Lyme disease      Degenerative disc disease, lumbar      HLD (hyperlipidemia)      History of secondary hyperparathyroidism      MGUS (monoclonal gammopathy of unknown significance)      Gout      Schizoaffective disorder, bipolar type      S/P lumpectomy of breast  , left breast, no lymph nodes removed      S/P tonsillectomy      S/P dilation and curettage  uterine polyps      S/P coronary angiogram  10 yrs ago, no intervention      H/O: hysterectomy      Abnormal biopsy of kidney      Kidney transplanted        FAMILY HISTORY:  FH: lymphoma      Social Hx: Denies alcohol, tobacco, recreational drug use    Allergies  Levaquin (Anaphylaxis)    ANTIMICROBIALS (past 90 days)  MEDICATIONS  (STANDING):    cefepime  Injectable.   2000 milliGRAM(s) IV Push (25 @ 23:14)    vancomycin  IVPB   166.67 mL/Hr IV Intermittent (25 @ 23:14)        ACTIVE ANTIMICROBIALS  meropenem Injectable 1000 every 12 hours (3/14 --- )    MEDICATIONS  (STANDING):  acetaminophen     Tablet .. 650 every 6 hours PRN  aluminum hydroxide/magnesium hydroxide/simethicone Suspension 30 every 4 hours PRN  melatonin 3 at bedtime PRN  ondansetron Injectable 4 every 8 hours PRN      REVIEW OF SYSTEMS  [  ] ROS unobtainable because:    [ x ] All other systems negative except as noted below:	    Constitutional:  [ ] fever [ ] chills  [ ] weight loss  [ ] weakness  Skin:  [ ] rash [ ] phlebitis	  Eyes: [ ] icterus [ ] pain  [ ] discharge	  ENMT: [ ] sore throat  [ ] thrush [ ] ulcers [ ] exudates  Respiratory: [ ] dyspnea [ ] hemoptysis [ ] cough [ ] sputum	  Cardiovascular:  [ ] chest pain [ ] palpitations [ ] edema	  Gastrointestinal:  [ ] nausea [ ] vomiting [ ] diarrhea [ ] constipation [ ] pain	  Genitourinary:  [ ] dysuria [x ] frequency [ ] hematuria [ ] discharge [ ] flank pain  [ ] incontinence  Musculoskeletal:  [ ] myalgias [ ] arthralgias [ ] arthritis  [ ] back pain  Neurological:  [ ] headache [ ] seizures  [ ] confusion/altered mental status  Psychiatric:  [ ] anxiety [ ] depression	  Hematology/Lymphatics:  [ ] lymphadenopathy  Endocrine:  [ ] adrenal [ ] thyroid  Allergic/Immunologic:	 [ ] transplant [ ] seasonal    Vital Signs Last 24 Hrs  T(C): 37.2 (14 Mar 2025 08:14), Max: 38.7 (13 Mar 2025 20:02)  T(F): 99 (14 Mar 2025 08:14), Max: 101.6 (13 Mar 2025 20:02)  HR: 78 (14 Mar 2025 08:14) (70 - 114)  BP: 108/68 (14 Mar 2025 08:14) (90/58 - 114/70)  BP(mean): 73 (14 Mar 2025 03:15) (69 - 87)  RR: 17 (14 Mar 2025 08:14) (14 - 20)  SpO2: 95% (14 Mar 2025 08:14) (91% - 97%)    Parameters below as of 14 Mar 2025 08:14  Patient On (Oxygen Delivery Method): room air        Physical Exam:  Constitutional:  frail, NAD  Head/Eyes: no icterus  LUNGS:  CTA  CVS:  regular rhythm  Abd:  soft, non-tender; non-distended  Ext:  no edema  Vascular:  IV site no erythema tenderness or discharge  Neuro: AAO X 3, non- focal    Labs: all available labs reviewed                        12.2   11.92 )-----------( 193      ( 13 Mar 2025 22:18 )             37.8     -    135  |  103  |  18  ----------------------------<  152[H]  4.0   |  26  |  1.31[H]    Ca    9.9      13 Mar 2025 22:18    TPro  7.5  /  Alb  3.3  /  TBili  0.9  /  DBili  x   /  AST  76[H]  /  ALT  152[H]  /  AlkPhos  337[H]  -13     LIVER FUNCTIONS - ( 13 Mar 2025 22:18 )  Alb: 3.3 g/dL / Pro: 7.5 gm/dL / ALK PHOS: 337 U/L / ALT: 152 U/L / AST: 76 U/L / GGT: x           Urinalysis Basic - ( 13 Mar 2025 22:37 )    Color: Yellow / Appearance: Cloudy / S.016 / pH: x  Gluc: x / Ketone: Trace mg/dL  / Bili: Negative / Urobili: 0.2 mg/dL   Blood: x / Protein: 30 mg/dL / Nitrite: Negative   Leuk Esterase: Moderate / RBC: 10-15 /HPF / WBC 25-50 /HPF   Sq Epi: x / Non Sq Epi: x / Bacteria: Few /HPF          Radiology: all available radiological tests reviewed    Advanced directives addressed: full resuscitation
NEPHROLOGY CONSULT  HPI:  Patient is a 64y old  Female who presents with a chief complaint of fever.  HPI: 64F hx renal txplt (for renal failure 2/2 lithium toxicity, [facility], 3y ago), hx ESBL UTI/bacteremia , DVT on Eliquis, breast cancer s/p lumpectomy and RT, gout, HLD, hyperparathyroidism, MGUS, schizoaffective d/o, uterine leiomyoma s/p hysterectomy, tardive dyskinesia, p/w fever. Reports feeling unwell x 1wk, fever x 1d.  Also with mild L groin pain (renal txplt is in RLQ), Has had similar sx in past with prior UTI. Here, found to be febrile, tachy, w leukocytosis, VALERIE and abnl UA. Of note, pt reports that had been on fosfomycin as outpt tx for cystitis in the past, hasn't taken in a few months. Is due for belatacept infusion for renal txplt this [place] 3/16.    Nephrology  Above from chart, previous charts reviewed  Admitted with fatigue and dx w UTI/ Cystitis perinephric stranding  Pts transplant meds reviewed due for belatacept  this   Receives q 4 weeks      PAST MEDICAL & SURGICAL HISTORY:  Cardiac murmur  Kidney disease  "stage 4" as per patient secondary to lithium treatment many years ago  Breast CA  DCIS, left breast, s/p RT  Uterine leiomyoma  GERD (gastroesophageal reflux disease)  Lyme disease  Degenerative disc disease, lumbar  HLD (hyperlipidemia)  History of secondary hyperparathyroidism  MGUS (monoclonal gammopathy of unknown significance)  Gout  Schizoaffective disorder, bipolar type  S/P lumpectomy of breast  , left breast, no lymph nodes removed  S/P tonsillectomy  S/P dilation and curettage  uterine polyps  S/P coronary angiogram  10 yrs ago, no intervention  H/O: hysterectomy  Abnormal biopsy of kidney  Kidney transplanted    FAMILY HISTORY:  FH: lymphoma      Social History:      Allergies    Levaquin (Anaphylaxis)    Intolerances        MEDICATIONS  (STANDING):  meropenem Injectable 1000 milliGRAM(s) IV Push every 12 hours    MEDICATIONS  (PRN):  acetaminophen     Tablet .. 650 milliGRAM(s) Oral every 6 hours PRN Mild Pain (1 - 3)  aluminum hydroxide/magnesium hydroxide/simethicone Suspension 30 milliLiter(s) Oral every 4 hours PRN Dyspepsia  melatonin 3 milliGRAM(s) Oral at bedtime PRN Insomnia  ondansetron Injectable 4 milliGRAM(s) IV Push every 8 hours PRN Nausea and/or Vomiting      ROS:  General:  No fevers, chills, or unexplained weight loss  Skin: No rash or bothersome skin lesions  Musculoskeletal: No arthalgias, myalgias or joint swelling  Eyes: No visual changes or eye pain  Ears: No hearing loss , otorrhea or ear pain  Nose, Mouth, Throat: No nasal congestion, rhinorrhea, oral lesions, postnasal drip or sore throat  Cardio: No chest pain or palpitations. no lower extremity edema. no syncope. no claudication.   Respiratory: No cough, shortness of breath or wheezing   GI: No diarrhea, constipation, blood in stools, abdominal pain, vomiting or heartburn  : No urinary frequency, hematuria, incontinence, or dysuria  Neurologic: No headaches, parasthesias, confusion, dysarthria or gait instability  Psychiatric:  No anxiety or depression  Lymphatic:  No easy bruising, easy bleeding or swollen glands  Allergic: No itching, sneezing , watery eyes, clear rhinorrhea or recurrent infections    PEx  T(C): 37.2 (25 @ 08:14), Max: 38.7 (25 @ 20:02)  HR: 78 (25 @ 08:14) (70 - 114)  BP: 108/68 (25 @ 08:14) (90/58 - 114/70)  RR: 17 (25 @ 08:14) (14 - 20)  SpO2: 95% (25 @ 08:14) (91% - 97%)  Wt(kg): --      General:     no acute distress, no identifying marks , scars, or tattoos.  Skin: no rash or prominent lesions  Head: normocephalic, atraumatic     Sinuses: non-tender  Nose: no external lesions, mucosa non-inflamed, septum and turbinates normal  Throat: no erythema, exudates or lesions.  Neck: Supple without lymphadenopathy. Thyroid no thyromegaly, no palpable thyroid nodules, no palpable nodules or masses, carotid arteries no bruits.   Breasts: No palpable masses or lesions.  Heart: RRR, no murmur or gallop.  Normal S1, S2.  No S3, S4.   Lungs: CTA bilaterally, no wheezes, rhonchi, rales.  Breathing unlabored.   Chest wall: Normal insp   Abdomen:  Soft, NT/ND, normal bowel sounds, no HSM, no masses.  No peritoneal signs.   Back: spine normal without deformity or tenderness.  Normal ROM   : Exam normal.  no inguinal hernias.  Extremities: No deformities, clubbing, cyanosis, or edema.  Musculoskeletal: Normal gait and station. No decreased range of motion, instability, atrophy or abnormal strength or tone in the head, neck, spine, ribs, pelvis or extremities.   Neurologic: CN 2-12 normal. Sensation to pain, touch and proprioception normal. DTRs normal in upper and lower extremities. No pathologic reflexes.  Motor normal.  Psychiatric: Oriented X3, intact recent and remote memory, judgement and insight, normal mood and affect.                          11.2   10.69 )-----------( 175      ( 14 Mar 2025 08:09 )             34.3     03-13    135  |  103  |  18  ----------------------------<  152[H]  4.0   |  26  |  1.31[H]    Ca    9.9      13 Mar 2025 22:18    TPro  7.5  /  Alb  3.3  /  TBili  0.9  /  DBili  x   /  AST  76[H]  /  ALT  152[H]  /  AlkPhos  337[H]  03-13    CAPILLARY BLOOD GLUCOSE        PT/INR - ( 13 Mar 2025 22:18 )   PT: 16.7 sec;   INR: 1.46 ratio         PTT - ( 13 Mar 2025 22:18 )  PTT:35.7 sec  Urinalysis Basic - ( 13 Mar 2025 22:37 )    Color: Yellow / Appearance: Cloudy / S.016 / pH: x  Gluc: x / Ketone: Trace mg/dL  / Bili: Negative / Urobili: 0.2 mg/dL   Blood: x / Protein: 30 mg/dL / Nitrite: Negative   Leuk Esterase: Moderate / RBC: 10-15 /HPF / WBC 25-50 /HPF   Sq Epi: x / Non Sq Epi: x / Bacteria: Few /HPF          Radiology/Imaging, I have personally reviewed:  CT a/p noncon 3/13/25: Right iliac fossa renal transplant. No hydroureteronephrosis of the renal transplant. Mild perinephric and periureteral fat strandingand bladder wall thickening with slight infiltration of the perivesicular fat which raises question of cystitis with ascending urinary tract infection. Correlate with urinalysis. Colonic diverticulosis without evidence of diverticulitis.    CXR 3/13/15: Low lung volume without cynthia consolidation, effusion, or pneumothorax.   (14 Mar 2025 08:26)      PAST MEDICAL & SURGICAL HISTORY:  Cardiac murmur      Kidney disease  "stage 4" as per patient secondary to lithium treatment many years ago      Breast CA  DCIS, left breast, s/p RT      Uterine leiomyoma      GERD (gastroesophageal reflux disease)      Lyme disease      Degenerative disc disease, lumbar      HLD (hyperlipidemia)      History of secondary hyperparathyroidism      MGUS (monoclonal gammopathy of unknown significance)      Gout      Schizoaffective disorder, bipolar type      S/P lumpectomy of breast  , left breast, no lymph nodes removed      S/P tonsillectomy      S/P dilation and curettage  uterine polyps      S/P coronary angiogram  10 yrs ago, no intervention      H/O: hysterectomy      Abnormal biopsy of kidney      Kidney transplanted          FAMILY HISTORY:  FH: lymphoma        MEDICATIONS  (STANDING):  meropenem Injectable 1000 milliGRAM(s) IV Push every 12 hours  polyethylene glycol 3350 17 Gram(s) Oral daily  senna 2 Tablet(s) Oral at bedtime    MEDICATIONS  (PRN):  acetaminophen     Tablet .. 650 milliGRAM(s) Oral every 6 hours PRN Mild Pain (1 - 3)  aluminum hydroxide/magnesium hydroxide/simethicone Suspension 30 milliLiter(s) Oral every 4 hours PRN Dyspepsia  melatonin 3 milliGRAM(s) Oral at bedtime PRN Insomnia  ondansetron Injectable 4 milliGRAM(s) IV Push every 8 hours PRN Nausea and/or Vomiting      Allergies    Levaquin (Anaphylaxis)    Intolerances        I&O's Summary        REVIEW OF SYSTEMS:    CONSTITUTIONAL:  As per HPI.  CONSTITUTIONAL: No weakness, fevers or chills  EYES/ENT: No visual changes;  No vertigo or throat pain   NECK: No pain or stiffness  CARDIOVASCULAR: No chest pain or palpitations  GASTROINTESTINAL: No abdominal or epigastric pain. No nausea, vomiting, or hematemesis; No diarrhea or constipation. No melena or hematochezia.  GENITOURINARY: No dysuria, frequency or hematuria  NEUROLOGICAL: No numbness or weakness  SKIN: No itching, burning, rashes, or lesions   All other review of systems is negative unless indicated above      Vital Signs Last 24 Hrs  T(C): 37.2 (14 Mar 2025 08:14), Max: 38.7 (13 Mar 2025 20:02)  T(F): 99 (14 Mar 2025 08:14), Max: 101.6 (13 Mar 2025 20:02)  HR: 78 (14 Mar 2025 08:14) (70 - 114)  BP: 108/68 (14 Mar 2025 08:14) (90/58 - 114/70)  BP(mean): 73 (14 Mar 2025 03:15) (69 - 87)  RR: 17 (14 Mar 2025 08:14) (14 - 20)  SpO2: 95% (14 Mar 2025 08:14) (91% - 97%)    Parameters below as of 14 Mar 2025 08:14  Patient On (Oxygen Delivery Method): room air        Daily     Daily     I&O's Summary      PHYSICAL EXAM:    General:  Alert, No acute distress.    Neuro:  Alert and oriented to person, place, and time. Able to communicate  well.      HEENT:  No JVD, no masses, Eyes anicteric, ,    Cardiovascular:  Regular rate and rhythm, with normal S1 and S2.    Lungs:  clear. no rales, no wheezing, .    Abdomen:  Normoactive bowel sounds. Soft, flat, non-tender, and non-distended.  positive bowel sounds    Skin:  Warm, dry    Extremities:  warm,  no cyanosis    LABS:                        11.2   10.69 )-----------( 175      ( 14 Mar 2025 08:09 )             34.3     03-14    139  |  105  |  15  ----------------------------<  144[H]  3.7   |  25  |  1.19    Ca    9.9      14 Mar 2025 08:09    TPro  6.7  /  Alb  3.0[L]  /  TBili  1.0  /  DBili  x   /  AST  56[H]  /  ALT  124[H]  /  AlkPhos  295[H]  03-14    PT/INR - ( 13 Mar 2025 22:18 )   PT: 16.7 sec;   INR: 1.46 ratio         PTT - ( 13 Mar 2025 22:18 )  PTT:35.7 sec  Urinalysis Basic - ( 14 Mar 2025 08:09 )    Color: x / Appearance: x / SG: x / pH: x  Gluc: 144 mg/dL / Ketone: x  / Bili: x / Urobili: x   Blood: x / Protein: x / Nitrite: x   Leuk Esterase: x / RBC: x / WBC x   Sq Epi: x / Non Sq Epi: x / Bacteria: x

## 2025-03-15 ENCOUNTER — RESULT REVIEW (OUTPATIENT)
Age: 65
End: 2025-03-15

## 2025-03-15 LAB
ADD ON TEST-SPECIMEN IN LAB: SIGNIFICANT CHANGE UP
ALP SERPL-CCNC: 266 U/L — HIGH (ref 40–120)
ALT FLD-CCNC: 106 U/L — HIGH (ref 12–78)
ANION GAP SERPL CALC-SCNC: 5 MMOL/L — SIGNIFICANT CHANGE UP (ref 5–17)
AST SERPL-CCNC: 46 U/L — HIGH (ref 15–37)
BILIRUB SERPL-MCNC: 0.5 MG/DL — SIGNIFICANT CHANGE UP (ref 0.2–1.2)
BUN SERPL-MCNC: 25 MG/DL — HIGH (ref 7–23)
CALCIUM SERPL-MCNC: 9.3 MG/DL — SIGNIFICANT CHANGE UP (ref 8.5–10.1)
CHLORIDE SERPL-SCNC: 106 MMOL/L — SIGNIFICANT CHANGE UP (ref 96–108)
CK SERPL-CCNC: 95 U/L — SIGNIFICANT CHANGE UP (ref 26–192)
CO2 SERPL-SCNC: 26 MMOL/L — SIGNIFICANT CHANGE UP (ref 22–31)
CREAT SERPL-MCNC: 1.14 MG/DL — SIGNIFICANT CHANGE UP (ref 0.5–1.3)
CULTURE RESULTS: SIGNIFICANT CHANGE UP
EGFR: 53 ML/MIN/1.73M2 — LOW
EGFR: 53 ML/MIN/1.73M2 — LOW
GLUCOSE SERPL-MCNC: 113 MG/DL — HIGH (ref 70–99)
HCT VFR BLD CALC: 31.6 % — LOW (ref 34.5–45)
HGB BLD-MCNC: 10 G/DL — LOW (ref 11.5–15.5)
MCHC RBC-ENTMCNC: 29.2 PG — SIGNIFICANT CHANGE UP (ref 27–34)
MCV RBC AUTO: 92.1 FL — SIGNIFICANT CHANGE UP (ref 80–100)
NRBC # BLD AUTO: 0 K/UL — SIGNIFICANT CHANGE UP (ref 0–0)
NRBC # FLD: 0 K/UL — SIGNIFICANT CHANGE UP (ref 0–0)
NRBC BLD AUTO-RTO: 0 /100 WBCS — SIGNIFICANT CHANGE UP (ref 0–0)
NT-PROBNP SERPL-SCNC: 422 PG/ML — HIGH (ref 0–125)
PLATELET # BLD AUTO: 177 K/UL — SIGNIFICANT CHANGE UP (ref 150–400)
PMV BLD: 9.9 FL — SIGNIFICANT CHANGE UP (ref 7–13)
POTASSIUM SERPL-MCNC: 3.6 MMOL/L — SIGNIFICANT CHANGE UP (ref 3.5–5.3)
POTASSIUM SERPL-SCNC: 3.6 MMOL/L — SIGNIFICANT CHANGE UP (ref 3.5–5.3)
RBC # BLD: 3.43 M/UL — LOW (ref 3.8–5.2)
RBC # FLD: 12.7 % — SIGNIFICANT CHANGE UP (ref 10.3–14.5)
SODIUM SERPL-SCNC: 137 MMOL/L — SIGNIFICANT CHANGE UP (ref 135–145)
SPECIMEN SOURCE: SIGNIFICANT CHANGE UP
WBC # BLD: 8.98 K/UL — SIGNIFICANT CHANGE UP (ref 3.8–10.5)
WBC # FLD AUTO: 8.98 K/UL — SIGNIFICANT CHANGE UP (ref 3.8–10.5)

## 2025-03-15 PROCEDURE — 93010 ELECTROCARDIOGRAM REPORT: CPT

## 2025-03-15 PROCEDURE — 99232 SBSQ HOSP IP/OBS MODERATE 35: CPT

## 2025-03-15 PROCEDURE — 99233 SBSQ HOSP IP/OBS HIGH 50: CPT

## 2025-03-15 PROCEDURE — 93306 TTE W/DOPPLER COMPLETE: CPT | Mod: 26

## 2025-03-15 RX ORDER — ACETAMINOPHEN 500 MG/5ML
1000 LIQUID (ML) ORAL ONCE
Refills: 0 | Status: COMPLETED | OUTPATIENT
Start: 2025-03-15 | End: 2025-03-15

## 2025-03-15 RX ADMIN — MEROPENEM 1000 MILLIGRAM(S): 1 INJECTION INTRAVENOUS at 22:16

## 2025-03-15 RX ADMIN — HALOPERIDOL 1 MILLIGRAM(S): 10 TABLET ORAL at 09:44

## 2025-03-15 RX ADMIN — MEROPENEM 1000 MILLIGRAM(S): 1 INJECTION INTRAVENOUS at 09:43

## 2025-03-15 RX ADMIN — Medication 400 MILLIGRAM(S): at 04:17

## 2025-03-15 RX ADMIN — MYCOPHENOLATE MOFETIL 250 MILLIGRAM(S): 500 TABLET, FILM COATED ORAL at 22:16

## 2025-03-15 RX ADMIN — Medication 500 MILLIGRAM(S): at 22:16

## 2025-03-15 RX ADMIN — MYCOPHENOLATE MOFETIL 250 MILLIGRAM(S): 500 TABLET, FILM COATED ORAL at 09:43

## 2025-03-15 RX ADMIN — Medication 1000 UNIT(S): at 09:44

## 2025-03-15 RX ADMIN — APIXABAN 2.5 MILLIGRAM(S): 2.5 TABLET, FILM COATED ORAL at 22:16

## 2025-03-15 RX ADMIN — Medication 1 TABLET(S): at 09:44

## 2025-03-15 RX ADMIN — MEROPENEM 1000 MILLIGRAM(S): 1 INJECTION INTRAVENOUS at 15:04

## 2025-03-15 RX ADMIN — Medication 1000 MILLIGRAM(S): at 04:00

## 2025-03-15 RX ADMIN — Medication 2 TABLET(S): at 22:17

## 2025-03-15 RX ADMIN — PREDNISONE 5 MILLIGRAM(S): 20 TABLET ORAL at 09:44

## 2025-03-15 RX ADMIN — POLYETHYLENE GLYCOL 3350 17 GRAM(S): 17 POWDER, FOR SOLUTION ORAL at 10:34

## 2025-03-15 RX ADMIN — Medication 500 MILLIGRAM(S): at 09:44

## 2025-03-15 RX ADMIN — APIXABAN 2.5 MILLIGRAM(S): 2.5 TABLET, FILM COATED ORAL at 09:44

## 2025-03-15 NOTE — PROGRESS NOTE ADULT - TIME BILLING
I spend mentioned above total minutes on direct patient care on the date of this encounter . This includes reviewing prior documentation, results and imaging in addition to completing a full history and physical examination on the patient. Further tests, medications, and procedures have been ordered as indicated. Laboratory results and the plan of care were communicated to the patient and/or their family member. Supporting documentation was completed and added to the patient's chart.   .

## 2025-03-16 VITALS
DIASTOLIC BLOOD PRESSURE: 77 MMHG | SYSTOLIC BLOOD PRESSURE: 110 MMHG | RESPIRATION RATE: 18 BRPM | HEART RATE: 69 BPM | TEMPERATURE: 98 F | OXYGEN SATURATION: 97 %

## 2025-03-16 LAB
24R-OH-CALCIDIOL SERPL-MCNC: 83.2 NG/ML — SIGNIFICANT CHANGE UP
ADD ON TEST-SPECIMEN IN LAB: SIGNIFICANT CHANGE UP
ALBUMIN SERPL ELPH-MCNC: 2.8 G/DL — LOW (ref 3.3–5)
ALP SERPL-CCNC: 224 U/L — HIGH (ref 40–120)
ALT FLD-CCNC: 96 U/L — HIGH (ref 12–78)
ANION GAP SERPL CALC-SCNC: 3 MMOL/L — LOW (ref 5–17)
AST SERPL-CCNC: 31 U/L — SIGNIFICANT CHANGE UP (ref 15–37)
BASOPHILS # BLD AUTO: 0.02 K/UL — SIGNIFICANT CHANGE UP (ref 0–0.2)
BASOPHILS NFR BLD AUTO: 0.3 % — SIGNIFICANT CHANGE UP (ref 0–2)
BILIRUB DIRECT SERPL-MCNC: 0.1 MG/DL — SIGNIFICANT CHANGE UP (ref 0–0.3)
BILIRUB INDIRECT FLD-MCNC: 0.2 MG/DL — SIGNIFICANT CHANGE UP (ref 0.2–1)
BILIRUB SERPL-MCNC: 0.3 MG/DL — SIGNIFICANT CHANGE UP (ref 0.2–1.2)
BUN SERPL-MCNC: 22 MG/DL — SIGNIFICANT CHANGE UP (ref 7–23)
CHLORIDE SERPL-SCNC: 108 MMOL/L — SIGNIFICANT CHANGE UP (ref 96–108)
CO2 SERPL-SCNC: 28 MMOL/L — SIGNIFICANT CHANGE UP (ref 22–31)
CREAT SERPL-MCNC: 1.12 MG/DL — SIGNIFICANT CHANGE UP (ref 0.5–1.3)
CRP SERPL-MCNC: 96.9 MG/ML — HIGH (ref 0–5)
EGFR: 55 ML/MIN/1.73M2 — LOW
EGFR: 55 ML/MIN/1.73M2 — LOW
EOSINOPHIL # BLD AUTO: 0.09 K/UL — SIGNIFICANT CHANGE UP (ref 0–0.5)
FERRITIN SERPL-MCNC: 420 NG/ML — HIGH (ref 13–330)
FOLATE SERPL-MCNC: 19.9 NG/ML — SIGNIFICANT CHANGE UP
GLUCOSE SERPL-MCNC: 112 MG/DL — HIGH (ref 70–99)
HCT VFR BLD CALC: 33.1 % — LOW (ref 34.5–45)
HGB BLD-MCNC: 10.6 G/DL — LOW (ref 11.5–15.5)
IMM GRANULOCYTES # BLD AUTO: 0.04 K/UL — SIGNIFICANT CHANGE UP (ref 0–0.07)
IMM GRANULOCYTES NFR BLD AUTO: 0.7 % — SIGNIFICANT CHANGE UP (ref 0–0.9)
IRON SATN MFR SERPL: 26 % — SIGNIFICANT CHANGE UP (ref 14–50)
IRON SATN MFR SERPL: 54 UG/DL — SIGNIFICANT CHANGE UP (ref 30–160)
LYMPHOCYTES # BLD AUTO: 0.86 K/UL — LOW (ref 1–3.3)
LYMPHOCYTES NFR BLD AUTO: 14.1 % — SIGNIFICANT CHANGE UP (ref 13–44)
MAGNESIUM SERPL-MCNC: 2.3 MG/DL — SIGNIFICANT CHANGE UP (ref 1.6–2.6)
MCHC RBC-ENTMCNC: 29.5 PG — SIGNIFICANT CHANGE UP (ref 27–34)
MCHC RBC-ENTMCNC: 32 G/DL — SIGNIFICANT CHANGE UP (ref 32–36)
MCV RBC AUTO: 92.2 FL — SIGNIFICANT CHANGE UP (ref 80–100)
MONOCYTES # BLD AUTO: 0.55 K/UL — SIGNIFICANT CHANGE UP (ref 0–0.9)
MONOCYTES NFR BLD AUTO: 9 % — SIGNIFICANT CHANGE UP (ref 2–14)
NEUTROPHILS # BLD AUTO: 4.55 K/UL — SIGNIFICANT CHANGE UP (ref 1.8–7.4)
NEUTROPHILS NFR BLD AUTO: 74.4 % — SIGNIFICANT CHANGE UP (ref 43–77)
NRBC # BLD AUTO: 0 K/UL — SIGNIFICANT CHANGE UP (ref 0–0)
NRBC # FLD: 0 K/UL — SIGNIFICANT CHANGE UP (ref 0–0)
NRBC BLD AUTO-RTO: 0 /100 WBCS — SIGNIFICANT CHANGE UP (ref 0–0)
NT-PROBNP SERPL-SCNC: 209 PG/ML — HIGH (ref 0–125)
PHOSPHATE SERPL-MCNC: 2.7 MG/DL — SIGNIFICANT CHANGE UP (ref 2.5–4.5)
PLATELET # BLD AUTO: 191 K/UL — SIGNIFICANT CHANGE UP (ref 150–400)
PMV BLD: 10 FL — SIGNIFICANT CHANGE UP (ref 7–13)
POTASSIUM SERPL-MCNC: 3.8 MMOL/L — SIGNIFICANT CHANGE UP (ref 3.5–5.3)
POTASSIUM SERPL-SCNC: 3.8 MMOL/L — SIGNIFICANT CHANGE UP (ref 3.5–5.3)
PROCALCITONIN SERPL-MCNC: 0.15 NG/ML — HIGH (ref 0.02–0.1)
PROT SERPL-MCNC: 6.7 GM/DL — SIGNIFICANT CHANGE UP (ref 6–8.3)
PTH-INTACT FLD-MCNC: 32 PG/ML — SIGNIFICANT CHANGE UP (ref 15–65)
RBC # BLD: 3.59 M/UL — LOW (ref 3.8–5.2)
RBC # FLD: 12.6 % — SIGNIFICANT CHANGE UP (ref 10.3–14.5)
SODIUM SERPL-SCNC: 139 MMOL/L — SIGNIFICANT CHANGE UP (ref 135–145)
TIBC SERPL-MCNC: 205 UG/DL — LOW (ref 220–430)
TROPONIN I, HIGH SENSITIVITY RESULT: 4.47 NG/L — SIGNIFICANT CHANGE UP
TSH SERPL-MCNC: 1.27 UU/ML — SIGNIFICANT CHANGE UP (ref 0.34–4.82)
UIBC SERPL-MCNC: 152 UG/DL — SIGNIFICANT CHANGE UP (ref 110–370)
WBC # BLD: 6.11 K/UL — SIGNIFICANT CHANGE UP (ref 3.8–10.5)
WBC # FLD AUTO: 6.11 K/UL — SIGNIFICANT CHANGE UP (ref 3.8–10.5)

## 2025-03-16 PROCEDURE — 99232 SBSQ HOSP IP/OBS MODERATE 35: CPT

## 2025-03-16 PROCEDURE — 99233 SBSQ HOSP IP/OBS HIGH 50: CPT

## 2025-03-16 RX ORDER — SULFAMETHOXAZOLE/TRIMETHOPRIM 800-160 MG
1 TABLET ORAL
Qty: 10 | Refills: 0
Start: 2025-03-16 | End: 2025-03-20

## 2025-03-16 RX ADMIN — Medication 1000 UNIT(S): at 09:10

## 2025-03-16 RX ADMIN — MEROPENEM 1000 MILLIGRAM(S): 1 INJECTION INTRAVENOUS at 06:02

## 2025-03-16 RX ADMIN — MEROPENEM 1000 MILLIGRAM(S): 1 INJECTION INTRAVENOUS at 13:48

## 2025-03-16 RX ADMIN — Medication 1 TABLET(S): at 09:10

## 2025-03-16 RX ADMIN — Medication 500 MILLIGRAM(S): at 09:10

## 2025-03-16 RX ADMIN — POLYETHYLENE GLYCOL 3350 17 GRAM(S): 17 POWDER, FOR SOLUTION ORAL at 09:09

## 2025-03-16 RX ADMIN — PREDNISONE 5 MILLIGRAM(S): 20 TABLET ORAL at 09:11

## 2025-03-16 RX ADMIN — APIXABAN 2.5 MILLIGRAM(S): 2.5 TABLET, FILM COATED ORAL at 09:15

## 2025-03-16 RX ADMIN — MYCOPHENOLATE MOFETIL 250 MILLIGRAM(S): 500 TABLET, FILM COATED ORAL at 09:11

## 2025-03-16 NOTE — PROGRESS NOTE ADULT - SUBJECTIVE AND OBJECTIVE BOX
Consultation - Nephrology   Karla Agustin 60 y o  male MRN: [id number]  Unit/Bed#: [facility] 603-01 Encounter: [id number]    Jessica Lujan at [facility] 
Date of Service:03-16-25 @ 09:43  Interval History/ROS: Afebrile overnight, comfortable on RA, no leukocytosis, reports still feeling weak, but no fever or chills       REVIEW OF SYSTEMS  [  ] ROS unobtainable because:    [ x ] All other systems negative except as noted below    Constitutional:  [ ] fever [ ] chills  [ ] weight loss  [ ]night sweat  [ ]poor appetite/PO intake [ ]fatigue   Skin:  [ ] rash [ ] phlebitis	  Eyes: [ ] icterus [ ] pain  [ ] discharge	  ENMT: [ ] sore throat  [ ] thrush [ ] ulcers [ ] exudates [ ]anosmia  Respiratory: [ ] dyspnea [ ] hemoptysis [ ] cough [ ] sputum	  Cardiovascular:  [ ] chest pain [ ] palpitations [ ] edema	  Gastrointestinal:  [ ] nausea [ ] vomiting [ ] diarrhea [ ] constipation [ ] pain	  Genitourinary:  [ ] dysuria [ ] frequency [ ] hematuria [ ] discharge [ ] flank pain  [ ] incontinence  Musculoskeletal:  [ ] myalgias [ ] arthralgias [ ] arthritis  [ ] back pain  Neurological:  [ ] headache [ ] weakness [ ] seizures  [ ] confusion/altered mental status    Allergies  Levaquin (Anaphylaxis)        ANTIMICROBIALS:    meropenem Injectable 1000 every 8 hours        OTHER MEDS: MEDICATIONS  (STANDING):  acetaminophen     Tablet .. 650 every 6 hours PRN  aluminum hydroxide/magnesium hydroxide/simethicone Suspension 30 every 4 hours PRN  apixaban 2.5 every 12 hours  clonazePAM  Tablet 0.5 daily PRN  haloperidol     Tablet 1 <User Schedule>  melatonin 3 at bedtime PRN  mycophenolate mofetil 250 two times a day  ondansetron Injectable 4 every 8 hours PRN  polyethylene glycol 3350 17 daily  predniSONE   Tablet 5 daily  senna 2 at bedtime      Vital Signs Last 24 Hrs  T(F): 98.2 (03-16-25 @ 08:07), Max: 101.7 (03-14-25 @ 23:55)    Vital Signs Last 24 Hrs  HR: 79 (03-16-25 @ 08:07) (64 - 79)  BP: 98/74 (03-16-25 @ 08:07) (98/74 - 115/65)  RR: 18 (03-16-25 @ 08:07)  SpO2: 94% (03-16-25 @ 08:07) (94% - 99%)  Wt(kg): --    EXAM:    Constitutional:  frail, NAD  Head/Eyes: no icterus  LUNGS:  CTA  CVS:  regular rhythm  Abd:  soft, non-tender; non-distended  Ext:  no edema  Vascular:  IV site no erythema tenderness or discharge  Neuro: AAO X 3, non- focal    Labs:                        10.6   6.11  )-----------( 191      ( 16 Mar 2025 07:30 )             33.1     03-16    139  |  108  |  22  ----------------------------<  112[H]  3.8   |  28  |  1.12    Ca    9.9      16 Mar 2025 07:30  Phos  2.7     03-16  Mg     2.3     03-16    TPro  6.1  /  Alb  2.6[L]  /  TBili  0.5  /  DBili  x   /  AST  46[H]  /  ALT  106[H]  /  AlkPhos  266[H]  03-15      WBC Trend:  WBC Count: 6.11 (03-16-25 @ 07:30)  WBC Count: 8.98 (03-15-25 @ 05:17)  WBC Count: 10.69 (03-14-25 @ 08:09)  WBC Count: 11.92 (03-13-25 @ 22:18)      Creatine Trend:  Creatinine: 1.12 (03-16)  Creatinine: 1.14 (03-15)  Creatinine: 1.19 (03-14)  Creatinine: 1.31 (03-13)      Liver Biochemical Testing Trend:  Alanine Aminotransferase (ALT/SGPT): 106 *H* (03-15)  Alanine Aminotransferase (ALT/SGPT): 124 *H* (03-14)  Alanine Aminotransferase (ALT/SGPT): 152 *H* (03-13)  Alanine Aminotransferase (ALT/SGPT): 45 (06-04)  Alanine Aminotransferase (ALT/SGPT): 40 (06-03)  Aspartate Aminotransferase (AST/SGOT): 46 (03-15-25 @ 05:17)  Aspartate Aminotransferase (AST/SGOT): 56 (03-14-25 @ 08:09)  Aspartate Aminotransferase (AST/SGOT): 76 (03-13-25 @ 22:18)  Aspartate Aminotransferase (AST/SGOT): 34 (06-04-24 @ 09:32)  Aspartate Aminotransferase (AST/SGOT): 32 (06-03-24 @ 22:54)  Bilirubin Total: 0.5 (03-15)  Bilirubin Total: 1.0 (03-14)  Bilirubin Total: 0.9 (03-13)  Bilirubin Total: 0.4 (06-04)  Bilirubin Total: 0.6 (06-03)      Trend LDH      Urinalysis Basic - ( 16 Mar 2025 07:30 )    Color: x / Appearance: x / SG: x / pH: x  Gluc: 112 mg/dL / Ketone: x  / Bili: x / Urobili: x   Blood: x / Protein: x / Nitrite: x   Leuk Esterase: x / RBC: x / WBC x   Sq Epi: x / Non Sq Epi: x / Bacteria: x        MICROBIOLOGY:        Culture - Urine (collected 13 Mar 2025 22:37)  Source: Urine None  Final Report:    <10,000 CFU/mL Normal Urogenital Ilana    Urinalysis with Rflx Culture (collected 13 Mar 2025 22:37)    Culture - Blood (collected 13 Mar 2025 22:18)  Source: Blood None  Preliminary Report:    No growth at 48 Hours    Culture - Blood (collected 13 Mar 2025 22:18)  Source: Blood None  Preliminary Report:    No growth at 48 Hours    Culture - Blood (collected 06 Jun 2024 14:08)  Source: .Blood None  Final Report:    No growth at 5 days    Culture - Blood (collected 06 Jun 2024 13:32)  Source: .Blood None  Final Report:    No growth at 5 days    Culture - Urine (collected 03 Jun 2024 23:54)  Source: Clean Catch None  Final Report:    >100,000 CFU/ml Escherichia coli ESBL  Organism: Escherichia coli ESBL  Organism: Escherichia coli ESBL    Sensitivities:      Method Type: OMAYRA      -  Ampicillin: R >16 These ampicillin results predict results for amoxicillin      -  Ampicillin/Sulbactam: R >16/8      -  Aztreonam: R >16      -  Cefazolin: R >16 For uncomplicated UTI with K. pneumoniae, E. coli, or P. mirablis: OMAYRA <=16 is sensitive and OMAYRA >=32 is resistant. This also predicts results for oral agents cefaclor, cefdinir, cefpodoxime, cefprozil, cefuroxime axetil, cephalexin and locarbef for uncomplicated UTI. Note that some isolates may be susceptible to these agents while testing resistant to cefazolin.      -  Cefepime: R >16      -  Ceftriaxone: R >32      -  Cefuroxime: R >16      -  Ciprofloxacin: R >2      -  Ertapenem: S <=0.5      -  Gentamicin: S <=2      -  Imipenem: S <=1      -  Levofloxacin: R >4      -  Meropenem: S <=1      -  Nitrofurantoin: S <=32 Should not be used to treat pyelonephritis      -  Piperacillin/Tazobactam: R 32      -  Tobramycin: R >8      -  Trimethoprim/Sulfamethoxazole: S <=0.5/9.5    Culture - Blood (collected 03 Jun 2024 23:21)  Source: .Blood None  Final Report:    No growth at 5 days    Culture - Blood (collected 03 Jun 2024 22:54)  Source: .Blood None  Final Report:    Growth in aerobic bottle: Escherichia coli ESBL    Direct identification is available within approximately 3-5    hours either by Blood Panel Multiplexed PCR or Direct    MALDI-TOF. Details: [email or web address]  Organism: Blood Culture PCR  Escherichia coli ESBL  Organism: Escherichia coli ESBL    Sensitivities:      Method Type: OMAYRA      -  Ampicillin: R >16 These ampicillin results predict results for amoxicillin      -  Ampicillin/Sulbactam: R 16/8      -  Aztreonam: R >16      -  Cefazolin: R >16      -  Cefepime: R >16      -  Ceftriaxone: R >32      -  Ciprofloxacin: R >2      -  Ertapenem: S <=0.5      -  Gentamicin: S <=2      -  Imipenem: S <=1      -  Levofloxacin: R >4      -  Meropenem: S <=1      -  Piperacillin/Tazobactam: R <=8      -  Tobramycin: R >8      -  Trimethoprim/Sulfamethoxazole: S <=0.5/9.5  Organism: Blood Culture PCR    Sensitivities:      Method Type: PCR      -  Escherichia coli: Detec      -  ESBL: Detec      -  CTX-M Resistance Marker: Detec    Culture - Urine (collected 29 Apr 2024 15:15)  Source: Clean Catch None  Final Report:    >100,000 CFU/ml Escherichia coli  Organism: Escherichia coli  Organism: Escherichia coli    Sensitivities:      Method Type: OMAYRA      -  Amoxicillin/Clavulanic Acid: S <=8/4      -  Ampicillin: R >16 These ampicillin results predict results for amoxicillin      -  Ampicillin/Sulbactam: I 16/8      -  Aztreonam: S <=4      -  Cefazolin: S 4 For uncomplicated UTI with K. pneumoniae, E. coli, or P. mirablis: OMAYRA <=16 is sensitive and OMAYRA >=32 is resistant. This also predicts results for oral agents cefaclor, cefdinir, cefpodoxime, cefprozil, cefuroxime axetil, cephalexin and locarbef for uncomplicated UTI. Note that some isolates may be susceptible to these agents while testing resistant to cefazolin.      -  Cefepime: S <=2      -  Cefoxitin: S <=8      -  Ceftriaxone: S <=1      -  Cefuroxime: S <=4      -  Ciprofloxacin: I 0.5      -  Ertapenem: S <=0.5      -  Gentamicin: S <=2      -  Imipenem: S <=1      -  Levofloxacin: S <=0.5      -  Meropenem: S <=1      -  Nitrofurantoin: S <=32 Should not be used to treat pyelonephritis      -  Piperacillin/Tazobactam: S <=8      -  Tobramycin: S <=2      -  Trimethoprim/Sulfamethoxazole: S <=0.5/9.5      Rapid RVP Result: NotDetec (03-14 @ 11:00)          C-Reactive Protein: 96.9 (03-16)      Lactate, Blood: 0.8 (03-13 @ 22:18)        RADIOLOGY:  imaging below personally reviewed    Xray Chest 1 View- PORTABLE-Urgent:  (13 Mar 2025 23:07)          
monitor volume status closely    -not on outpatient diuretics         HISTORY OF PRESENT ILLNESS:  Requesting Physician: Devante Rodríguez MD  Reason for Consult:  Acute kidney injury    Rosalva Siegel is a 60y o  year old male who was electively admitted to [facility] [facility])     Chronic pain     Colon cancer ([facility] [address] ) 
NEPHROLOGY CONSULT  HPI:  Patient is a 64y old  Female who presents with a chief complaint of fever.  HPI: 64F hx renal txplt (for renal failure 2/2 lithium toxicity, [facility], 3y ago), hx ESBL UTI/bacteremia , DVT on Eliquis, breast cancer s/p lumpectomy and RT, gout, HLD, hyperparathyroidism, MGUS, schizoaffective d/o, uterine leiomyoma s/p hysterectomy, tardive dyskinesia, p/w fever. Reports feeling unwell x 1wk, fever x 1d.  Also with mild L groin pain (renal txplt is in RLQ), Has had similar sx in past with prior UTI. Here, found to be febrile, tachy, w leukocytosis, VALERIE and abnl UA. Of note, pt reports that had been on fosfomycin as outpt tx for cystitis in the past, hasn't taken in a few months. Is due for belatacept infusion for renal txplt this [place] 3/16.    Nephrology  Above from chart, previous charts reviewed  Admitted with fatigue and dx w UTI/ Cystitis perinephric stranding  Pts transplant meds reviewed due for belatacept  this   Receives q 4 weeks    3/15  OOB   feels well  States had sweats last night      PAST MEDICAL & SURGICAL HISTORY:  Cardiac murmur  Kidney disease  "stage 4" as per patient secondary to lithium treatment many years ago  Breast CA  DCIS, left breast, s/p RT  Uterine leiomyoma  GERD (gastroesophageal reflux disease)  Lyme disease  Degenerative disc disease, lumbar  HLD (hyperlipidemia)  History of secondary hyperparathyroidism  MGUS (monoclonal gammopathy of unknown significance)  Gout  Schizoaffective disorder, bipolar type  S/P lumpectomy of breast  , left breast, no lymph nodes removed  S/P tonsillectomy  S/P dilation and curettage  uterine polyps  S/P coronary angiogram  10 yrs ago, no intervention  H/O: hysterectomy  Abnormal biopsy of kidney  Kidney transplanted    FAMILY HISTORY:  FH: lymphoma      Social History:      Allergies    Levaquin (Anaphylaxis)    Intolerances        MEDICATIONS  (STANDING):  meropenem Injectable 1000 milliGRAM(s) IV Push every 12 hours    MEDICATIONS  (PRN):  acetaminophen     Tablet .. 650 milliGRAM(s) Oral every 6 hours PRN Mild Pain (1 - 3)  aluminum hydroxide/magnesium hydroxide/simethicone Suspension 30 milliLiter(s) Oral every 4 hours PRN Dyspepsia  melatonin 3 milliGRAM(s) Oral at bedtime PRN Insomnia  ondansetron Injectable 4 milliGRAM(s) IV Push every 8 hours PRN Nausea and/or Vomiting      ROS:  General:  No fevers, chills, or unexplained weight loss  Skin: No rash or bothersome skin lesions  Musculoskeletal: No arthalgias, myalgias or joint swelling  Eyes: No visual changes or eye pain  Ears: No hearing loss , otorrhea or ear pain  Nose, Mouth, Throat: No nasal congestion, rhinorrhea, oral lesions, postnasal drip or sore throat  Cardio: No chest pain or palpitations. no lower extremity edema. no syncope. no claudication.   Respiratory: No cough, shortness of breath or wheezing   GI: No diarrhea, constipation, blood in stools, abdominal pain, vomiting or heartburn  : No urinary frequency, hematuria, incontinence, or dysuria  Neurologic: No headaches, parasthesias, confusion, dysarthria or gait instability  Psychiatric:  No anxiety or depression  Lymphatic:  No easy bruising, easy bleeding or swollen glands  Allergic: No itching, sneezing , watery eyes, clear rhinorrhea or recurrent infections      Vital Signs Last 24 Hrs  T(C): 36.3 (15 Mar 2025 16:19), Max: 38.7 (14 Mar 2025 23:55)  T(F): 97.3 (15 Mar 2025 16:19), Max: 101.7 (14 Mar 2025 23:55)  HR: 64 (15 Mar 2025 16:19) (64 - 80)  BP: 107/74 (15 Mar 2025 16:19) (98/54 - 121/66)  BP(mean): 72 (15 Mar 2025 01:57) (72 - 72)  RR: 18 (15 Mar 2025 16:19) (15 - 18)  SpO2: 98% (15 Mar 2025 16:19) (95% - 98%)    Parameters below as of 15 Mar 2025 16:19  Patient On (Oxygen Delivery Method): room air --      General:  no acute distress, no identifying marks , scars, or tattoos.  Skin: no rash or prominent lesions  Head: normocephalic, atraumatic     Sinuses: non-tender  Nose: no external lesions, mucosa non-inflamed, septum and turbinates normal  Throat: no erythema, exudates or lesions.  Neck: Supple without lymphadenopathy. Thyroid no thyromegaly, no palpable thyroid nodules, no palpable nodules or masses, carotid arteries no bruits.   Breasts: No palpable masses or lesions.  Heart: RRR, no murmur or gallop.  Normal S1, S2.  No S3, S4.   Lungs: CTA bilaterally, no wheezes, rhonchi, rales.  Breathing unlabored.   Chest wall: Normal insp   Abdomen:  Soft, NT/ND, normal bowel sounds, no HSM, no masses.  No peritoneal signs.   Back: spine normal without deformity or tenderness.  Normal ROM   : Exam normal.  no inguinal hernias.  Extremities: No deformities, clubbing, cyanosis, or edema.  Musculoskeletal: Normal gait and station. No decreased range of motion, instability, atrophy or abnormal strength or tone in the head, neck, spine, ribs, pelvis or extremities.   Neurologic: CN 2-12 normal. Sensation to pain, touch and proprioception normal. DTRs normal in upper and lower extremities. No pathologic reflexes.  Motor normal.  Psychiatric: Oriented X3, intact recent and remote memory, judgement and insight, normal mood and affect.                          11.2   10.69 )-----------( 175      ( 14 Mar 2025 08:09 )             34.3     03-15    137  |  106  |  25[H]  ----------------------------<  113[H]  3.6   |  26  |  1.14    Ca    9.3      15 Mar 2025 05:17    TPro  6.1  /  Alb  2.6[L]  /  TBili  0.5  /  DBili  x   /  AST  46[H]  /  ALT  106[H]  /  AlkPhos  266[H]  03-15      03-13    135  |  103  |  18  ----------------------------<  152[H]  4.0   |  26  |  1.31[H]    Ca    9.9      13 Mar 2025 22:18    TPro  7.5  /  Alb  3.3  /  TBili  0.9  /  DBili  x   /  AST  76[H]  /  ALT  152[H]  /  AlkPhos  337[H]      CAPILLARY BLOOD GLUCOSE        PT/INR - ( 13 Mar 2025 22:18 )   PT: 16.7 sec;   INR: 1.46 ratio         PTT - ( 13 Mar 2025 22:18 )  PTT:35.7 sec  Urinalysis Basic - ( 13 Mar 2025 22:37 )    Color: Yellow / Appearance: Cloudy / S.016 / pH: x  Gluc: x / Ketone: Trace mg/dL  / Bili: Negative / Urobili: 0.2 mg/dL   Blood: x / Protein: 30 mg/dL / Nitrite: Negative   Leuk Esterase: Moderate / RBC: 10-15 /HPF / WBC 25-50 /HPF   Sq Epi: x / Non Sq Epi: x / Bacteria: Few /HPF          Radiology/Imaging, I have personally reviewed:  CT a/p noncon 3/13/25: Right iliac fossa renal transplant. No hydroureteronephrosis of the renal transplant. Mild perinephric and periureteral fat strandingand bladder wall thickening with slight infiltration of the perivesicular fat which raises question of cystitis with ascending urinary tract infection. Correlate with urinalysis. Colonic diverticulosis without evidence of diverticulitis.    CXR 3/13/15: Low lung volume without cynthia consolidation, effusion, or pneumothorax.   (14 Mar 2025 08:26)      PAST MEDICAL & SURGICAL HISTORY:  Cardiac murmur      Kidney disease  "stage 4" as per patient secondary to lithium treatment many years ago      Breast CA  DCIS, left breast, s/p RT      Uterine leiomyoma      GERD (gastroesophageal reflux disease)      Lyme disease      Degenerative disc disease, lumbar      HLD (hyperlipidemia)      History of secondary hyperparathyroidism      MGUS (monoclonal gammopathy of unknown significance)      Gout      Schizoaffective disorder, bipolar type      S/P lumpectomy of breast  , left breast, no lymph nodes removed      S/P tonsillectomy      S/P dilation and curettage  uterine polyps      S/P coronary angiogram  10 yrs ago, no intervention      H/O: hysterectomy      Abnormal biopsy of kidney      Kidney transplanted          FAMILY HISTORY:  FH: lymphoma        MEDICATIONS  (STANDING):  meropenem Injectable 1000 milliGRAM(s) IV Push every 12 hours  polyethylene glycol 3350 17 Gram(s) Oral daily  senna 2 Tablet(s) Oral at bedtime    MEDICATIONS  (PRN):  acetaminophen     Tablet .. 650 milliGRAM(s) Oral every 6 hours PRN Mild Pain (1 - 3)  aluminum hydroxide/magnesium hydroxide/simethicone Suspension 30 milliLiter(s) Oral every 4 hours PRN Dyspepsia  melatonin 3 milliGRAM(s) Oral at bedtime PRN Insomnia  ondansetron Injectable 4 milliGRAM(s) IV Push every 8 hours PRN Nausea and/or Vomiting      Allergies    Levaquin (Anaphylaxis)    Intolerances        I&O's Summary        REVIEW OF SYSTEMS:    CONSTITUTIONAL:  As per HPI.  CONSTITUTIONAL: No weakness, fevers or chills  EYES/ENT: No visual changes;  No vertigo or throat pain   NECK: No pain or stiffness  CARDIOVASCULAR: No chest pain or palpitations  GASTROINTESTINAL: No abdominal or epigastric pain. No nausea, vomiting, or hematemesis; No diarrhea or constipation. No melena or hematochezia.  GENITOURINARY: No dysuria, frequency or hematuria  NEUROLOGICAL: No numbness or weakness  SKIN: No itching, burning, rashes, or lesions   All other review of systems is negative unless indicated above      Vital Signs Last 24 Hrs  T(C): 37.2 (14 Mar 2025 08:14), Max: 38.7 (13 Mar 2025 20:02)  T(F): 99 (14 Mar 2025 08:14), Max: 101.6 (13 Mar 2025 20:02)  HR: 78 (14 Mar 2025 08:14) (70 - 114)  BP: 108/68 (14 Mar 2025 08:14) (90/58 - 114/70)  BP(mean): 73 (14 Mar 2025 03:15) (69 - 87)  RR: 17 (14 Mar 2025 08:14) (14 - 20)  SpO2: 95% (14 Mar 2025 08:14) (91% - 97%)    Parameters below as of 14 Mar 2025 08:14  Patient On (Oxygen Delivery Method): room air        Daily     Daily     I&O's Summary      PHYSICAL EXAM:    General:  Alert, No acute distress.    Neuro:  Alert and oriented to person, place, and time. Able to communicate  well.      HEENT:  No JVD, no masses, Eyes anicteric, ,    Cardiovascular:  Regular rate and rhythm, with normal S1 and S2.    Lungs:  clear. no rales, no wheezing, .    Abdomen:  Normoactive bowel sounds. Soft, flat, non-tender, and non-distended.  positive bowel sounds    Skin:  Warm, dry    Extremities:  warm,  no cyanosis    LABS:                        11.2   10.69 )-----------( 175      ( 14 Mar 2025 08:09 )             34.3     03-14    139  |  105  |  15  ----------------------------<  144[H]  3.7   |  25  |  1.19    Ca    9.9      14 Mar 2025 08:09    TPro  6.7  /  Alb  3.0[L]  /  TBili  1.0  /  DBili  x   /  AST  56[H]  /  ALT  124[H]  /  AlkPhos  295[H]  03-14    PT/INR - ( 13 Mar 2025 22:18 )   PT: 16.7 sec;   INR: 1.46 ratio         PTT - ( 13 Mar 2025 22:18 )  PTT:35.7 sec  Urinalysis Basic - ( 14 Mar 2025 08:09 )    Color: x / Appearance: x / SG: x / pH: x  Gluc: 144 mg/dL / Ketone: x  / Bili: x / Urobili: x   Blood: x / Protein: x / Nitrite: x   Leuk Esterase: x / RBC: x / WBC x   Sq Epi: x / Non Sq Epi: x / Bacteria: x          
myocardial infarction) ([facility] [address] ) [facility] [address] N/A 12/26/2017    Procedure: LYSIS OF ADHESIONS;  Surgeon: Selin Santiago MD;  [facility];  Service: Surgical Oncology    PORTACATH PLACEMENT Right     AK CABG, ARTERIAL, SINGLE N/A 12/13/2017    Procedure: INTRA OP ARELI; CABG X4 WITH SVH TO RCA, OM1, DIAGONAL AND LIMA TO LAD; RIGHT LEG EVH;  Surgeon: Therese Miranda Cary Garcia MD;  [facility]; Service: Gastroenterology    KS 
NEPHROLOGY CONSULT  HPI:  Patient is a 64y old  Female who presents with a chief complaint of fever.  HPI: 64F hx renal txplt (for renal failure 2/2 lithium toxicity, [facility], 3y ago), hx ESBL UTI/bacteremia , DVT on Eliquis, breast cancer s/p lumpectomy and RT, gout, HLD, hyperparathyroidism, MGUS, schizoaffective d/o, uterine leiomyoma s/p hysterectomy, tardive dyskinesia, p/w fever. Reports feeling unwell x 1wk, fever x 1d.  Also with mild L groin pain (renal txplt is in RLQ), Has had similar sx in past with prior UTI. Here, found to be febrile, tachy, w leukocytosis, VALERIE and abnl UA. Of note, pt reports that had been on fosfomycin as outpt tx for cystitis in the past, hasn't taken in a few months. Is due for belatacept infusion for renal txplt this [place] 3/16.    Nephrology  Above from chart, previous charts reviewed  Admitted with fatigue and dx w UTI/ Cystitis perinephric stranding  Pts transplant meds reviewed due for belatacept  this   Receives q 4 weeks    3/15  OOB   feels well  States had sweats last night    3/16  feels well  no new events  for dc home      PAST MEDICAL & SURGICAL HISTORY:  Cardiac murmur  Kidney disease  "stage 4" as per patient secondary to lithium treatment many years ago  Breast CA  DCIS, left breast, s/p RT  Uterine leiomyoma  GERD (gastroesophageal reflux disease)  Lyme disease  Degenerative disc disease, lumbar  HLD (hyperlipidemia)  History of secondary hyperparathyroidism  MGUS (monoclonal gammopathy of unknown significance)  Gout  Schizoaffective disorder, bipolar type  S/P lumpectomy of breast  , left breast, no lymph nodes removed  S/P tonsillectomy  S/P dilation and curettage  uterine polyps  S/P coronary angiogram  10 yrs ago, no intervention  H/O: hysterectomy  Abnormal biopsy of kidney  Kidney transplanted    FAMILY HISTORY:  FH: lymphoma      Social History:      Allergies    Levaquin (Anaphylaxis)    Intolerances        MEDICATIONS  (STANDING):  meropenem Injectable 1000 milliGRAM(s) IV Push every 12 hours    MEDICATIONS  (PRN):  acetaminophen     Tablet .. 650 milliGRAM(s) Oral every 6 hours PRN Mild Pain (1 - 3)  aluminum hydroxide/magnesium hydroxide/simethicone Suspension 30 milliLiter(s) Oral every 4 hours PRN Dyspepsia  melatonin 3 milliGRAM(s) Oral at bedtime PRN Insomnia  ondansetron Injectable 4 milliGRAM(s) IV Push every 8 hours PRN Nausea and/or Vomiting      ROS:  General:  No fevers, chills, or unexplained weight loss  Skin: No rash or bothersome skin lesions  Musculoskeletal: No arthalgias, myalgias or joint swelling  Eyes: No visual changes or eye pain  Ears: No hearing loss , otorrhea or ear pain  Nose, Mouth, Throat: No nasal congestion, rhinorrhea, oral lesions, postnasal drip or sore throat  Cardio: No chest pain or palpitations. no lower extremity edema. no syncope. no claudication.   Respiratory: No cough, shortness of breath or wheezing   GI: No diarrhea, constipation, blood in stools, abdominal pain, vomiting or heartburn  : No urinary frequency, hematuria, incontinence, or dysuria  Neurologic: No headaches, parasthesias, confusion, dysarthria or gait instability  Psychiatric:  No anxiety or depression  Lymphatic:  No easy bruising, easy bleeding or swollen glands  Allergic: No itching, sneezing , watery eyes, clear rhinorrhea or recurrent infections      Vital Signs Last 24 Hrs  T(C): 36.3 (15 Mar 2025 16:19), Max: 38.7 (14 Mar 2025 23:55)  T(F): 97.3 (15 Mar 2025 16:19), Max: 101.7 (14 Mar 2025 23:55)  HR: 64 (15 Mar 2025 16:19) (64 - 80)  BP: 107/74 (15 Mar 2025 16:19) (98/54 - 121/66)  BP(mean): 72 (15 Mar 2025 01:57) (72 - 72)  RR: 18 (15 Mar 2025 16:19) (15 - 18)  SpO2: 98% (15 Mar 2025 16:19) (95% - 98%)    Parameters below as of 15 Mar 2025 16:19  Patient On (Oxygen Delivery Method): room air --      General:  no acute distress, no identifying marks , scars, or tattoos.  Skin: no rash or prominent lesions  Head: normocephalic, atraumatic     Sinuses: non-tender  Nose: no external lesions, mucosa non-inflamed, septum and turbinates normal  Throat: no erythema, exudates or lesions.  Neck: Supple without lymphadenopathy. Thyroid no thyromegaly, no palpable thyroid nodules, no palpable nodules or masses, carotid arteries no bruits.   Breasts: No palpable masses or lesions.  Heart: RRR, no murmur or gallop.  Normal S1, S2.  No S3, S4.   Lungs: CTA bilaterally, no wheezes, rhonchi, rales.  Breathing unlabored.   Chest wall: Normal insp   Abdomen:  Soft, NT/ND, normal bowel sounds, no HSM, no masses.  No peritoneal signs.   Back: spine normal without deformity or tenderness.  Normal ROM   : Exam normal.  no inguinal hernias.  Extremities: No deformities, clubbing, cyanosis, or edema.  Musculoskeletal: Normal gait and station. No decreased range of motion, instability, atrophy or abnormal strength or tone in the head, neck, spine, ribs, pelvis or extremities.   Neurologic: CN 2-12 normal. Sensation to pain, touch and proprioception normal. DTRs normal in upper and lower extremities. No pathologic reflexes.  Motor normal.  Psychiatric: Oriented X3, intact recent and remote memory, judgement and insight, normal mood and affect.                                   10.6   6.11  )-----------( 191      ( 16 Mar 2025 07:30 )             33.1                         10.0   8.98  )-----------( 177      ( 15 Mar 2025 05:17 )             31.6                         11.2   10.69 )-----------( 175      ( 14 Mar 2025 08:09 )             34.3                    11.2   10.69 )-----------( 175      ( 14 Mar 2025 08:09 )             34.3       03-16    139  |  108  |  22  ----------------------------<  112[H]  3.8   |  28  |  1.12    Ca    9.9      16 Mar 2025 07:30  Phos  2.7     -16  Mg     2.3     -16    TPro  6.7  /  Alb  2.8[L]  /  TBili  0.3  /  DBili  0.1  /  AST  31  /  ALT  96[H]  /  AlkPhos  224[H]  03-16      03-15    137  |  106  |  25[H]  ----------------------------<  113[H]  3.6   |  26  |  1.14    Ca    9.3      15 Mar 2025 05:17    TPro  6.1  /  Alb  2.6[L]  /  TBili  0.5  /  DBili  x   /  AST  46[H]  /  ALT  106[H]  /  AlkPhos  266[H]  03-15      03-13    135  |  103  |  18  ----------------------------<  152[H]  4.0   |  26  |  1.31[H]    Ca    9.9      13 Mar 2025 22:18    TPro  7.5  /  Alb  3.3  /  TBili  0.9  /  DBili  x   /  AST  76[H]  /  ALT  152[H]  /  AlkPhos  337[H]      CAPILLARY BLOOD GLUCOSE        PT/INR - ( 13 Mar 2025 22:18 )   PT: 16.7 sec;   INR: 1.46 ratio         PTT - ( 13 Mar 2025 22:18 )  PTT:35.7 sec  Urinalysis Basic - ( 13 Mar 2025 22:37 )    Color: Yellow / Appearance: Cloudy / S.016 / pH: x  Gluc: x / Ketone: Trace mg/dL  / Bili: Negative / Urobili: 0.2 mg/dL   Blood: x / Protein: 30 mg/dL / Nitrite: Negative   Leuk Esterase: Moderate / RBC: 10-15 /HPF / WBC 25-50 /HPF   Sq Epi: x / Non Sq Epi: x / Bacteria: Few /HPF          Radiology/Imaging, I have personally reviewed:  CT a/p noncon 3/13/25: Right iliac fossa renal transplant. No hydroureteronephrosis of the renal transplant. Mild perinephric and periureteral fat strandingand bladder wall thickening with slight infiltration of the perivesicular fat which raises question of cystitis with ascending urinary tract infection. Correlate with urinalysis. Colonic diverticulosis without evidence of diverticulitis.    CXR 3/13/15: Low lung volume without cynthia consolidation, effusion, or pneumothorax.   (14 Mar 2025 08:26)      PAST MEDICAL & SURGICAL HISTORY:  Cardiac murmur      Kidney disease  "stage 4" as per patient secondary to lithium treatment many years ago      Breast CA  DCIS, left breast, s/p RT      Uterine leiomyoma      GERD (gastroesophageal reflux disease)      Lyme disease      Degenerative disc disease, lumbar      HLD (hyperlipidemia)      History of secondary hyperparathyroidism      MGUS (monoclonal gammopathy of unknown significance)      Gout      Schizoaffective disorder, bipolar type      S/P lumpectomy of breast  , left breast, no lymph nodes removed      S/P tonsillectomy      S/P dilation and curettage  uterine polyps      S/P coronary angiogram  10 yrs ago, no intervention      H/O: hysterectomy      Abnormal biopsy of kidney      Kidney transplanted          FAMILY HISTORY:  FH: lymphoma        MEDICATIONS  (STANDING):  meropenem Injectable 1000 milliGRAM(s) IV Push every 12 hours  polyethylene glycol 3350 17 Gram(s) Oral daily  senna 2 Tablet(s) Oral at bedtime    MEDICATIONS  (PRN):  acetaminophen     Tablet .. 650 milliGRAM(s) Oral every 6 hours PRN Mild Pain (1 - 3)  aluminum hydroxide/magnesium hydroxide/simethicone Suspension 30 milliLiter(s) Oral every 4 hours PRN Dyspepsia  melatonin 3 milliGRAM(s) Oral at bedtime PRN Insomnia  ondansetron Injectable 4 milliGRAM(s) IV Push every 8 hours PRN Nausea and/or Vomiting      Allergies    Levaquin (Anaphylaxis)    Intolerances        I&O's Summary        REVIEW OF SYSTEMS:    CONSTITUTIONAL:  As per HPI.  CONSTITUTIONAL: No weakness, fevers or chills  EYES/ENT: No visual changes;  No vertigo or throat pain   NECK: No pain or stiffness  CARDIOVASCULAR: No chest pain or palpitations  GASTROINTESTINAL: No abdominal or epigastric pain. No nausea, vomiting, or hematemesis; No diarrhea or constipation. No melena or hematochezia.  GENITOURINARY: No dysuria, frequency or hematuria  NEUROLOGICAL: No numbness or weakness  SKIN: No itching, burning, rashes, or lesions   All other review of systems is negative unless indicated above      Vital Signs Last 24 Hrs  T(C): 37.2 (14 Mar 2025 08:14), Max: 38.7 (13 Mar 2025 20:02)  T(F): 99 (14 Mar 2025 08:14), Max: 101.6 (13 Mar 2025 20:02)  HR: 78 (14 Mar 2025 08:14) (70 - 114)  BP: 108/68 (14 Mar 2025 08:14) (90/58 - 114/70)  BP(mean): 73 (14 Mar 2025 03:15) (69 - 87)  RR: 17 (14 Mar 2025 08:14) (14 - 20)  SpO2: 95% (14 Mar 2025 08:14) (91% - 97%)    Parameters below as of 14 Mar 2025 08:14  Patient On (Oxygen Delivery Method): room air        Daily     Daily     I&O's Summary      PHYSICAL EXAM:    General:  Alert, No acute distress.    Neuro:  Alert and oriented to person, place, and time. Able to communicate  well.      HEENT:  No JVD, no masses, Eyes anicteric, ,    Cardiovascular:  Regular rate and rhythm, with normal S1 and S2.    Lungs:  clear. no rales, no wheezing, .    Abdomen:  Normoactive bowel sounds. Soft, flat, non-tender, and non-distended.  positive bowel sounds    Skin:  Warm, dry    Extremities:  warm,  no cyanosis    LABS:                        11.2   10.69 )-----------( 175      ( 14 Mar 2025 08:09 )             34.3     03-14    139  |  105  |  15  ----------------------------<  144[H]  3.7   |  25  |  1.19    Ca    9.9      14 Mar 2025 08:09    TPro  6.7  /  Alb  3.0[L]  /  TBili  1.0  /  DBili  x   /  AST  56[H]  /  ALT  124[H]  /  AlkPhos  295[H]  03-14    PT/INR - ( 13 Mar 2025 22:18 )   PT: 16.7 sec;   INR: 1.46 ratio         PTT - ( 13 Mar 2025 22:18 )  PTT:35.7 sec  Urinalysis Basic - ( 14 Mar 2025 08:09 )    Color: x / Appearance: x / SG: x / pH: x  Gluc: 144 mg/dL / Ketone: x  / Bili: x / Urobili: x   Blood: x / Protein: x / Nitrite: x   Leuk Esterase: x / RBC: x / WBC x   Sq Epi: x / Non Sq Epi: x / Bacteria: x          
Subjective:  Chief complain :  fever   HPI:     63 y/o female with PMH of right renal transplant  (for renal failure 2/2 lithium toxicity, [facility], 3y ago), hx ESBL UTI/bacteremia , DVT on Eliquis, breast cancer s/p lumpectomy and RT, gout, HLD, hyperparathyroidism, MGUS, schizoaffective d/o, uterine leiomyoma s/p hysterectomy, tardive dyskinesia, p/w fever. Reports feeling unwell x 1wk, fever x 1d.  Also with mild L groin pain (renal txplt is in RLQ), Has had similar sx in past with prior UTI. Here, found to be febrile, tachy, w leukocytosis, VALERIE and abnl UA. Of note, pt reports that had been on fosfomycin as outpt tx for cystitis in the past, hasn't taken in a few months. Is due for belatacept infusion for renal txplt this Monday 3/17.    3/15 -   Patient seen and examined at bedside earlier today, + chest pain, + dry cough, worse at night, denies dyspnea at rest,  tolerating IV antibiotics, plan discussed     Review of system- Rest of the review of system are negative except mentioned in HPI     Vital sings reviewed for last 24 h  T(C): 36.3 (03-15-25 @ 16:19), Max: 38.7 (03-14-25 @ 23:55)  HR: 64 (03-15-25 @ 16:19) (64 - 80)  BP: 107/74 (03-15-25 @ 16:19) (98/54 - 121/66)  RR: 18 (03-15-25 @ 16:19) (15 - 18)  SpO2: 98% (03-15-25 @ 16:19) (95% - 98%)        Physical exam:   General : NAD, appear to be of stated age , well groomed   NERVOUS SYSTEM:  Alert & Oriented X3, non- focal exam, Motor Strength 5/5 B/L upper and lower extremities; DTRs 2+ intact and symmetric  HEAD:  Atraumatic, Normocephalic  EYES: EOMI, PERRLA, conjunctiva and sclera clear  HEENT: Moist mucous membranes, Supple neck , No JVD  CHEST: BS decreased at bases bilaterally; +  rales, no rhonchi, no wheezing  HEART: Regular rate and rhythm; No murmurs, no rubs or gallops  ABDOMEN: Soft, Non-tender, Non-distended; Bowel sounds present, no guarding , no peritoneal irritation   GENITOURINARY- Voiding, no suprapubic tenderness  EXTREMITIES:  2+ Peripheral Pulses, No clubbing, cyanosis,   edema  MUSCULOSKELETAL:- No muscle tenderness, Muscle tone normal, No joint tenderness, no Joint swelling,  Joint ROM –normal   SKIN-no rash, no lesion    Labs radiologic and other test : all reviewed and interpreted :                         10.0   8.98  )-----------( 177      ( 15 Mar 2025 05:17 )             31.6     03-15    137  |  106  |  25[H]  ----------------------------<  113[H]  3.6   |  26  |  1.14    Ca    9.3      15 Mar 2025 05:17    TPro  6.1  /  Alb  2.6[L]  /  TBili  0.5  /  DBili  x   /  AST  46[H]  /  ALT  106[H]  /  AlkPhos  266[H]  03-15    PT/INR - ( 13 Mar 2025 22:18 )   PT: 16.7 sec;   INR: 1.46 ratio         PTT - ( 13 Mar 2025 22:18 )  PTT:35.7 sec     Xray Chest 1 View- PORTABLE-Urgent (03.13.25 @ 23:07) >    Impression:      Low lung volume without cynthia consolidation, effusion, or pneumothorax.       CT Abdomen and Pelvis No Cont (03.13.25 @ 22:58) >  IMPRESSION:  Right iliac fossa renal transplant. No hydroureteronephrosis of the renal   transplant. Mild perinephric and periureteral fat strandingand bladder   wall thickening with slight infiltration of the perivesicular fat which   raises question of cystitis with ascending urinary tract infection.   Correlate with urinalysis.    Colonic diverticulosis without evidence of diverticulitis.        RECENT CULTURES:  Culture - Urine (03.13.25 @ 22:37)    Specimen Source: Urine None   Culture Results:   <10,000 CFU/mL Normal Urogenital Ilana    Culture - Blood (03.13.25 @ 22:18)    Specimen Source: Blood None   Culture Results:   No growth at 24 hours        Cardiac testing : reviewed   EKG < from: 12 Lead ECG (03.15.25 @ 11:58) >  Ventricular Rate 73 BPM  QTC Calculation(Bazett) 427 ms  Normal sinus rhythm  Minimal voltage criteria for LVH, may be normal variant ( R in aVL )  Borderline ECG  When compared with ECG of 15-MAR-2025 11:58,  No significant change was found          Procedures :     Devices:     Current medications:  acetaminophen     Tablet .. 650 milliGRAM(s) Oral every 6 hours PRN  aluminum hydroxide/magnesium hydroxide/simethicone Suspension 30 milliLiter(s) Oral every 4 hours PRN  apixaban 2.5 milliGRAM(s) Oral every 12 hours  ascorbic acid 500 milliGRAM(s) Oral two times a day  cholecalciferol 1000 Unit(s) Oral daily  clonazePAM  Tablet 0.5 milliGRAM(s) Oral daily PRN  haloperidol     Tablet 1 milliGRAM(s) Oral <User Schedule>  melatonin 3 milliGRAM(s) Oral at bedtime PRN  meropenem Injectable 1000 milliGRAM(s) IV Push every 8 hours  multivitamin 1 Tablet(s) Oral daily  mycophenolate mofetil 250 milliGRAM(s) Oral two times a day  ondansetron Injectable 4 milliGRAM(s) IV Push every 8 hours PRN  polyethylene glycol 3350 17 Gram(s) Oral daily  predniSONE   Tablet 5 milliGRAM(s) Oral daily  senna 2 Tablet(s) Oral at bedtime

## 2025-03-16 NOTE — DISCHARGE NOTE PROVIDER - NSDCCPCAREPLAN_GEN_ALL_CORE_FT
PRINCIPAL DISCHARGE DIAGNOSIS  Diagnosis: Acute UTI  Assessment and Plan of Treatment: complete 5 more days of antibiotics asprescribed, follow up with your PCP within 1 week for further care   return to ED if fever, chills, abdoominal pain, other concerns      SECONDARY DISCHARGE DIAGNOSES  Diagnosis: History of renal transplant  Assessment and Plan of Treatment: follow up with you nephrologist within 1week    Diagnosis: Transaminitis  Assessment and Plan of Treatment: repeat liver enzimes in 1 week    Diagnosis: Anemia of chronic disease  Assessment and Plan of Treatment: repeat blood work within 1 week

## 2025-03-16 NOTE — DISCHARGE NOTE PROVIDER - PROVIDER TOKENS
FREE:[LAST:[renal transplant doctor],PHONE:[(   )    -],FAX:[(   )    -]],PROVIDER:[TOKEN:[[id number]],FOLLOWUP:[1 week]]

## 2025-03-16 NOTE — DISCHARGE NOTE PROVIDER - NSDCMRMEDTOKEN_GEN_ALL_CORE_FT
Bactrim  mg-160 mg oral tablet: 1 tab(s) orally 2 times a day  belatacept 250 mg intravenous injection: intravenously every 6 weeks , next dose scheduled at [facility] on Monday 3/17  Biotin: 1 cap(s) orally once a day  cholecalciferol 25 mcg (1000 intl units) oral tablet: 1 tab(s) orally once a day  clonazePAM 0.5 mg oral tablet: 1 tab(s) orally once a day as needed for  D-Mannose 1200mg OTC supplement: 2x a day  Eliquis 2.5 mg oral tablet: 1 tab(s) orally every 12 hours at 9:30am &amp; 9:30pm  haloperidol 1 mg oral tablet: 1 tab(s) orally every other day  magnesium glycinate 200 mg oral tablet: 1 tab(s) orally 2 times a day  Multiple Vitamins oral tablet: 1 tab(s) orally once a day  mycophenolate mofetil 250 mg oral capsule: 1 cap(s) orally every 12 hours at 9:30am &amp; 9:30pm  predniSONE 5 mg oral tablet: 1 tab(s) orally once a day  Vitamin C 500 mg oral tablet: 1 tab(s) orally 2 times a day

## 2025-03-16 NOTE — DISCHARGE NOTE PROVIDER - CARE PROVIDER_API CALL
renal transplant doctor,   Phone: (   )    -  Fax: (   )    -  Follow Up Time:     Verónica Rocha  [facility]  [address], [address]  [place] [address]  Phone: [phone]  Fax: [phone]  Follow Up Time: 1 week   [facility]

## 2025-03-16 NOTE — PROGRESS NOTE ADULT - ASSESSMENT
Assessment:  64F with Renal Transplant (3 years ago), Tardive dyskinesia presents 3/13 with fever, urinary frequency and malaise for 2 days  Reports some mild dry cough but no dyspnea, chest pain  No diarrhea or overt dysuria  No known sick contact or recent travel  Febrile 101F on admission, on RA  WBC 11  Cr 1.31  UA negative  Full RVP negative  CXR clear  CTAP with Right iliac fossa renal transplant. No hydroureteronephrosis of the renal   transplant. Mild perinephric and periureteral fat strandingand bladder   wall thickening with slight infiltration of the perivesicular fat which   raises question of cystitis with ascending urinary tract infection.   Correlate with urinalysis.  UCx with ESBL Ecoli 2024  BCx 3/13 negative  UCx 3/13 negative    Antimicrobials:  meropenem Injectable 1000 every 12 hours (3/14 --- )    Impression:   #Complicated UTI  #Fever  #Leukocytosis  #VALERIE improved  #Renal Transplant Recipient   #Levaquin Allergy, Anaphylaxis  - fever and leukocytosis resolved, BCx remains negative    Recommendations:  - continue empiric Meropenem 1G q8 (Day #3)  - monitor temperature curve  - trend WBC  - reason for abx use reviewed with patient  - side effects of antibiotic discussed, tolerating abx well so far  - Prior cultures reviewed. An epidemiologic assessment was performed. There is a significant risk for resistant microorganisms to spread to family members, and/or healthcare staff. The patient will be placed on isolation according to infection control policy. Will reconsider isolation measures based on new culture results and other clinical data as appropriate Appropriate cultures collected and an appropriate broad spectrum antibiotic therapy will be considered  - culture negative, will base sensitivity on prior ESBL organism  - if continues to improve, can transition to PO antibiotic once ready for discharge, Bactrim DS 1tab q12 to complete 7-day course  - rest per primary team    Clinical team may change from intravenous to oral antibiotics when the following criteria are met:   1. Patient is clinically improving/stable       a)	Improved signs and symptoms of infection from initial presentation       b)	Afebrile for 24 hours       c)	Leukocytosis trending towards normal range   2. Patient is tolerating oral intake   3. Initial/repeat blood cultures are negative OR do not need to wait for preliminary blood cultures to result    When above criteria met may change iv antibiotics to an oral agent as above  
Patient is a 64y old  Female who presents with a chief complaint of fever.  HPI: 64F hx renal txplt (for renal failure 2/2 lithium toxicity, [facility], 3y ago), hx ESBL UTI/bacteremia , DVT on Eliquis, breast cancer s/p lumpectomy and RT, gout, HLD, hyperparathyroidism, MGUS, schizoaffective d/o, uterine leiomyoma s/p hysterectomy, tardive dyskinesia, p/w fever. Reports feeling unwell x 1wk, fever x 1d.  Also with mild L groin pain (renal txplt is in RLQ), Has had similar sx in past with prior UTI. Here, found to be febrile, tachy, w leukocytosis, VALERIE and abnl UA. Of note, pt reports that had been on fosfomycin as outpt tx for cystitis in the past, hasn't taken in a few months. Is due for belatacept infusion for renal txplt this [place] 3/16.    Nephrology  Above from chart, previous charts reviewed  DDRT at [facility] in 2022, multiple admissions for UTI  Admitted with fatigue and dx w UTI/ Cystitis perinephric stranding  Pts transplant meds reviewed due for belatacept  this Sunday  Receives q 4 weeks  Feels well right now     A/P  DDRT [facility] renal transplant 2022, RLQ mild tenderness  UTI, not septic  CKD III  Mild anemia will follow  Mild alk phos and LFT elevation  D/w Transplant unit may cont her transplant meds    3/15  S/p Renal transplant  CKD III   stable renal status   Pt prefers belatacept as outpt, after DC    3/16  s/p Renal transplant  CKD III  for dc home   OK to take bactrim DS BID        
64F hx renal txplt (for renal failure 2/2 lithium toxicity, [facility], 3y ago), hx ESBL UTI/bacteremia , DVT on Eliquis, breast cancer s/p lumpectomy and RT, gout, HLD, hyperparathyroidism, MGUS, schizoaffective d/o, uterine leiomyoma s/p hysterectomy, tardive dyskinesia, p/w fever, admitted for severe sepsis w VALERIE, likely 2/2 UTI.     # Severe sepis, POA (w VALERIE), in immunocompromised host (renal txplt recipient)  # complicated UTI  -monitor T, HR, BP, WBC, lactate wnl, -UA abnl  -f/u UCx, BCx (hx ESBL)   -CT a/p c/w UTI   -s/p 2.7L LR  -Meropenem course  -appreciate ID recs, discussed case    #VALERIE  -s/p 2.7L LR (sepsis fluids)  - Creatinine Trend: 1.14<--, 1.19<--, 1.31<--  -Cr improving  -f/u renal recs     #s/p renal txplt  -continue prednisone and MMF  - Is due for belatacept infusion for renal txplt this Monday 3/17.    #Transaminitis  -likely in setting of sepsis  -improving w IVF  - check hepatitis panel     # Chest pains, dry cough r/o CHF , valvular problems  - Xray wnl  - serial ekg - no changes  - troponn x1 neg   - mildly elevated BNP  - 2 d echo     #DVT, HLD, gout  -home meds    #DVT ppx- full a/c w Eliquis    Dispo IV abc, 2 d echo, pending resolution of sepsis, VALERIE, UCx and BCx, if ESBL, will need iv abx course  
Patient is a 64y old  Female who presents with a chief complaint of fever.  HPI: 64F hx renal txplt (for renal failure 2/2 lithium toxicity, [facility], 3y ago), hx ESBL UTI/bacteremia , DVT on Eliquis, breast cancer s/p lumpectomy and RT, gout, HLD, hyperparathyroidism, MGUS, schizoaffective d/o, uterine leiomyoma s/p hysterectomy, tardive dyskinesia, p/w fever. Reports feeling unwell x 1wk, fever x 1d.  Also with mild L groin pain (renal txplt is in RLQ), Has had similar sx in past with prior UTI. Here, found to be febrile, tachy, w leukocytosis, VALERIE and abnl UA. Of note, pt reports that had been on fosfomycin as outpt tx for cystitis in the past, hasn't taken in a few months. Is due for belatacept infusion for renal txplt this [place] 3/16.    Nephrology  Above from chart, previous charts reviewed  DDRT at [facility] in 2022, multiple admissions for UTI  Admitted with fatigue and dx w UTI/ Cystitis perinephric stranding  Pts transplant meds reviewed due for belatacept  this Sunday  Receives q 4 weeks  Feels well right now     A/P  DDRT [facility] renal transplant 2022, RLQ mild tenderness  UTI, not septic  CKD III  Mild anemia will follow  Mild alk phos and LFT elevation  D/w Transplant unit may cont her transplant meds    3/15  S/p Renal transplant  CKD III   stable renal status   Pt prefers belatacept as outpt, after DC

## 2025-03-16 NOTE — DISCHARGE NOTE NURSING/CASE MANAGEMENT/SOCIAL WORK - PATIENT PORTAL LINK FT
You can access the FollowMyHealth Patient Portal offered by [facility] by registering at the following website: [email or web address]. By joining [facility]’s FollowMyHealth portal, you will also be able to view your health information using other applications (apps) compatible with our system.

## 2025-03-16 NOTE — DISCHARGE NOTE PROVIDER - HOSPITAL COURSE
Chief complain :  fever   HPI:     65 y/o female with PMH of right renal transplant  (for renal failure 2/2 lithium toxicity, [facility], 3y ago), hx ESBL UTI/bacteremia , DVT on Eliquis, breast cancer s/p lumpectomy and RT, gout, HLD, hyperparathyroidism, MGUS, schizoaffective d/o, uterine leiomyoma s/p hysterectomy, tardive dyskinesia, p/w fever. Reports feeling unwell x 1wk, fever x 1d.  Also with mild L groin pain (renal txplt is in RLQ), Has had similar sx in past with prior UTI. Here, found to be febrile, tachy, w leukocytosis, VALERIE and abnl UA. Of note, pt reports that had been on fosfomycin as outpt tx for cystitis in the past, hasn't taken in a few months. Is due for belatacept infusion for renal txplt this Monday 3/17.  3/15 -   Patient seen and examined at bedside earlier today, + chest pain, + dry cough, worse at night, denies dyspnea at rest,  tolerating IV antibiotics, plan discussed   3/16 - feels better, cough improved, tolerating IV anabiotics, afebrile, denies cp, dyspnea , abdominal pain, plan discussed  Review of system- Rest of the review of system are negative except mentioned in HPI  Vital sings reviewed for last 24 h  T(C): 36.8 (03-16-25 @ 08:07), Max: 37.1 (03-15-25 @ 23:51)  T(F): 98.2 (03-16-25 @ 08:07), Max: 98.8 (03-15-25 @ 23:51)  HR: 79 (03-16-25 @ 08:07) (64 - 79)  BP: 98/74 (03-16-25 @ 08:07) (98/74 - 115/65)  RR: 18 (03-16-25 @ 08:07) (18 - 18)  SpO2: 94% (03-16-25 @ 08:07) (94% - 99%)  Physical exam:   General : NAD, appear to be of stated age , well groomed   NERVOUS SYSTEM:  Alert & Oriented X3, non- focal exam, Motor Strength 5/5 B/L upper and lower extremities; DTRs 2+ intact and symmetric  HEAD:  Atraumatic, Normocephalic  EYES: EOMI, PERRLA, conjunctiva and sclera clear  HEENT: Moist mucous membranes, Supple neck , No JVD  CHEST: BS decreased at bases bilaterally; +  rales, no rhonchi, no wheezing  HEART: Regular rate and rhythm; No murmurs, no rubs or gallops  ABDOMEN: Soft, Non-tender, Non-distended; Bowel sounds present, no guarding , no peritoneal irritation   GENITOURINARY- Voiding, no suprapubic tenderness  EXTREMITIES:  2+ Peripheral Pulses, No clubbing, cyanosis,   edema  MUSCULOSKELETAL:- No muscle tenderness, Muscle tone normal, No joint tenderness, no Joint swelling,  Joint ROM –normal   SKIN-no rash, no lesion    Labs radiologic and other test : all reviewed and interpreted   · Assessment    64F hx renal txplt (for renal failure 2/2 lithium toxicity, [facility], 3y ago), hx ESBL UTI/bacteremia , DVT on Eliquis, breast cancer s/p lumpectomy and RT, gout, HLD, hyperparathyroidism, MGUS, schizoaffective d/o, uterine leiomyoma s/p hysterectomy, tardive dyskinesia, p/w fever, admitted for severe sepsis w VALERIE, likely 2/2 UTI.   # Severe sepis, POA (w VALERIE), in immunocompromised host (renal txplt recipient)  # complicated UTI  -monitor T, HR, BP, WBC, lactate wnl, -UA abnl  -f/u UCx, BCx (hx ESBL)   -CT a/p c/w UTI , s/p 2.7L LR  -Meropenem course  -appreciate ID recs, discussed case  #VALERIE  -s/p 2.7L LR (sepsis fluids)  - Creatinine Trend: 1.14<--, 1.19<--, 1.31<--  -Cr improving, f/u renal recs   #s/p renal txplt  -continue prednisone and MMF  - Is due for belatacept infusion for renal txplt this Monday 3/17.  #Transaminitis  -likely in setting of sepsis  -improving w IVF,  check hepatitis panel   # Chest pains, dry cough r/o CHF , valvular problems  - Xray wnl,  serial ekg - no changes,  troponin x1 neg   - mildly elevated BNP,  2 d echo  EF wnl  #DVT, HLD, gout -home meds  #DVT ppx- full a/c w Eliquis  Final diagnosis, treatment plan, and follow-up recommendations were discussed and explained to the patient.   The patient was given an opportunity to ask questions concerning the diagnosis and treatment plan.   The patient acknowledged understanding of the diagnosis, treatment, and follow-up recommendations.   The patient was advised to seek urgent care upon discharge if worsening symptoms develop prior to scheduled follow-up.   Time spent on discharge included time with the patient, and also coordinating discharge care as outlined below.  Discharge note faxed to PCP with my contact information to call me back   PCP Dr. Rocha  Total time spent: 50 min

## 2025-03-16 NOTE — DISCHARGE NOTE NURSING/CASE MANAGEMENT/SOCIAL WORK - FINANCIAL ASSISTANCE
[facility] provides services at a reduced cost to those who are determined to be eligible through [facility]’s financial assistance program. Information regarding [facility]’s financial assistance program can be found by going to [email or web address] or by calling [phone].

## 2025-03-17 LAB
HAV IGM SER-ACNC: SIGNIFICANT CHANGE UP
HBV CORE IGM SER-ACNC: SIGNIFICANT CHANGE UP
HBV SURFACE AG SER-ACNC: SIGNIFICANT CHANGE UP
HCV AB S/CO SERPL IA: 0.16 S/CO — SIGNIFICANT CHANGE UP (ref 0–0.79)
HCV AB SERPL-IMP: SIGNIFICANT CHANGE UP
VIT D25+D1,25 OH+D1,25 PNL SERPL-MCNC: 40.5 PG/ML — SIGNIFICANT CHANGE UP (ref 19.9–79.3)

## 2025-03-21 DIAGNOSIS — R53.83 OTHER FATIGUE: ICD-10-CM

## 2025-03-21 DIAGNOSIS — R65.20 SEVERE SEPSIS WITHOUT SEPTIC SHOCK: ICD-10-CM

## 2025-03-21 DIAGNOSIS — R01.1 CARDIAC MURMUR, UNSPECIFIED: ICD-10-CM

## 2025-03-21 DIAGNOSIS — Z88.1 ALLERGY STATUS TO OTHER ANTIBIOTIC AGENTS: ICD-10-CM

## 2025-03-21 DIAGNOSIS — R07.9 CHEST PAIN, UNSPECIFIED: ICD-10-CM

## 2025-03-21 DIAGNOSIS — G47.00 INSOMNIA, UNSPECIFIED: ICD-10-CM

## 2025-03-21 DIAGNOSIS — E78.5 HYPERLIPIDEMIA, UNSPECIFIED: ICD-10-CM

## 2025-03-21 DIAGNOSIS — Z85.3 PERSONAL HISTORY OF MALIGNANT NEOPLASM OF BREAST: ICD-10-CM

## 2025-03-21 DIAGNOSIS — N17.9 ACUTE KIDNEY FAILURE, UNSPECIFIED: ICD-10-CM

## 2025-03-21 DIAGNOSIS — Z79.01 LONG TERM (CURRENT) USE OF ANTICOAGULANTS: ICD-10-CM

## 2025-03-21 DIAGNOSIS — D64.9 ANEMIA, UNSPECIFIED: ICD-10-CM

## 2025-03-21 DIAGNOSIS — Z79.52 LONG TERM (CURRENT) USE OF SYSTEMIC STEROIDS: ICD-10-CM

## 2025-03-21 DIAGNOSIS — R35.0 FREQUENCY OF MICTURITION: ICD-10-CM

## 2025-03-21 DIAGNOSIS — R05.8 OTHER SPECIFIED COUGH: ICD-10-CM

## 2025-03-21 DIAGNOSIS — M10.9 GOUT, UNSPECIFIED: ICD-10-CM

## 2025-03-21 DIAGNOSIS — D47.2 MONOCLONAL GAMMOPATHY: ICD-10-CM

## 2025-03-21 DIAGNOSIS — N25.81 SECONDARY HYPERPARATHYROIDISM OF RENAL ORIGIN: ICD-10-CM

## 2025-03-21 DIAGNOSIS — Z90.710 ACQUIRED ABSENCE OF BOTH CERVIX AND UTERUS: ICD-10-CM

## 2025-03-21 DIAGNOSIS — Z92.3 PERSONAL HISTORY OF IRRADIATION: ICD-10-CM

## 2025-03-21 DIAGNOSIS — M51.369 OTHER INTERVERTEBRAL DISC DEGENERATION, LUMBAR REGION WITHOUT MENTION OF LUMBAR BACK PAIN OR LOWER EXTREMITY PAIN: ICD-10-CM

## 2025-03-21 DIAGNOSIS — D84.9 IMMUNODEFICIENCY, UNSPECIFIED: ICD-10-CM

## 2025-03-21 DIAGNOSIS — Z91.198 PATIENT'S NONCOMPLIANCE WITH OTHER MEDICAL TREATMENT AND REGIMEN FOR OTHER REASON: ICD-10-CM

## 2025-03-21 DIAGNOSIS — F31.9 BIPOLAR DISORDER, UNSPECIFIED: ICD-10-CM

## 2025-03-21 DIAGNOSIS — Z90.89 ACQUIRED ABSENCE OF OTHER ORGANS: ICD-10-CM

## 2025-03-21 DIAGNOSIS — R74.01 ELEVATION OF LEVELS OF LIVER TRANSAMINASE LEVELS: ICD-10-CM

## 2025-03-21 DIAGNOSIS — Z87.440 PERSONAL HISTORY OF URINARY (TRACT) INFECTIONS: ICD-10-CM

## 2025-03-21 DIAGNOSIS — Z94.0 KIDNEY TRANSPLANT STATUS: ICD-10-CM

## 2025-03-21 DIAGNOSIS — Z86.718 PERSONAL HISTORY OF OTHER VENOUS THROMBOSIS AND EMBOLISM: ICD-10-CM

## 2025-03-21 DIAGNOSIS — N39.0 URINARY TRACT INFECTION, SITE NOT SPECIFIED: ICD-10-CM

## 2025-03-21 DIAGNOSIS — K21.9 GASTRO-ESOPHAGEAL REFLUX DISEASE WITHOUT ESOPHAGITIS: ICD-10-CM

## 2025-03-21 DIAGNOSIS — G24.01 DRUG INDUCED SUBACUTE DYSKINESIA: ICD-10-CM

## 2025-03-21 DIAGNOSIS — A41.9 SEPSIS, UNSPECIFIED ORGANISM: ICD-10-CM

## 2025-05-12 NOTE — ED ADULT TRIAGE NOTE - WEIGHT IN LBS
[FreeTextEntry1] : José Manuel is a 40 y/o F who is here for Right breast pain.  On physical exam, there are no discrete masses in R/L breast. There is no nipple discharge or inversion bilaterally. There are no skin changes bilaterally. The imaging looks fine. They recommended annual screening, and I recommended pt monthly breast examinations and annual screening.   total time on encounter: 35 mins PLAN: -Annual screening -F/u as needed  160

## 2025-06-25 NOTE — ED ADULT NURSE NOTE - NSFALLRISKFACTORS_ED_ALL_ED
Transitional Care Management Visit      Admission Date: 06/18/2025  Discharge Date: 06/20/2025 Home or Self Care  This Visit: 06/25/2025 is 5 days after discharge.Admission Date: 06/08/2025   Discharge Date: 06/20/2025     Delfina Jo is a 46 year old here for Office Visit and Hospital F/U    The discharge summary was reviewed. It documents that the patient was hospitalized for right lower extremity cellulitis.    Hospital Course/Synopsis:   Patient presented to the hospital with right lower extremity cellulitis.  Patient was previously hospitalized at  [facility] for same diagnosis on 6/8/25.  Patient had improvement during an inpatient stay and was sent home on doxycycline.  Patient's MRSA nares was negative.  Patient had worsening on doxycycline.  Patient presented to our hospital on 6/18 after PCP visit (was sent back to hospital ED).  IV antibiotics were reinstituted.  Patient had subsequent improvement in erythema/warmth.  Patient feels he is improved enough to return back home with oral antibiotics.  On this discharge-Keflex will be utilized instead.  Infectious disease was involved throughout his hospital course here.    Medication List at Discharge      amLODIPine Besylate 10 mg Oral DAILY, DO NOT RESTART UNTIL PCP FOLLOW-UP    Cephalexin 500 mg Oral 4 TIMES DAILY    hydroCHLOROthiazide 50 mg Oral DAILY    Losartan Potassium 25 mg Oral DAILY    metFORMIN HCl 500 mg Oral DAILY WITH BREAKFAST    Multiple Vitamins-Minerals 1 each Oral DAILY    Naproxen 500 mg Oral 2 TIMES DAILY WITH MEALS    oxyCODONE-Acetaminophen 5-325 MG 1 tablet Oral EVERY 6 HOURS PRN      Medication list changes include: see above.  Pertinent hospital labs and tests: were reviewed.  Durable Medical Equipment/Assistive devices ordered: None     Review of Systems   Constitutional:  Negative for activity change, appetite change, chills, diaphoresis, fatigue, fever and unexpected weight change.   Respiratory:  Negative for shortness  of breath.    Cardiovascular:  Negative for chest pain.   Skin:  Positive for color change.        Right lower extremity with brown discoloration surrounding shin and calf area   Psychiatric/Behavioral:  Negative for agitation, behavioral problems, confusion, decreased concentration and dysphoric mood.        Advance care planning documents on file - no    Objective   Vitals:    06/25/25 1025 06/25/25 1028   BP:  (!) 138/98   Pulse:  87   Resp:  18   Temp:  98.1 °F (36.7 °C)   TempSrc:  Oral   SpO2:  98%   Weight:  (!) 159.8 kg (352 lb 4.8 oz)   PainSc:   0   Height - Patient Reported: 6' 2.5\" (1.892 m)      Physical Exam  Constitutional:       Appearance: Normal appearance. He is obese. He is not ill-appearing, toxic-appearing or diaphoretic.   Cardiovascular:      Rate and Rhythm: Normal rate and regular rhythm.      Pulses: Normal pulses.      Heart sounds: Normal heart sounds.   Pulmonary:      Effort: Pulmonary effort is normal.      Breath sounds: Normal breath sounds.   Skin:     General: Skin is warm and dry.      Findings: No erythema.      Comments: Brown discoloration surrounding shin and calf of right lower extremity from cellulitis. No erythema, warmth, lesions or drainage noted. No signs or symptoms of infection noted.  Improving condition with some peeling noted, previously red and inflamed.     Neurological:      General: No focal deficit present.      Mental Status: He is alert and oriented to person, place, and time. Mental status is at baseline.      Cranial Nerves: No cranial nerve deficit.      Sensory: No sensory deficit.      Motor: No weakness.      Coordination: Coordination normal.      Gait: Gait normal.      Deep Tendon Reflexes: Reflexes normal.   Psychiatric:         Mood and Affect: Mood normal.         Behavior: Behavior normal.         Thought Content: Thought content normal.         Judgment: Judgment normal.            ASSESSMENT AND PLAN     Patient reports a significant  improvement in his condition, with only minor skin peeling remaining. He has been diligent in applying aloe lotion daily and adhering to his antibiotic regimen. He is not experiencing any systemic symptoms such as fever or chills. He is scheduled for a follow-up appointment with Infectious Disease tomorrow. Pain is being managed with medication as needed.    Patient expresses a desire to transition from metformin to Trulicity injections, as he believes this could potentially resolve his diabetes with appropriate diet and lifestyle modifications. He recalls a previous discussion about Mounjaro injections as a preventive measure, but this was not pursued due to insurance denial. However, now that he has been diagnosed with diabetes, he is interested in revisiting this option. He has never used injectable medications before and is currently monitoring his blood glucose levels twice daily which have been running high due to right leg infection. Last glucose level charted during hospital stay prior to discharge was  104 (189). He has recently joined a fitness center through his workplace and plans to resume regular exercise once his antibiotic course is completed. He also mentions the use of kidney cleansing tea. He was prescribed metformin prior to the infection due to elevated blood glucose and A1c levels.    Plan:  1. Leg infection.  - The leg infection is showing signs of improvement.  - Doppler studies revealed no thrombotic events, and the chest x-ray was unremarkable  - He is advised to continue the cephalexin regimen and follow up with Infectious Disease tomorrow.  - Pain medication is being taken as needed.    2. Diabetes mellitus.  - His glucose levels have been well-controlled.  The most recent A1c level  was 6.7 on 5/30/25  - A prescription for Mounjaro has been initiated, pending preauthorization.  Follow-up  - The patient will follow up with his primary care physician in approximately 2 months.  1. Type 2  diabetes mellitus treated without insulin  (CMD)  -     tirzepatide (Mounjaro) 2.5 MG/0.5ML Solution Auto-injector; Inject 2.5 mg into the skin every 7 days for 28 days. Indications: Type 2 Diabetes  -     tirzepatide (Mounjaro) 5 MG/0.5ML Solution Auto-injector; Inject 5 mg into the skin every 7 days. Indications: Type 2 Diabetes Begin taking on July 23, 2025. Start after finishing 2.5 mg prescription.  2. Primary hypertension  -     ALBUMIN, RANDOM URINE WITH CREATININE    Discharge medications were reviewed and updated with patient/family: fully compliant with the medication regimen prescribed at the time of discharge     Adherence to discharge treatment plan was assessed: fully adherent with the entire discharge treatment plan.    Return in about 2 months (around 8/25/2025).     Future Appointments        JUN 26 2025   09:40 AM - Follow-up Visit  [facility] [address] [facility] - Myah Taylor MD                   Coagulation: Bleeding disorder either through use of anticoagulants or underlying clinical condition(s)

## 2025-08-07 RX ORDER — SULFAMETHOXAZOLE/TRIMETHOPRIM 800-160 MG
1 TABLET ORAL
Qty: 10 | Refills: 0 | DISCHARGE
Start: 2025-08-07 | End: 2025-08-12

## 2025-08-23 ENCOUNTER — INPATIENT (INPATIENT)
Facility: HOSPITAL | Age: 65
LOS: 2 days | Discharge: HOME | DRG: 690 | End: 2025-08-26
Attending: HOSPITALIST | Admitting: STUDENT IN AN ORGANIZED HEALTH CARE EDUCATION/TRAINING PROGRAM
Payer: MEDICARE

## 2025-08-23 ENCOUNTER — NON-APPOINTMENT (OUTPATIENT)
Age: 65
End: 2025-08-23

## 2025-08-23 VITALS
RESPIRATION RATE: 18 BRPM | TEMPERATURE: 98 F | OXYGEN SATURATION: 98 % | SYSTOLIC BLOOD PRESSURE: 91 MMHG | DIASTOLIC BLOOD PRESSURE: 64 MMHG | HEART RATE: 76 BPM

## 2025-08-23 DIAGNOSIS — R89.7 ABNORMAL HISTOLOGICAL FINDINGS IN SPECIMENS FROM OTHER ORGANS, SYSTEMS AND TISSUES: Chronic | ICD-10-CM

## 2025-08-23 DIAGNOSIS — Z94.0 KIDNEY TRANSPLANT STATUS: Chronic | ICD-10-CM

## 2025-08-23 DIAGNOSIS — Z90.710 ACQUIRED ABSENCE OF BOTH CERVIX AND UTERUS: Chronic | ICD-10-CM

## 2025-08-23 DIAGNOSIS — N39.0 URINARY TRACT INFECTION, SITE NOT SPECIFIED: ICD-10-CM

## 2025-08-23 LAB
ALBUMIN SERPL ELPH-MCNC: 3.6 G/DL — SIGNIFICANT CHANGE UP (ref 3.3–5)
ALP SERPL-CCNC: 163 U/L — HIGH (ref 40–120)
ALT FLD-CCNC: 152 U/L — HIGH (ref 12–78)
ANION GAP SERPL CALC-SCNC: 6 MMOL/L — SIGNIFICANT CHANGE UP (ref 5–17)
APPEARANCE UR: ABNORMAL
APTT BLD: 35.4 SEC — SIGNIFICANT CHANGE UP (ref 26.1–36.8)
AST SERPL-CCNC: 109 U/L — HIGH (ref 15–37)
BACTERIA # UR AUTO: ABNORMAL /HPF
BASOPHILS # BLD AUTO: 0.04 K/UL — SIGNIFICANT CHANGE UP (ref 0–0.2)
BASOPHILS NFR BLD AUTO: 0.3 % — SIGNIFICANT CHANGE UP (ref 0–2)
BILIRUB SERPL-MCNC: 0.8 MG/DL — SIGNIFICANT CHANGE UP (ref 0.2–1.2)
BILIRUB UR-MCNC: NEGATIVE — SIGNIFICANT CHANGE UP
BUN SERPL-MCNC: 29 MG/DL — HIGH (ref 7–23)
CALCIUM SERPL-MCNC: 10.1 MG/DL — SIGNIFICANT CHANGE UP (ref 8.5–10.1)
CAST: 7 /LPF — HIGH (ref 0–4)
CHLORIDE SERPL-SCNC: 105 MMOL/L — SIGNIFICANT CHANGE UP (ref 96–108)
CO2 SERPL-SCNC: 25 MMOL/L — SIGNIFICANT CHANGE UP (ref 22–31)
COLOR SPEC: SIGNIFICANT CHANGE UP
CREAT SERPL-MCNC: 1.4 MG/DL — HIGH (ref 0.5–1.3)
DIFF PNL FLD: ABNORMAL
EGFR: 42 ML/MIN/1.73M2 — LOW
EGFR: 42 ML/MIN/1.73M2 — LOW
EOSINOPHIL # BLD AUTO: 0.01 K/UL — SIGNIFICANT CHANGE UP (ref 0–0.5)
EOSINOPHIL NFR BLD AUTO: 0.1 % — SIGNIFICANT CHANGE UP (ref 0–6)
GLUCOSE SERPL-MCNC: 159 MG/DL — HIGH (ref 70–99)
GLUCOSE UR QL: NEGATIVE MG/DL — SIGNIFICANT CHANGE UP
GRAN CASTS # UR COMP ASSIST: PRESENT
HCT VFR BLD CALC: 40.8 % — SIGNIFICANT CHANGE UP (ref 34.5–45)
HGB BLD-MCNC: 12.7 G/DL — SIGNIFICANT CHANGE UP (ref 11.5–15.5)
IMM GRANULOCYTES # BLD AUTO: 0.09 K/UL — HIGH (ref 0–0.07)
IMM GRANULOCYTES NFR BLD AUTO: 0.7 % — SIGNIFICANT CHANGE UP (ref 0–0.9)
INR BLD: 1.1 RATIO — SIGNIFICANT CHANGE UP (ref 0.85–1.16)
KETONES UR QL: ABNORMAL MG/DL
LACTATE SERPL-SCNC: 0.8 MMOL/L — SIGNIFICANT CHANGE UP (ref 0.7–2)
LEUKOCYTE ESTERASE UR-ACNC: ABNORMAL
LYMPHOCYTES # BLD AUTO: 0.59 K/UL — LOW (ref 1–3.3)
LYMPHOCYTES NFR BLD AUTO: 4.9 % — LOW (ref 13–44)
MCHC RBC-ENTMCNC: 28.5 PG — SIGNIFICANT CHANGE UP (ref 27–34)
MCHC RBC-ENTMCNC: 31.1 G/DL — LOW (ref 32–36)
MCV RBC AUTO: 91.7 FL — SIGNIFICANT CHANGE UP (ref 80–100)
MONOCYTES # BLD AUTO: 0.66 K/UL — SIGNIFICANT CHANGE UP (ref 0–0.9)
MONOCYTES NFR BLD AUTO: 5.5 % — SIGNIFICANT CHANGE UP (ref 2–14)
NEUTROPHILS # BLD AUTO: 10.64 K/UL — HIGH (ref 1.8–7.4)
NEUTROPHILS NFR BLD AUTO: 88.5 % — HIGH (ref 43–77)
NITRITE UR-MCNC: POSITIVE
NRBC # BLD AUTO: 0 K/UL — SIGNIFICANT CHANGE UP (ref 0–0)
NRBC # FLD: 0 K/UL — SIGNIFICANT CHANGE UP (ref 0–0)
NRBC BLD AUTO-RTO: 0 /100 WBCS — SIGNIFICANT CHANGE UP (ref 0–0)
PH UR: 6 — SIGNIFICANT CHANGE UP (ref 5–8)
PLATELET # BLD AUTO: 212 K/UL — SIGNIFICANT CHANGE UP (ref 150–400)
PMV BLD: 10.2 FL — SIGNIFICANT CHANGE UP (ref 7–13)
POTASSIUM SERPL-MCNC: 3.7 MMOL/L — SIGNIFICANT CHANGE UP (ref 3.5–5.3)
POTASSIUM SERPL-SCNC: 3.7 MMOL/L — SIGNIFICANT CHANGE UP (ref 3.5–5.3)
PROT SERPL-MCNC: 7.8 GM/DL — SIGNIFICANT CHANGE UP (ref 6–8.3)
PROT UR-MCNC: 100 MG/DL
PROTHROM AB SERPL-ACNC: 12.8 SEC — SIGNIFICANT CHANGE UP (ref 9.9–13.4)
RBC # BLD: 4.45 M/UL — SIGNIFICANT CHANGE UP (ref 3.8–5.2)
RBC # FLD: 14.1 % — SIGNIFICANT CHANGE UP (ref 10.3–14.5)
RBC CASTS # UR COMP ASSIST: 2 /HPF — SIGNIFICANT CHANGE UP (ref 0–4)
SODIUM SERPL-SCNC: 136 MMOL/L — SIGNIFICANT CHANGE UP (ref 135–145)
SP GR SPEC: 1.02 — SIGNIFICANT CHANGE UP (ref 1–1.03)
SQUAMOUS # UR AUTO: 2 /HPF — SIGNIFICANT CHANGE UP (ref 0–5)
UROBILINOGEN FLD QL: 1 MG/DL — SIGNIFICANT CHANGE UP (ref 0.2–1)
WBC # BLD: 12.03 K/UL — HIGH (ref 3.8–10.5)
WBC # FLD AUTO: 12.03 K/UL — HIGH (ref 3.8–10.5)
WBC UR QL: 541 /HPF — HIGH (ref 0–5)

## 2025-08-23 PROCEDURE — 85027 COMPLETE CBC AUTOMATED: CPT

## 2025-08-23 PROCEDURE — 83036 HEMOGLOBIN GLYCOSYLATED A1C: CPT

## 2025-08-23 PROCEDURE — 99285 EMERGENCY DEPT VISIT HI MDM: CPT | Mod: FS

## 2025-08-23 PROCEDURE — 99223 1ST HOSP IP/OBS HIGH 75: CPT

## 2025-08-23 PROCEDURE — 80061 LIPID PANEL: CPT

## 2025-08-23 PROCEDURE — 80074 ACUTE HEPATITIS PANEL: CPT

## 2025-08-23 PROCEDURE — 71045 X-RAY EXAM CHEST 1 VIEW: CPT | Mod: 26

## 2025-08-23 PROCEDURE — 85610 PROTHROMBIN TIME: CPT

## 2025-08-23 PROCEDURE — 36415 COLL VENOUS BLD VENIPUNCTURE: CPT

## 2025-08-23 PROCEDURE — 83735 ASSAY OF MAGNESIUM: CPT

## 2025-08-23 PROCEDURE — 93010 ELECTROCARDIOGRAM REPORT: CPT

## 2025-08-23 PROCEDURE — 85730 THROMBOPLASTIN TIME PARTIAL: CPT

## 2025-08-23 PROCEDURE — 76700 US EXAM ABDOM COMPLETE: CPT

## 2025-08-23 PROCEDURE — 76776 US EXAM K TRANSPL W/DOPPLER: CPT | Mod: RT

## 2025-08-23 PROCEDURE — 85025 COMPLETE CBC W/AUTO DIFF WBC: CPT

## 2025-08-23 PROCEDURE — 80053 COMPREHEN METABOLIC PANEL: CPT

## 2025-08-23 RX ORDER — CEFEPIME 2 G/20ML
1000 INJECTION, POWDER, FOR SOLUTION INTRAVENOUS ONCE
Refills: 0 | Status: DISCONTINUED | OUTPATIENT
Start: 2025-08-23 | End: 2025-08-23

## 2025-08-23 RX ORDER — MEROPENEM 1 G/30ML
1000 INJECTION INTRAVENOUS EVERY 12 HOURS
Refills: 0 | Status: DISCONTINUED | OUTPATIENT
Start: 2025-08-23 | End: 2025-08-23

## 2025-08-23 RX ORDER — B1/B2/B3/B5/B6/B12/VIT C/FOLIC 500-0.5 MG
1 TABLET ORAL DAILY
Refills: 0 | Status: DISCONTINUED | OUTPATIENT
Start: 2025-08-23 | End: 2025-08-26

## 2025-08-23 RX ORDER — HALOPERIDOL 10 MG/1
0.5 TABLET ORAL AT BEDTIME
Refills: 0 | Status: DISCONTINUED | OUTPATIENT
Start: 2025-08-23 | End: 2025-08-26

## 2025-08-23 RX ORDER — PREDNISONE 20 MG/1
5 TABLET ORAL DAILY
Refills: 0 | Status: DISCONTINUED | OUTPATIENT
Start: 2025-08-23 | End: 2025-08-26

## 2025-08-23 RX ORDER — CLONAZEPAM 0.5 MG/1
0.5 TABLET ORAL DAILY
Refills: 0 | Status: DISCONTINUED | OUTPATIENT
Start: 2025-08-23 | End: 2025-08-24

## 2025-08-23 RX ORDER — ONDANSETRON HCL/PF 4 MG/2 ML
4 VIAL (ML) INJECTION ONCE
Refills: 0 | Status: COMPLETED | OUTPATIENT
Start: 2025-08-23 | End: 2025-08-23

## 2025-08-23 RX ORDER — MEROPENEM 1 G/30ML
1000 INJECTION INTRAVENOUS EVERY 12 HOURS
Refills: 0 | Status: DISCONTINUED | OUTPATIENT
Start: 2025-08-23 | End: 2025-08-26

## 2025-08-23 RX ORDER — APIXABAN 2.5 MG/1
2.5 TABLET, FILM COATED ORAL
Refills: 0 | Status: DISCONTINUED | OUTPATIENT
Start: 2025-08-23 | End: 2025-08-26

## 2025-08-23 RX ORDER — MYCOPHENOLATE MOFETIL 500 MG/1
250 TABLET, FILM COATED ORAL
Refills: 0 | Status: DISCONTINUED | OUTPATIENT
Start: 2025-08-23 | End: 2025-08-26

## 2025-08-23 RX ORDER — HALOPERIDOL 10 MG/1
1 TABLET ORAL
Refills: 0 | DISCHARGE

## 2025-08-23 RX ORDER — CEFEPIME 2 G/20ML
1000 INJECTION, POWDER, FOR SOLUTION INTRAVENOUS ONCE
Refills: 0 | Status: COMPLETED | OUTPATIENT
Start: 2025-08-23 | End: 2025-08-23

## 2025-08-23 RX ADMIN — HALOPERIDOL 0.5 MILLIGRAM(S): 10 TABLET ORAL at 21:31

## 2025-08-23 RX ADMIN — MYCOPHENOLATE MOFETIL 250 MILLIGRAM(S): 500 TABLET, FILM COATED ORAL at 21:31

## 2025-08-23 RX ADMIN — APIXABAN 2.5 MILLIGRAM(S): 2.5 TABLET, FILM COATED ORAL at 21:31

## 2025-08-23 RX ADMIN — CEFEPIME 1000 MILLIGRAM(S): 2 INJECTION, POWDER, FOR SOLUTION INTRAVENOUS at 15:54

## 2025-08-23 RX ADMIN — MEROPENEM 1000 MILLIGRAM(S): 1 INJECTION INTRAVENOUS at 21:31

## 2025-08-23 RX ADMIN — Medication 4 MILLIGRAM(S): at 15:51

## 2025-08-23 RX ADMIN — Medication 1000 MILLILITER(S): at 15:51

## 2025-08-24 LAB
A1C WITH ESTIMATED AVERAGE GLUCOSE RESULT: 5.8 % — HIGH (ref 4–5.6)
ALBUMIN SERPL ELPH-MCNC: 2.8 G/DL — LOW (ref 3.3–5)
ALP SERPL-CCNC: 131 U/L — HIGH (ref 40–120)
ALT FLD-CCNC: 114 U/L — HIGH (ref 12–78)
ANION GAP SERPL CALC-SCNC: 7 MMOL/L — SIGNIFICANT CHANGE UP (ref 5–17)
APTT BLD: 33.1 SEC — SIGNIFICANT CHANGE UP (ref 26.1–36.8)
AST SERPL-CCNC: 66 U/L — HIGH (ref 15–37)
BASOPHILS # BLD AUTO: 0.02 K/UL — SIGNIFICANT CHANGE UP (ref 0–0.2)
BASOPHILS NFR BLD AUTO: 0.3 % — SIGNIFICANT CHANGE UP (ref 0–2)
BILIRUB SERPL-MCNC: 0.4 MG/DL — SIGNIFICANT CHANGE UP (ref 0.2–1.2)
BUN SERPL-MCNC: 28 MG/DL — HIGH (ref 7–23)
CALCIUM SERPL-MCNC: 9.1 MG/DL — SIGNIFICANT CHANGE UP (ref 8.5–10.1)
CHLORIDE SERPL-SCNC: 108 MMOL/L — SIGNIFICANT CHANGE UP (ref 96–108)
CHOLEST SERPL-MCNC: 186 MG/DL — SIGNIFICANT CHANGE UP
CO2 SERPL-SCNC: 26 MMOL/L — SIGNIFICANT CHANGE UP (ref 22–31)
CREAT SERPL-MCNC: 1.13 MG/DL — SIGNIFICANT CHANGE UP (ref 0.5–1.3)
EGFR: 54 ML/MIN/1.73M2 — LOW
EGFR: 54 ML/MIN/1.73M2 — LOW
EOSINOPHIL # BLD AUTO: 0.1 K/UL — SIGNIFICANT CHANGE UP (ref 0–0.5)
EOSINOPHIL NFR BLD AUTO: 1.3 % — SIGNIFICANT CHANGE UP (ref 0–6)
ESTIMATED AVERAGE GLUCOSE: 120 MG/DL — HIGH (ref 68–114)
GLUCOSE SERPL-MCNC: 111 MG/DL — HIGH (ref 70–99)
HAV IGM SER-ACNC: SIGNIFICANT CHANGE UP
HBV CORE IGM SER-ACNC: SIGNIFICANT CHANGE UP
HBV SURFACE AG SER-ACNC: SIGNIFICANT CHANGE UP
HCT VFR BLD CALC: 34.8 % — SIGNIFICANT CHANGE UP (ref 34.5–45)
HCV AB S/CO SERPL IA: 0.1 S/CO — SIGNIFICANT CHANGE UP (ref 0–0.79)
HCV AB SERPL-IMP: SIGNIFICANT CHANGE UP
HDLC SERPL-MCNC: 53 MG/DL — SIGNIFICANT CHANGE UP
HGB BLD-MCNC: 10.9 G/DL — LOW (ref 11.5–15.5)
IMM GRANULOCYTES # BLD AUTO: 0.04 K/UL — SIGNIFICANT CHANGE UP (ref 0–0.07)
IMM GRANULOCYTES NFR BLD AUTO: 0.5 % — SIGNIFICANT CHANGE UP (ref 0–0.9)
INR BLD: 1.15 RATIO — SIGNIFICANT CHANGE UP (ref 0.85–1.16)
LDLC SERPL-MCNC: 114 MG/DL — HIGH
LIPID PNL WITH DIRECT LDL SERPL: 114 MG/DL — HIGH
LYMPHOCYTES # BLD AUTO: 0.88 K/UL — LOW (ref 1–3.3)
LYMPHOCYTES NFR BLD AUTO: 11.8 % — LOW (ref 13–44)
MCHC RBC-ENTMCNC: 28.5 PG — SIGNIFICANT CHANGE UP (ref 27–34)
MCHC RBC-ENTMCNC: 31.3 G/DL — LOW (ref 32–36)
MCV RBC AUTO: 91.1 FL — SIGNIFICANT CHANGE UP (ref 80–100)
MONOCYTES # BLD AUTO: 0.69 K/UL — SIGNIFICANT CHANGE UP (ref 0–0.9)
MONOCYTES NFR BLD AUTO: 9.3 % — SIGNIFICANT CHANGE UP (ref 2–14)
NEUTROPHILS # BLD AUTO: 5.7 K/UL — SIGNIFICANT CHANGE UP (ref 1.8–7.4)
NEUTROPHILS NFR BLD AUTO: 76.8 % — SIGNIFICANT CHANGE UP (ref 43–77)
NONHDLC SERPL-MCNC: 134 MG/DL — HIGH
NRBC # BLD AUTO: 0 K/UL — SIGNIFICANT CHANGE UP (ref 0–0)
NRBC # FLD: 0 K/UL — SIGNIFICANT CHANGE UP (ref 0–0)
NRBC BLD AUTO-RTO: 0 /100 WBCS — SIGNIFICANT CHANGE UP (ref 0–0)
PLATELET # BLD AUTO: 165 K/UL — SIGNIFICANT CHANGE UP (ref 150–400)
PMV BLD: 10.3 FL — SIGNIFICANT CHANGE UP (ref 7–13)
POTASSIUM SERPL-MCNC: 3.9 MMOL/L — SIGNIFICANT CHANGE UP (ref 3.5–5.3)
POTASSIUM SERPL-SCNC: 3.9 MMOL/L — SIGNIFICANT CHANGE UP (ref 3.5–5.3)
PROT SERPL-MCNC: 6.5 GM/DL — SIGNIFICANT CHANGE UP (ref 6–8.3)
PROTHROM AB SERPL-ACNC: 13.3 SEC — SIGNIFICANT CHANGE UP (ref 9.9–13.4)
RBC # BLD: 3.82 M/UL — SIGNIFICANT CHANGE UP (ref 3.8–5.2)
RBC # FLD: 14.1 % — SIGNIFICANT CHANGE UP (ref 10.3–14.5)
SODIUM SERPL-SCNC: 141 MMOL/L — SIGNIFICANT CHANGE UP (ref 135–145)
TRIGL SERPL-MCNC: 109 MG/DL — SIGNIFICANT CHANGE UP
WBC # BLD: 7.43 K/UL — SIGNIFICANT CHANGE UP (ref 3.8–10.5)
WBC # FLD AUTO: 7.43 K/UL — SIGNIFICANT CHANGE UP (ref 3.8–10.5)

## 2025-08-24 PROCEDURE — 99232 SBSQ HOSP IP/OBS MODERATE 35: CPT

## 2025-08-24 PROCEDURE — 76700 US EXAM ABDOM COMPLETE: CPT | Mod: 26

## 2025-08-24 RX ORDER — CLONAZEPAM 0.5 MG/1
0.5 TABLET ORAL AT BEDTIME
Refills: 0 | Status: DISCONTINUED | OUTPATIENT
Start: 2025-08-24 | End: 2025-08-26

## 2025-08-24 RX ADMIN — MYCOPHENOLATE MOFETIL 250 MILLIGRAM(S): 500 TABLET, FILM COATED ORAL at 21:41

## 2025-08-24 RX ADMIN — Medication 1 TABLET(S): at 10:30

## 2025-08-24 RX ADMIN — MEROPENEM 1000 MILLIGRAM(S): 1 INJECTION INTRAVENOUS at 10:31

## 2025-08-24 RX ADMIN — PREDNISONE 5 MILLIGRAM(S): 20 TABLET ORAL at 10:29

## 2025-08-24 RX ADMIN — CLONAZEPAM 0.5 MILLIGRAM(S): 0.5 TABLET ORAL at 04:00

## 2025-08-24 RX ADMIN — Medication 500 MILLIGRAM(S): at 10:30

## 2025-08-24 RX ADMIN — MYCOPHENOLATE MOFETIL 250 MILLIGRAM(S): 500 TABLET, FILM COATED ORAL at 10:30

## 2025-08-24 RX ADMIN — HALOPERIDOL 0.5 MILLIGRAM(S): 10 TABLET ORAL at 21:42

## 2025-08-24 RX ADMIN — MEROPENEM 1000 MILLIGRAM(S): 1 INJECTION INTRAVENOUS at 21:42

## 2025-08-24 RX ADMIN — APIXABAN 2.5 MILLIGRAM(S): 2.5 TABLET, FILM COATED ORAL at 10:29

## 2025-08-24 RX ADMIN — Medication 1000 UNIT(S): at 10:31

## 2025-08-24 RX ADMIN — CLONAZEPAM 0.5 MILLIGRAM(S): 0.5 TABLET ORAL at 21:44

## 2025-08-24 RX ADMIN — APIXABAN 2.5 MILLIGRAM(S): 2.5 TABLET, FILM COATED ORAL at 21:42

## 2025-08-25 LAB
ALBUMIN SERPL ELPH-MCNC: 2.7 G/DL — LOW (ref 3.3–5)
ALP SERPL-CCNC: 158 U/L — HIGH (ref 40–120)
ALT FLD-CCNC: 121 U/L — HIGH (ref 12–78)
ANION GAP SERPL CALC-SCNC: 6 MMOL/L — SIGNIFICANT CHANGE UP (ref 5–17)
AST SERPL-CCNC: 57 U/L — HIGH (ref 15–37)
BILIRUB SERPL-MCNC: 0.3 MG/DL — SIGNIFICANT CHANGE UP (ref 0.2–1.2)
BUN SERPL-MCNC: 28 MG/DL — HIGH (ref 7–23)
CALCIUM SERPL-MCNC: 9.3 MG/DL — SIGNIFICANT CHANGE UP (ref 8.5–10.1)
CHLORIDE SERPL-SCNC: 111 MMOL/L — HIGH (ref 96–108)
CO2 SERPL-SCNC: 23 MMOL/L — SIGNIFICANT CHANGE UP (ref 22–31)
CREAT SERPL-MCNC: 1.05 MG/DL — SIGNIFICANT CHANGE UP (ref 0.5–1.3)
EGFR: 59 ML/MIN/1.73M2 — LOW
EGFR: 59 ML/MIN/1.73M2 — LOW
GLUCOSE SERPL-MCNC: 106 MG/DL — HIGH (ref 70–99)
HCT VFR BLD CALC: 36.2 % — SIGNIFICANT CHANGE UP (ref 34.5–45)
HGB BLD-MCNC: 11.4 G/DL — LOW (ref 11.5–15.5)
MAGNESIUM SERPL-MCNC: 2.1 MG/DL — SIGNIFICANT CHANGE UP (ref 1.6–2.6)
MCHC RBC-ENTMCNC: 28.6 PG — SIGNIFICANT CHANGE UP (ref 27–34)
MCHC RBC-ENTMCNC: 31.5 G/DL — LOW (ref 32–36)
MCV RBC AUTO: 91 FL — SIGNIFICANT CHANGE UP (ref 80–100)
NRBC # BLD AUTO: 0 K/UL — SIGNIFICANT CHANGE UP (ref 0–0)
NRBC # FLD: 0 K/UL — SIGNIFICANT CHANGE UP (ref 0–0)
NRBC BLD AUTO-RTO: 0 /100 WBCS — SIGNIFICANT CHANGE UP (ref 0–0)
PLATELET # BLD AUTO: 183 K/UL — SIGNIFICANT CHANGE UP (ref 150–400)
PMV BLD: 10.1 FL — SIGNIFICANT CHANGE UP (ref 7–13)
POTASSIUM SERPL-MCNC: 3.9 MMOL/L — SIGNIFICANT CHANGE UP (ref 3.5–5.3)
POTASSIUM SERPL-SCNC: 3.9 MMOL/L — SIGNIFICANT CHANGE UP (ref 3.5–5.3)
PROT SERPL-MCNC: 6.2 GM/DL — SIGNIFICANT CHANGE UP (ref 6–8.3)
RBC # BLD: 3.98 M/UL — SIGNIFICANT CHANGE UP (ref 3.8–5.2)
RBC # FLD: 14 % — SIGNIFICANT CHANGE UP (ref 10.3–14.5)
SODIUM SERPL-SCNC: 140 MMOL/L — SIGNIFICANT CHANGE UP (ref 135–145)
WBC # BLD: 5.62 K/UL — SIGNIFICANT CHANGE UP (ref 3.8–10.5)
WBC # FLD AUTO: 5.62 K/UL — SIGNIFICANT CHANGE UP (ref 3.8–10.5)

## 2025-08-25 PROCEDURE — 99232 SBSQ HOSP IP/OBS MODERATE 35: CPT

## 2025-08-25 PROCEDURE — 99223 1ST HOSP IP/OBS HIGH 75: CPT

## 2025-08-25 RX ORDER — POLYETHYLENE GLYCOL 3350 17 G/17G
17 POWDER, FOR SOLUTION ORAL ONCE
Refills: 0 | Status: DISCONTINUED | OUTPATIENT
Start: 2025-08-25 | End: 2025-08-25

## 2025-08-25 RX ORDER — POLYETHYLENE GLYCOL 3350 17 G/17G
17 POWDER, FOR SOLUTION ORAL
Refills: 0 | Status: DISCONTINUED | OUTPATIENT
Start: 2025-08-25 | End: 2025-08-26

## 2025-08-25 RX ORDER — SENNA 187 MG
2 TABLET ORAL AT BEDTIME
Refills: 0 | Status: DISCONTINUED | OUTPATIENT
Start: 2025-08-25 | End: 2025-08-26

## 2025-08-25 RX ADMIN — Medication 2 TABLET(S): at 22:56

## 2025-08-25 RX ADMIN — MEROPENEM 1000 MILLIGRAM(S): 1 INJECTION INTRAVENOUS at 09:33

## 2025-08-25 RX ADMIN — PREDNISONE 5 MILLIGRAM(S): 20 TABLET ORAL at 09:34

## 2025-08-25 RX ADMIN — POLYETHYLENE GLYCOL 3350 17 GRAM(S): 17 POWDER, FOR SOLUTION ORAL at 09:34

## 2025-08-25 RX ADMIN — Medication 1 TABLET(S): at 09:34

## 2025-08-25 RX ADMIN — Medication 1000 UNIT(S): at 09:33

## 2025-08-25 RX ADMIN — APIXABAN 2.5 MILLIGRAM(S): 2.5 TABLET, FILM COATED ORAL at 09:34

## 2025-08-25 RX ADMIN — MYCOPHENOLATE MOFETIL 250 MILLIGRAM(S): 500 TABLET, FILM COATED ORAL at 22:57

## 2025-08-25 RX ADMIN — APIXABAN 2.5 MILLIGRAM(S): 2.5 TABLET, FILM COATED ORAL at 22:56

## 2025-08-25 RX ADMIN — MYCOPHENOLATE MOFETIL 250 MILLIGRAM(S): 500 TABLET, FILM COATED ORAL at 09:33

## 2025-08-25 RX ADMIN — HALOPERIDOL 0.5 MILLIGRAM(S): 10 TABLET ORAL at 22:56

## 2025-08-25 RX ADMIN — MEROPENEM 1000 MILLIGRAM(S): 1 INJECTION INTRAVENOUS at 22:56

## 2025-08-25 RX ADMIN — Medication 500 MILLIGRAM(S): at 09:34

## 2025-08-25 RX ADMIN — POLYETHYLENE GLYCOL 3350 17 GRAM(S): 17 POWDER, FOR SOLUTION ORAL at 22:56

## 2025-08-26 ENCOUNTER — TRANSCRIPTION ENCOUNTER (OUTPATIENT)
Age: 65
End: 2025-08-26

## 2025-08-26 VITALS
DIASTOLIC BLOOD PRESSURE: 84 MMHG | TEMPERATURE: 98 F | RESPIRATION RATE: 18 BRPM | SYSTOLIC BLOOD PRESSURE: 118 MMHG | HEART RATE: 73 BPM | OXYGEN SATURATION: 96 %

## 2025-08-26 LAB
-  AMOXICILLIN/CLAVULANIC ACID: SIGNIFICANT CHANGE UP
-  AMPICILLIN/SULBACTAM: SIGNIFICANT CHANGE UP
-  AMPICILLIN: SIGNIFICANT CHANGE UP
-  AZTREONAM: SIGNIFICANT CHANGE UP
-  CEFAZOLIN: SIGNIFICANT CHANGE UP
-  CEFEPIME: SIGNIFICANT CHANGE UP
-  CEFOXITIN: SIGNIFICANT CHANGE UP
-  CEFTRIAXONE: SIGNIFICANT CHANGE UP
-  CEFUROXIME: SIGNIFICANT CHANGE UP
-  CIPROFLOXACIN: SIGNIFICANT CHANGE UP
-  ERTAPENEM: SIGNIFICANT CHANGE UP
-  GENTAMICIN: SIGNIFICANT CHANGE UP
-  IMIPENEM: SIGNIFICANT CHANGE UP
-  LEVOFLOXACIN: SIGNIFICANT CHANGE UP
-  MEROPENEM: SIGNIFICANT CHANGE UP
-  NITROFURANTOIN: SIGNIFICANT CHANGE UP
-  PIPERACILLIN/TAZOBACTAM: SIGNIFICANT CHANGE UP
-  TIGECYCLINE: SIGNIFICANT CHANGE UP
-  TOBRAMYCIN: SIGNIFICANT CHANGE UP
-  TRIMETHOPRIM/SULFAMETHOXAZOLE: SIGNIFICANT CHANGE UP
ALBUMIN SERPL ELPH-MCNC: 2.9 G/DL — LOW (ref 3.3–5)
ALP SERPL-CCNC: 166 U/L — HIGH (ref 40–120)
ALT FLD-CCNC: 97 U/L — HIGH (ref 12–78)
ANION GAP SERPL CALC-SCNC: 3 MMOL/L — LOW (ref 5–17)
AST SERPL-CCNC: 35 U/L — SIGNIFICANT CHANGE UP (ref 15–37)
BILIRUB SERPL-MCNC: 0.3 MG/DL — SIGNIFICANT CHANGE UP (ref 0.2–1.2)
BUN SERPL-MCNC: 29 MG/DL — HIGH (ref 7–23)
CALCIUM SERPL-MCNC: 9.5 MG/DL — SIGNIFICANT CHANGE UP (ref 8.5–10.1)
CHLORIDE SERPL-SCNC: 111 MMOL/L — HIGH (ref 96–108)
CO2 SERPL-SCNC: 27 MMOL/L — SIGNIFICANT CHANGE UP (ref 22–31)
CREAT SERPL-MCNC: 1.01 MG/DL — SIGNIFICANT CHANGE UP (ref 0.5–1.3)
CULTURE RESULTS: ABNORMAL
EGFR: 62 ML/MIN/1.73M2 — SIGNIFICANT CHANGE UP
EGFR: 62 ML/MIN/1.73M2 — SIGNIFICANT CHANGE UP
GLUCOSE SERPL-MCNC: 97 MG/DL — SIGNIFICANT CHANGE UP (ref 70–99)
METHOD TYPE: SIGNIFICANT CHANGE UP
ORGANISM # SPEC MICROSCOPIC CNT: ABNORMAL
ORGANISM # SPEC MICROSCOPIC CNT: SIGNIFICANT CHANGE UP
POTASSIUM SERPL-MCNC: 3.9 MMOL/L — SIGNIFICANT CHANGE UP (ref 3.5–5.3)
POTASSIUM SERPL-SCNC: 3.9 MMOL/L — SIGNIFICANT CHANGE UP (ref 3.5–5.3)
PROT SERPL-MCNC: 6.6 GM/DL — SIGNIFICANT CHANGE UP (ref 6–8.3)
SODIUM SERPL-SCNC: 141 MMOL/L — SIGNIFICANT CHANGE UP (ref 135–145)
SPECIMEN SOURCE: SIGNIFICANT CHANGE UP

## 2025-08-26 PROCEDURE — 99239 HOSP IP/OBS DSCHRG MGMT >30: CPT

## 2025-08-26 PROCEDURE — 99232 SBSQ HOSP IP/OBS MODERATE 35: CPT

## 2025-08-26 RX ORDER — CEFUROXIME SODIUM 1.5 G
1 VIAL (EA) INJECTION
Qty: 14 | Refills: 0
Start: 2025-08-26 | End: 2025-09-01

## 2025-08-26 RX ORDER — PREDNISONE 20 MG/1
1 TABLET ORAL
Refills: 0 | DISCHARGE

## 2025-08-26 RX ORDER — FOSFOMYCIN TROMETHAMINE 3 G/1
1 GRANULE, FOR SOLUTION ORAL
Qty: 0 | Refills: 0 | DISCHARGE

## 2025-08-26 RX ORDER — MEROPENEM 1 G/30ML
1000 INJECTION INTRAVENOUS EVERY 8 HOURS
Refills: 0 | Status: DISCONTINUED | OUTPATIENT
Start: 2025-08-26 | End: 2025-08-26

## 2025-08-26 RX ADMIN — Medication 1000 UNIT(S): at 09:22

## 2025-08-26 RX ADMIN — APIXABAN 2.5 MILLIGRAM(S): 2.5 TABLET, FILM COATED ORAL at 09:21

## 2025-08-26 RX ADMIN — PREDNISONE 5 MILLIGRAM(S): 20 TABLET ORAL at 09:22

## 2025-08-26 RX ADMIN — Medication 500 MILLIGRAM(S): at 09:21

## 2025-08-26 RX ADMIN — MYCOPHENOLATE MOFETIL 250 MILLIGRAM(S): 500 TABLET, FILM COATED ORAL at 09:22

## 2025-08-26 RX ADMIN — MEROPENEM 1000 MILLIGRAM(S): 1 INJECTION INTRAVENOUS at 11:14

## 2025-08-26 RX ADMIN — Medication 1 TABLET(S): at 09:22

## 2025-09-02 DIAGNOSIS — D84.9 IMMUNODEFICIENCY, UNSPECIFIED: ICD-10-CM

## 2025-09-02 DIAGNOSIS — Z86.718 PERSONAL HISTORY OF OTHER VENOUS THROMBOSIS AND EMBOLISM: ICD-10-CM

## 2025-09-02 DIAGNOSIS — Z85.3 PERSONAL HISTORY OF MALIGNANT NEOPLASM OF BREAST: ICD-10-CM

## 2025-09-02 DIAGNOSIS — N39.0 URINARY TRACT INFECTION, SITE NOT SPECIFIED: ICD-10-CM

## 2025-09-02 DIAGNOSIS — Z94.0 KIDNEY TRANSPLANT STATUS: ICD-10-CM

## 2025-09-02 DIAGNOSIS — F25.0 SCHIZOAFFECTIVE DISORDER, BIPOLAR TYPE: ICD-10-CM

## 2025-09-02 DIAGNOSIS — Z92.3 PERSONAL HISTORY OF IRRADIATION: ICD-10-CM

## 2025-09-02 DIAGNOSIS — Z79.01 LONG TERM (CURRENT) USE OF ANTICOAGULANTS: ICD-10-CM

## 2025-09-02 DIAGNOSIS — N18.30 CHRONIC KIDNEY DISEASE, STAGE 3 UNSPECIFIED: ICD-10-CM

## 2025-09-02 DIAGNOSIS — Z88.1 ALLERGY STATUS TO OTHER ANTIBIOTIC AGENTS: ICD-10-CM

## 2025-09-02 DIAGNOSIS — B96.1 KLEBSIELLA PNEUMONIAE [K. PNEUMONIAE] AS THE CAUSE OF DISEASES CLASSIFIED ELSEWHERE: ICD-10-CM

## 2025-09-02 DIAGNOSIS — E78.5 HYPERLIPIDEMIA, UNSPECIFIED: ICD-10-CM

## 2025-09-02 DIAGNOSIS — Z79.52 LONG TERM (CURRENT) USE OF SYSTEMIC STEROIDS: ICD-10-CM

## 2025-09-02 DIAGNOSIS — D47.2 MONOCLONAL GAMMOPATHY: ICD-10-CM

## 2025-09-02 DIAGNOSIS — Z79.899 OTHER LONG TERM (CURRENT) DRUG THERAPY: ICD-10-CM

## 2025-09-02 DIAGNOSIS — Z16.35 RESISTANCE TO MULTIPLE ANTIMICROBIAL DRUGS: ICD-10-CM

## 2025-09-02 DIAGNOSIS — R74.01 ELEVATION OF LEVELS OF LIVER TRANSAMINASE LEVELS: ICD-10-CM

## 2025-09-02 DIAGNOSIS — Z90.710 ACQUIRED ABSENCE OF BOTH CERVIX AND UTERUS: ICD-10-CM

## 2025-09-02 DIAGNOSIS — M10.9 GOUT, UNSPECIFIED: ICD-10-CM

## 2025-09-02 DIAGNOSIS — N17.9 ACUTE KIDNEY FAILURE, UNSPECIFIED: ICD-10-CM

## 2025-09-02 DIAGNOSIS — T86.19 OTHER COMPLICATION OF KIDNEY TRANSPLANT: ICD-10-CM
